# Patient Record
Sex: MALE | Race: WHITE | NOT HISPANIC OR LATINO | Employment: OTHER | URBAN - NONMETROPOLITAN AREA
[De-identification: names, ages, dates, MRNs, and addresses within clinical notes are randomized per-mention and may not be internally consistent; named-entity substitution may affect disease eponyms.]

---

## 2017-01-20 ENCOUNTER — LAB (OUTPATIENT)
Dept: ONCOLOGY | Facility: CLINIC | Age: 42
End: 2017-01-20

## 2017-01-20 ENCOUNTER — OFFICE VISIT (OUTPATIENT)
Dept: ONCOLOGY | Facility: CLINIC | Age: 42
End: 2017-01-20

## 2017-01-20 VITALS
DIASTOLIC BLOOD PRESSURE: 83 MMHG | OXYGEN SATURATION: 94 % | BODY MASS INDEX: 53.05 KG/M2 | SYSTOLIC BLOOD PRESSURE: 131 MMHG | HEART RATE: 91 BPM | RESPIRATION RATE: 18 BRPM | WEIGHT: 315 LBS | TEMPERATURE: 97.6 F

## 2017-01-20 DIAGNOSIS — I10 ESSENTIAL HYPERTENSION: ICD-10-CM

## 2017-01-20 DIAGNOSIS — D75.1 POLYCYTHEMIA: Primary | ICD-10-CM

## 2017-01-20 DIAGNOSIS — R93.89 MEDIASTINAL WIDENING: ICD-10-CM

## 2017-01-20 DIAGNOSIS — IMO0002 UNCONTROLLED TYPE 2 DIABETES MELLITUS WITH OTHER CIRCULATORY COMPLICATION: ICD-10-CM

## 2017-01-20 LAB
BASOPHILS # BLD AUTO: 0.05 10*3/MM3 (ref 0–0.3)
BASOPHILS NFR BLD AUTO: 0.5 % (ref 0–2)
DEPRECATED RDW RBC AUTO: 43.8 FL (ref 37–54)
EOSINOPHIL # BLD AUTO: 1.21 10*3/MM3 (ref 0–0.7)
EOSINOPHIL NFR BLD AUTO: 13.3 % (ref 0–5)
ERYTHROCYTE [DISTWIDTH] IN BLOOD BY AUTOMATED COUNT: 13.2 % (ref 11.5–14.5)
HCT VFR BLD AUTO: 52.7 % (ref 42–52)
HGB BLD-MCNC: 18.6 G/DL (ref 14–18)
IMM GRANULOCYTES # BLD: 0.03 10*3/MM3 (ref 0–0.03)
IMM GRANULOCYTES NFR BLD: 0.3 % (ref 0–0.5)
LYMPHOCYTES # BLD AUTO: 2.9 10*3/MM3 (ref 1–3)
LYMPHOCYTES NFR BLD AUTO: 31.9 % (ref 21–51)
MCH RBC QN AUTO: 32.4 PG (ref 27–33)
MCHC RBC AUTO-ENTMCNC: 35.3 G/DL (ref 33–37)
MCV RBC AUTO: 91.8 FL (ref 80–94)
MONOCYTES # BLD AUTO: 0.85 10*3/MM3 (ref 0.1–0.9)
MONOCYTES NFR BLD AUTO: 9.3 % (ref 0–10)
NEUTROPHILS # BLD AUTO: 4.06 10*3/MM3 (ref 1.4–6.5)
NEUTROPHILS NFR BLD AUTO: 44.7 % (ref 30–70)
PLATELET # BLD AUTO: 197 10*3/MM3 (ref 130–400)
PMV BLD AUTO: 10.6 FL (ref 6–10)
RBC # BLD AUTO: 5.74 10*6/MM3 (ref 4.7–6.1)
WBC NRBC COR # BLD: 9.1 10*3/MM3 (ref 4.5–12.5)

## 2017-01-20 PROCEDURE — 85025 COMPLETE CBC W/AUTO DIFF WBC: CPT | Performed by: INTERNAL MEDICINE

## 2017-01-20 PROCEDURE — 99214 OFFICE O/P EST MOD 30 MIN: CPT | Performed by: INTERNAL MEDICINE

## 2017-01-20 NOTE — LETTER
January 20, 2017     TYSON Sepulveda  [address] [facility] [address]    Patient: Johnathon Mclain   YOB: 1975   Date of Visit: 1/20/2017       Dear TYSON Argueta:    Johnathon Mclain was in my office today. Below are the relevant portions of my assessment and plan of care.      Polycythemiaassess for a myeloproliferative disorder I obtained a [id number] and Exon 12/CALR/MPL mutations which were negative. BCR ABL PCR was <0.001%. 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 I did make a sleep referral however he has not proceeded as he wanted to see if he would qualify for an at home study however insurance did not approve this. Therefore will make an appointment at the sleep center for testing. Abdominal ultrasound did show mild splenomegaly. He has not yet had pulmonary function tests and given his exposure to second hand smoke will obtain these. Will also obtain an erythropoietin level during his next visit. In the interim I have initiated phlebotomy for one unit (500 mL) to be taken every 2 weeks with goal of Hct <50%. He currently does not take iron supplementation.     Mediastinal Widening  This was seen on CXR in evaluation for secondary polycythemia therefore will obtain a CT Chest with IV contrast to further evaluate.     Elevated Liver Function Tests  Abdominal US was negative with mild splenomegaly.     I will have the patient return in follow up appointment to review test results in one month. He understands that should he have any questions or concerns prior to his appointment he should give us a call at any time and I would be happy to see him sooner. It was a pleasure to see this patient in clinic today, thank you for allowing me to participate in the care of this patient.     I spent 26 minutes in regards to this patient’s care today. More than 15 minutes of the time was spent in direct interaction with the patient for  the above problems.      Shy Lowe MD  01/20/2017  2:42 PM        If you have questions, please do not hesitate to call me. I look forward to following Johnathon along with you.         Sincerely,        Shy Lowe MD        CC: No Recipients

## 2017-01-20 NOTE — PROGRESS NOTES
Johnathon Mclain  [id number]  1975 1/20/2017    Referring Provider:   TYSON Sepulveda     Reason for Consultation:   Polycythemia/Erythrocytosis     Chief Complaint:  Fatigue      History of Present Illness   Johnathon Mclain is a very pleasant 41 y.o.  male who presents in new consultation at the request of TYSON Vinson for further management and evaluation of polycythemia.      Mr. Mclain reports that he has had an erythrocytosis for at least the last six months. He has also had some phlebotomy with his primary care provider. He denies of any significant weight changes, fevers, night sweats or lymphadenopathy. He denies of any significant headaches or pruritis. He does not have any abnormal bleeding or thrombosis history. He is a never smoker and is not on testosterone supplementation. His wife notes that he does snore at night and at time he does awake himself in the middle of his sleep. He has never been tested for obstructive sleep apnea.       The following portions of the patient's history were reviewed and updated as appropriate: allergies, current medications, past family history, past medical history, past social history, past surgical history and problem list.    No Known Allergies    Past Medical History   Diagnosis Date   • COPD (chronic obstructive pulmonary disease)    • Diabetes mellitus    • GERD (gastroesophageal reflux disease)    • Gout    • Hyperlipidemia      mixed   • Hypertension    • Neuralgia and neuritis    • Osteoarthrosis    • Type II diabetes mellitus, uncontrolled      uncomplicated       History reviewed. No pertinent past surgical history.    Social History     Social History   • Marital status:      Spouse name: N/A   • Number of children: N/A   • Years of education: N/A     Occupational History   • Not on file.     Social History Main Topics   • Smoking status: Never Smoker   • Smokeless tobacco: Current User     Types: Chew   • Alcohol  use No   • Drug use: No   • Sexual activity: Not on file     Other Topics Concern   • Not on file     Social History Narrative       Family History   Problem Relation Age of Onset   • Arthritis Mother    • COPD Mother    • Asthma Mother    • Cancer Mother    • Hyperlipidemia Mother    • Diabetes Father    • Heart disease Father    • Hyperlipidemia Father    • Hypertension Father    • Stroke Father          Current Outpatient Prescriptions:   •  albuterol (PROVENTIL HFA;VENTOLIN HFA) 108 (90 BASE) MCG/ACT inhaler, Inhale 1 puff As Needed for wheezing., Disp: 1 inhaler, Rfl: 5  •  aspirin  MG EC tablet, Take 325 mg by mouth daily., Disp: , Rfl:   •  Canagliflozin (INVOKANA) 100 MG tablet, Take 100 mg by mouth Daily., Disp: 30 tablet, Rfl: 5  •  carvedilol (COREG) 6.25 MG tablet, Take 1 tablet by mouth 2 (Two) Times a Day., Disp: 60 tablet, Rfl: 5  •  chlorthalidone (HYGROTON) 25 MG tablet, Take 1 tablet by mouth Daily., Disp: 30 tablet, Rfl: 5  •  cholecalciferol (VITAMIN D3) 1000 UNITS tablet, Take 1,000 Units by mouth 2 (two) times a day., Disp: , Rfl:   •  clobetasol (TEMOVATE) 0.05 % cream, Apply  topically 2 (Two) Times a Day., Disp: 30 g, Rfl: 5  •  clotrimazole (LOTRIMIN) 1 % cream, Apply 1 application topically daily., Disp: , Rfl:   •  colchicine 0.6 MG tablet, Take 1 tablet by mouth Daily., Disp: 30 tablet, Rfl: 5  •  febuxostat (ULORIC) 80 MG tablet tablet, Take 1 tablet by mouth Daily., Disp: 30 tablet, Rfl: 5  •  fenofibrate (TRICOR) 48 MG tablet, Take 1 tablet by mouth Daily., Disp: 30 tablet, Rfl: 5  •  fluticasone (FLONASE) 50 MCG/ACT nasal spray, 2 sprays into each nostril Daily. Administer 2 sprays in each nostril for each dose., Disp: 1 bottle, Rfl: 5  •  gabapentin (NEURONTIN) 600 MG tablet, Take 1 tablet by mouth 3 (Three) Times a Day., Disp: 90 tablet, Rfl: 5  •  glipiZIDE (GLUCOTROL) 5 MG tablet, Take 1 tablet by mouth Daily., Disp: 30 tablet, Rfl: 5  •  HYDROcodone-acetaminophen (NORCO)  7.5-325 MG per tablet, Take 1 tablet by mouth Every 6 (Six) Hours As Needed for moderate pain (4-6) or severe pain (7-10)., Disp: 60 tablet, Rfl: 0  •  icosapent ethyl (VASCEPA) 1 G capsule capsule, Take 1 g by mouth 2 (Two) Times a Day With Meals., Disp: 120 capsule, Rfl: 5  •  lisinopril (PRINIVIL,ZESTRIL) 20 MG tablet, Take 1 tablet by mouth 2 (Two) Times a Day., Disp: 60 tablet, Rfl: 5  •  Loratadine 10 MG capsule, Take 10 mg by mouth Daily., Disp: 30 each, Rfl: 5  •  metFORMIN (GLUCOPHAGE) 1000 MG tablet, Take 1 tablet by mouth 2 (Two) Times a Day., Disp: 60 tablet, Rfl: 5  •  omeprazole (priLOSEC) 20 MG capsule, Take 1 capsule by mouth Daily., Disp: 30 capsule, Rfl: 5        Review of Systems  A comprehensive 12 point review of systems was conducted with patient and positive as per HPI otherwise negative.         Physical Exam  Vital Signs: These were reviewed and listed as per patient’s electronic medical chart  Vitals:    01/20/17 1116   BP: 131/83   Pulse: 91   Resp: 18   Temp: 97.6 °F (36.4 °C)   SpO2: 94%     General: Awake, alert and oriented, in no distress, obese  HEENT: Head is atraumatic, normocephalic, extraocular movements full, oropharynx clear, no scleral icterus, pink moist mucous membranes, large neck  Neck: supple, no jvd, lymphadenopathy or masses  Cardiovascular: regular rate and rhythm without murmurs, rubs or gallops  Pulmonary: non-labored, clear to auscultation bilaterally, no wheezing  Abdomen: soft, non-tender, non-distended, normal active bowel sounds present, no organomegaly  Extremities: No clubbing, cyanosis or edema  Lymph: No cervical, supraclavicular, axillary, adenopathy  Neurologic: Mental status as above, alert, awake and oriented, grossly non-focal exam  Skin: warm, dry, intact        Labs / Studies:    Office Visit on 01/20/2017   Component Date Value   • WBC 01/20/2017 9.10    • RBC 01/20/2017 5.74    • Hemoglobin 01/20/2017 18.6*   • Hematocrit 01/20/2017 52.7*   • MCV  01/20/2017 91.8    • MCH 01/20/2017 32.4    • MCHC 01/20/2017 35.3    • RDW 01/20/2017 13.2    • RDW-SD 01/20/2017 43.8    • MPV 01/20/2017 10.6*   • Platelets 01/20/2017 197    • Neutrophil % 01/20/2017 44.7    • Lymphocyte % 01/20/2017 31.9    • Monocyte % 01/20/2017 9.3    • Eosinophil % 01/20/2017 13.3*   • Basophil % 01/20/2017 0.5    • Immature Grans % 01/20/2017 0.3    • Neutrophils, Absolute 01/20/2017 4.06    • Lymphocytes, Absolute 01/20/2017 2.90    • Monocytes, Absolute 01/20/2017 0.85    • Eosinophils, Absolute 01/20/2017 1.21*   • Basophils, Absolute 01/20/2017 0.05    • Immature Grans, Absolute 01/20/2017 0.03    Lab on 12/22/2016   Component Date Value   • WBC 12/22/2016 9.37    • RBC 12/22/2016 5.79    • Hemoglobin 12/22/2016 18.7*   • Hematocrit 12/22/2016 53.9*   • MCV 12/22/2016 93.1    • MCH 12/22/2016 32.3    • MCHC 12/22/2016 34.7    • RDW 12/22/2016 13.1    • RDW-SD 12/22/2016 44.2    • MPV 12/22/2016 10.5*   • Platelets 12/22/2016 183    • Neutrophil % 12/22/2016 50.6    • Lymphocyte % 12/22/2016 28.3    • Monocyte % 12/22/2016 9.2    • Eosinophil % 12/22/2016 11.2*   • Basophil % 12/22/2016 0.4    • Immature Grans % 12/22/2016 0.3    • Neutrophils, Absolute 12/22/2016 4.74    • Lymphocytes, Absolute 12/22/2016 2.65    • Monocytes, Absolute 12/22/2016 0.86    • Eosinophils, Absolute 12/22/2016 1.05*   • Basophils, Absolute 12/22/2016 0.04    • Immature Grans, Absolute 12/22/2016 0.03    • Pulse 12/22/2016 95    • Volume Collected 12/22/2016 510    • Pre-Hgb 12/22/2016 18.7    • Pre-Hct 12/22/2016 53.9    • Pre-Blood Pressure 12/22/2016 150/95    • Post-Blood Pressure 12/22/2016 144/85    Consult on 12/16/2016   Component Date Value   • B2A2 transcript 12/16/2016 Comment    • B3A2 transcript 12/16/2016 Comment    • E1A2 transcript 12/16/2016 Comment    • Interpretation 12/16/2016 Comment    • Director Review 12/16/2016 Comment    • Background: 12/16/2016 Comment    • JAK2 [id number] Mutation  12/16/2016 Comment    • Background: 12/16/2016 Comment    • Director Review 12/16/2016 Comment    • Reflex 12/16/2016 Comment    • Glucose 12/16/2016 185*   • BUN 12/16/2016 24*   • Creatinine 12/16/2016 1.03    • Sodium 12/16/2016 136    • Potassium 12/16/2016 4.4    • Chloride 12/16/2016 100    • CO2 12/16/2016 27.1    • Calcium 12/16/2016 9.9    • Total Protein 12/16/2016 7.4    • Albumin 12/16/2016 4.50    • ALT (SGPT) 12/16/2016 86*   • AST (SGOT) 12/16/2016 45*   • Alkaline Phosphatase 12/16/2016 68    • Total Bilirubin 12/16/2016 0.9    • eGFR Non  Amer 12/16/2016 79    • Globulin 12/16/2016 2.9    • A/G Ratio 12/16/2016 1.6    • BUN/Creatinine Ratio 12/16/2016 23.3    • Anion Gap 12/16/2016 8.9    • Erythropoietin 12/16/2016 10.5    • WBC 12/16/2016 8.63    • RBC 12/16/2016 5.70    • Hemoglobin 12/16/2016 18.1*   • Hematocrit 12/16/2016 52.3*   • MCV 12/16/2016 91.8    • MCH 12/16/2016 31.8    • MCHC 12/16/2016 34.6    • RDW 12/16/2016 13.3    • RDW-SD 12/16/2016 43.7    • MPV 12/16/2016 10.2*   • Platelets 12/16/2016 214    • Neutrophil % 12/16/2016 49.0    • Lymphocyte % 12/16/2016 27.3    • Monocyte % 12/16/2016 9.8    • Eosinophil % 12/16/2016 13.1*   • Basophil % 12/16/2016 0.6    • Immature Grans % 12/16/2016 0.2    • Neutrophils, Absolute 12/16/2016 4.22    • Lymphocytes, Absolute 12/16/2016 2.36    • Monocytes, Absolute 12/16/2016 0.85    • Eosinophils, Absolute 12/16/2016 1.13*   • Basophils, Absolute 12/16/2016 0.05    • Immature Grans, Absolute 12/16/2016 0.02    • Osmolality Calc 12/16/2016 281    • CALR Mutation Detection * 12/16/2016 Comment    • Background: 12/16/2016 Comment    • Methodology 12/16/2016 Comment    • Reference: 12/16/2016 Comment    • Director Review 12/16/2016 Comment    • JAK2 Exon 12 Analysis Re* 12/16/2016 Comment    • Background 12/16/2016 Comment    • Director Review 12/16/2016 Comment    • MPL Mutation Analysis Re* 12/16/2016 Comment    • Background 12/16/2016  Comment    • Method: 12/16/2016 Comment    • Reference 12/16/2016 Comment    • Director Review: 12/16/2016 Comment    Office Visit on 12/08/2016   Component Date Value   • Glucose 12/08/2016 163*   • BUN 12/08/2016 22*   • Creatinine 12/08/2016 0.93    • Sodium 12/08/2016 137    • Potassium 12/08/2016 4.8    • Chloride 12/08/2016 104    • CO2 12/08/2016 24.1*   • Calcium 12/08/2016 10.4*   • Total Protein 12/08/2016 7.6    • Albumin 12/08/2016 4.80    • ALT (SGPT) 12/08/2016 84*   • AST (SGOT) 12/08/2016 41*   • Alkaline Phosphatase 12/08/2016 74    • Total Bilirubin 12/08/2016 1.1    • eGFR Non  Amer 12/08/2016 89    • Globulin 12/08/2016 2.8    • A/G Ratio 12/08/2016 1.7    • BUN/Creatinine Ratio 12/08/2016 23.7    • Anion Gap 12/08/2016 8.9    • Total Cholesterol 12/08/2016 171    • Triglycerides 12/08/2016 306*   • HDL Cholesterol 12/08/2016 29*   • LDL Cholesterol  12/08/2016 81    • VLDL Cholesterol 12/08/2016 61.2    • LDL/HDL Ratio 12/08/2016 2.79    • TSH 12/08/2016 0.803    • Hemoglobin A1C 12/08/2016 6.80*   • Uric Acid 12/08/2016 4.6    • WBC 12/08/2016 9.77    • RBC 12/08/2016 5.94    • Hemoglobin 12/08/2016 18.5*   • Hematocrit 12/08/2016 54.8*   • MCV 12/08/2016 92.3    • MCH 12/08/2016 31.1    • MCHC 12/08/2016 33.8    • RDW 12/08/2016 13.6    • RDW-SD 12/08/2016 45.0    • MPV 12/08/2016 10.6*   • Platelets 12/08/2016 215    • Neutrophil % 12/08/2016 50.4    • Lymphocyte % 12/08/2016 23.2    • Monocyte % 12/08/2016 11.2*   • Eosinophil % 12/08/2016 13.7*   • Basophil % 12/08/2016 1.0    • Immature Grans % 12/08/2016 0.5    • Neutrophils, Absolute 12/08/2016 4.92    • Lymphocytes, Absolute 12/08/2016 2.27    • Monocytes, Absolute 12/08/2016 1.09*   • Eosinophils, Absolute 12/08/2016 1.34*   • Basophils, Absolute 12/08/2016 0.10    • Immature Grans, Absolute 12/08/2016 0.05*   • Osmolality Calc 12/08/2016 281           12/30/16: US ABDOMEN COMPLETE: Visualized pancreas is unremarkable. The  imaged portion of the abdominal aorta is nondilated.  The liver is homogeneous. There is no intrahepatic ductal dilatation or focal hepatic mass. The imaged portion of the hepatic vessels and inferior vena cava are patent. The gallbladder shows no cholelithiasis or pericholecystic fluid. The wall of the gallbladder is not thickened. It measures 2.9 mm. The common bile duct is normal, measuring 4.8 mm. The kidneys demonstrate no evidence of hydronephrosis or solid renal mass. The spleen is homogeneous and measures 14.2 cm which is slightly enlarged. IMPRESSION: Negative abdominal ultrasound.     12/30/16: XR CHEST PA AND LATERAL: The cardiac silhouette is normal in size. There is some slight widening of the upper mediastinum. This can be seen with tortuous vessels. I could not entirely exclude some right paratracheal adenopathy. Correlation with old films or CT may add additional information. The lungs show increased interstitial markings throughout the lungs. No alveolar infiltrates to suggest pneumonia are seen; there are no signs of heart failure or pleural fluid. IMPRESSION: Slight widening of the mediastinum with fullness in the right paratracheal area. There is interstitial lung disease throughout both lungs.            Assessment/Plan   Polycythemia  To assess for a myeloproliferative disorder I obtained a [id number] and Exon 12/CALR/MPL mutations which were negative. BCR ABL PCR was <0.001%. 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 I did make a sleep referral however he has not proceeded as he wanted to see if he would qualify for an at home study however insurance did not approve this. Therefore will make an appointment at the sleep center for testing. Abdominal ultrasound did show mild splenomegaly. He has not yet had pulmonary function tests and given his exposure to second hand smoke will obtain these. Will also obtain an erythropoietin level during his  next visit. In the interim I have initiated phlebotomy for one unit (500 mL) to be taken every 2 weeks with goal of Hct <50%. He currently does not take iron supplementation.     Mediastinal Widening  This was seen on CXR in evaluation for secondary polycythemia therefore will obtain a CT Chest with IV contrast to further evaluate.     Elevated Liver Function Tests  Abdominal US was negative with mild splenomegaly.     I will have the patient return in follow up appointment to review test results in one month. He understands that should he have any questions or concerns prior to his appointment he should give us a call at any time and I would be happy to see him sooner. It was a pleasure to see this patient in clinic today, thank you for allowing me to participate in the care of this patient.     I spent 26 minutes in regards to this patient’s care today. More than 15 minutes of the time was spent in direct interaction with the patient for the above problems.      Shy Lowe MD  01/20/2017  2:42 PM

## 2017-01-20 NOTE — MR AVS SNAPSHOT
Johnathon Mclain   1/20/2017 11:45 AM   Office Visit    Dept Phone:  [phone]   Encounter #:  [id number]    Provider:  Shy Lowe MD   Department:  [facility] HEMATOLOGY  AND ONCOLOGY                Your Full Care Plan              Your Updated Medication List          This list is accurate as of: 1/20/17 12:16 PM.  Always use your most recent med list.                albuterol 108 (90 BASE) MCG/ACT inhaler   Commonly known as:  PROVENTIL HFA;VENTOLIN HFA   Inhale 1 puff As Needed for wheezing.       aspirin  MG tablet       Canagliflozin 100 MG tablet   Commonly known as:  INVOKANA   Take 100 mg by mouth Daily.       carvedilol 6.25 MG tablet   Commonly known as:  COREG   Take 1 tablet by mouth 2 (Two) Times a Day.       chlorthalidone 25 MG tablet   Commonly known as:  HYGROTON   Take 1 tablet by mouth Daily.       cholecalciferol 1000 UNITS tablet   Commonly known as:  VITAMIN D3       clobetasol 0.05 % cream   Commonly known as:  TEMOVATE   Apply  topically 2 (Two) Times a Day.       clotrimazole 1 % cream   Commonly known as:  LOTRIMIN       colchicine 0.6 MG tablet   Take 1 tablet by mouth Daily.       febuxostat 80 MG tablet tablet   Commonly known as:  ULORIC   Take 1 tablet by mouth Daily.       fenofibrate 48 MG tablet   Commonly known as:  TRICOR   Take 1 tablet by mouth Daily.       fluticasone 50 MCG/ACT nasal spray   Commonly known as:  FLONASE   2 sprays into each nostril Daily. Administer 2 sprays in each nostril for each dose.       gabapentin 600 MG tablet   Commonly known as:  NEURONTIN   Take 1 tablet by mouth 3 (Three) Times a Day.       glipiZIDE 5 MG tablet   Commonly known as:  GLUCOTROL   Take 1 tablet by mouth Daily.       HYDROcodone-acetaminophen 7.5-325 MG per tablet   Commonly known as:  NORCO   Take 1 tablet by mouth Every 6 (Six) Hours As Needed for moderate pain (4-6) or severe pain (7-10).       icosapent ethyl 1 G capsule  capsule   Commonly known as:  VASCEPA   Take 1 g by mouth 2 (Two) Times a Day With Meals.       lisinopril 20 MG tablet   Commonly known as:  PRINIVIL,ZESTRIL   Take 1 tablet by mouth 2 (Two) Times a Day.       Loratadine 10 MG capsule   Take 10 mg by mouth Daily.       metFORMIN 1000 MG tablet   Commonly known as:  GLUCOPHAGE   Take 1 tablet by mouth 2 (Two) Times a Day.       omeprazole 20 MG capsule   Commonly known as:  priLOSEC   Take 1 capsule by mouth Daily.               We Performed the Following     CBC & Differential     CBC Auto Differential       You Were Diagnosed With        Codes Comments    Polycythemia    -  Primary ICD-10-CM: D75.1  ICD-9-CM: 238.4     Mediastinal widening     ICD-10-CM: R93.8  ICD-9-CM: 793.2       Instructions     None    Patient Instructions History      Upcoming Appointments     Visit Type Date Time Department    FOLLOW UP 1/20/2017 11:45 AM [facility]    LAB 1/20/2017 12:15 PM [facility]    FOLLOW UP 3/3/2017 11:00 AM [facility]    OFFICE VISIT 6/8/2017  8:00 AM [facility]      Lockbox Signup     Our records indicate that you have an active [employer] account.    You can view your After Visit Summary by going to [employer] and logging in with your Lockbox username and password.  If you don't have a Lockbox username and password but a parent or guardian has access to your record, the parent or guardian should login with their own Lockbox username and password and access your record to view the After Visit Summary.    If you have questions, you can email [email or web address] or call [phone] to talk to our Lockbox staff.  Remember, Lockbox is NOT to be used for urgent needs.  For medical emergencies, dial 911.               Other Info from Your Visit           Your Appointments     Mar 03, 2017 11:00 AM EST   Follow Up with Shy Lowe MD   [facility]  [facility]COL[facility] ([place])    [address]  [facility] [facility]  [facility] [id number] [phone]           Arrive 15 minutes prior to appointment.            Jun 08, 2017  8:00 AM EDT   Office Visit with TYSON Sepulveda   [facility] (--)    [address] [place] [id number] [phone]           Please arrive 10 minutes early, bring a complete list of all medications and bring any previous records or diagnostic testing results.              Allergies     No Known Allergies      Vital Signs     Blood Pressure Pulse Temperature Respirations Weight Oxygen Saturation    131/83 91 97.6 °F (36.4 °C) (Oral) 18 375 lb (170 kg) 94%    Body Mass Index Smoking Status                53.05 kg/m2 Never Smoker          Problems and Diagnoses Noted     Elevated red blood cell count    -  Primary    Mediastinal widening

## 2017-01-23 ENCOUNTER — LAB (OUTPATIENT)
Dept: ONCOLOGY | Facility: CLINIC | Age: 42
End: 2017-01-23

## 2017-01-23 VITALS
TEMPERATURE: 96.8 F | BODY MASS INDEX: 53.36 KG/M2 | HEART RATE: 92 BPM | SYSTOLIC BLOOD PRESSURE: 126 MMHG | DIASTOLIC BLOOD PRESSURE: 81 MMHG | RESPIRATION RATE: 18 BRPM | WEIGHT: 315 LBS | OXYGEN SATURATION: 95 %

## 2017-01-23 DIAGNOSIS — D75.1 POLYCYTHEMIA: Primary | ICD-10-CM

## 2017-01-23 LAB
BASOPHILS # BLD AUTO: 0.04 10*3/MM3 (ref 0–0.3)
BASOPHILS NFR BLD AUTO: 0.5 % (ref 0–2)
DEPRECATED RDW RBC AUTO: 44.2 FL (ref 37–54)
EOSINOPHIL # BLD AUTO: 1.29 10*3/MM3 (ref 0–0.7)
EOSINOPHIL NFR BLD AUTO: 14.8 % (ref 0–5)
ERYTHROCYTE [DISTWIDTH] IN BLOOD BY AUTOMATED COUNT: 13.3 % (ref 11.5–14.5)
HCT VFR BLD AUTO: 53 % (ref 42–52)
HGB BLD-MCNC: 19.1 G/DL (ref 14–18)
IMM GRANULOCYTES # BLD: 0.03 10*3/MM3 (ref 0–0.03)
IMM GRANULOCYTES NFR BLD: 0.3 % (ref 0–0.5)
LYMPHOCYTES # BLD AUTO: 2.79 10*3/MM3 (ref 1–3)
LYMPHOCYTES NFR BLD AUTO: 32.1 % (ref 21–51)
MCH RBC QN AUTO: 33.2 PG (ref 27–33)
MCHC RBC AUTO-ENTMCNC: 36 G/DL (ref 33–37)
MCV RBC AUTO: 92 FL (ref 80–94)
MONOCYTES # BLD AUTO: 0.94 10*3/MM3 (ref 0.1–0.9)
MONOCYTES NFR BLD AUTO: 10.8 % (ref 0–10)
NEUTROPHILS # BLD AUTO: 3.61 10*3/MM3 (ref 1.4–6.5)
NEUTROPHILS NFR BLD AUTO: 41.5 % (ref 30–70)
PLATELET # BLD AUTO: 193 10*3/MM3 (ref 130–400)
PMV BLD AUTO: 10.5 FL (ref 6–10)
POST-BLOOD PRESSURE: NORMAL
PRE-BLOOD PRESSURE: NORMAL
PRE-HCT: 53
PRE-HGB: 19.1
PULSE: 92
RBC # BLD AUTO: 5.76 10*6/MM3 (ref 4.7–6.1)
VOLUME COLLECTED: 510
WBC NRBC COR # BLD: 8.7 10*3/MM3 (ref 4.5–12.5)

## 2017-01-23 PROCEDURE — 85025 COMPLETE CBC W/AUTO DIFF WBC: CPT | Performed by: INTERNAL MEDICINE

## 2017-01-23 PROCEDURE — 36415 COLL VENOUS BLD VENIPUNCTURE: CPT

## 2017-01-26 ENCOUNTER — HOSPITAL ENCOUNTER (OUTPATIENT)
Dept: CT IMAGING | Facility: HOSPITAL | Age: 42
Discharge: HOME | End: 2017-01-26
Attending: INTERNAL MEDICINE | Admitting: INTERNAL MEDICINE

## 2017-01-26 DIAGNOSIS — R93.89 MEDIASTINAL WIDENING: ICD-10-CM

## 2017-01-26 LAB — CREAT BLDA-MCNC: 1.1 MG/DL (ref 0.6–1.3)

## 2017-01-26 PROCEDURE — 82565 ASSAY OF CREATININE: CPT

## 2017-01-26 PROCEDURE — 71260 CT THORAX DX C+: CPT | Performed by: RADIOLOGY

## 2017-01-26 PROCEDURE — 0 IOPAMIDOL 61 % SOLUTION: Performed by: INTERNAL MEDICINE

## 2017-01-26 PROCEDURE — 71260 CT THORAX DX C+: CPT

## 2017-01-26 RX ADMIN — IOPAMIDOL 100 ML: 612 INJECTION, SOLUTION INTRAVENOUS at 09:00

## 2017-02-06 ENCOUNTER — LAB (OUTPATIENT)
Dept: ONCOLOGY | Facility: CLINIC | Age: 42
End: 2017-02-06

## 2017-02-06 VITALS
OXYGEN SATURATION: 99 % | RESPIRATION RATE: 20 BRPM | TEMPERATURE: 97.6 F | SYSTOLIC BLOOD PRESSURE: 123 MMHG | HEART RATE: 89 BPM | DIASTOLIC BLOOD PRESSURE: 66 MMHG

## 2017-02-06 DIAGNOSIS — D75.1 POLYCYTHEMIA: ICD-10-CM

## 2017-02-06 LAB
BASOPHILS # BLD AUTO: 0.04 10*3/MM3 (ref 0–0.3)
BASOPHILS NFR BLD AUTO: 0.5 % (ref 0–2)
DEPRECATED RDW RBC AUTO: 43.6 FL (ref 37–54)
EOSINOPHIL # BLD AUTO: 1.19 10*3/MM3 (ref 0–0.7)
EOSINOPHIL NFR BLD AUTO: 14 % (ref 0–5)
ERYTHROCYTE [DISTWIDTH] IN BLOOD BY AUTOMATED COUNT: 13.2 % (ref 11.5–14.5)
HCT VFR BLD AUTO: 51.7 % (ref 42–52)
HGB BLD-MCNC: 17.7 G/DL (ref 14–18)
IMM GRANULOCYTES # BLD: 0.02 10*3/MM3 (ref 0–0.03)
IMM GRANULOCYTES NFR BLD: 0.2 % (ref 0–0.5)
LYMPHOCYTES # BLD AUTO: 2.5 10*3/MM3 (ref 1–3)
LYMPHOCYTES NFR BLD AUTO: 29.4 % (ref 21–51)
MCH RBC QN AUTO: 31.3 PG (ref 27–33)
MCHC RBC AUTO-ENTMCNC: 34.2 G/DL (ref 33–37)
MCV RBC AUTO: 91.5 FL (ref 80–94)
MONOCYTES # BLD AUTO: 0.82 10*3/MM3 (ref 0.1–0.9)
MONOCYTES NFR BLD AUTO: 9.6 % (ref 0–10)
NEUTROPHILS # BLD AUTO: 3.93 10*3/MM3 (ref 1.4–6.5)
NEUTROPHILS NFR BLD AUTO: 46.3 % (ref 30–70)
PLATELET # BLD AUTO: 202 10*3/MM3 (ref 130–400)
PMV BLD AUTO: 10 FL (ref 6–10)
POST-BLOOD PRESSURE: NORMAL
PRE-BLOOD PRESSURE: NORMAL
PRE-HCT: 51.7
PRE-HGB: 17.7
PULSE: 89
RBC # BLD AUTO: 5.65 10*6/MM3 (ref 4.7–6.1)
VOLUME COLLECTED: 517
WBC NRBC COR # BLD: 8.5 10*3/MM3 (ref 4.5–12.5)

## 2017-02-06 PROCEDURE — 85025 COMPLETE CBC W/AUTO DIFF WBC: CPT | Performed by: INTERNAL MEDICINE

## 2017-02-20 ENCOUNTER — LAB (OUTPATIENT)
Dept: ONCOLOGY | Facility: CLINIC | Age: 42
End: 2017-02-20

## 2017-02-20 VITALS
OXYGEN SATURATION: 96 % | HEART RATE: 104 BPM | RESPIRATION RATE: 16 BRPM | TEMPERATURE: 97 F | SYSTOLIC BLOOD PRESSURE: 119 MMHG | DIASTOLIC BLOOD PRESSURE: 76 MMHG

## 2017-02-20 DIAGNOSIS — D75.1 POLYCYTHEMIA: ICD-10-CM

## 2017-02-20 LAB
BASOPHILS # BLD AUTO: 0.04 10*3/MM3 (ref 0–0.3)
BASOPHILS NFR BLD AUTO: 0.4 % (ref 0–2)
DEPRECATED RDW RBC AUTO: 42.2 FL (ref 37–54)
EOSINOPHIL # BLD AUTO: 1.14 10*3/MM3 (ref 0–0.7)
EOSINOPHIL NFR BLD AUTO: 12.4 % (ref 0–5)
ERYTHROCYTE [DISTWIDTH] IN BLOOD BY AUTOMATED COUNT: 12.8 % (ref 11.5–14.5)
HCT VFR BLD AUTO: 53.1 % (ref 42–52)
HGB BLD-MCNC: 18.4 G/DL (ref 14–18)
IMM GRANULOCYTES # BLD: 0.04 10*3/MM3 (ref 0–0.03)
IMM GRANULOCYTES NFR BLD: 0.4 % (ref 0–0.5)
LYMPHOCYTES # BLD AUTO: 2.22 10*3/MM3 (ref 1–3)
LYMPHOCYTES NFR BLD AUTO: 24.1 % (ref 21–51)
MCH RBC QN AUTO: 31.7 PG (ref 27–33)
MCHC RBC AUTO-ENTMCNC: 34.7 G/DL (ref 33–37)
MCV RBC AUTO: 91.6 FL (ref 80–94)
MONOCYTES # BLD AUTO: 0.95 10*3/MM3 (ref 0.1–0.9)
MONOCYTES NFR BLD AUTO: 10.3 % (ref 0–10)
NEUTROPHILS # BLD AUTO: 4.81 10*3/MM3 (ref 1.4–6.5)
NEUTROPHILS NFR BLD AUTO: 52.4 % (ref 30–70)
PLATELET # BLD AUTO: 212 10*3/MM3 (ref 130–400)
PMV BLD AUTO: 10.6 FL (ref 6–10)
POST-BLOOD PRESSURE: NORMAL
PRE-BLOOD PRESSURE: NORMAL
PRE-HCT: 53.1
PRE-HGB: 18.4
PULSE: 104
RBC # BLD AUTO: 5.8 10*6/MM3 (ref 4.7–6.1)
VOLUME COLLECTED: 524
WBC NRBC COR # BLD: 9.2 10*3/MM3 (ref 4.5–12.5)

## 2017-02-20 PROCEDURE — 85025 COMPLETE CBC W/AUTO DIFF WBC: CPT | Performed by: INTERNAL MEDICINE

## 2017-03-03 ENCOUNTER — LAB (OUTPATIENT)
Dept: ONCOLOGY | Facility: CLINIC | Age: 42
End: 2017-03-03

## 2017-03-03 ENCOUNTER — OFFICE VISIT (OUTPATIENT)
Dept: ONCOLOGY | Facility: CLINIC | Age: 42
End: 2017-03-03

## 2017-03-03 VITALS
RESPIRATION RATE: 16 BRPM | OXYGEN SATURATION: 96 % | SYSTOLIC BLOOD PRESSURE: 144 MMHG | HEART RATE: 93 BPM | BODY MASS INDEX: 52.9 KG/M2 | TEMPERATURE: 97.5 F | WEIGHT: 315 LBS | DIASTOLIC BLOOD PRESSURE: 79 MMHG

## 2017-03-03 DIAGNOSIS — D75.1 POLYCYTHEMIA: Primary | ICD-10-CM

## 2017-03-03 DIAGNOSIS — M79.2 NEURALGIA AND NEURITIS: ICD-10-CM

## 2017-03-03 DIAGNOSIS — IMO0002 UNCONTROLLED TYPE 2 DIABETES MELLITUS WITH OTHER CIRCULATORY COMPLICATION: ICD-10-CM

## 2017-03-03 LAB
BASOPHILS # BLD AUTO: 0.05 10*3/MM3 (ref 0–0.3)
BASOPHILS NFR BLD AUTO: 0.6 % (ref 0–2)
DEPRECATED RDW RBC AUTO: 41.6 FL (ref 37–54)
EOSINOPHIL # BLD AUTO: 1.02 10*3/MM3 (ref 0–0.7)
EOSINOPHIL NFR BLD AUTO: 11.7 % (ref 0–5)
ERYTHROCYTE [DISTWIDTH] IN BLOOD BY AUTOMATED COUNT: 12.6 % (ref 11.5–14.5)
HCT VFR BLD AUTO: 49.4 % (ref 42–52)
HGB BLD-MCNC: 17 G/DL (ref 14–18)
IMM GRANULOCYTES # BLD: 0.02 10*3/MM3 (ref 0–0.03)
IMM GRANULOCYTES NFR BLD: 0.2 % (ref 0–0.5)
LYMPHOCYTES # BLD AUTO: 2.67 10*3/MM3 (ref 1–3)
LYMPHOCYTES NFR BLD AUTO: 30.5 % (ref 21–51)
MCH RBC QN AUTO: 31.4 PG (ref 27–33)
MCHC RBC AUTO-ENTMCNC: 34.4 G/DL (ref 33–37)
MCV RBC AUTO: 91.3 FL (ref 80–94)
MONOCYTES # BLD AUTO: 1.06 10*3/MM3 (ref 0.1–0.9)
MONOCYTES NFR BLD AUTO: 12.1 % (ref 0–10)
NEUTROPHILS # BLD AUTO: 3.92 10*3/MM3 (ref 1.4–6.5)
NEUTROPHILS NFR BLD AUTO: 44.9 % (ref 30–70)
PLATELET # BLD AUTO: 217 10*3/MM3 (ref 130–400)
PMV BLD AUTO: 10.3 FL (ref 6–10)
POST-BLOOD PRESSURE: NORMAL
PRE-BLOOD PRESSURE: NORMAL
PRE-HCT: 49.4
PRE-HGB: 17
PULSE: 93
RBC # BLD AUTO: 5.41 10*6/MM3 (ref 4.7–6.1)
VOLUME COLLECTED: 510
WBC NRBC COR # BLD: 8.74 10*3/MM3 (ref 4.5–12.5)

## 2017-03-03 PROCEDURE — 85025 COMPLETE CBC W/AUTO DIFF WBC: CPT | Performed by: INTERNAL MEDICINE

## 2017-03-03 PROCEDURE — 99214 OFFICE O/P EST MOD 30 MIN: CPT | Performed by: INTERNAL MEDICINE

## 2017-03-03 NOTE — PROGRESS NOTES
Johnathon Mclain  [id number]  1975  3/6/2017    Referring Provider:   TYSON Sepulveda     Reason for Consultation:   Polycythemia/Erythrocytosis     Chief Complaint:  Fatigue      History of Present Illness   Johnathon Mclain is a very pleasant 41 y.o.  male who presents in new consultation at the request of TYSON Vinson for further management and evaluation of polycythemia.      Mr. Mclain reports that he has had an erythrocytosis since summer of 2016. He has also had some phlebotomy with his primary care provider. He denies of any significant weight changes, fevers, night sweats or lymphadenopathy. He denies of any significant headaches or pruritis. He does not have any abnormal bleeding or thrombosis history. He is a never smoker, however does have strong exposure to second hand smoke and also worked in the coal mines. His wife notes that he does snore at night and at time he does awake himself in the middle of his sleep. He has never been tested for obstructive sleep apnea. He was also previously on testosterone supplementation however is no longer taking this.    Patient's main complaint today is gout related pain othewise he denies of any signifcant complaints. He does have chronic dyspnea with long distances and is currently on inhalers. He reports that his last pulmonary function tests were performed some time ago and and these showed decreased lung function requiring his to take inhalers.      The following portions of the patient's history were reviewed and updated as appropriate: allergies, current medications, past family history, past medical history, past social history, past surgical history and problem list.    No Known Allergies    Past Medical History   Diagnosis Date   • COPD (chronic obstructive pulmonary disease)    • Diabetes mellitus    • GERD (gastroesophageal reflux disease)    • Gout    • Hyperlipidemia      mixed   • Hypertension    • Neuralgia and  neuritis    • Osteoarthrosis    • Type II diabetes mellitus, uncontrolled      uncomplicated       History reviewed. No pertinent past surgical history.    Social History     Social History   • Marital status:      Spouse name: N/A   • Number of children: N/A   • Years of education: N/A     Occupational History   • Not on file.     Social History Main Topics   • Smoking status: Never Smoker   • Smokeless tobacco: Current User     Types: Chew   • Alcohol use No   • Drug use: No   • Sexual activity: Not on file     Other Topics Concern   • Not on file     Social History Narrative       Family History   Problem Relation Age of Onset   • Arthritis Mother    • COPD Mother    • Asthma Mother    • Cancer Mother    • Hyperlipidemia Mother    • Diabetes Father    • Heart disease Father    • Hyperlipidemia Father    • Hypertension Father    • Stroke Father          Current Outpatient Prescriptions:   •  albuterol (PROVENTIL HFA;VENTOLIN HFA) 108 (90 BASE) MCG/ACT inhaler, Inhale 1 puff As Needed for wheezing., Disp: 1 inhaler, Rfl: 5  •  aspirin  MG EC tablet, Take 325 mg by mouth daily., Disp: , Rfl:   •  Canagliflozin (INVOKANA) 100 MG tablet, Take 100 mg by mouth Daily., Disp: 30 tablet, Rfl: 5  •  carvedilol (COREG) 6.25 MG tablet, Take 1 tablet by mouth 2 (Two) Times a Day., Disp: 60 tablet, Rfl: 5  •  chlorthalidone (HYGROTON) 25 MG tablet, Take 1 tablet by mouth Daily., Disp: 30 tablet, Rfl: 5  •  cholecalciferol (VITAMIN D3) 1000 UNITS tablet, Take 1,000 Units by mouth 2 (two) times a day., Disp: , Rfl:   •  clobetasol (TEMOVATE) 0.05 % cream, Apply  topically 2 (Two) Times a Day., Disp: 30 g, Rfl: 5  •  clotrimazole (LOTRIMIN) 1 % cream, Apply 1 application topically daily., Disp: , Rfl:   •  colchicine 0.6 MG tablet, Take 1 tablet by mouth Daily., Disp: 30 tablet, Rfl: 5  •  febuxostat (ULORIC) 80 MG tablet tablet, Take 1 tablet by mouth Daily., Disp: 30 tablet, Rfl: 5  •  fenofibrate (TRICOR) 48 MG  tablet, Take 1 tablet by mouth Daily., Disp: 30 tablet, Rfl: 5  •  fluticasone (FLONASE) 50 MCG/ACT nasal spray, 2 sprays into each nostril Daily. Administer 2 sprays in each nostril for each dose., Disp: 1 bottle, Rfl: 5  •  gabapentin (NEURONTIN) 600 MG tablet, Take 1 tablet by mouth 3 (Three) Times a Day., Disp: 90 tablet, Rfl: 5  •  glipiZIDE (GLUCOTROL) 5 MG tablet, Take 1 tablet by mouth Daily., Disp: 30 tablet, Rfl: 5  •  HYDROcodone-acetaminophen (NORCO) 7.5-325 MG per tablet, Take 1 tablet by mouth Every 6 (Six) Hours As Needed for moderate pain (4-6) or severe pain (7-10)., Disp: 60 tablet, Rfl: 0  •  icosapent ethyl (VASCEPA) 1 G capsule capsule, Take 1 g by mouth 2 (Two) Times a Day With Meals., Disp: 120 capsule, Rfl: 5  •  lisinopril (PRINIVIL,ZESTRIL) 20 MG tablet, Take 1 tablet by mouth 2 (Two) Times a Day., Disp: 60 tablet, Rfl: 5  •  Loratadine 10 MG capsule, Take 10 mg by mouth Daily., Disp: 30 each, Rfl: 5  •  metFORMIN (GLUCOPHAGE) 1000 MG tablet, Take 1 tablet by mouth 2 (Two) Times a Day., Disp: 60 tablet, Rfl: 5  •  omeprazole (priLOSEC) 20 MG capsule, Take 1 capsule by mouth Daily., Disp: 30 capsule, Rfl: 5        Review of Systems  A comprehensive 12 point review of systems was conducted with patient and positive as per HPI otherwise negative.         Physical Exam  Vital Signs: These were reviewed and listed as per patient’s electronic medical chart  Vitals:    03/03/17 1129   BP: 144/79   Pulse:    Resp:    Temp:    SpO2:      General: Awake, alert and oriented, in no distress, obese  HEENT: Head is atraumatic, normocephalic, extraocular movements full, oropharynx clear, no scleral icterus, pink moist mucous membranes, large neck  Neck: supple, no jvd, lymphadenopathy or masses  Cardiovascular: regular rate and rhythm without murmurs, rubs or gallops  Pulmonary: non-labored, clear to auscultation bilaterally, no wheezing  Abdomen: soft, non-tender, non-distended, normal active bowel  sounds present, no organomegaly  Extremities: No clubbing, cyanosis or edema  Lymph: No cervical, supraclavicular adenopathy  Neurologic: Mental status as above, alert, awake and oriented, grossly non-focal exam  Skin: warm, dry, intact        Labs / Studies:    Office Visit on 03/03/2017   Component Date Value   • WBC 03/03/2017 8.74    • RBC 03/03/2017 5.41    • Hemoglobin 03/03/2017 17.0    • Hematocrit 03/03/2017 49.4    • MCV 03/03/2017 91.3    • MCH 03/03/2017 31.4    • MCHC 03/03/2017 34.4    • RDW 03/03/2017 12.6    • RDW-SD 03/03/2017 41.6    • MPV 03/03/2017 10.3*   • Platelets 03/03/2017 217    • Neutrophil % 03/03/2017 44.9    • Lymphocyte % 03/03/2017 30.5    • Monocyte % 03/03/2017 12.1*   • Eosinophil % 03/03/2017 11.7*   • Basophil % 03/03/2017 0.6    • Immature Grans % 03/03/2017 0.2    • Neutrophils, Absolute 03/03/2017 3.92    • Lymphocytes, Absolute 03/03/2017 2.67    • Monocytes, Absolute 03/03/2017 1.06*   • Eosinophils, Absolute 03/03/2017 1.02*   • Basophils, Absolute 03/03/2017 0.05    • Immature Grans, Absolute 03/03/2017 0.02    • Pulse 03/03/2017 93    • Volume Collected 03/03/2017 510    • Pre-Hgb 03/03/2017 17.0    • Pre-Hct 03/03/2017 49.4    • Pre-Blood Pressure 03/03/2017 144/79    • Post-Blood Pressure 03/03/2017 115/75    Lab on 02/20/2017   Component Date Value   • WBC 02/20/2017 9.20    • RBC 02/20/2017 5.80    • Hemoglobin 02/20/2017 18.4*   • Hematocrit 02/20/2017 53.1*   • MCV 02/20/2017 91.6    • MCH 02/20/2017 31.7    • MCHC 02/20/2017 34.7    • RDW 02/20/2017 12.8    • RDW-SD 02/20/2017 42.2    • MPV 02/20/2017 10.6*   • Platelets 02/20/2017 212    • Neutrophil % 02/20/2017 52.4    • Lymphocyte % 02/20/2017 24.1    • Monocyte % 02/20/2017 10.3*   • Eosinophil % 02/20/2017 12.4*   • Basophil % 02/20/2017 0.4    • Immature Grans % 02/20/2017 0.4    • Neutrophils, Absolute 02/20/2017 4.81    • Lymphocytes, Absolute 02/20/2017 2.22    • Monocytes, Absolute 02/20/2017 0.95*   •  Eosinophils, Absolute 02/20/2017 1.14*   • Basophils, Absolute 02/20/2017 0.04    • Immature Grans, Absolute 02/20/2017 0.04*   • Pulse 02/20/2017 104    • Volume Collected 02/20/2017 524    • Pre-Hgb 02/20/2017 18.4    • Pre-Hct 02/20/2017 53.1    • Pre-Blood Pressure 02/20/2017 119/76    • Post-Blood Pressure 02/20/2017 125/75    Lab on 02/06/2017   Component Date Value   • WBC 02/06/2017 8.50    • RBC 02/06/2017 5.65    • Hemoglobin 02/06/2017 17.7    • Hematocrit 02/06/2017 51.7    • MCV 02/06/2017 91.5    • MCH 02/06/2017 31.3    • MCHC 02/06/2017 34.2    • RDW 02/06/2017 13.2    • RDW-SD 02/06/2017 43.6    • MPV 02/06/2017 10.0    • Platelets 02/06/2017 202    • Neutrophil % 02/06/2017 46.3    • Lymphocyte % 02/06/2017 29.4    • Monocyte % 02/06/2017 9.6    • Eosinophil % 02/06/2017 14.0*   • Basophil % 02/06/2017 0.5    • Immature Grans % 02/06/2017 0.2    • Neutrophils, Absolute 02/06/2017 3.93    • Lymphocytes, Absolute 02/06/2017 2.50    • Monocytes, Absolute 02/06/2017 0.82    • Eosinophils, Absolute 02/06/2017 1.19*   • Basophils, Absolute 02/06/2017 0.04    • Immature Grans, Absolute 02/06/2017 0.02    • Pulse 02/06/2017 89    • Volume Collected 02/06/2017 517    • Pre-Hgb 02/06/2017 17.7    • Pre-Hct 02/06/2017 51.7    • Pre-Blood Pressure 02/06/2017 123/66    • Post-Blood Pressure 02/06/2017 119/74    Hospital Outpatient Visit on 01/26/2017   Component Date Value   • Creatinine 01/26/2017 1.10    Lab on 01/23/2017   Component Date Value   • WBC 01/23/2017 8.70    • RBC 01/23/2017 5.76    • Hemoglobin 01/23/2017 19.1*   • Hematocrit 01/23/2017 53.0*   • MCV 01/23/2017 92.0    • MCH 01/23/2017 33.2*   • MCHC 01/23/2017 36.0    • RDW 01/23/2017 13.3    • RDW-SD 01/23/2017 44.2    • MPV 01/23/2017 10.5*   • Platelets 01/23/2017 193    • Neutrophil % 01/23/2017 41.5    • Lymphocyte % 01/23/2017 32.1    • Monocyte % 01/23/2017 10.8*   • Eosinophil % 01/23/2017 14.8*   • Basophil % 01/23/2017 0.5    •  Immature Grans % 01/23/2017 0.3    • Neutrophils, Absolute 01/23/2017 3.61    • Lymphocytes, Absolute 01/23/2017 2.79    • Monocytes, Absolute 01/23/2017 0.94*   • Eosinophils, Absolute 01/23/2017 1.29*   • Basophils, Absolute 01/23/2017 0.04    • Immature Grans, Absolute 01/23/2017 0.03    • Pulse 01/23/2017 92    • Volume Collected 01/23/2017 510    • Pre-Hgb 01/23/2017 19.1    • Pre-Hct 01/23/2017 53.0    • Pre-Blood Pressure 01/23/2017 126/81    • Post-Blood Pressure 01/23/2017 127/69    Office Visit on 01/20/2017   Component Date Value   • WBC 01/20/2017 9.10    • RBC 01/20/2017 5.74    • Hemoglobin 01/20/2017 18.6*   • Hematocrit 01/20/2017 52.7*   • MCV 01/20/2017 91.8    • MCH 01/20/2017 32.4    • MCHC 01/20/2017 35.3    • RDW 01/20/2017 13.2    • RDW-SD 01/20/2017 43.8    • MPV 01/20/2017 10.6*   • Platelets 01/20/2017 197    • Neutrophil % 01/20/2017 44.7    • Lymphocyte % 01/20/2017 31.9    • Monocyte % 01/20/2017 9.3    • Eosinophil % 01/20/2017 13.3*   • Basophil % 01/20/2017 0.5    • Immature Grans % 01/20/2017 0.3    • Neutrophils, Absolute 01/20/2017 4.06    • Lymphocytes, Absolute 01/20/2017 2.90    • Monocytes, Absolute 01/20/2017 0.85    • Eosinophils, Absolute 01/20/2017 1.21*   • Basophils, Absolute 01/20/2017 0.05    • Immature Grans, Absolute 01/20/2017 0.03    Lab on 12/22/2016   Component Date Value   • WBC 12/22/2016 9.37    • RBC 12/22/2016 5.79    • Hemoglobin 12/22/2016 18.7*   • Hematocrit 12/22/2016 53.9*   • MCV 12/22/2016 93.1    • MCH 12/22/2016 32.3    • MCHC 12/22/2016 34.7    • RDW 12/22/2016 13.1    • RDW-SD 12/22/2016 44.2    • MPV 12/22/2016 10.5*   • Platelets 12/22/2016 183    • Neutrophil % 12/22/2016 50.6    • Lymphocyte % 12/22/2016 28.3    • Monocyte % 12/22/2016 9.2    • Eosinophil % 12/22/2016 11.2*   • Basophil % 12/22/2016 0.4    • Immature Grans % 12/22/2016 0.3    • Neutrophils, Absolute 12/22/2016 4.74    • Lymphocytes, Absolute 12/22/2016 2.65    • Monocytes,  Absolute 12/22/2016 0.86    • Eosinophils, Absolute 12/22/2016 1.05*   • Basophils, Absolute 12/22/2016 0.04    • Immature Grans, Absolute 12/22/2016 0.03    • Pulse 12/22/2016 95    • Volume Collected 12/22/2016 510    • Pre-Hgb 12/22/2016 18.7    • Pre-Hct 12/22/2016 53.9    • Pre-Blood Pressure 12/22/2016 150/95    • Post-Blood Pressure 12/22/2016 144/85    Consult on 12/16/2016   Component Date Value   • B2A2 transcript 12/16/2016 Comment    • B3A2 transcript 12/16/2016 Comment    • E1A2 transcript 12/16/2016 Comment    • Interpretation 12/16/2016 Comment    • Director Review 12/16/2016 Comment    • Background: 12/16/2016 Comment    • JAK2 [id number] Mutation 12/16/2016 Comment    • Background: 12/16/2016 Comment    • Director Review 12/16/2016 Comment    • Reflex 12/16/2016 Comment    • Glucose 12/16/2016 185*   • BUN 12/16/2016 24*   • Creatinine 12/16/2016 1.03    • Sodium 12/16/2016 136    • Potassium 12/16/2016 4.4    • Chloride 12/16/2016 100    • CO2 12/16/2016 27.1    • Calcium 12/16/2016 9.9    • Total Protein 12/16/2016 7.4    • Albumin 12/16/2016 4.50    • ALT (SGPT) 12/16/2016 86*   • AST (SGOT) 12/16/2016 45*   • Alkaline Phosphatase 12/16/2016 68    • Total Bilirubin 12/16/2016 0.9    • eGFR Non  Amer 12/16/2016 79    • Globulin 12/16/2016 2.9    • A/G Ratio 12/16/2016 1.6    • BUN/Creatinine Ratio 12/16/2016 23.3    • Anion Gap 12/16/2016 8.9    • Erythropoietin 12/16/2016 10.5    • WBC 12/16/2016 8.63    • RBC 12/16/2016 5.70    • Hemoglobin 12/16/2016 18.1*   • Hematocrit 12/16/2016 52.3*   • MCV 12/16/2016 91.8    • MCH 12/16/2016 31.8    • MCHC 12/16/2016 34.6    • RDW 12/16/2016 13.3    • RDW-SD 12/16/2016 43.7    • MPV 12/16/2016 10.2*   • Platelets 12/16/2016 214    • Neutrophil % 12/16/2016 49.0    • Lymphocyte % 12/16/2016 27.3    • Monocyte % 12/16/2016 9.8    • Eosinophil % 12/16/2016 13.1*   • Basophil % 12/16/2016 0.6    • Immature Grans % 12/16/2016 0.2    • Neutrophils, Absolute  12/16/2016 4.22    • Lymphocytes, Absolute 12/16/2016 2.36    • Monocytes, Absolute 12/16/2016 0.85    • Eosinophils, Absolute 12/16/2016 1.13*   • Basophils, Absolute 12/16/2016 0.05    • Immature Grans, Absolute 12/16/2016 0.02    • Osmolality Calc 12/16/2016 281    • CALR Mutation Detection * 12/16/2016 Comment    • Background: 12/16/2016 Comment    • Methodology 12/16/2016 Comment    • Reference: 12/16/2016 Comment    • Director Review 12/16/2016 Comment    • JAK2 Exon 12 Analysis Re* 12/16/2016 Comment    • Background 12/16/2016 Comment    • Director Review 12/16/2016 Comment    • MPL Mutation Analysis Re* 12/16/2016 Comment    • Background 12/16/2016 Comment    • Method: 12/16/2016 Comment    • Reference 12/16/2016 Comment    • Director Review: 12/16/2016 Comment    Office Visit on 12/08/2016   Component Date Value   • Glucose 12/08/2016 163*   • BUN 12/08/2016 22*   • Creatinine 12/08/2016 0.93    • Sodium 12/08/2016 137    • Potassium 12/08/2016 4.8    • Chloride 12/08/2016 104    • CO2 12/08/2016 24.1*   • Calcium 12/08/2016 10.4*   • Total Protein 12/08/2016 7.6    • Albumin 12/08/2016 4.80    • ALT (SGPT) 12/08/2016 84*   • AST (SGOT) 12/08/2016 41*   • Alkaline Phosphatase 12/08/2016 74    • Total Bilirubin 12/08/2016 1.1    • eGFR Non  Amer 12/08/2016 89    • Globulin 12/08/2016 2.8    • A/G Ratio 12/08/2016 1.7    • BUN/Creatinine Ratio 12/08/2016 23.7    • Anion Gap 12/08/2016 8.9    • Total Cholesterol 12/08/2016 171    • Triglycerides 12/08/2016 306*   • HDL Cholesterol 12/08/2016 29*   • LDL Cholesterol  12/08/2016 81    • VLDL Cholesterol 12/08/2016 61.2    • LDL/HDL Ratio 12/08/2016 2.79    • TSH 12/08/2016 0.803    • Hemoglobin A1C 12/08/2016 6.80*   • Uric Acid 12/08/2016 4.6    • WBC 12/08/2016 9.77    • RBC 12/08/2016 5.94    • Hemoglobin 12/08/2016 18.5*   • Hematocrit 12/08/2016 54.8*   • MCV 12/08/2016 92.3    • MCH 12/08/2016 31.1    • MCHC 12/08/2016 33.8    • RDW 12/08/2016 13.6     • RDW-SD 12/08/2016 45.0    • MPV 12/08/2016 10.6*   • Platelets 12/08/2016 215    • Neutrophil % 12/08/2016 50.4    • Lymphocyte % 12/08/2016 23.2    • Monocyte % 12/08/2016 11.2*   • Eosinophil % 12/08/2016 13.7*   • Basophil % 12/08/2016 1.0    • Immature Grans % 12/08/2016 0.5    • Neutrophils, Absolute 12/08/2016 4.92    • Lymphocytes, Absolute 12/08/2016 2.27    • Monocytes, Absolute 12/08/2016 1.09*   • Eosinophils, Absolute 12/08/2016 1.34*   • Basophils, Absolute 12/08/2016 0.10    • Immature Grans, Absolute 12/08/2016 0.05*   • Osmolality Calc 12/08/2016 281           12/30/16: US ABDOMEN COMPLETE: Visualized pancreas is unremarkable. The imaged portion of the abdominal aorta is nondilated.  The liver is homogeneous. There is no intrahepatic ductal dilatation or focal hepatic mass. The imaged portion of the hepatic vessels and inferior vena cava are patent. The gallbladder shows no cholelithiasis or pericholecystic fluid. The wall of the gallbladder is not thickened. It measures 2.9 mm. The common bile duct is normal, measuring 4.8 mm. The kidneys demonstrate no evidence of hydronephrosis or solid renal mass. The spleen is homogeneous and measures 14.2 cm which is slightly enlarged. IMPRESSION: Negative abdominal ultrasound.     12/30/16: XR CHEST PA AND LATERAL: The cardiac silhouette is normal in size. There is some slight widening of the upper mediastinum. This can be seen with tortuous vessels. I could not entirely exclude some right paratracheal adenopathy. Correlation with old films or CT may add additional information. The lungs show increased interstitial markings throughout the lungs. No alveolar infiltrates to suggest pneumonia are seen; there are no signs of heart failure or pleural fluid. IMPRESSION: Slight widening of the mediastinum with fullness in the right paratracheal area. There is interstitial lung disease throughout both lungs.            Assessment/Plan   Polycythemia  To assess for  a myeloproliferative disorder I obtained a [id number] and Exon 12/CALR/MPL mutations which were negative. BCR ABL PCR was <0.001%. 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 I did make a sleep referral however he has not proceeded as he wanted to see if he would qualify for an at home study however insurance did not approve this. Therefore he does have an appointment with the sleep center for further testing. Abdominal ultrasound did show mild splenomegaly. He has not yet had pulmonary function tests and given his exposure to second hand smoke will obtain these. Erythropoietin level was normal. In the interim I have initiated phlebotomy for one unit (500 mL) to be taken every 2 weeks with goal of Hct <50%. He will get phlebotomy today for borderline Hct level. He currently does not take iron supplementation.     Mediastinal Widening  This was seen on CXR in evaluation for secondary polycythemia therefore I did obtain a CT Chest with IV contrast to further evaluate this did not show any evidence of any abnormal adenopathy or masses and was felt to be due to benign findings.     Elevated Liver Function Tests  Abdominal US was negative with mild splenomegaly, will repeat during his next visit.     I will have the patient return in follow up appointment to review test results in three months. He understands that should he have any questions or concerns prior to his appointment he should give us a call at any time and I would be happy to see him sooner. It was a pleasure to see this patient in clinic today, thank you for allowing me to participate in the care of this patient.     I spent 26 minutes in regards to this patient’s care today. More than 15 minutes of the time was spent in direct interaction with the patient for the above problems.      Shy Lowe MD  01/20/2017  8:03 AM

## 2017-03-06 ENCOUNTER — OFFICE VISIT (OUTPATIENT)
Dept: FAMILY MEDICINE CLINIC | Facility: CLINIC | Age: 42
End: 2017-03-06

## 2017-03-06 VITALS
SYSTOLIC BLOOD PRESSURE: 125 MMHG | HEART RATE: 115 BPM | OXYGEN SATURATION: 97 % | TEMPERATURE: 98.1 F | BODY MASS INDEX: 44.1 KG/M2 | HEIGHT: 71 IN | DIASTOLIC BLOOD PRESSURE: 70 MMHG | WEIGHT: 315 LBS

## 2017-03-06 DIAGNOSIS — D45 POLYCYTHEMIA VERA (HCC): ICD-10-CM

## 2017-03-06 DIAGNOSIS — N52.9 IMPOTENCE DUE TO ERECTILE DYSFUNCTION: Primary | ICD-10-CM

## 2017-03-06 DIAGNOSIS — G47.33 OBSTRUCTIVE SLEEP APNEA SYNDROME: ICD-10-CM

## 2017-03-06 DIAGNOSIS — Z12.5 SCREENING FOR PROSTATE CANCER: ICD-10-CM

## 2017-03-06 DIAGNOSIS — M15.9 PRIMARY OSTEOARTHRITIS INVOLVING MULTIPLE JOINTS: ICD-10-CM

## 2017-03-06 DIAGNOSIS — M79.2 NEURALGIA AND NEURITIS: ICD-10-CM

## 2017-03-06 DIAGNOSIS — Z79.899 HIGH RISK MEDICATION USE: ICD-10-CM

## 2017-03-06 DIAGNOSIS — M10.00 ACUTE IDIOPATHIC GOUT, UNSPECIFIED SITE: ICD-10-CM

## 2017-03-06 DIAGNOSIS — IMO0002 UNCONTROLLED TYPE 2 DIABETES MELLITUS WITH OTHER CIRCULATORY COMPLICATION, WITHOUT LONG-TERM CURRENT USE OF INSULIN: ICD-10-CM

## 2017-03-06 DIAGNOSIS — R39.15 URGENCY OF URINATION: ICD-10-CM

## 2017-03-06 DIAGNOSIS — Z13.9 SCREENING: ICD-10-CM

## 2017-03-06 PROCEDURE — 99214 OFFICE O/P EST MOD 30 MIN: CPT | Performed by: NURSE PRACTITIONER

## 2017-03-06 RX ORDER — ASPIRIN 81 MG/1
81 TABLET ORAL DAILY
Qty: 90 TABLET | Refills: 1 | Status: SHIPPED | OUTPATIENT
Start: 2017-03-06 | End: 2017-10-10 | Stop reason: SDUPTHER

## 2017-03-06 RX ORDER — SILDENAFIL 100 MG/1
100 TABLET, FILM COATED ORAL DAILY PRN
Qty: 10 TABLET | Refills: 3 | Status: SHIPPED | OUTPATIENT
Start: 2017-03-06 | End: 2017-06-08 | Stop reason: SDUPTHER

## 2017-03-06 RX ORDER — LORATADINE 10 MG/1
TABLET ORAL
Qty: 30 TABLET | Refills: 5 | Status: SHIPPED | OUTPATIENT
Start: 2017-03-06 | End: 2017-10-10 | Stop reason: SDUPTHER

## 2017-03-06 RX ORDER — HYDROCODONE BITARTRATE AND ACETAMINOPHEN 7.5; 325 MG/1; MG/1
1 TABLET ORAL EVERY 6 HOURS PRN
Qty: 60 TABLET | Refills: 0 | Status: SHIPPED | OUTPATIENT
Start: 2017-03-06 | End: 2017-06-08 | Stop reason: SDUPTHER

## 2017-03-06 NOTE — PROGRESS NOTES
Subjective   Johnathon Mclain is a 41 y.o. male.     History of Present Illness      Pt is here with his wife today wishing to discuss some concerns.     He has been having some bleeding after venipuncture. Taking several minutes for the bleeding to stop. His wife is requesting his aspirin be reduced to 81 mg if appropriate.    Impotence  Mr. Mclain and his wife are concerned that he is having difficulty maintaining an erection. He would like to discuss medication to help this problem. He denies any difficulty voiding or decreased urine flow. He is on several medications that can cause Erectile dysfunction.     Polycythemia   He is under the care of hematology.   Hematology is requesting a sleep study. He does have COPD. His wife reports he snores and that she must often shake him at night as he stops breathing. He declines to go for in house sleep study.     Neuralgia/Neuritis   Pt states that he is having increased foot pain. Burning pain between his toes that radiates into his foot. He has been seen by podiatry.   Neurontin is helpful.     Type 2 Diabetes  Pt states his home glucose reading has been with in accepted ranges. He admits that he does not diet or watch his food intake at all times. His wife tries to encourage him to make healthy choices.     Gout  Foot pain is increased. Most recent uric acid level is < 7.     OA- chronic condition , mostly affected in his low back. He does have some neuropathy in his feet.   Johnathon Mclain rates pain today as 7 on a scale of 0-10. Patient states that pain is reduced by at least one half but not completely with current Medication/Treatment. Pain does interfere with ability to perform daily activities as well as enjoyment of life. Patient reports quality of life and mobility has improved as a result of current treatment. Patient describes pain as stabbing. Pain is made worse from walking. Pain is eased by medication and rest. [facility] urine drug screen will be obtained as  indicated.          The following portions of the patient's history were reviewed and updated as appropriate: allergies, current medications, past family history, past medical history, past social history, past surgical history and problem list.    Review of Systems   Constitutional: Negative.    HENT: Negative.    Eyes: Negative.    Respiratory: Positive for apnea. Negative for cough, chest tightness, shortness of breath and wheezing.    Cardiovascular: Negative for chest pain and palpitations.   Gastrointestinal: Negative for abdominal pain, blood in stool, constipation, diarrhea, nausea and vomiting.   Endocrine: Negative.    Genitourinary: Positive for urgency. Negative for dysuria and testicular pain.   Musculoskeletal: Positive for arthralgias and back pain.   Allergic/Immunologic: Negative.    Neurological: Negative.    Hematological: Negative for adenopathy. Bruises/bleeds easily.   Psychiatric/Behavioral: Positive for sleep disturbance (due to foot pain ). Negative for self-injury and suicidal ideas.   All other systems reviewed and are negative.      Objective   Physical Exam   Constitutional: He is oriented to person, place, and time. Vital signs are normal. He appears well-developed and well-nourished. No distress.   Visibly obese.    HENT:   Head: Normocephalic and atraumatic.   Right Ear: External ear normal.   Left Ear: External ear normal.   Nose: Nose normal.   Mouth/Throat: Oropharynx is clear and moist. No oropharyngeal exudate.   Eyes: EOM are normal. Pupils are equal, round, and reactive to light.   Neck: Normal range of motion. Neck supple. No tracheal deviation present. No thyromegaly present.   Cardiovascular: Normal rate, regular rhythm, normal heart sounds and intact distal pulses.    No murmur heard.  Pulmonary/Chest: Effort normal and breath sounds normal. No respiratory distress. He has no wheezes. He has no rales. He exhibits no tenderness.   Abdominal: Soft. Bowel sounds are normal. He  exhibits no distension and no mass. There is no tenderness. There is no rebound and no guarding.   Musculoskeletal:        Lumbar back: He exhibits decreased range of motion, tenderness, pain and spasm.   Decreased lumbar curvature, there is pain with forward flexion. DTR's +2. No edema.    Lymphadenopathy:     He has no cervical adenopathy.   Neurological: He is alert and oriented to person, place, and time. He has normal reflexes.   Skin: Skin is warm and dry. No rash noted. He is not diaphoretic. No erythema. No pallor.   Psychiatric: He has a normal mood and affect. His behavior is normal. Judgment and thought content normal. His affect is not inappropriate. Cognition and memory are normal.   Denies thoughts of hurting self or others.   Nursing note and vitals reviewed.      Assessment/Plan       Diagnoses and all orders for this visit:    Impotence due to erectile dysfunction  -     Testosterone (Free & Total), LC / MS; Future  - psa  Sample of Viagra 50 mg tablets x 2 single dose pack.   Reviewed possible side effects and risks with patient and his wife.     Screening  -     Home Sleep Study; Future    Obstructive sleep apnea syndrome   -     Home Sleep Study; Future    Neuralgia and neuritis  Comments:  refill medications     Orders:  -     HYDROcodone-acetaminophen (NORCO) 7.5-325 MG per tablet; Take 1 tablet by mouth Every 6 (Six) Hours As Needed for moderate pain (4-6) or severe pain (7-10).   [facility] for Nabumetone 2.5%, gabapentin 2.5%, Lidocaine 2.25% and prilocaine 2.25% topical cream. May be applied up to four times daily for foot pain.     Acute idiopathic gout, unspecified site  -     Uric Acid; Future    Uncontrolled type 2 diabetes mellitus with other circulatory complication, without long-term current use of insulin  Pt has been educated/instructed on diabetic care and protocols. Sick day rules reviewed. Continue to monitor blood sugar and report abnormal readings  as discussed today. Pt / family state understanding.   - decrease aspirin to 81 mg. Have requested pt and his wife also discuss this change with hematology at next visit.     Polycythemia vera  - remain under the care of hematology.     Screening for prostate cancer    Urgency of urination   -     PSA; Future    Primary osteoarthritis involving multiple joints  -     Urine Drug Screen    High risk medication use  -     Urine Drug Screen    Other orders  -     sildenafil (VIAGRA) 100 MG tablet; Take 1 tablet by mouth Daily As Needed for erectile dysfunction.  -     aspirin 81 MG EC tablet; Take 1 tablet by mouth Daily.      I have discussed diagnosis in detail today allowing time for questions and answers. Pt is aware of reasons to seek urgent or emergent medical care as well as reasons to return to the clinic for evaluation. Possible side effects, interactions and progression of symptoms discussed as well. Pt / family states understanding.   Emotional support and active listening provided.   Medication adjustments.   Labs will be obtained with next scheduled lab for hematology next week.   Follow up q 3-4 months, sooner if needed.   Weight loss is recommended and goals of gradual weight loss discussed.

## 2017-03-13 DIAGNOSIS — D45 POLYCYTHEMIA VERA (HCC): Primary | ICD-10-CM

## 2017-03-14 DIAGNOSIS — G47.30 SLEEP APNEA IN ADULT: Primary | ICD-10-CM

## 2017-03-17 ENCOUNTER — TELEPHONE (OUTPATIENT)
Dept: FAMILY MEDICINE CLINIC | Facility: CLINIC | Age: 42
End: 2017-03-17

## 2017-03-17 ENCOUNTER — DOCUMENTATION (OUTPATIENT)
Dept: FAMILY MEDICINE CLINIC | Facility: CLINIC | Age: 42
End: 2017-03-17

## 2017-03-20 ENCOUNTER — LAB (OUTPATIENT)
Dept: ONCOLOGY | Facility: CLINIC | Age: 42
End: 2017-03-20

## 2017-03-20 VITALS
TEMPERATURE: 97.8 F | BODY MASS INDEX: 52.99 KG/M2 | DIASTOLIC BLOOD PRESSURE: 86 MMHG | RESPIRATION RATE: 18 BRPM | OXYGEN SATURATION: 97 % | HEART RATE: 99 BPM | SYSTOLIC BLOOD PRESSURE: 122 MMHG | WEIGHT: 315 LBS

## 2017-03-20 DIAGNOSIS — R39.15 URGENCY OF URINATION: ICD-10-CM

## 2017-03-20 DIAGNOSIS — N52.9 IMPOTENCE DUE TO ERECTILE DYSFUNCTION: ICD-10-CM

## 2017-03-20 DIAGNOSIS — M10.00 ACUTE IDIOPATHIC GOUT, UNSPECIFIED SITE: ICD-10-CM

## 2017-03-20 DIAGNOSIS — D45 POLYCYTHEMIA VERA (HCC): ICD-10-CM

## 2017-03-20 LAB
BASOPHILS # BLD AUTO: 0.07 10*3/MM3 (ref 0–0.3)
BASOPHILS NFR BLD AUTO: 0.8 % (ref 0–2)
DEPRECATED RDW RBC AUTO: 41.2 FL (ref 37–54)
EOSINOPHIL # BLD AUTO: 1.05 10*3/MM3 (ref 0–0.7)
EOSINOPHIL NFR BLD AUTO: 12 % (ref 0–5)
ERYTHROCYTE [DISTWIDTH] IN BLOOD BY AUTOMATED COUNT: 12.7 % (ref 11.5–14.5)
HCT VFR BLD AUTO: 49.5 % (ref 42–52)
HGB BLD-MCNC: 16.8 G/DL (ref 14–18)
IMM GRANULOCYTES # BLD: 0.03 10*3/MM3 (ref 0–0.03)
IMM GRANULOCYTES NFR BLD: 0.3 % (ref 0–0.5)
LYMPHOCYTES # BLD AUTO: 2.76 10*3/MM3 (ref 1–3)
LYMPHOCYTES NFR BLD AUTO: 31.4 % (ref 21–51)
MCH RBC QN AUTO: 30.4 PG (ref 27–33)
MCHC RBC AUTO-ENTMCNC: 33.9 G/DL (ref 33–37)
MCV RBC AUTO: 89.5 FL (ref 80–94)
MONOCYTES # BLD AUTO: 0.98 10*3/MM3 (ref 0.1–0.9)
MONOCYTES NFR BLD AUTO: 11.2 % (ref 0–10)
NEUTROPHILS # BLD AUTO: 3.89 10*3/MM3 (ref 1.4–6.5)
NEUTROPHILS NFR BLD AUTO: 44.3 % (ref 30–70)
PLATELET # BLD AUTO: 242 10*3/MM3 (ref 130–400)
PMV BLD AUTO: 9.8 FL (ref 6–10)
PSA SERPL-MCNC: 0.49 NG/ML (ref 0–4)
RBC # BLD AUTO: 5.53 10*6/MM3 (ref 4.7–6.1)
URATE SERPL-MCNC: 4.4 MG/DL (ref 3.7–7)
WBC NRBC COR # BLD: 8.78 10*3/MM3 (ref 4.5–12.5)

## 2017-03-20 PROCEDURE — 85025 COMPLETE CBC W/AUTO DIFF WBC: CPT | Performed by: INTERNAL MEDICINE

## 2017-03-20 PROCEDURE — 84402 ASSAY OF FREE TESTOSTERONE: CPT | Performed by: NURSE PRACTITIONER

## 2017-03-20 PROCEDURE — 84153 ASSAY OF PSA TOTAL: CPT | Performed by: NURSE PRACTITIONER

## 2017-03-20 PROCEDURE — 36415 COLL VENOUS BLD VENIPUNCTURE: CPT | Performed by: INTERNAL MEDICINE

## 2017-03-20 PROCEDURE — 84550 ASSAY OF BLOOD/URIC ACID: CPT | Performed by: NURSE PRACTITIONER

## 2017-03-20 PROCEDURE — 84403 ASSAY OF TOTAL TESTOSTERONE: CPT | Performed by: NURSE PRACTITIONER

## 2017-03-23 LAB
TESTOST FREE SERPL-MCNC: 7.7 PG/ML (ref 6.8–21.5)
TESTOST SERPL-MCNC: 486.9 NG/DL (ref 348–1197)

## 2017-04-03 ENCOUNTER — LAB (OUTPATIENT)
Dept: ONCOLOGY | Facility: CLINIC | Age: 42
End: 2017-04-03

## 2017-04-03 VITALS
WEIGHT: 315 LBS | HEART RATE: 84 BPM | TEMPERATURE: 97.6 F | DIASTOLIC BLOOD PRESSURE: 79 MMHG | OXYGEN SATURATION: 98 % | BODY MASS INDEX: 53.47 KG/M2 | RESPIRATION RATE: 20 BRPM | SYSTOLIC BLOOD PRESSURE: 132 MMHG

## 2017-04-03 DIAGNOSIS — D75.1 POLYCYTHEMIA: ICD-10-CM

## 2017-04-03 DIAGNOSIS — M79.2 NEURALGIA AND NEURITIS: ICD-10-CM

## 2017-04-03 LAB
BASOPHILS # BLD AUTO: 0.05 10*3/MM3 (ref 0–0.3)
BASOPHILS NFR BLD AUTO: 0.6 % (ref 0–2)
DEPRECATED RDW RBC AUTO: 40.3 FL (ref 37–54)
EOSINOPHIL # BLD AUTO: 1.09 10*3/MM3 (ref 0–0.7)
EOSINOPHIL NFR BLD AUTO: 12.6 % (ref 0–5)
ERYTHROCYTE [DISTWIDTH] IN BLOOD BY AUTOMATED COUNT: 12.5 % (ref 11.5–14.5)
HCT VFR BLD AUTO: 49.3 % (ref 42–52)
HGB BLD-MCNC: 16.8 G/DL (ref 14–18)
IMM GRANULOCYTES # BLD: 0.05 10*3/MM3 (ref 0–0.03)
IMM GRANULOCYTES NFR BLD: 0.6 % (ref 0–0.5)
LYMPHOCYTES # BLD AUTO: 2.15 10*3/MM3 (ref 1–3)
LYMPHOCYTES NFR BLD AUTO: 24.8 % (ref 21–51)
MCH RBC QN AUTO: 30.7 PG (ref 27–33)
MCHC RBC AUTO-ENTMCNC: 34.1 G/DL (ref 33–37)
MCV RBC AUTO: 90.1 FL (ref 80–94)
MONOCYTES # BLD AUTO: 1.06 10*3/MM3 (ref 0.1–0.9)
MONOCYTES NFR BLD AUTO: 12.2 % (ref 0–10)
NEUTROPHILS # BLD AUTO: 4.26 10*3/MM3 (ref 1.4–6.5)
NEUTROPHILS NFR BLD AUTO: 49.2 % (ref 30–70)
PLATELET # BLD AUTO: 223 10*3/MM3 (ref 130–400)
PMV BLD AUTO: 10.2 FL (ref 6–10)
RBC # BLD AUTO: 5.47 10*6/MM3 (ref 4.7–6.1)
WBC NRBC COR # BLD: 8.66 10*3/MM3 (ref 4.5–12.5)

## 2017-04-03 PROCEDURE — 85025 COMPLETE CBC W/AUTO DIFF WBC: CPT | Performed by: INTERNAL MEDICINE

## 2017-04-03 PROCEDURE — 36415 COLL VENOUS BLD VENIPUNCTURE: CPT | Performed by: INTERNAL MEDICINE

## 2017-04-03 NOTE — TELEPHONE ENCOUNTER
Pt has appt on the 11th. Not sure if pt is to come in sooner or not to get med.  Thanks  Mason KITCHEN

## 2017-04-04 RX ORDER — HYDROCODONE BITARTRATE AND ACETAMINOPHEN 7.5; 325 MG/1; MG/1
TABLET ORAL
Qty: 60 TABLET | OUTPATIENT
Start: 2017-04-04

## 2017-04-11 ENCOUNTER — HOSPITAL ENCOUNTER (OUTPATIENT)
Dept: RESPIRATORY THERAPY | Facility: HOSPITAL | Age: 42
Discharge: HOME | End: 2017-04-11
Attending: INTERNAL MEDICINE | Admitting: INTERNAL MEDICINE

## 2017-04-11 DIAGNOSIS — D75.1 POLYCYTHEMIA: ICD-10-CM

## 2017-04-11 PROCEDURE — 94729 DIFFUSING CAPACITY: CPT

## 2017-04-11 PROCEDURE — 94729 DIFFUSING CAPACITY: CPT | Performed by: INTERNAL MEDICINE

## 2017-04-12 ENCOUNTER — OFFICE VISIT (OUTPATIENT)
Dept: FAMILY MEDICINE CLINIC | Facility: CLINIC | Age: 42
End: 2017-04-12

## 2017-04-12 VITALS
DIASTOLIC BLOOD PRESSURE: 80 MMHG | SYSTOLIC BLOOD PRESSURE: 130 MMHG | HEIGHT: 71 IN | TEMPERATURE: 97.4 F | HEART RATE: 97 BPM | BODY MASS INDEX: 44.1 KG/M2 | WEIGHT: 315 LBS | OXYGEN SATURATION: 99 %

## 2017-04-12 DIAGNOSIS — L02.91 ABSCESS: Primary | ICD-10-CM

## 2017-04-12 PROCEDURE — 99213 OFFICE O/P EST LOW 20 MIN: CPT | Performed by: NURSE PRACTITIONER

## 2017-04-12 RX ORDER — SULFAMETHOXAZOLE AND TRIMETHOPRIM 800; 160 MG/1; MG/1
1 TABLET ORAL 2 TIMES DAILY
Qty: 20 TABLET | Refills: 0 | Status: SHIPPED | OUTPATIENT
Start: 2017-04-12 | End: 2017-08-14

## 2017-04-12 RX ORDER — MUPIROCIN CALCIUM 20 MG/G
CREAM TOPICAL 3 TIMES DAILY
Qty: 1 EACH | Refills: 5 | Status: SHIPPED | OUTPATIENT
Start: 2017-04-12 | End: 2017-10-10 | Stop reason: SDUPTHER

## 2017-04-12 NOTE — PROGRESS NOTES
Subjective   Johnathon Mclain is a 41 y.o. male.     History of Present Illness     Recurrent abscess on the back of his neck. Reports it feels tender and warm. Responded to antibiotics last time. Feels he has caught it early this time.   No fever or chills.     The following portions of the patient's history were reviewed and updated as appropriate: allergies, current medications, past family history, past medical history, past social history, past surgical history and problem list.    Review of Systems   Constitutional: Negative.    HENT: Negative.    Respiratory: Positive for apnea. Negative for cough, choking, shortness of breath and wheezing.    Cardiovascular: Negative for chest pain and palpitations.   Gastrointestinal: Negative for abdominal pain.   Musculoskeletal: Positive for arthralgias and back pain.   Skin: Positive for color change.   Allergic/Immunologic: Negative.    Hematological: Negative.    Psychiatric/Behavioral: Negative for self-injury and suicidal ideas.       Objective   Physical Exam   Constitutional: He is oriented to person, place, and time. Vital signs are normal. He appears well-developed and well-nourished. No distress.   Visibly obese.    HENT:   Head: Normocephalic and atraumatic.   Right Ear: External ear normal.   Left Ear: External ear normal.   Nose: Nose normal.   Mouth/Throat: Oropharynx is clear and moist. No oropharyngeal exudate.   Eyes: EOM are normal. Pupils are equal, round, and reactive to light.   Neck: Normal range of motion. Neck supple. No tracheal deviation present. No thyromegaly present.   Cardiovascular: Normal rate, regular rhythm, normal heart sounds and intact distal pulses.    No murmur heard.  Pulmonary/Chest: Effort normal and breath sounds normal. No respiratory distress. He has no wheezes. He has no rales. He exhibits no tenderness.   Abdominal: Soft. Bowel sounds are normal. He exhibits no distension and no mass. There is no tenderness. There is no rebound  and no guarding.   Musculoskeletal:   Decreased lumbar curvature, there is pain with forward flexion. DTR's +2. No edema.    Lymphadenopathy:     He has no cervical adenopathy.   Neurological: He is alert and oriented to person, place, and time. He has normal reflexes.   Skin: Skin is warm and dry. No rash noted. He is not diaphoretic. No erythema. No pallor.        Psychiatric: He has a normal mood and affect. His speech is normal and behavior is normal. Judgment and thought content normal. His affect is not inappropriate. Cognition and memory are normal.   Denies thoughts of hurting self or others.   Nursing note and vitals reviewed.      Assessment/Plan   Diagnoses and all orders for this visit:    Abscess    Other orders  -     sulfamethoxazole-trimethoprim (BACTRIM DS) 800-160 MG per tablet; Take 1 tablet by mouth 2 (Two) Times a Day.  -     mupirocin (BACTROBAN) 2 % cream; Apply  topically 3 (Three) Times a Day.    Warm soaks , use of antibacterial soap daily. Infection control instructions. Report failure to improve in 3-5 days. Immediately report any worsening of symptoms or changes. Increase fluid intake while on bactrim.   RTC 2-5 days if not improved, sooner if condition worsens/changes. Symptomatic care advised as well as reasons for urgent or emergent care. Pt / family state understanding.

## 2017-04-17 ENCOUNTER — LAB (OUTPATIENT)
Dept: ONCOLOGY | Facility: CLINIC | Age: 42
End: 2017-04-17

## 2017-04-17 VITALS
SYSTOLIC BLOOD PRESSURE: 117 MMHG | DIASTOLIC BLOOD PRESSURE: 70 MMHG | RESPIRATION RATE: 20 BRPM | BODY MASS INDEX: 52.34 KG/M2 | HEART RATE: 101 BPM | OXYGEN SATURATION: 96 % | TEMPERATURE: 98 F | WEIGHT: 315 LBS

## 2017-04-17 DIAGNOSIS — D75.1 POLYCYTHEMIA: ICD-10-CM

## 2017-04-17 LAB
BASOPHILS # BLD AUTO: 0.06 10*3/MM3 (ref 0–0.3)
BASOPHILS NFR BLD AUTO: 0.6 % (ref 0–2)
DEPRECATED RDW RBC AUTO: 40.5 FL (ref 37–54)
EOSINOPHIL # BLD AUTO: 1.01 10*3/MM3 (ref 0–0.7)
EOSINOPHIL NFR BLD AUTO: 9.3 % (ref 0–5)
ERYTHROCYTE [DISTWIDTH] IN BLOOD BY AUTOMATED COUNT: 12.7 % (ref 11.5–14.5)
HCT VFR BLD AUTO: 48.9 % (ref 42–52)
HGB BLD-MCNC: 16.3 G/DL (ref 14–18)
IMM GRANULOCYTES # BLD: 0.04 10*3/MM3 (ref 0–0.03)
IMM GRANULOCYTES NFR BLD: 0.4 % (ref 0–0.5)
LYMPHOCYTES # BLD AUTO: 2.2 10*3/MM3 (ref 1–3)
LYMPHOCYTES NFR BLD AUTO: 20.2 % (ref 21–51)
MCH RBC QN AUTO: 29.5 PG (ref 27–33)
MCHC RBC AUTO-ENTMCNC: 33.3 G/DL (ref 33–37)
MCV RBC AUTO: 88.4 FL (ref 80–94)
MONOCYTES # BLD AUTO: 1.13 10*3/MM3 (ref 0.1–0.9)
MONOCYTES NFR BLD AUTO: 10.4 % (ref 0–10)
NEUTROPHILS # BLD AUTO: 6.44 10*3/MM3 (ref 1.4–6.5)
NEUTROPHILS NFR BLD AUTO: 59.1 % (ref 30–70)
PLATELET # BLD AUTO: 214 10*3/MM3 (ref 130–400)
PMV BLD AUTO: 10.3 FL (ref 6–10)
RBC # BLD AUTO: 5.53 10*6/MM3 (ref 4.7–6.1)
WBC NRBC COR # BLD: 10.88 10*3/MM3 (ref 4.5–12.5)

## 2017-04-17 PROCEDURE — 85025 COMPLETE CBC W/AUTO DIFF WBC: CPT | Performed by: INTERNAL MEDICINE

## 2017-04-17 PROCEDURE — 36415 COLL VENOUS BLD VENIPUNCTURE: CPT | Performed by: INTERNAL MEDICINE

## 2017-05-02 DIAGNOSIS — E78.5 HYPERLIPEMIA: ICD-10-CM

## 2017-05-02 DIAGNOSIS — M79.2 NEURALGIA AND NEURITIS: ICD-10-CM

## 2017-05-02 RX ORDER — ICOSAPENT ETHYL 1000 MG/1
CAPSULE ORAL
Qty: 120 CAPSULE | OUTPATIENT
Start: 2017-05-02

## 2017-05-02 RX ORDER — HYDROCODONE BITARTRATE AND ACETAMINOPHEN 7.5; 325 MG/1; MG/1
TABLET ORAL
Qty: 60 TABLET | OUTPATIENT
Start: 2017-05-02

## 2017-05-15 ENCOUNTER — LAB (OUTPATIENT)
Dept: ONCOLOGY | Facility: CLINIC | Age: 42
End: 2017-05-15

## 2017-05-15 VITALS
WEIGHT: 315 LBS | DIASTOLIC BLOOD PRESSURE: 81 MMHG | BODY MASS INDEX: 53.39 KG/M2 | RESPIRATION RATE: 18 BRPM | SYSTOLIC BLOOD PRESSURE: 131 MMHG | TEMPERATURE: 97.9 F | HEART RATE: 91 BPM | OXYGEN SATURATION: 97 %

## 2017-05-15 DIAGNOSIS — D75.1 POLYCYTHEMIA: ICD-10-CM

## 2017-05-15 LAB
BASOPHILS # BLD AUTO: 0.06 10*3/MM3 (ref 0–0.3)
BASOPHILS NFR BLD AUTO: 0.8 % (ref 0–2)
DEPRECATED RDW RBC AUTO: 42.9 FL (ref 37–54)
EOSINOPHIL # BLD AUTO: 1.19 10*3/MM3 (ref 0–0.7)
EOSINOPHIL NFR BLD AUTO: 15.9 % (ref 0–5)
ERYTHROCYTE [DISTWIDTH] IN BLOOD BY AUTOMATED COUNT: 13.8 % (ref 11.5–14.5)
HCT VFR BLD AUTO: 51 % (ref 42–52)
HGB BLD-MCNC: 16.9 G/DL (ref 14–18)
IMM GRANULOCYTES # BLD: 0.02 10*3/MM3 (ref 0–0.03)
IMM GRANULOCYTES NFR BLD: 0.3 % (ref 0–0.5)
LYMPHOCYTES # BLD AUTO: 2.4 10*3/MM3 (ref 1–3)
LYMPHOCYTES NFR BLD AUTO: 32 % (ref 21–51)
MCH RBC QN AUTO: 28.5 PG (ref 27–33)
MCHC RBC AUTO-ENTMCNC: 33.1 G/DL (ref 33–37)
MCV RBC AUTO: 86 FL (ref 80–94)
MONOCYTES # BLD AUTO: 0.7 10*3/MM3 (ref 0.1–0.9)
MONOCYTES NFR BLD AUTO: 9.3 % (ref 0–10)
NEUTROPHILS # BLD AUTO: 3.13 10*3/MM3 (ref 1.4–6.5)
NEUTROPHILS NFR BLD AUTO: 41.7 % (ref 30–70)
PLATELET # BLD AUTO: 198 10*3/MM3 (ref 130–400)
PMV BLD AUTO: 10.5 FL (ref 6–10)
POST-BLOOD PRESSURE: NORMAL
PRE-BLOOD PRESSURE: NORMAL
PRE-HCT: 51
PRE-HGB: 16.9
PULSE: 91
RBC # BLD AUTO: 5.93 10*6/MM3 (ref 4.7–6.1)
VOLUME COLLECTED: 510
WBC NRBC COR # BLD: 7.5 10*3/MM3 (ref 4.5–12.5)

## 2017-05-15 PROCEDURE — 85025 COMPLETE CBC W/AUTO DIFF WBC: CPT | Performed by: INTERNAL MEDICINE

## 2017-05-15 PROCEDURE — 36415 COLL VENOUS BLD VENIPUNCTURE: CPT | Performed by: INTERNAL MEDICINE

## 2017-05-15 PROCEDURE — 99195 PHLEBOTOMY: CPT | Performed by: INTERNAL MEDICINE

## 2017-05-31 DIAGNOSIS — I10 ESSENTIAL HYPERTENSION: ICD-10-CM

## 2017-05-31 DIAGNOSIS — IMO0002 UNCONTROLLED TYPE 2 DIABETES MELLITUS WITH OTHER CIRCULATORY COMPLICATION: ICD-10-CM

## 2017-05-31 DIAGNOSIS — M79.2 NEURALGIA AND NEURITIS: ICD-10-CM

## 2017-05-31 RX ORDER — CANAGLIFLOZIN 100 MG/1
TABLET, FILM COATED ORAL
Qty: 30 TABLET | OUTPATIENT
Start: 2017-05-31

## 2017-06-02 ENCOUNTER — LAB (OUTPATIENT)
Dept: ONCOLOGY | Facility: CLINIC | Age: 42
End: 2017-06-02

## 2017-06-02 ENCOUNTER — OFFICE VISIT (OUTPATIENT)
Dept: ONCOLOGY | Facility: CLINIC | Age: 42
End: 2017-06-02

## 2017-06-02 VITALS
RESPIRATION RATE: 18 BRPM | WEIGHT: 315 LBS | HEART RATE: 105 BPM | TEMPERATURE: 97.9 F | OXYGEN SATURATION: 98 % | SYSTOLIC BLOOD PRESSURE: 143 MMHG | BODY MASS INDEX: 53.56 KG/M2 | DIASTOLIC BLOOD PRESSURE: 86 MMHG

## 2017-06-02 DIAGNOSIS — D75.1 POLYCYTHEMIA: ICD-10-CM

## 2017-06-02 DIAGNOSIS — I10 ESSENTIAL HYPERTENSION: ICD-10-CM

## 2017-06-02 DIAGNOSIS — R79.89 ELEVATED LFTS: Primary | ICD-10-CM

## 2017-06-02 DIAGNOSIS — D45 POLYCYTHEMIA VERA (HCC): ICD-10-CM

## 2017-06-02 LAB
BASOPHILS # BLD AUTO: 0.06 10*3/MM3 (ref 0–0.3)
BASOPHILS NFR BLD AUTO: 0.7 % (ref 0–2)
DEPRECATED RDW RBC AUTO: 42.3 FL (ref 37–54)
EOSINOPHIL # BLD AUTO: 1.06 10*3/MM3 (ref 0–0.7)
EOSINOPHIL NFR BLD AUTO: 11.7 % (ref 0–5)
ERYTHROCYTE [DISTWIDTH] IN BLOOD BY AUTOMATED COUNT: 13.6 % (ref 11.5–14.5)
HCT VFR BLD AUTO: 49.5 % (ref 42–52)
HGB BLD-MCNC: 16.6 G/DL (ref 14–18)
IMM GRANULOCYTES # BLD: 0.02 10*3/MM3 (ref 0–0.03)
IMM GRANULOCYTES NFR BLD: 0.2 % (ref 0–0.5)
LYMPHOCYTES # BLD AUTO: 2.58 10*3/MM3 (ref 1–3)
LYMPHOCYTES NFR BLD AUTO: 28.4 % (ref 21–51)
MCH RBC QN AUTO: 29 PG (ref 27–33)
MCHC RBC AUTO-ENTMCNC: 33.5 G/DL (ref 33–37)
MCV RBC AUTO: 86.4 FL (ref 80–94)
MONOCYTES # BLD AUTO: 0.93 10*3/MM3 (ref 0.1–0.9)
MONOCYTES NFR BLD AUTO: 10.2 % (ref 0–10)
NEUTROPHILS # BLD AUTO: 4.43 10*3/MM3 (ref 1.4–6.5)
NEUTROPHILS NFR BLD AUTO: 48.8 % (ref 30–70)
PLATELET # BLD AUTO: 233 10*3/MM3 (ref 130–400)
PMV BLD AUTO: 10.5 FL (ref 6–10)
POST-BLOOD PRESSURE: NORMAL
PRE-BLOOD PRESSURE: NORMAL
PRE-HCT: 49.5
PRE-HGB: 16.6
PULSE: 105
RBC # BLD AUTO: 5.73 10*6/MM3 (ref 4.7–6.1)
VOLUME COLLECTED: 515
WBC NRBC COR # BLD: 9.08 10*3/MM3 (ref 4.5–12.5)

## 2017-06-02 PROCEDURE — 99195 PHLEBOTOMY: CPT | Performed by: INTERNAL MEDICINE

## 2017-06-02 PROCEDURE — 85025 COMPLETE CBC W/AUTO DIFF WBC: CPT | Performed by: INTERNAL MEDICINE

## 2017-06-02 PROCEDURE — 99214 OFFICE O/P EST MOD 30 MIN: CPT | Performed by: INTERNAL MEDICINE

## 2017-06-02 PROCEDURE — 36415 COLL VENOUS BLD VENIPUNCTURE: CPT | Performed by: INTERNAL MEDICINE

## 2017-06-02 NOTE — PROGRESS NOTES
Johnathon Mclain  [id number]  1975 6/2/2017    Referring Provider:   TYSON Sepulveda     Reason for Follow Up:   Polycythemia/Erythrocytosis     Chief Complaint:  Not Any      History of Present Illness:   Johnathon Mclain is a very pleasant 41 y.o.  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      Mr. Mclain has had an erythrocytosis since summer of 2016. He has also received previous phlebotomy with his primary care provider. He denies of any significant weight changes, fevers, night sweats or lymphadenopathy. He denies of any significant headaches or pruritis. He does not have any abnormal bleeding or thrombosis history. He is a never smoker, however does have strong exposure to second hand smoke and also worked in the coal mines. His wife notes that he does snore at night and at time he does awake himself in the middle of his sleep. He has never been tested for obstructive sleep apnea. He was also previously on testosterone supplementation however is no longer taking this. He did undergo pulmonary function tests which were normal. He reports that he did keep his appointment with sleep medicine however when he got home that day he already received an at home testing kit and later was sent supplemental oxygen at home. He is currently on nasal canula only and does not wear a CPAP mask.    Ms. Mclain otherwise denies of any significant complaints today. He last received phlebotomy two weeks ago.    The following portions of the patient's history were reviewed and updated as appropriate: allergies, current medications, past family history, past medical history, past social history, past surgical history and problem list.    No Known Allergies    Past Medical History:   Diagnosis Date   • COPD (chronic obstructive pulmonary disease)    • Diabetes mellitus    • GERD (gastroesophageal reflux disease)    • Gout    • Hyperlipidemia      mixed   • Hypertension    • Neuralgia and neuritis    • Osteoarthrosis    • Type II diabetes mellitus, uncontrolled     uncomplicated       History reviewed. No pertinent surgical history.    Social History     Social History   • Marital status:      Spouse name: N/A   • Number of children: N/A   • Years of education: N/A     Occupational History   • Not on file.     Social History Main Topics   • Smoking status: Never Smoker   • Smokeless tobacco: Current User     Types: Chew   • Alcohol use No   • Drug use: No   • Sexual activity: Not on file     Other Topics Concern   • Not on file     Social History Narrative       Family History   Problem Relation Age of Onset   • Arthritis Mother    • COPD Mother    • Asthma Mother    • Cancer Mother    • Hyperlipidemia Mother    • Diabetes Father    • Heart disease Father    • Hyperlipidemia Father    • Hypertension Father    • Stroke Father          Current Outpatient Prescriptions:   •  albuterol (PROVENTIL HFA;VENTOLIN HFA) 108 (90 BASE) MCG/ACT inhaler, Inhale 1 puff As Needed for wheezing., Disp: 1 inhaler, Rfl: 5  •  aspirin 81 MG EC tablet, Take 1 tablet by mouth Daily., Disp: 90 tablet, Rfl: 1  •  Canagliflozin (INVOKANA) 100 MG tablet, Take 100 mg by mouth Daily., Disp: 30 tablet, Rfl: 5  •  carvedilol (COREG) 6.25 MG tablet, Take 1 tablet by mouth 2 (Two) Times a Day., Disp: 60 tablet, Rfl: 5  •  chlorthalidone (HYGROTON) 25 MG tablet, Take 1 tablet by mouth Daily., Disp: 30 tablet, Rfl: 5  •  cholecalciferol (VITAMIN D3) 1000 UNITS tablet, Take 1,000 Units by mouth 2 (two) times a day., Disp: , Rfl:   •  clobetasol (TEMOVATE) 0.05 % cream, Apply  topically 2 (Two) Times a Day., Disp: 30 g, Rfl: 5  •  clotrimazole (LOTRIMIN) 1 % cream, Apply 1 application topically daily., Disp: , Rfl:   •  colchicine 0.6 MG tablet, Take 1 tablet by mouth Daily., Disp: 30 tablet, Rfl: 5  •  febuxostat (ULORIC) 80 MG tablet tablet, Take 1 tablet by mouth Daily., Disp: 30  tablet, Rfl: 5  •  fenofibrate (TRICOR) 48 MG tablet, Take 1 tablet by mouth Daily., Disp: 30 tablet, Rfl: 5  •  fluticasone (FLONASE) 50 MCG/ACT nasal spray, 2 sprays into each nostril Daily. Administer 2 sprays in each nostril for each dose., Disp: 1 bottle, Rfl: 5  •  gabapentin (NEURONTIN) 600 MG tablet, Take 1 tablet by mouth 3 (Three) Times a Day., Disp: 90 tablet, Rfl: 5  •  glipiZIDE (GLUCOTROL) 5 MG tablet, Take 1 tablet by mouth Daily., Disp: 30 tablet, Rfl: 5  •  HYDROcodone-acetaminophen (NORCO) 7.5-325 MG per tablet, Take 1 tablet by mouth Every 6 (Six) Hours As Needed for moderate pain (4-6) or severe pain (7-10)., Disp: 60 tablet, Rfl: 0  •  icosapent ethyl (VASCEPA) 1 G capsule capsule, Take 1 g by mouth 2 (Two) Times a Day With Meals., Disp: 120 capsule, Rfl: 5  •  lisinopril (PRINIVIL,ZESTRIL) 20 MG tablet, Take 1 tablet by mouth 2 (Two) Times a Day., Disp: 60 tablet, Rfl: 5  •  loratadine (CLARITIN) 10 MG tablet, TAKE 1 Tablet BY MOUTH EVERY DAY, Disp: 30 tablet, Rfl: 5  •  Loratadine 10 MG capsule, Take 10 mg by mouth Daily., Disp: 30 each, Rfl: 5  •  metFORMIN (GLUCOPHAGE) 1000 MG tablet, Take 1 tablet by mouth 2 (Two) Times a Day., Disp: 60 tablet, Rfl: 5  •  mupirocin (BACTROBAN) 2 % cream, Apply  topically 3 (Three) Times a Day., Disp: 1 each, Rfl: 5  •  omeprazole (priLOSEC) 20 MG capsule, Take 1 capsule by mouth Daily., Disp: 30 capsule, Rfl: 5  •  sildenafil (VIAGRA) 100 MG tablet, Take 1 tablet by mouth Daily As Needed for erectile dysfunction., Disp: 10 tablet, Rfl: 3  •  sulfamethoxazole-trimethoprim (BACTRIM DS) 800-160 MG per tablet, Take 1 tablet by mouth 2 (Two) Times a Day., Disp: 20 tablet, Rfl: 0        Review of Systems  A comprehensive 10 point review of systems was conducted with patient and positive as per HPI otherwise negative.         Physical Exam  Vital Signs: These were reviewed and listed as per patient’s electronic medical chart  Vitals:    06/02/17 1126   BP: 143/86    Pulse: 105   Resp: 18   Temp: 97.9 °F (36.6 °C)   SpO2: 98%     General: Awake, alert and oriented, in no distress, obese  HEENT: Head is atraumatic, normocephalic, extraocular movements full, oropharynx clear, no scleral icterus, pink moist mucous membranes, large neck  Neck: supple, no jvd, lymphadenopathy or masses  Cardiovascular: regular rate and rhythm without murmurs, rubs or gallops  Pulmonary: non-labored, clear to auscultation bilaterally, no wheezing  Abdomen: soft, non-tender, non-distended, normal active bowel sounds present, no organomegaly  Extremities: No clubbing, cyanosis or edema  Lymph: No cervical, supraclavicular adenopathy  Neurologic: Mental status as above, alert, awake and oriented, grossly non-focal exam  Skin: warm, dry, intact        Labs / Studies:    Lab on 05/15/2017   Component Date Value   • WBC 05/15/2017 7.50    • RBC 05/15/2017 5.93    • Hemoglobin 05/15/2017 16.9    • Hematocrit 05/15/2017 51.0    • MCV 05/15/2017 86.0    • MCH 05/15/2017 28.5    • MCHC 05/15/2017 33.1    • RDW 05/15/2017 13.8    • RDW-SD 05/15/2017 42.9    • MPV 05/15/2017 10.5*   • Platelets 05/15/2017 198    • Neutrophil % 05/15/2017 41.7    • Lymphocyte % 05/15/2017 32.0    • Monocyte % 05/15/2017 9.3    • Eosinophil % 05/15/2017 15.9*   • Basophil % 05/15/2017 0.8    • Immature Grans % 05/15/2017 0.3    • Neutrophils, Absolute 05/15/2017 3.13    • Lymphocytes, Absolute 05/15/2017 2.40    • Monocytes, Absolute 05/15/2017 0.70    • Eosinophils, Absolute 05/15/2017 1.19*   • Basophils, Absolute 05/15/2017 0.06    • Immature Grans, Absolute 05/15/2017 0.02    • Pulse 05/15/2017 91    • Volume Collected 05/15/2017 510    • Pre-Hgb 05/15/2017 16.9    • Pre-Hct 05/15/2017 51.0    • Pre-Blood Pressure 05/15/2017 131/81    • Post-Blood Pressure 05/15/2017 120/74    Lab on 04/17/2017   Component Date Value   • WBC 04/17/2017 10.88    • RBC 04/17/2017 5.53    • Hemoglobin 04/17/2017 16.3    • Hematocrit 04/17/2017  48.9    • MCV 04/17/2017 88.4    • MCH 04/17/2017 29.5    • MCHC 04/17/2017 33.3    • RDW 04/17/2017 12.7    • RDW-SD 04/17/2017 40.5    • MPV 04/17/2017 10.3*   • Platelets 04/17/2017 214    • Neutrophil % 04/17/2017 59.1    • Lymphocyte % 04/17/2017 20.2*   • Monocyte % 04/17/2017 10.4*   • Eosinophil % 04/17/2017 9.3*   • Basophil % 04/17/2017 0.6    • Immature Grans % 04/17/2017 0.4    • Neutrophils, Absolute 04/17/2017 6.44    • Lymphocytes, Absolute 04/17/2017 2.20    • Monocytes, Absolute 04/17/2017 1.13*   • Eosinophils, Absolute 04/17/2017 1.01*   • Basophils, Absolute 04/17/2017 0.06    • Immature Grans, Absolute 04/17/2017 0.04*   Lab on 04/03/2017   Component Date Value   • WBC 04/03/2017 8.66    • RBC 04/03/2017 5.47    • Hemoglobin 04/03/2017 16.8    • Hematocrit 04/03/2017 49.3    • MCV 04/03/2017 90.1    • MCH 04/03/2017 30.7    • MCHC 04/03/2017 34.1    • RDW 04/03/2017 12.5    • RDW-SD 04/03/2017 40.3    • MPV 04/03/2017 10.2*   • Platelets 04/03/2017 223    • Neutrophil % 04/03/2017 49.2    • Lymphocyte % 04/03/2017 24.8    • Monocyte % 04/03/2017 12.2*   • Eosinophil % 04/03/2017 12.6*   • Basophil % 04/03/2017 0.6    • Immature Grans % 04/03/2017 0.6*   • Neutrophils, Absolute 04/03/2017 4.26    • Lymphocytes, Absolute 04/03/2017 2.15    • Monocytes, Absolute 04/03/2017 1.06*   • Eosinophils, Absolute 04/03/2017 1.09*   • Basophils, Absolute 04/03/2017 0.05    • Immature Grans, Absolute 04/03/2017 0.05*   Lab on 03/20/2017   Component Date Value   • WBC 03/20/2017 8.78    • RBC 03/20/2017 5.53    • Hemoglobin 03/20/2017 16.8    • Hematocrit 03/20/2017 49.5    • MCV 03/20/2017 89.5    • MCH 03/20/2017 30.4    • MCHC 03/20/2017 33.9    • RDW 03/20/2017 12.7    • RDW-SD 03/20/2017 41.2    • MPV 03/20/2017 9.8    • Platelets 03/20/2017 242    • Neutrophil % 03/20/2017 44.3    • Lymphocyte % 03/20/2017 31.4    • Monocyte % 03/20/2017 11.2*   • Eosinophil % 03/20/2017 12.0*   • Basophil % 03/20/2017  0.8    • Immature Grans % 03/20/2017 0.3    • Neutrophils, Absolute 03/20/2017 3.89    • Lymphocytes, Absolute 03/20/2017 2.76    • Monocytes, Absolute 03/20/2017 0.98*   • Eosinophils, Absolute 03/20/2017 1.05*   • Basophils, Absolute 03/20/2017 0.07    • Immature Grans, Absolute 03/20/2017 0.03    • Testosterone, Total 03/20/2017 486.9    • Testosterone, Free 03/20/2017 7.7    • Uric Acid 03/20/2017 4.4    • PSA 03/20/2017 0.490    Office Visit on 03/03/2017   Component Date Value   • WBC 03/03/2017 8.74    • RBC 03/03/2017 5.41    • Hemoglobin 03/03/2017 17.0    • Hematocrit 03/03/2017 49.4    • MCV 03/03/2017 91.3    • MCH 03/03/2017 31.4    • MCHC 03/03/2017 34.4    • RDW 03/03/2017 12.6    • RDW-SD 03/03/2017 41.6    • MPV 03/03/2017 10.3*   • Platelets 03/03/2017 217    • Neutrophil % 03/03/2017 44.9    • Lymphocyte % 03/03/2017 30.5    • Monocyte % 03/03/2017 12.1*   • Eosinophil % 03/03/2017 11.7*   • Basophil % 03/03/2017 0.6    • Immature Grans % 03/03/2017 0.2    • Neutrophils, Absolute 03/03/2017 3.92    • Lymphocytes, Absolute 03/03/2017 2.67    • Monocytes, Absolute 03/03/2017 1.06*   • Eosinophils, Absolute 03/03/2017 1.02*   • Basophils, Absolute 03/03/2017 0.05    • Immature Grans, Absolute 03/03/2017 0.02    • Pulse 03/03/2017 93    • Volume Collected 03/03/2017 510    • Pre-Hgb 03/03/2017 17.0    • Pre-Hct 03/03/2017 49.4    • Pre-Blood Pressure 03/03/2017 144/79    • Post-Blood Pressure 03/03/2017 115/75    Lab on 02/20/2017   Component Date Value   • WBC 02/20/2017 9.20    • RBC 02/20/2017 5.80    • Hemoglobin 02/20/2017 18.4*   • Hematocrit 02/20/2017 53.1*   • MCV 02/20/2017 91.6    • MCH 02/20/2017 31.7    • MCHC 02/20/2017 34.7    • RDW 02/20/2017 12.8    • RDW-SD 02/20/2017 42.2    • MPV 02/20/2017 10.6*   • Platelets 02/20/2017 212    • Neutrophil % 02/20/2017 52.4    • Lymphocyte % 02/20/2017 24.1    • Monocyte % 02/20/2017 10.3*   • Eosinophil % 02/20/2017 12.4*   • Basophil %  02/20/2017 0.4    • Immature Grans % 02/20/2017 0.4    • Neutrophils, Absolute 02/20/2017 4.81    • Lymphocytes, Absolute 02/20/2017 2.22    • Monocytes, Absolute 02/20/2017 0.95*   • Eosinophils, Absolute 02/20/2017 1.14*   • Basophils, Absolute 02/20/2017 0.04    • Immature Grans, Absolute 02/20/2017 0.04*   • Pulse 02/20/2017 104    • Volume Collected 02/20/2017 524    • Pre-Hgb 02/20/2017 18.4    • Pre-Hct 02/20/2017 53.1    • Pre-Blood Pressure 02/20/2017 119/76    • Post-Blood Pressure 02/20/2017 125/75    Lab on 02/06/2017   Component Date Value   • WBC 02/06/2017 8.50    • RBC 02/06/2017 5.65    • Hemoglobin 02/06/2017 17.7    • Hematocrit 02/06/2017 51.7    • MCV 02/06/2017 91.5    • MCH 02/06/2017 31.3    • MCHC 02/06/2017 34.2    • RDW 02/06/2017 13.2    • RDW-SD 02/06/2017 43.6    • MPV 02/06/2017 10.0    • Platelets 02/06/2017 202    • Neutrophil % 02/06/2017 46.3    • Lymphocyte % 02/06/2017 29.4    • Monocyte % 02/06/2017 9.6    • Eosinophil % 02/06/2017 14.0*   • Basophil % 02/06/2017 0.5    • Immature Grans % 02/06/2017 0.2    • Neutrophils, Absolute 02/06/2017 3.93    • Lymphocytes, Absolute 02/06/2017 2.50    • Monocytes, Absolute 02/06/2017 0.82    • Eosinophils, Absolute 02/06/2017 1.19*   • Basophils, Absolute 02/06/2017 0.04    • Immature Grans, Absolute 02/06/2017 0.02    • Pulse 02/06/2017 89    • Volume Collected 02/06/2017 517    • Pre-Hgb 02/06/2017 17.7    • Pre-Hct 02/06/2017 51.7    • Pre-Blood Pressure 02/06/2017 123/66    • Post-Blood Pressure 02/06/2017 119/74    Hospital Outpatient Visit on 01/26/2017   Component Date Value   • Creatinine 01/26/2017 1.10    Lab on 01/23/2017   Component Date Value   • WBC 01/23/2017 8.70    • RBC 01/23/2017 5.76    • Hemoglobin 01/23/2017 19.1*   • Hematocrit 01/23/2017 53.0*   • MCV 01/23/2017 92.0    • MCH 01/23/2017 33.2*   • MCHC 01/23/2017 36.0    • RDW 01/23/2017 13.3    • RDW-SD 01/23/2017 44.2    • MPV 01/23/2017 10.5*   • Platelets  01/23/2017 193    • Neutrophil % 01/23/2017 41.5    • Lymphocyte % 01/23/2017 32.1    • Monocyte % 01/23/2017 10.8*   • Eosinophil % 01/23/2017 14.8*   • Basophil % 01/23/2017 0.5    • Immature Grans % 01/23/2017 0.3    • Neutrophils, Absolute 01/23/2017 3.61    • Lymphocytes, Absolute 01/23/2017 2.79    • Monocytes, Absolute 01/23/2017 0.94*   • Eosinophils, Absolute 01/23/2017 1.29*   • Basophils, Absolute 01/23/2017 0.04    • Immature Grans, Absolute 01/23/2017 0.03    • Pulse 01/23/2017 92    • Volume Collected 01/23/2017 510    • Pre-Hgb 01/23/2017 19.1    • Pre-Hct 01/23/2017 53.0    • Pre-Blood Pressure 01/23/2017 126/81    • Post-Blood Pressure 01/23/2017 127/69    Office Visit on 01/20/2017   Component Date Value   • WBC 01/20/2017 9.10    • RBC 01/20/2017 5.74    • Hemoglobin 01/20/2017 18.6*   • Hematocrit 01/20/2017 52.7*   • MCV 01/20/2017 91.8    • MCH 01/20/2017 32.4    • MCHC 01/20/2017 35.3    • RDW 01/20/2017 13.2    • RDW-SD 01/20/2017 43.8    • MPV 01/20/2017 10.6*   • Platelets 01/20/2017 197    • Neutrophil % 01/20/2017 44.7    • Lymphocyte % 01/20/2017 31.9    • Monocyte % 01/20/2017 9.3    • Eosinophil % 01/20/2017 13.3*   • Basophil % 01/20/2017 0.5    • Immature Grans % 01/20/2017 0.3    • Neutrophils, Absolute 01/20/2017 4.06    • Lymphocytes, Absolute 01/20/2017 2.90    • Monocytes, Absolute 01/20/2017 0.85    • Eosinophils, Absolute 01/20/2017 1.21*   • Basophils, Absolute 01/20/2017 0.05    • Immature Grans, Absolute 01/20/2017 0.03    There may be more visits with results that are not included.       12/30/16: US ABDOMEN COMPLETE: Visualized pancreas is unremarkable. The imaged portion of the abdominal aorta is nondilated.  The liver is homogeneous. There is no intrahepatic ductal dilatation or focal hepatic mass. The imaged portion of the hepatic vessels and inferior vena cava are patent. The gallbladder shows no cholelithiasis or pericholecystic fluid. The wall of the gallbladder is  not thickened. It measures 2.9 mm. The common bile duct is normal, measuring 4.8 mm. The kidneys demonstrate no evidence of hydronephrosis or solid renal mass. The spleen is homogeneous and measures 14.2 cm which is slightly enlarged. IMPRESSION: Negative abdominal ultrasound.     12/30/16: XR CHEST PA AND LATERAL: The cardiac silhouette is normal in size. There is some slight widening of the upper mediastinum. This can be seen with tortuous vessels. I could not entirely exclude some right paratracheal adenopathy. Correlation with old films or CT may add additional information. The lungs show increased interstitial markings throughout the lungs. No alveolar infiltrates to suggest pneumonia are seen; there are no signs of heart failure or pleural fluid. IMPRESSION: Slight widening of the mediastinum with fullness in the right paratracheal area. There is interstitial lung disease throughout both lungs.            Assessment/Plan    Johnathon Mclain is a very pleasant 41 y.o.  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     Polycythemia  To assess for a myeloproliferative disorder I obtained a [id number] and Exon 12/CALR/MPL mutations which were negative. BCR ABL PCR was <0.001%. I believe that he likely has a secondary polycythemia from underlying sleep apnea. I originally did make a sleep referral however he has not proceeded as he wanted to see if he would qualify for an at home study however insurance did not approve this. However it appears that his primary provider was able to approve this. He is currently on supplemental oxygen via NC at night and is currently not on CPAP. He likely therefore carries a diagnosis of sleep apnea however does not know the results of this study and should ideally used a CPAP machine if this is the case which was relayed to the patient. He has seen sleep medicine once however did not return as he was able to do at  home testing. Abdominal ultrasound did show mild splenomegaly. His pulmonary function tests are likely consistent with sleep apnea versus hypoxemia due to COPD however O2 sats on RA have been normal. Erythropoietin level was normal. Patient was initiated phlebotomy for one unit (500 mL) to be taken every 2 weeks with goal of Hct <50%. I will change this to every month and he will also get a CMP with his next lab draw. He will get phlebotomy today for borderline Hct level. He currently does not take iron supplementation.     Mediastinal Widening  This was seen on CXR in evaluation for secondary polycythemia therefore I did obtain a CT Chest with IV contrast to further evaluate this did not show any evidence of any abnormal adenopathy or masses and was felt to be due to benign findings.     Elevated Liver Function Tests  Abdominal US was negative with mild splenomegaly, will repeat with next blood draw as complete blood count was already drawn today.     I will have the patient return in follow up appointment to in three months with labs monthly. He understands that should he have any questions or concerns prior to his appointment he should give us a call at any time and I would be happy to see him sooner. It was a pleasure to see this patient in clinic today, thank you for allowing me to participate in the care of this patient.     I spent 26 minutes in regards to this patient’s care today. More than 15 minutes of the time was spent in direct interaction with the patient for the above problems.      Shy Lowe MD  06/02/2017  11:32 AM

## 2017-06-05 RX ORDER — ALBUTEROL SULFATE 90 UG/1
2 AEROSOL, METERED RESPIRATORY (INHALATION) EVERY 4 HOURS PRN
Qty: 1 INHALER | Refills: 1
Start: 2017-06-05 | End: 2017-08-14

## 2017-06-05 RX ORDER — GABAPENTIN 600 MG/1
600 TABLET ORAL 3 TIMES DAILY
Qty: 90 TABLET | Refills: 0
Start: 2017-06-05 | End: 2017-06-08 | Stop reason: SDUPTHER

## 2017-06-05 RX ORDER — CHLORTHALIDONE 25 MG/1
25 TABLET ORAL DAILY
Qty: 30 TABLET | Refills: 1
Start: 2017-06-05 | End: 2017-06-08 | Stop reason: SDUPTHER

## 2017-06-05 RX ORDER — FENOFIBRATE 48 MG/1
48 TABLET, COATED ORAL DAILY
Qty: 30 TABLET | Refills: 1
Start: 2017-06-05 | End: 2017-06-08 | Stop reason: SDUPTHER

## 2017-06-08 ENCOUNTER — TELEPHONE (OUTPATIENT)
Dept: FAMILY MEDICINE CLINIC | Facility: CLINIC | Age: 42
End: 2017-06-08

## 2017-06-08 ENCOUNTER — OFFICE VISIT (OUTPATIENT)
Dept: FAMILY MEDICINE CLINIC | Facility: CLINIC | Age: 42
End: 2017-06-08

## 2017-06-08 VITALS
HEART RATE: 88 BPM | SYSTOLIC BLOOD PRESSURE: 140 MMHG | OXYGEN SATURATION: 94 % | DIASTOLIC BLOOD PRESSURE: 80 MMHG | TEMPERATURE: 97.7 F | WEIGHT: 315 LBS | BODY MASS INDEX: 44.1 KG/M2 | HEIGHT: 71 IN

## 2017-06-08 DIAGNOSIS — E78.2 MIXED HYPERLIPIDEMIA: ICD-10-CM

## 2017-06-08 DIAGNOSIS — H60.22 ACUTE MALIGNANT OTITIS EXTERNA OF LEFT EAR: ICD-10-CM

## 2017-06-08 DIAGNOSIS — G47.33 OBSTRUCTIVE SLEEP APNEA SYNDROME: ICD-10-CM

## 2017-06-08 DIAGNOSIS — J30.89 SEASONAL ALLERGIC RHINITIS DUE TO OTHER ALLERGIC TRIGGER: ICD-10-CM

## 2017-06-08 DIAGNOSIS — I10 ESSENTIAL HYPERTENSION: ICD-10-CM

## 2017-06-08 DIAGNOSIS — IMO0002 UNCONTROLLED TYPE 2 DIABETES MELLITUS WITH OTHER CIRCULATORY COMPLICATION: ICD-10-CM

## 2017-06-08 DIAGNOSIS — Z79.899 HIGH RISK MEDICATION USE: Primary | ICD-10-CM

## 2017-06-08 DIAGNOSIS — M79.2 NEURALGIA AND NEURITIS: ICD-10-CM

## 2017-06-08 DIAGNOSIS — K21.00 GASTROESOPHAGEAL REFLUX DISEASE WITH ESOPHAGITIS: ICD-10-CM

## 2017-06-08 DIAGNOSIS — Z13.9 SCREENING: ICD-10-CM

## 2017-06-08 DIAGNOSIS — E55.9 VITAMIN D DEFICIENCY DISEASE: ICD-10-CM

## 2017-06-08 PROCEDURE — 99214 OFFICE O/P EST MOD 30 MIN: CPT | Performed by: NURSE PRACTITIONER

## 2017-06-08 RX ORDER — CHLORTHALIDONE 25 MG/1
25 TABLET ORAL DAILY
Qty: 30 TABLET | Refills: 1
Start: 2017-06-08 | End: 2017-08-01 | Stop reason: SDUPTHER

## 2017-06-08 RX ORDER — FENOFIBRATE 48 MG/1
48 TABLET, COATED ORAL DAILY
Qty: 30 TABLET | Refills: 1
Start: 2017-06-08 | End: 2017-08-01 | Stop reason: SDUPTHER

## 2017-06-08 RX ORDER — GLIPIZIDE 5 MG/1
5 TABLET ORAL DAILY
Qty: 30 TABLET | Refills: 5 | Status: SHIPPED | OUTPATIENT
Start: 2017-06-08 | End: 2017-10-10 | Stop reason: SDUPTHER

## 2017-06-08 RX ORDER — SILDENAFIL 100 MG/1
100 TABLET, FILM COATED ORAL DAILY PRN
Qty: 10 TABLET | Refills: 3 | Status: SHIPPED | OUTPATIENT
Start: 2017-06-08 | End: 2017-10-10 | Stop reason: SDUPTHER

## 2017-06-08 RX ORDER — HYDROCODONE BITARTRATE AND ACETAMINOPHEN 7.5; 325 MG/1; MG/1
1 TABLET ORAL EVERY 6 HOURS PRN
Qty: 60 TABLET | Refills: 0 | Status: SHIPPED | OUTPATIENT
Start: 2017-06-08 | End: 2017-08-14 | Stop reason: SDUPTHER

## 2017-06-08 RX ORDER — LISINOPRIL 20 MG/1
20 TABLET ORAL 2 TIMES DAILY
Qty: 60 TABLET | Refills: 5
Start: 2017-06-08 | End: 2017-10-10 | Stop reason: SDUPTHER

## 2017-06-08 RX ORDER — LORATADINE 10 MG/1
10 CAPSULE, LIQUID FILLED ORAL DAILY
Qty: 30 EACH | Refills: 5 | Status: SHIPPED | OUTPATIENT
Start: 2017-06-08 | End: 2017-10-10 | Stop reason: SDUPTHER

## 2017-06-08 RX ORDER — COLCHICINE 0.6 MG/1
0.6 TABLET ORAL DAILY
Qty: 30 TABLET | Refills: 5 | Status: SHIPPED | OUTPATIENT
Start: 2017-06-08 | End: 2017-10-10 | Stop reason: SDUPTHER

## 2017-06-08 RX ORDER — FLUTICASONE PROPIONATE 50 MCG
2 SPRAY, SUSPENSION (ML) NASAL DAILY
Qty: 1 BOTTLE | Refills: 5 | Status: SHIPPED | OUTPATIENT
Start: 2017-06-08 | End: 2017-10-10 | Stop reason: SDUPTHER

## 2017-06-08 RX ORDER — GABAPENTIN 600 MG/1
600 TABLET ORAL 3 TIMES DAILY
Qty: 90 TABLET | Refills: 0 | Status: SHIPPED | OUTPATIENT
Start: 2017-06-08 | End: 2017-08-01 | Stop reason: SDUPTHER

## 2017-06-08 RX ORDER — ICOSAPENT ETHYL 1000 MG/1
1 CAPSULE ORAL 2 TIMES DAILY WITH MEALS
Qty: 120 CAPSULE | Refills: 5 | Status: SHIPPED | OUTPATIENT
Start: 2017-06-08 | End: 2018-02-08 | Stop reason: SDUPTHER

## 2017-06-08 RX ORDER — FEBUXOSTAT 80 MG/1
80 TABLET, FILM COATED ORAL DAILY
Qty: 30 TABLET | Refills: 5 | Status: SHIPPED | OUTPATIENT
Start: 2017-06-08 | End: 2017-10-10 | Stop reason: SDUPTHER

## 2017-06-08 RX ORDER — CIPROFLOXACIN AND DEXAMETHASONE 3; 1 MG/ML; MG/ML
4 SUSPENSION/ DROPS AURICULAR (OTIC) 2 TIMES DAILY
Qty: 7.5 ML | Refills: 0 | Status: SHIPPED | OUTPATIENT
Start: 2017-06-08 | End: 2017-06-15

## 2017-06-08 RX ORDER — CLOBETASOL PROPIONATE 0.5 MG/G
CREAM TOPICAL 2 TIMES DAILY
Qty: 30 G | Refills: 5 | Status: SHIPPED | OUTPATIENT
Start: 2017-06-08 | End: 2017-10-10 | Stop reason: SDUPTHER

## 2017-06-08 RX ORDER — OMEPRAZOLE 20 MG/1
20 CAPSULE, DELAYED RELEASE ORAL DAILY
Qty: 30 CAPSULE | Refills: 5 | Status: SHIPPED | OUTPATIENT
Start: 2017-06-08 | End: 2017-10-10 | Stop reason: SDUPTHER

## 2017-06-08 NOTE — PROGRESS NOTES
Subjective   Johnathon Mclain is a 41 y.o. male.     Chief Complaint   Patient presents with   • Diabetes   • Osteoarthritis       History of Present Illness     OA- chronic condition , mostly affected in his low back. He does have some neuropathy in his feet.   Johanthon Mclain rates pain today as 7 on a scale of 0-10. Patient states that pain is reduced by at least one half but not completely with current Medication/Treatment. Pain does interfere with ability to perform daily activities as well as enjoyment of life. Patient reports quality of life and mobility has improved as a result of current treatment. Patient describes pain as stabbing. Pain is made worse from walking. Pain is eased by medication and rest.     Diabetes  Current symptoms include paresthesia of the feet. Patient denies visual disturbances, polydipsia, polyuria, hypoglycemia and foot ulcerations. Evaluation to date has been: fasting blood sugar, fasting lipid panel, hemoglobin A1C and microalbuminuria. Home sugars: patient does not check sugars. Current treatments: metformin. Last dilated eye exam 2016. Most recent hemoglobin A1c   Lab Results   Component Value Date    HGBA1C 6.80 (H) 12/08/2016    HGBA1C 6.9 (H) 06/08/2016    HGBA1C 6.4 (H) 12/17/2015    HGBA1C 8.0 (H) 06/08/2015      Sleep apnea  Pt has sleep study on file. Is wearing oxygen. Has had pulmonary function testing. Agrees to Pulmonary consult.     Hypertension  Stable     Under the care of hematology for polycythemia. Has scheduled blood removals.       Acute complaint of left ear pain- present for one week, tender, no fever.       The following portions of the patient's history were reviewed and updated as appropriate: allergies, current medications, past family history, past medical history, past social history, past surgical history and problem list.    Review of Systems   Constitutional: Negative.    HENT: Positive for ear pain.    Eyes: Negative.    Respiratory: Positive for apnea  "(wears oxygen at night ). Negative for cough, chest tightness, shortness of breath and wheezing.    Cardiovascular: Negative for chest pain and palpitations.   Gastrointestinal: Negative for abdominal pain, blood in stool, constipation, diarrhea, nausea and vomiting.   Endocrine: Negative.    Genitourinary: Negative.    Musculoskeletal: Positive for arthralgias and back pain. Negative for neck stiffness.   Skin: Negative for rash and wound.   Allergic/Immunologic: Negative.    Neurological: Negative.    Hematological: Negative.    Psychiatric/Behavioral: Negative for self-injury and suicidal ideas. The patient is not nervous/anxious.    All other systems reviewed and are negative.      Objective     /80 (BP Location: Left arm, Patient Position: Sitting, Cuff Size: Adult)  Pulse 88  Temp 97.7 °F (36.5 °C) (Oral)   Ht 70.5\" (179.1 cm)  Wt (!) 378 lb (171 kg)  SpO2 94%  BMI 53.47 kg/m2  Office Visit on 06/02/2017   Component Date Value Ref Range Status   • WBC 06/02/2017 9.08  4.50 - 12.50 10*3/mm3 Final   • RBC 06/02/2017 5.73  4.70 - 6.10 10*6/mm3 Final   • Hemoglobin 06/02/2017 16.6  14.0 - 18.0 g/dL Final   • Hematocrit 06/02/2017 49.5  42.0 - 52.0 % Final   • MCV 06/02/2017 86.4  80.0 - 94.0 fL Final   • MCH 06/02/2017 29.0  27.0 - 33.0 pg Final   • MCHC 06/02/2017 33.5  33.0 - 37.0 g/dL Final   • RDW 06/02/2017 13.6  11.5 - 14.5 % Final   • RDW-SD 06/02/2017 42.3  37.0 - 54.0 fl Final   • MPV 06/02/2017 10.5* 6.0 - 10.0 fL Final   • Platelets 06/02/2017 233  130 - 400 10*3/mm3 Final   • Neutrophil % 06/02/2017 48.8  30.0 - 70.0 % Final   • Lymphocyte % 06/02/2017 28.4  21.0 - 51.0 % Final   • Monocyte % 06/02/2017 10.2* 0.0 - 10.0 % Final   • Eosinophil % 06/02/2017 11.7* 0.0 - 5.0 % Final   • Basophil % 06/02/2017 0.7  0.0 - 2.0 % Final   • Immature Grans % 06/02/2017 0.2  0.0 - 0.5 % Final   • Neutrophils, Absolute 06/02/2017 4.43  1.40 - 6.50 10*3/mm3 Final   • Lymphocytes, Absolute 06/02/2017 2.58 "  1.00 - 3.00 10*3/mm3 Final   • Monocytes, Absolute 06/02/2017 0.93* 0.10 - 0.90 10*3/mm3 Final   • Eosinophils, Absolute 06/02/2017 1.06* 0.00 - 0.70 10*3/mm3 Final   • Basophils, Absolute 06/02/2017 0.06  0.00 - 0.30 10*3/mm3 Final   • Immature Grans, Absolute 06/02/2017 0.02  0.00 - 0.03 10*3/mm3 Final       Physical Exam   Constitutional: He is oriented to person, place, and time. Vital signs are normal. He appears well-developed and well-nourished. No distress.   Visibly obese.    HENT:   Head: Normocephalic and atraumatic.   Right Ear: Hearing, tympanic membrane, external ear and ear canal normal.   Left Ear: Tympanic membrane and external ear normal. There is drainage and swelling.   Nose: Nose normal.   Mouth/Throat: Oropharynx is clear and moist. No oropharyngeal exudate.   Eyes: EOM are normal. Pupils are equal, round, and reactive to light.   Neck: Normal range of motion. Neck supple. No tracheal deviation present. No thyromegaly present.   Cardiovascular: Normal rate, regular rhythm, normal heart sounds and intact distal pulses.  Exam reveals no gallop and no friction rub.    No murmur heard.  Pulmonary/Chest: Effort normal and breath sounds normal. No respiratory distress. He has no wheezes. He has no rales. He exhibits no tenderness.   Abdominal: Soft. Bowel sounds are normal. He exhibits no distension and no mass. There is no tenderness. There is no rebound and no guarding.   Musculoskeletal:        Lumbar back: He exhibits decreased range of motion and tenderness.   Lymphadenopathy:     He has no cervical adenopathy.   Neurological: He is alert and oriented to person, place, and time. He has normal reflexes.   CN 2-12 grossly intact    Skin: Skin is warm and dry. No rash noted. He is not diaphoretic. No erythema.   Multiple skin tags at base of neck    Psychiatric: He has a normal mood and affect. His speech is normal and behavior is normal. Judgment and thought content normal. Cognition and memory  are normal.   Vitals reviewed.      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    Overview     uncomplicated         Relevant Medications    glipiZIDE (GLUCOTROL) 5 MG tablet    lisinopril (PRINIVIL,ZESTRIL) 20 MG tablet    Other Relevant Orders    Comprehensive Metabolic Panel    Lipid Panel    Hemoglobin A1c    TSH      Other Visit Diagnoses     High risk medication use    -  Primary    Relevant Orders    Urine Drug Screen    Mixed hyperlipidemia        Relevant Medications    fenofibrate (TRICOR) 48 MG tablet    icosapent ethyl (VASCEPA) 1 G capsule capsule    Gastroesophageal reflux disease with esophagitis        stable    Relevant Medications    omeprazole (priLOSEC) 20 MG capsule    Vitamin D deficiency disease        Relevant Orders    Vitamin D 25 Hydroxy    Screening        Relevant Orders    Vitamin B12    Seasonal allergic rhinitis due to other allergic trigger        Relevant Medications    fluticasone (FLONASE) 50 MCG/ACT nasal spray    Acute malignant otitis externa of left ear        Relevant Medications    clobetasol (TEMOVATE) 0.05 % cream    ciprofloxacin-dexamethasone (CIPRODEX) 0.3-0.1 % otic suspension    Obstructive sleep apnea syndrome        Relevant Orders    Ambulatory Referral to Pulmonology        Remain under the care of hematology.  Fasting labs one week prior to next visit.   Pt has been educated/instructed on diabetic care and protocols. Sick day rules reviewed. Continue to monitor  blood sugar and report abnormal readings as discussed today. Pt / family state understanding.   I have discussed diagnosis in detail today allowing time for questions and answers. Pt is aware of reasons to seek urgent or emergent medical care as well as reasons to return to the clinic for evaluation. Possible side effects, interactions and progression of symptoms discussed as well. Pt / family states understanding.   John has been reviewed as consistent.  Uds is on file.   Goal: Promote comfort and maintain mobility.   Follow up 1-2 months, sooner if needed.              This document has been electronically signed by:  TYSON Vinson, NP-C

## 2017-06-29 ENCOUNTER — LAB (OUTPATIENT)
Dept: ONCOLOGY | Facility: CLINIC | Age: 42
End: 2017-06-29

## 2017-06-29 VITALS
DIASTOLIC BLOOD PRESSURE: 72 MMHG | HEART RATE: 102 BPM | SYSTOLIC BLOOD PRESSURE: 127 MMHG | TEMPERATURE: 97.3 F | OXYGEN SATURATION: 97 % | RESPIRATION RATE: 18 BRPM

## 2017-06-29 DIAGNOSIS — D75.1 POLYCYTHEMIA: ICD-10-CM

## 2017-06-29 LAB
BASOPHILS # BLD AUTO: 0.05 10*3/MM3 (ref 0–0.3)
BASOPHILS NFR BLD AUTO: 0.6 % (ref 0–2)
DEPRECATED RDW RBC AUTO: 42.5 FL (ref 37–54)
EOSINOPHIL # BLD AUTO: 1.22 10*3/MM3 (ref 0–0.7)
EOSINOPHIL NFR BLD AUTO: 14.3 % (ref 0–5)
ERYTHROCYTE [DISTWIDTH] IN BLOOD BY AUTOMATED COUNT: 13.8 % (ref 11.5–14.5)
HCT VFR BLD AUTO: 47.2 % (ref 42–52)
HGB BLD-MCNC: 15.4 G/DL (ref 14–18)
IMM GRANULOCYTES # BLD: 0.03 10*3/MM3 (ref 0–0.03)
IMM GRANULOCYTES NFR BLD: 0.4 % (ref 0–0.5)
LYMPHOCYTES # BLD AUTO: 2.42 10*3/MM3 (ref 1–3)
LYMPHOCYTES NFR BLD AUTO: 28.3 % (ref 21–51)
MCH RBC QN AUTO: 27.5 PG (ref 27–33)
MCHC RBC AUTO-ENTMCNC: 32.6 G/DL (ref 33–37)
MCV RBC AUTO: 84.1 FL (ref 80–94)
MONOCYTES # BLD AUTO: 0.88 10*3/MM3 (ref 0.1–0.9)
MONOCYTES NFR BLD AUTO: 10.3 % (ref 0–10)
NEUTROPHILS # BLD AUTO: 3.94 10*3/MM3 (ref 1.4–6.5)
NEUTROPHILS NFR BLD AUTO: 46.1 % (ref 30–70)
PLATELET # BLD AUTO: 232 10*3/MM3 (ref 130–400)
PMV BLD AUTO: 10.2 FL (ref 6–10)
RBC # BLD AUTO: 5.61 10*6/MM3 (ref 4.7–6.1)
WBC NRBC COR # BLD: 8.54 10*3/MM3 (ref 4.5–12.5)

## 2017-06-29 PROCEDURE — 36415 COLL VENOUS BLD VENIPUNCTURE: CPT | Performed by: INTERNAL MEDICINE

## 2017-06-29 PROCEDURE — 85025 COMPLETE CBC W/AUTO DIFF WBC: CPT | Performed by: INTERNAL MEDICINE

## 2017-06-30 DIAGNOSIS — IMO0002 UNCONTROLLED TYPE 2 DIABETES MELLITUS WITH OTHER CIRCULATORY COMPLICATION: ICD-10-CM

## 2017-06-30 DIAGNOSIS — I10 ESSENTIAL HYPERTENSION: ICD-10-CM

## 2017-06-30 DIAGNOSIS — M79.2 NEURALGIA AND NEURITIS: ICD-10-CM

## 2017-06-30 DIAGNOSIS — E78.5 HYPERLIPEMIA: ICD-10-CM

## 2017-06-30 RX ORDER — ICOSAPENT ETHYL 1000 MG/1
CAPSULE ORAL
Qty: 120 CAPSULE | OUTPATIENT
Start: 2017-06-30

## 2017-06-30 RX ORDER — FENOFIBRATE 48 MG/1
TABLET, COATED ORAL
Qty: 30 TABLET | OUTPATIENT
Start: 2017-06-30

## 2017-06-30 RX ORDER — CHLORTHALIDONE 25 MG/1
TABLET ORAL
Qty: 30 TABLET | OUTPATIENT
Start: 2017-06-30

## 2017-06-30 RX ORDER — LISINOPRIL 20 MG/1
TABLET ORAL
Qty: 60 TABLET | OUTPATIENT
Start: 2017-06-30

## 2017-06-30 RX ORDER — CARVEDILOL 6.25 MG/1
TABLET ORAL
Qty: 60 TABLET | OUTPATIENT
Start: 2017-06-30

## 2017-06-30 RX ORDER — GABAPENTIN 600 MG/1
TABLET ORAL
Qty: 90 TABLET | OUTPATIENT
Start: 2017-06-30

## 2017-07-02 RX ORDER — HYDROCODONE BITARTRATE AND ACETAMINOPHEN 7.5; 325 MG/1; MG/1
TABLET ORAL
Qty: 60 TABLET | OUTPATIENT
Start: 2017-07-02

## 2017-07-27 ENCOUNTER — LAB (OUTPATIENT)
Dept: ONCOLOGY | Facility: CLINIC | Age: 42
End: 2017-07-27

## 2017-07-27 VITALS
HEART RATE: 97 BPM | RESPIRATION RATE: 18 BRPM | DIASTOLIC BLOOD PRESSURE: 80 MMHG | BODY MASS INDEX: 54.62 KG/M2 | OXYGEN SATURATION: 95 % | SYSTOLIC BLOOD PRESSURE: 141 MMHG | WEIGHT: 315 LBS | TEMPERATURE: 97.3 F

## 2017-07-27 DIAGNOSIS — D75.1 POLYCYTHEMIA: ICD-10-CM

## 2017-07-27 LAB
BASOPHILS # BLD AUTO: 0.06 10*3/MM3 (ref 0–0.3)
BASOPHILS NFR BLD AUTO: 0.7 % (ref 0–2)
DEPRECATED RDW RBC AUTO: 41.2 FL (ref 37–54)
EOSINOPHIL # BLD AUTO: 1.15 10*3/MM3 (ref 0–0.7)
EOSINOPHIL NFR BLD AUTO: 13.8 % (ref 0–5)
ERYTHROCYTE [DISTWIDTH] IN BLOOD BY AUTOMATED COUNT: 13.5 % (ref 11.5–14.5)
HCT VFR BLD AUTO: 47.6 % (ref 42–52)
HGB BLD-MCNC: 15.7 G/DL (ref 14–18)
IMM GRANULOCYTES # BLD: 0.02 10*3/MM3 (ref 0–0.03)
IMM GRANULOCYTES NFR BLD: 0.2 % (ref 0–0.5)
LYMPHOCYTES # BLD AUTO: 2.77 10*3/MM3 (ref 1–3)
LYMPHOCYTES NFR BLD AUTO: 33.2 % (ref 21–51)
MCH RBC QN AUTO: 27.7 PG (ref 27–33)
MCHC RBC AUTO-ENTMCNC: 33 G/DL (ref 33–37)
MCV RBC AUTO: 84 FL (ref 80–94)
MONOCYTES # BLD AUTO: 0.75 10*3/MM3 (ref 0.1–0.9)
MONOCYTES NFR BLD AUTO: 9 % (ref 0–10)
NEUTROPHILS # BLD AUTO: 3.6 10*3/MM3 (ref 1.4–6.5)
NEUTROPHILS NFR BLD AUTO: 43.1 % (ref 30–70)
PLATELET # BLD AUTO: 210 10*3/MM3 (ref 130–400)
PMV BLD AUTO: 10.4 FL (ref 6–10)
RBC # BLD AUTO: 5.67 10*6/MM3 (ref 4.7–6.1)
WBC NRBC COR # BLD: 8.35 10*3/MM3 (ref 4.5–12.5)

## 2017-07-27 PROCEDURE — 85025 COMPLETE CBC W/AUTO DIFF WBC: CPT | Performed by: INTERNAL MEDICINE

## 2017-08-01 DIAGNOSIS — I10 ESSENTIAL HYPERTENSION: ICD-10-CM

## 2017-08-01 DIAGNOSIS — M79.2 NEURALGIA AND NEURITIS: ICD-10-CM

## 2017-08-02 RX ORDER — CHLORTHALIDONE 25 MG/1
TABLET ORAL
Qty: 30 TABLET | Refills: 5 | Status: SHIPPED | OUTPATIENT
Start: 2017-08-02 | End: 2017-10-10 | Stop reason: SDUPTHER

## 2017-08-02 RX ORDER — FENOFIBRATE 48 MG/1
TABLET, COATED ORAL
Qty: 30 TABLET | Refills: 5 | Status: SHIPPED | OUTPATIENT
Start: 2017-08-02 | End: 2017-10-10 | Stop reason: SDUPTHER

## 2017-08-02 RX ORDER — GABAPENTIN 600 MG/1
TABLET ORAL
Qty: 90 TABLET | Refills: 2 | Status: SHIPPED | OUTPATIENT
Start: 2017-08-02 | End: 2017-08-14 | Stop reason: SDUPTHER

## 2017-08-02 RX ORDER — CANAGLIFLOZIN 100 MG/1
TABLET, FILM COATED ORAL
Qty: 30 TABLET | Refills: 5 | Status: SHIPPED | OUTPATIENT
Start: 2017-08-02 | End: 2018-01-15 | Stop reason: SDUPTHER

## 2017-08-08 ENCOUNTER — LAB (OUTPATIENT)
Dept: FAMILY MEDICINE CLINIC | Facility: CLINIC | Age: 42
End: 2017-08-08

## 2017-08-08 DIAGNOSIS — E55.9 VITAMIN D DEFICIENCY DISEASE: ICD-10-CM

## 2017-08-08 DIAGNOSIS — IMO0002 UNCONTROLLED TYPE 2 DIABETES MELLITUS WITH OTHER CIRCULATORY COMPLICATION: ICD-10-CM

## 2017-08-08 DIAGNOSIS — Z13.9 SCREENING: ICD-10-CM

## 2017-08-08 LAB
25(OH)D3 SERPL-MCNC: 30 NG/ML
ALBUMIN SERPL-MCNC: 4.7 G/DL (ref 3.5–5)
ALBUMIN/GLOB SERPL: 1.8 G/DL (ref 1.5–2.5)
ALP SERPL-CCNC: 74 U/L (ref 40–129)
ALT SERPL W P-5'-P-CCNC: 91 U/L (ref 10–44)
ANION GAP SERPL CALCULATED.3IONS-SCNC: 8.5 MMOL/L (ref 3.6–11.2)
AST SERPL-CCNC: 50 U/L (ref 10–34)
BILIRUB SERPL-MCNC: 0.5 MG/DL (ref 0.2–1.8)
BUN BLD-MCNC: 21 MG/DL (ref 7–21)
BUN/CREAT SERPL: 17.2 (ref 7–25)
CALCIUM SPEC-SCNC: 10.3 MG/DL (ref 7.7–10)
CHLORIDE SERPL-SCNC: 101 MMOL/L (ref 99–112)
CHOLEST SERPL-MCNC: 165 MG/DL (ref 0–200)
CO2 SERPL-SCNC: 26.5 MMOL/L (ref 24.3–31.9)
CREAT BLD-MCNC: 1.22 MG/DL (ref 0.43–1.29)
GFR SERPL CREATININE-BSD FRML MDRD: 65 ML/MIN/1.73
GLOBULIN UR ELPH-MCNC: 2.6 GM/DL
GLUCOSE BLD-MCNC: 208 MG/DL (ref 70–110)
HBA1C MFR BLD: 8.1 % (ref 4.5–5.7)
HDLC SERPL-MCNC: 27 MG/DL (ref 60–100)
LDLC SERPL CALC-MCNC: 78 MG/DL (ref 0–100)
LDLC/HDLC SERPL: 2.88 {RATIO}
OSMOLALITY SERPL CALC.SUM OF ELEC: 281 MOSM/KG (ref 273–305)
POTASSIUM BLD-SCNC: 4.7 MMOL/L (ref 3.5–5.3)
PROT SERPL-MCNC: 7.3 G/DL (ref 6–8)
SODIUM BLD-SCNC: 136 MMOL/L (ref 135–153)
TRIGL SERPL-MCNC: 301 MG/DL (ref 0–150)
TSH SERPL DL<=0.05 MIU/L-ACNC: 1.03 MIU/ML (ref 0.55–4.78)
VIT B12 BLD-MCNC: 544 PG/ML (ref 211–911)
VLDLC SERPL-MCNC: 60.2 MG/DL

## 2017-08-08 PROCEDURE — 80061 LIPID PANEL: CPT | Performed by: NURSE PRACTITIONER

## 2017-08-08 PROCEDURE — 80053 COMPREHEN METABOLIC PANEL: CPT | Performed by: NURSE PRACTITIONER

## 2017-08-08 PROCEDURE — 84443 ASSAY THYROID STIM HORMONE: CPT | Performed by: NURSE PRACTITIONER

## 2017-08-08 PROCEDURE — 83036 HEMOGLOBIN GLYCOSYLATED A1C: CPT | Performed by: NURSE PRACTITIONER

## 2017-08-08 PROCEDURE — 82306 VITAMIN D 25 HYDROXY: CPT | Performed by: NURSE PRACTITIONER

## 2017-08-08 PROCEDURE — 36415 COLL VENOUS BLD VENIPUNCTURE: CPT

## 2017-08-08 PROCEDURE — 82607 VITAMIN B-12: CPT | Performed by: NURSE PRACTITIONER

## 2017-08-08 NOTE — PROGRESS NOTES
Called and advised client to watch their sugar intake and to try to stay on a strict diabetic diet. Informed about A1c. PKF

## 2017-08-14 ENCOUNTER — TELEPHONE (OUTPATIENT)
Dept: FAMILY MEDICINE CLINIC | Facility: CLINIC | Age: 42
End: 2017-08-14

## 2017-08-14 ENCOUNTER — OFFICE VISIT (OUTPATIENT)
Dept: FAMILY MEDICINE CLINIC | Facility: CLINIC | Age: 42
End: 2017-08-14

## 2017-08-14 VITALS
HEART RATE: 93 BPM | SYSTOLIC BLOOD PRESSURE: 140 MMHG | WEIGHT: 315 LBS | DIASTOLIC BLOOD PRESSURE: 80 MMHG | HEIGHT: 71 IN | BODY MASS INDEX: 44.1 KG/M2 | TEMPERATURE: 98.7 F | OXYGEN SATURATION: 91 %

## 2017-08-14 DIAGNOSIS — E11.40 TYPE 2 DIABETES MELLITUS WITH DIABETIC NEUROPATHY, WITHOUT LONG-TERM CURRENT USE OF INSULIN (HCC): Primary | ICD-10-CM

## 2017-08-14 DIAGNOSIS — Z79.899 HIGH RISK MEDICATION USE: ICD-10-CM

## 2017-08-14 DIAGNOSIS — M19.90 OSTEOARTHRITIS, UNSPECIFIED OSTEOARTHRITIS TYPE, UNSPECIFIED SITE: ICD-10-CM

## 2017-08-14 DIAGNOSIS — IMO0002 UNCONTROLLED TYPE 2 DIABETES MELLITUS WITH DIABETIC PERIPHERAL ANGIOPATHY WITHOUT GANGRENE, WITHOUT LONG-TERM CURRENT USE OF INSULIN: ICD-10-CM

## 2017-08-14 DIAGNOSIS — M79.2 NEURALGIA AND NEURITIS: ICD-10-CM

## 2017-08-14 DIAGNOSIS — N52.9 IMPOTENCE DUE TO ERECTILE DYSFUNCTION: ICD-10-CM

## 2017-08-14 DIAGNOSIS — M54.5 LOW BACK PAIN, UNSPECIFIED BACK PAIN LATERALITY, UNSPECIFIED CHRONICITY, WITH SCIATICA PRESENCE UNSPECIFIED: ICD-10-CM

## 2017-08-14 DIAGNOSIS — I10 ESSENTIAL HYPERTENSION: ICD-10-CM

## 2017-08-14 LAB — ALBUMIN UR-MCNC: 3.6 MG/L

## 2017-08-14 PROCEDURE — 82043 UR ALBUMIN QUANTITATIVE: CPT | Performed by: NURSE PRACTITIONER

## 2017-08-14 PROCEDURE — 99214 OFFICE O/P EST MOD 30 MIN: CPT | Performed by: NURSE PRACTITIONER

## 2017-08-14 RX ORDER — HYDROCODONE BITARTRATE AND ACETAMINOPHEN 7.5; 325 MG/1; MG/1
1 TABLET ORAL EVERY 6 HOURS PRN
Qty: 60 TABLET | Refills: 0 | Status: SHIPPED | OUTPATIENT
Start: 2017-08-14 | End: 2017-10-10 | Stop reason: SDUPTHER

## 2017-08-14 RX ORDER — GABAPENTIN 600 MG/1
600 TABLET ORAL 3 TIMES DAILY
Qty: 90 TABLET | Refills: 2 | Status: SHIPPED | OUTPATIENT
Start: 2017-08-14 | End: 2017-10-10 | Stop reason: SDUPTHER

## 2017-08-14 NOTE — PROGRESS NOTES
Subjective   Johnathon Mclain is a 42 y.o. male.     Chief Complaint   Patient presents with   • Osteoarthritis   • Diabetes   • COPD   • Hyperlipidemia   • Hypertension       History of Present Illness       Allergic rhinitis - stable with current medications.  Hypertension- Does check blood pressure randomly with the readings within acceptable ranges.  Side effects from current medication.   COPD -  patient wears oxygen at night.  Is consistent and compliant with his inhaler use.  He does have sleep study on file.    Erectile dysfunction  - Viagra is effective.  He does not desire a refill today.    Polycythemia vera - remains under the care of hematology.    Uncontrolled diabetes with circulatory convocation and diabetic neuropathy - recent A1c greater than 8.  Patient declines to start any type of injection.  He does not wish to add or increase medications at this time.  He states he will work harder on diet and exercise.  He does understand the risks .  He currently takes Neurontin 600 mg 1 by mouth 3 times a day for diabetic neuropathy.  Osteoarthritis - patient has chronic osteoarthritis of his neck and mid and low back.  He rates his back pain as 7 on a scale of 0-10.  Pain is made worse with bending, lifting, twisting and prolonged standing.  Patient reports that he does become stiff if he is sedentary.  He has tried anti-inflammatories in the past with no success.  Patient currently receives Norco which he uses when necessary for chronic back pain.  He reports his pain is decreased to a 2 on a pain scale of 0-10.  He has no history of substance abuse or addiction.  He does report an improvement in quality of life and decrease in pain with current medication regimen.      The following portions of the patient's history were reviewed and updated as appropriate: allergies, current medications, past family history, past medical history, past social history, past surgical history and problem list.      Current  Outpatient Prescriptions:   •  albuterol (PROVENTIL HFA;VENTOLIN HFA) 108 (90 BASE) MCG/ACT inhaler, Inhale 1 puff As Needed for wheezing., Disp: 1 inhaler, Rfl: 5  •  aspirin 81 MG EC tablet, Take 1 tablet by mouth Daily., Disp: 90 tablet, Rfl: 1  •  carvedilol (COREG) 6.25 MG tablet, Take 1 tablet by mouth 2 (Two) Times a Day., Disp: 60 tablet, Rfl: 5  •  chlorthalidone (HYGROTON) 25 MG tablet, TAKE 1 Tablet BY MOUTH EVERY DAY, Disp: 30 tablet, Rfl: 5  •  cholecalciferol (VITAMIN D3) 1000 UNITS tablet, Take 1,000 Units by mouth 2 (two) times a day., Disp: , Rfl:   •  clobetasol (TEMOVATE) 0.05 % cream, Apply  topically 2 (Two) Times a Day., Disp: 30 g, Rfl: 5  •  colchicine 0.6 MG tablet, Take 1 tablet by mouth Daily., Disp: 30 tablet, Rfl: 5  •  febuxostat (ULORIC) 80 MG tablet tablet, Take 1 tablet by mouth Daily., Disp: 30 tablet, Rfl: 5  •  fenofibrate (TRICOR) 48 MG tablet, TAKE 1 Tablet BY MOUTH EVERY DAY, Disp: 30 tablet, Rfl: 5  •  fluticasone (FLONASE) 50 MCG/ACT nasal spray, 2 sprays into each nostril Daily. Administer 2 sprays in each nostril for each dose., Disp: 1 bottle, Rfl: 5  •  gabapentin (NEURONTIN) 600 MG tablet, Take 1 tablet by mouth 3 (Three) Times a Day., Disp: 90 tablet, Rfl: 2  •  glipiZIDE (GLUCOTROL) 5 MG tablet, Take 1 tablet by mouth Daily., Disp: 30 tablet, Rfl: 5  •  HYDROcodone-acetaminophen (NORCO) 7.5-325 MG per tablet, Take 1 tablet by mouth Every 6 (Six) Hours As Needed for Moderate Pain (4-6) or Severe Pain (7-10)., Disp: 60 tablet, Rfl: 0  •  icosapent ethyl (VASCEPA) 1 G capsule capsule, Take 1 g by mouth 2 (Two) Times a Day With Meals., Disp: 120 capsule, Rfl: 5  •  INVOKANA 100 MG tablet, TAKE 1 Tablet BY MOUTH EVERY DAY, Disp: 30 tablet, Rfl: 5  •  lisinopril (PRINIVIL,ZESTRIL) 20 MG tablet, Take 1 tablet by mouth 2 (Two) Times a Day., Disp: 60 tablet, Rfl: 5  •  loratadine (CLARITIN) 10 MG tablet, TAKE 1 Tablet BY MOUTH EVERY DAY, Disp: 30 tablet, Rfl: 5  •  Loratadine  "10 MG capsule, Take 10 mg by mouth Daily., Disp: 30 each, Rfl: 5  •  metFORMIN (GLUCOPHAGE) 1000 MG tablet, Take 1 tablet by mouth 2 (Two) Times a Day., Disp: 60 tablet, Rfl: 5  •  mupirocin (BACTROBAN) 2 % cream, Apply  topically 3 (Three) Times a Day., Disp: 1 each, Rfl: 5  •  omeprazole (priLOSEC) 20 MG capsule, Take 1 capsule by mouth Daily., Disp: 30 capsule, Rfl: 5  •  sildenafil (VIAGRA) 100 MG tablet, Take 1 tablet by mouth Daily As Needed for erectile dysfunction., Disp: 10 tablet, Rfl: 3    Review of Systems   Constitutional: Negative for activity change, appetite change, chills, fatigue and fever.   HENT: Negative for ear pain, facial swelling, hearing loss, sinus pressure, sore throat, trouble swallowing and voice change.    Eyes: Negative for pain, discharge and visual disturbance.   Respiratory: Negative for apnea, cough, chest tightness, shortness of breath and wheezing.         Oxygen at night   Cardiovascular: Negative for chest pain, palpitations and leg swelling.   Gastrointestinal: Negative for abdominal pain, blood in stool, constipation, diarrhea, nausea and vomiting.   Genitourinary: Negative for dysuria.   Musculoskeletal: Positive for arthralgias, back pain and neck pain. Negative for neck stiffness.   Skin: Negative for color change.   Neurological: Negative for headaches.   Psychiatric/Behavioral: Negative for confusion and suicidal ideas. The patient is not nervous/anxious.    All other systems reviewed and are negative.      Objective     /80 (BP Location: Left arm, Patient Position: Sitting, Cuff Size: Adult)  Pulse 93  Temp 98.7 °F (37.1 °C) (Tympanic)   Ht 70.5\" (179.1 cm)  Wt (!) 383 lb (174 kg)  SpO2 91%  BMI 54.18 kg/m2  Lab on 08/08/2017   Component Date Value Ref Range Status   • Glucose 08/08/2017 208* 70 - 110 mg/dL Final   • BUN 08/08/2017 21  7 - 21 mg/dL Final   • Creatinine 08/08/2017 1.22  0.43 - 1.29 mg/dL Final   • Sodium 08/08/2017 136  135 - 153 mmol/L " Final   • Potassium 08/08/2017 4.7  3.5 - 5.3 mmol/L Final   • Chloride 08/08/2017 101  99 - 112 mmol/L Final   • CO2 08/08/2017 26.5  24.3 - 31.9 mmol/L Final   • Calcium 08/08/2017 10.3* 7.7 - 10.0 mg/dL Final   • Total Protein 08/08/2017 7.3  6.0 - 8.0 g/dL Final   • Albumin 08/08/2017 4.70  3.50 - 5.00 g/dL Final   • ALT (SGPT) 08/08/2017 91* 10 - 44 U/L Final   • AST (SGOT) 08/08/2017 50* 10 - 34 U/L Final   • Alkaline Phosphatase 08/08/2017 74  40 - 129 U/L Final   • Total Bilirubin 08/08/2017 0.5  0.2 - 1.8 mg/dL Final   • eGFR Non  Amer 08/08/2017 65  >60 mL/min/1.73 Final   • Globulin 08/08/2017 2.6  gm/dL Final   • A/G Ratio 08/08/2017 1.8  1.5 - 2.5 g/dL Final   • BUN/Creatinine Ratio 08/08/2017 17.2  7.0 - 25.0 Final   • Anion Gap 08/08/2017 8.5  3.6 - 11.2 mmol/L Final   • Total Cholesterol 08/08/2017 165  0 - 200 mg/dL Final   • Triglycerides 08/08/2017 301* 0 - 150 mg/dL Final   • HDL Cholesterol 08/08/2017 27* 60 - 100 mg/dL Final   • LDL Cholesterol  08/08/2017 78  0 - 100 mg/dL Final   • VLDL Cholesterol 08/08/2017 60.2  mg/dL Final   • LDL/HDL Ratio 08/08/2017 2.88   Final   • Hemoglobin A1C 08/08/2017 8.10* 4.50 - 5.70 % Final   • TSH 08/08/2017 1.035  0.550 - 4.780 mIU/mL Final   • 25 Hydroxy, Vitamin D 08/08/2017 30.0  ng/ml Final   • Vitamin B-12 08/08/2017 544  211 - 911 pg/mL Final   • Osmolality Calc 08/08/2017 281.0  273.0 - 305.0 mOsm/kg Final       Physical Exam   Constitutional: He is oriented to person, place, and time. Vital signs are normal. He appears well-developed and well-nourished. No distress.   Visibly obese.    HENT:   Head: Normocephalic and atraumatic.   Right Ear: External ear normal.   Left Ear: External ear normal.   Nose: Nose normal.   Mouth/Throat: Oropharynx is clear and moist. No oropharyngeal exudate.   Eyes: EOM are normal. Pupils are equal, round, and reactive to light.   Neck: Normal range of motion. Neck supple. No tracheal deviation present. No  thyromegaly present.   Cardiovascular: Normal rate, regular rhythm, normal heart sounds and intact distal pulses.  Exam reveals no gallop and no friction rub.    No murmur heard.  Pulmonary/Chest: Effort normal and breath sounds normal. No respiratory distress. He has no wheezes. He has no rales. He exhibits no tenderness.   Abdominal: Soft. Bowel sounds are normal. He exhibits no distension and no mass. There is no tenderness. There is no rebound and no guarding.   Musculoskeletal:        Lumbar back: He exhibits decreased range of motion, tenderness and pain.   Lymphadenopathy:     He has no cervical adenopathy.   Neurological: He is alert and oriented to person, place, and time. He has normal reflexes.   CN 2-12 grossly intact    Skin: Skin is warm and dry. No rash noted. He is not diaphoretic. No erythema.   Multiple skin tags on the base of his neck   Psychiatric: He has a normal mood and affect. His speech is normal and behavior is normal. Judgment and thought content normal. Cognition and memory are normal.   Vitals reviewed.      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  John and UDS are on file as consistent.  Patient has been instructed regarding risk of misuse and addiction with controlled medication.  Goal: Improved quality of life and reduction in pain as evidenced by pt report.   We have reviewed his most recent labs.  Patient continues to decline changes in his diabetes medication for the addition of any type of injection.  He states he will work harder on diet and exercise as able.  We will obtain a micro-albumen today.  He has been given a written order for x-rays of the C-spine, T-spine, and L-spine.  There is existing radiology on file in all [facility] and [facility] records.     Medicare wellness exam this month.  Pt has been educated/instructed on diabetic care and protocols. Sick day rules reviewed. Continue to monitor blood sugar and report abnormal readings as discussed today. Pt / family state understanding.     I have discussed diagnosis in detail today allowing time for questions and answers. Pt is aware of reasons to seek urgent or emergent medical care as well as reasons to return to the clinic for evaluation. Possible side effects, interactions and progression of symptoms discussed as well. Pt / family states understanding.   Follow up this month for Medicare wellness, 1-2 months for routine visits.             This document has been electronically signed by:  TYSON Vinson, NP-C

## 2017-08-24 DIAGNOSIS — D45 CHRONIC ERYTHREMIA IN REMISSION (HCC): Primary | ICD-10-CM

## 2017-08-25 DIAGNOSIS — M54.5 LOW BACK PAIN, UNSPECIFIED BACK PAIN LATERALITY, UNSPECIFIED CHRONICITY, WITH SCIATICA PRESENCE UNSPECIFIED: Primary | ICD-10-CM

## 2017-08-31 ENCOUNTER — LAB (OUTPATIENT)
Dept: ONCOLOGY | Facility: CLINIC | Age: 42
End: 2017-08-31

## 2017-08-31 ENCOUNTER — OFFICE VISIT (OUTPATIENT)
Dept: ONCOLOGY | Facility: CLINIC | Age: 42
End: 2017-08-31

## 2017-08-31 VITALS
DIASTOLIC BLOOD PRESSURE: 81 MMHG | HEART RATE: 104 BPM | SYSTOLIC BLOOD PRESSURE: 136 MMHG | RESPIRATION RATE: 20 BRPM | BODY MASS INDEX: 54.18 KG/M2 | TEMPERATURE: 98.6 F | OXYGEN SATURATION: 96 % | WEIGHT: 315 LBS

## 2017-08-31 DIAGNOSIS — G47.33 OBSTRUCTIVE SLEEP APNEA: ICD-10-CM

## 2017-08-31 DIAGNOSIS — D75.1 POLYCYTHEMIA: Primary | ICD-10-CM

## 2017-08-31 DIAGNOSIS — K75.9 INFLAMMATORY LIVER DISEASE: ICD-10-CM

## 2017-08-31 DIAGNOSIS — R79.89 ELEVATED LFTS: ICD-10-CM

## 2017-08-31 DIAGNOSIS — E66.9 OBESITY, UNSPECIFIED OBESITY SEVERITY, UNSPECIFIED OBESITY TYPE: ICD-10-CM

## 2017-08-31 DIAGNOSIS — D75.1 POLYCYTHEMIA: ICD-10-CM

## 2017-08-31 DIAGNOSIS — IMO0002 UNCONTROLLED TYPE 2 DIABETES MELLITUS WITH OTHER CIRCULATORY COMPLICATION: ICD-10-CM

## 2017-08-31 LAB
ALBUMIN SERPL-MCNC: 4.5 G/DL (ref 3.5–5)
ALBUMIN/GLOB SERPL: 1.6 G/DL (ref 1.5–2.5)
ALP SERPL-CCNC: 74 U/L (ref 40–129)
ALT SERPL W P-5'-P-CCNC: 98 U/L (ref 10–44)
ANION GAP SERPL CALCULATED.3IONS-SCNC: 10.8 MMOL/L (ref 3.6–11.2)
AST SERPL-CCNC: 48 U/L (ref 10–34)
BASOPHILS # BLD AUTO: 0.04 10*3/MM3 (ref 0–0.3)
BASOPHILS NFR BLD AUTO: 0.5 % (ref 0–2)
BILIRUB SERPL-MCNC: 0.6 MG/DL (ref 0.2–1.8)
BUN BLD-MCNC: 25 MG/DL (ref 7–21)
BUN/CREAT SERPL: 21.6 (ref 7–25)
CALCIUM SPEC-SCNC: 10.1 MG/DL (ref 7.7–10)
CHLORIDE SERPL-SCNC: 100 MMOL/L (ref 99–112)
CO2 SERPL-SCNC: 24.2 MMOL/L (ref 24.3–31.9)
CREAT BLD-MCNC: 1.16 MG/DL (ref 0.43–1.29)
DEPRECATED RDW RBC AUTO: 43 FL (ref 37–54)
EOSINOPHIL # BLD AUTO: 1.55 10*3/MM3 (ref 0–0.7)
EOSINOPHIL NFR BLD AUTO: 17.8 % (ref 0–5)
ERYTHROCYTE [DISTWIDTH] IN BLOOD BY AUTOMATED COUNT: 14.3 % (ref 11.5–14.5)
GFR SERPL CREATININE-BSD FRML MDRD: 68 ML/MIN/1.73
GLOBULIN UR ELPH-MCNC: 2.9 GM/DL
GLUCOSE BLD-MCNC: 181 MG/DL (ref 70–110)
HCT VFR BLD AUTO: 49 % (ref 42–52)
HGB BLD-MCNC: 15.9 G/DL (ref 14–18)
IMM GRANULOCYTES # BLD: 0.02 10*3/MM3 (ref 0–0.03)
IMM GRANULOCYTES NFR BLD: 0.2 % (ref 0–0.5)
LYMPHOCYTES # BLD AUTO: 2.45 10*3/MM3 (ref 1–3)
LYMPHOCYTES NFR BLD AUTO: 28.1 % (ref 21–51)
MCH RBC QN AUTO: 26.9 PG (ref 27–33)
MCHC RBC AUTO-ENTMCNC: 32.4 G/DL (ref 33–37)
MCV RBC AUTO: 82.8 FL (ref 80–94)
MONOCYTES # BLD AUTO: 0.98 10*3/MM3 (ref 0.1–0.9)
MONOCYTES NFR BLD AUTO: 11.2 % (ref 0–10)
NEUTROPHILS # BLD AUTO: 3.69 10*3/MM3 (ref 1.4–6.5)
NEUTROPHILS NFR BLD AUTO: 42.2 % (ref 30–70)
OSMOLALITY SERPL CALC.SUM OF ELEC: 279.1 MOSM/KG (ref 273–305)
PLATELET # BLD AUTO: 212 10*3/MM3 (ref 130–400)
PMV BLD AUTO: 10.3 FL (ref 6–10)
POTASSIUM BLD-SCNC: 4.2 MMOL/L (ref 3.5–5.3)
PROT SERPL-MCNC: 7.4 G/DL (ref 6–8)
RBC # BLD AUTO: 5.92 10*6/MM3 (ref 4.7–6.1)
SODIUM BLD-SCNC: 135 MMOL/L (ref 135–153)
WBC NRBC COR # BLD: 8.73 10*3/MM3 (ref 4.5–12.5)

## 2017-08-31 PROCEDURE — 85025 COMPLETE CBC W/AUTO DIFF WBC: CPT | Performed by: INTERNAL MEDICINE

## 2017-08-31 PROCEDURE — 80053 COMPREHEN METABOLIC PANEL: CPT | Performed by: INTERNAL MEDICINE

## 2017-08-31 PROCEDURE — 99215 OFFICE O/P EST HI 40 MIN: CPT | Performed by: INTERNAL MEDICINE

## 2017-09-14 ENCOUNTER — OFFICE VISIT (OUTPATIENT)
Dept: FAMILY MEDICINE CLINIC | Facility: CLINIC | Age: 42
End: 2017-09-14

## 2017-09-14 VITALS
BODY MASS INDEX: 44.1 KG/M2 | HEIGHT: 71 IN | SYSTOLIC BLOOD PRESSURE: 130 MMHG | HEART RATE: 96 BPM | WEIGHT: 315 LBS | OXYGEN SATURATION: 96 % | TEMPERATURE: 98.2 F | DIASTOLIC BLOOD PRESSURE: 80 MMHG

## 2017-09-14 DIAGNOSIS — D45 POLYCYTHEMIA VERA (HCC): ICD-10-CM

## 2017-09-14 DIAGNOSIS — E78.2 MIXED HYPERLIPIDEMIA: ICD-10-CM

## 2017-09-14 DIAGNOSIS — E55.9 VITAMIN D DEFICIENCY DISEASE: ICD-10-CM

## 2017-09-14 DIAGNOSIS — J44.9 CHRONIC OBSTRUCTIVE PULMONARY DISEASE, UNSPECIFIED COPD TYPE (HCC): ICD-10-CM

## 2017-09-14 DIAGNOSIS — I10 ESSENTIAL HYPERTENSION: ICD-10-CM

## 2017-09-14 DIAGNOSIS — IMO0002 UNCONTROLLED TYPE 2 DIABETES MELLITUS WITH DIABETIC PERIPHERAL ANGIOPATHY WITHOUT GANGRENE, WITHOUT LONG-TERM CURRENT USE OF INSULIN: ICD-10-CM

## 2017-09-14 DIAGNOSIS — K21.9 GASTROESOPHAGEAL REFLUX DISEASE WITHOUT ESOPHAGITIS: ICD-10-CM

## 2017-09-14 DIAGNOSIS — M15.9 PRIMARY OSTEOARTHRITIS INVOLVING MULTIPLE JOINTS: ICD-10-CM

## 2017-09-14 DIAGNOSIS — M79.2 NEURALGIA AND NEURITIS: ICD-10-CM

## 2017-09-14 DIAGNOSIS — M10.00 IDIOPATHIC GOUT, UNSPECIFIED CHRONICITY, UNSPECIFIED SITE: ICD-10-CM

## 2017-09-14 DIAGNOSIS — E09.40: Primary | ICD-10-CM

## 2017-09-14 DIAGNOSIS — Z13.9 SCREENING: ICD-10-CM

## 2017-09-14 DIAGNOSIS — N52.9 IMPOTENCE DUE TO ERECTILE DYSFUNCTION: ICD-10-CM

## 2017-09-14 PROBLEM — M10.9 GOUT: Status: ACTIVE | Noted: 2017-09-14

## 2017-09-14 PROBLEM — E78.5 HYPERLIPIDEMIA: Status: ACTIVE | Noted: 2017-09-14

## 2017-09-14 PROCEDURE — G0438 PPPS, INITIAL VISIT: HCPCS | Performed by: NURSE PRACTITIONER

## 2017-09-14 PROCEDURE — 99214 OFFICE O/P EST MOD 30 MIN: CPT | Performed by: NURSE PRACTITIONER

## 2017-09-14 NOTE — PROGRESS NOTES
QUICK REFERENCE INFORMATION:  The ABCs of the Annual Wellness Visit    Initial Medicare Wellness Visit    HEALTH RISK ASSESSMENT    1975    Recent Hospitalizations:  No hospitalization(s) within the last year..        Current Medical Providers:  Patient Care Team:  TYSON Sepulveda as PCP - General  Shy Lowe MD as Consulting Physician (Hematology and Oncology)        Smoking Status:  History   Smoking Status   • Never Smoker   Smokeless Tobacco   • Current User   • Types: Chew     Chewing tobacco since age 5.       Alcohol Consumption:  History   Alcohol Use No       Depression Screen:   No flowsheet data found.    Health Habits and Functional and Cognitive Screening:  Functional & Cognitive Status 9/14/2017   Do you have difficulty preparing food and eating? No   Do you have difficulty bathing yourself? No   Do you have difficulty getting dressed? No   Do you have difficulty using the toilet? No   Do you have difficulty moving around from place to place? No   In the past year have you fallen or experienced a near fall? Yes   Do you need help using the phone?  No   Are you deaf or do you have serious difficulty hearing?  Yes   Do you need help with transportation? No   Do you need help shopping? No   Do you need help preparing meals?  No   Do you need help with housework?  Yes   Do you need help with laundry? Yes   Do you need help taking your medications? Yes   Do you need help managing money? Yes   Do you have difficulty concentrating, remembering or making decisions? No       Health Habits  Current Diet: Unhealthy Diet  Dental Exam: Not up to date  Eye Exam: Not up to date  Exercise (times per week): 0 times per week  Current Exercise Activities Include: Walking          Does the patient have evidence of cognitive impairment? No    Asiprin use counseling: Taking ASA appropriately as indicated      Recent Lab Results:    Visual Acuity:  No exam data present    20/20  Both  20/20 right    20/20 left     Hearing tested with appropriate response to whisper test at 5 and 10 feet.     Age-appropriate Screening Schedule:  Refer to the list below for future screening recommendations based on patient's age, sex and/or medical conditions. Orders for these recommended tests are listed in the plan section. The patient has been provided with a written plan.    Health Maintenance   Topic Date Due   • HEMOGLOBIN A1C  02/08/2018   • DIABETIC EYE EXAM  04/12/2018   • LIPID PANEL  08/08/2018   • URINE MICROALBUMIN  08/14/2018   • DIABETIC FOOT EXAM  09/14/2018   • TDAP/TD VACCINES (2 - Td) 03/06/2027   • PNEUMOCOCCAL VACCINE (19-64 MEDIUM RISK)  Addressed   • INFLUENZA VACCINE  Addressed        Subjective   History of Present Illness    Johnathon Mclain is a 42 y.o. male who presents for an Annual Wellness Visit.    Type 2 diabetes with diabetic neuropathy-patient has A1c greater than 8.  He is on multiple oral diabetes medications.  Some elevations in liver enzymes are noted.  He is currently on a statin and fenofibrate due to uncontrolled lipids.  A trial of decreasing his antilipid medication did not show an improvement in his liver enzyme elevation.  Patient is noncompliant with home glucose monitoring.  His wife is a nurse and is present for today's visit.  Patient states he is open to discussing changes in his diabetes medication regimen.  He does currently have some GI side effects from metformin.  Currently takes Neurontin for diabetic neuropathy.  Patient is currently on Inderal, 100 mg daily, metformin 1000 mg twice daily and glipizide 5 mg daily.     other diagnoses reviewed today include frequent abscesses, hypertension, impotence due to erectile dysfunction, osteoarthritis, polycythemia fair, COPD with sleep apnea, GERD, gout and hyperlipidemia.          The following portions of the patient's history were reviewed and updated as appropriate: allergies, current medications, past family history, past  medical history, past social history, past surgical history and problem list.    Outpatient Medications Prior to Visit   Medication Sig Dispense Refill   • albuterol (PROVENTIL HFA;VENTOLIN HFA) 108 (90 BASE) MCG/ACT inhaler Inhale 1 puff As Needed for wheezing. 1 inhaler 5   • aspirin 81 MG EC tablet Take 1 tablet by mouth Daily. 90 tablet 1   • carvedilol (COREG) 6.25 MG tablet Take 1 tablet by mouth 2 (Two) Times a Day. 60 tablet 5   • chlorthalidone (HYGROTON) 25 MG tablet TAKE 1 Tablet BY MOUTH EVERY DAY 30 tablet 5   • cholecalciferol (VITAMIN D3) 1000 UNITS tablet Take 1,000 Units by mouth 2 (two) times a day.     • clobetasol (TEMOVATE) 0.05 % cream Apply  topically 2 (Two) Times a Day. 30 g 5   • colchicine 0.6 MG tablet Take 1 tablet by mouth Daily. 30 tablet 5   • febuxostat (ULORIC) 80 MG tablet tablet Take 1 tablet by mouth Daily. 30 tablet 5   • fenofibrate (TRICOR) 48 MG tablet TAKE 1 Tablet BY MOUTH EVERY DAY 30 tablet 5   • fluticasone (FLONASE) 50 MCG/ACT nasal spray 2 sprays into each nostril Daily. Administer 2 sprays in each nostril for each dose. 1 bottle 5   • gabapentin (NEURONTIN) 600 MG tablet Take 1 tablet by mouth 3 (Three) Times a Day. 90 tablet 2   • glipiZIDE (GLUCOTROL) 5 MG tablet Take 1 tablet by mouth Daily. 30 tablet 5   • HYDROcodone-acetaminophen (NORCO) 7.5-325 MG per tablet Take 1 tablet by mouth Every 6 (Six) Hours As Needed for Moderate Pain (4-6) or Severe Pain (7-10). 60 tablet 0   • icosapent ethyl (VASCEPA) 1 G capsule capsule Take 1 g by mouth 2 (Two) Times a Day With Meals. 120 capsule 5   • INVOKANA 100 MG tablet TAKE 1 Tablet BY MOUTH EVERY DAY 30 tablet 5   • lisinopril (PRINIVIL,ZESTRIL) 20 MG tablet Take 1 tablet by mouth 2 (Two) Times a Day. 60 tablet 5   • loratadine (CLARITIN) 10 MG tablet TAKE 1 Tablet BY MOUTH EVERY DAY 30 tablet 5   • Loratadine 10 MG capsule Take 10 mg by mouth Daily. 30 each 5   • mupirocin (BACTROBAN) 2 % cream Apply  topically 3 (Three)  Times a Day. 1 each 5   • omeprazole (priLOSEC) 20 MG capsule Take 1 capsule by mouth Daily. 30 capsule 5   • sildenafil (VIAGRA) 100 MG tablet Take 1 tablet by mouth Daily As Needed for erectile dysfunction. 10 tablet 3   • metFORMIN (GLUCOPHAGE) 1000 MG tablet Take 1 tablet by mouth 2 (Two) Times a Day. 60 tablet 5     No facility-administered medications prior to visit.        Patient Active Problem List   Diagnosis   • Uncontrolled type 2 diabetes mellitus with circulatory disorder   • Neuralgia and neuritis   • Hypertension   • Impotence due to erectile dysfunction   • Polycythemia vera   • Abscess   • Osteoarthrosis   • Diabetes mellitus with diabetic neuropathy, without long-term current use of insulin   • COPD (chronic obstructive pulmonary disease)   • GERD (gastroesophageal reflux disease)   • Hyperlipidemia   • Gout       Advance Care Planning:  has NO advance directive - not interested in additional information    Identification of Risk Factors:  Risk factors include: weight , unhealthy diet, cardiovascular risk, inactivity, tobacco use and chronic pain.    Review of Systems   Constitutional: Negative for activity change, appetite change, chills, fatigue and fever.   HENT: Negative for ear pain, facial swelling, hearing loss, sinus pressure, sore throat, trouble swallowing and voice change.    Eyes: Negative for pain, discharge and visual disturbance.   Respiratory: Positive for cough (chronic copd). Negative for chest tightness, shortness of breath and wheezing. Apnea: has pap machine     Cardiovascular: Negative for chest pain, palpitations and leg swelling.   Gastrointestinal: Negative for abdominal pain, blood in stool, constipation, diarrhea, nausea and vomiting.   Genitourinary: Negative for dysuria.   Musculoskeletal: Positive for arthralgias and back pain. Negative for neck stiffness.   Skin: Negative for color change.   Neurological: Negative for headaches.   Psychiatric/Behavioral: Negative for  confusion, self-injury and suicidal ideas. The patient is not nervous/anxious.        Compared to one year ago, the patient feels his physical health is worse.  Compared to one year ago, the patient feels his mental health is the same.    Objective     Physical Exam   Constitutional: He is oriented to person, place, and time. Vital signs are normal. He appears well-developed and well-nourished. No distress.   Visibly obese.    HENT:   Head: Normocephalic and atraumatic.   Right Ear: External ear normal.   Left Ear: External ear normal.   Nose: Nose normal.   Mouth/Throat: Oropharynx is clear and moist. No oropharyngeal exudate.   Eyes: EOM are normal. Pupils are equal, round, and reactive to light.   Neck: Normal range of motion. Neck supple. No tracheal deviation present. No thyromegaly present.   Cardiovascular: Normal rate, regular rhythm, normal heart sounds and intact distal pulses.  Exam reveals no gallop and no friction rub.    No murmur heard.  Pulmonary/Chest: Effort normal and breath sounds normal. No respiratory distress. He has no wheezes. He has no rales. He exhibits no tenderness.   Abdominal: Soft. Bowel sounds are normal. He exhibits no distension and no mass. There is no tenderness. There is no rebound and no guarding.   Musculoskeletal:        Lumbar back: He exhibits decreased range of motion, tenderness and pain.    Johnathon had a diabetic foot exam performed today.    Vascular Status -  His exam exhibits right foot vasculature normal. His exam exhibits left foot vasculature normal.   Skin Integrity  -  His right foot skin is intact.     Johnathon 's left foot skin is intact. .  Lymphadenopathy:     He has no cervical adenopathy.   Neurological: He is alert and oriented to person, place, and time. He has normal reflexes.   CN 2-12 grossly intact    Skin: Skin is warm and dry. No rash noted. He is not diaphoretic. No erythema.   Multiple skin tags on the base of his neck   Psychiatric: He has a  "normal mood and affect. His speech is normal and behavior is normal. Judgment and thought content normal. Cognition and memory are normal.   Vitals reviewed.      Vitals:    09/14/17 1536   BP: 130/80   BP Location: Left arm   Patient Position: Sitting   Cuff Size: Adult   Pulse: 96   Temp: 98.2 °F (36.8 °C)   TempSrc: Tympanic   SpO2: 96%   Weight: (!) 380 lb (172 kg)   Height: 70.5\" (179.1 cm)       Body mass index is 53.75 kg/(m^2).  Discussed the patient's BMI with him. The BMI is above average; BMI management plan is completed.    Assessment/Plan   Patient Self-Management and Personalized Health Advice  The patient has been provided with information about: diet, exercise, weight management, prevention of cardiac or vascular disease, the relationship between weight and GERD, tobacco cessation and fall prevention and preventive services including:   · Advance directive, Colorectal cancer screening, fecal occult blood test, Counseling for cardiovascular disease risk reduction, Exercise counseling provided, Glaucoma screening recommended, Influenza vaccine, Nutrition counseling provided, Pneumococcal vaccine , Prostate cancer screening discussed, TdaP vaccine.    Visit Diagnoses:    ICD-10-CM ICD-9-CM   1. Drug or chemical induced diabetes mellitus with diabetic neuropathy, without long-term current use of insulin E09.40    2. Uncontrolled type 2 diabetes mellitus with diabetic peripheral angiopathy without gangrene, without long-term current use of insulin E11.51 250.72    E11.65 443.81   3. Impotence due to erectile dysfunction N52.9 607.84   4. Essential hypertension I10 401.9   5. Neuralgia and neuritis M79.2 729.2   6. Primary osteoarthritis involving multiple joints M15.0 715.09   7. Polycythemia vera D45 238.4   8. Chronic obstructive pulmonary disease, unspecified COPD type J44.9 496   9. Gastroesophageal reflux disease without esophagitis K21.9 530.81   10. Mixed hyperlipidemia E78.2 272.2   11. Idiopathic " gout, unspecified chronicity, unspecified site M10.00 274.9   12. Screening Z13.9 V82.9   13. Vitamin D deficiency disease E55.9 268.9       Orders Placed This Encounter   Procedures   • Comprehensive Metabolic Panel     Standing Status:   Future     Standing Expiration Date:   9/15/2018   • TSH     Standing Status:   Future     Standing Expiration Date:   9/14/2018   • Hemoglobin A1c     Standing Status:   Future     Standing Expiration Date:   9/14/2018   • Vitamin D 25 Hydroxy     Standing Status:   Future     Standing Expiration Date:   9/14/2018   • Uric Acid     Standing Status:   Future     Standing Expiration Date:   9/14/2018   • Lipid Panel     Standing Status:   Future     Standing Expiration Date:   9/14/2018   • MicroAlbumin, Urine, Random     Standing Status:   Future     Standing Expiration Date:   9/14/2018   • Vitamin B12   • Occult Blood X 3, Stool     Standing Status:   Future     Standing Expiration Date:   9/14/2018     Order Specific Question:   Is this occult blood testing for Screening purposes?     Answer:   Yes   • CBC & Differential     Standing Status:   Future     Standing Expiration Date:   9/14/2018     Order Specific Question:   Manual Differential     Answer:   No       Outpatient Encounter Prescriptions as of 9/14/2017   Medication Sig Dispense Refill   • albuterol (PROVENTIL HFA;VENTOLIN HFA) 108 (90 BASE) MCG/ACT inhaler Inhale 1 puff As Needed for wheezing. 1 inhaler 5   • aspirin 81 MG EC tablet Take 1 tablet by mouth Daily. 90 tablet 1   • carvedilol (COREG) 6.25 MG tablet Take 1 tablet by mouth 2 (Two) Times a Day. 60 tablet 5   • chlorthalidone (HYGROTON) 25 MG tablet TAKE 1 Tablet BY MOUTH EVERY DAY 30 tablet 5   • cholecalciferol (VITAMIN D3) 1000 UNITS tablet Take 1,000 Units by mouth 2 (two) times a day.     • clobetasol (TEMOVATE) 0.05 % cream Apply  topically 2 (Two) Times a Day. 30 g 5   • colchicine 0.6 MG tablet Take 1 tablet by mouth Daily. 30 tablet 5   •  febuxostat (ULORIC) 80 MG tablet tablet Take 1 tablet by mouth Daily. 30 tablet 5   • fenofibrate (TRICOR) 48 MG tablet TAKE 1 Tablet BY MOUTH EVERY DAY 30 tablet 5   • fluticasone (FLONASE) 50 MCG/ACT nasal spray 2 sprays into each nostril Daily. Administer 2 sprays in each nostril for each dose. 1 bottle 5   • gabapentin (NEURONTIN) 600 MG tablet Take 1 tablet by mouth 3 (Three) Times a Day. 90 tablet 2   • glipiZIDE (GLUCOTROL) 5 MG tablet Take 1 tablet by mouth Daily. 30 tablet 5   • HYDROcodone-acetaminophen (NORCO) 7.5-325 MG per tablet Take 1 tablet by mouth Every 6 (Six) Hours As Needed for Moderate Pain (4-6) or Severe Pain (7-10). 60 tablet 0   • icosapent ethyl (VASCEPA) 1 G capsule capsule Take 1 g by mouth 2 (Two) Times a Day With Meals. 120 capsule 5   • INVOKANA 100 MG tablet TAKE 1 Tablet BY MOUTH EVERY DAY 30 tablet 5   • lisinopril (PRINIVIL,ZESTRIL) 20 MG tablet Take 1 tablet by mouth 2 (Two) Times a Day. 60 tablet 5   • loratadine (CLARITIN) 10 MG tablet TAKE 1 Tablet BY MOUTH EVERY DAY 30 tablet 5   • Loratadine 10 MG capsule Take 10 mg by mouth Daily. 30 each 5   • metFORMIN (GLUCOPHAGE) 500 MG tablet Take 1 tablet by mouth 2 (Two) Times a Day. 60 tablet 5   • mupirocin (BACTROBAN) 2 % cream Apply  topically 3 (Three) Times a Day. 1 each 5   • omeprazole (priLOSEC) 20 MG capsule Take 1 capsule by mouth Daily. 30 capsule 5   • sildenafil (VIAGRA) 100 MG tablet Take 1 tablet by mouth Daily As Needed for erectile dysfunction. 10 tablet 3   • [DISCONTINUED] metFORMIN (GLUCOPHAGE) 1000 MG tablet Take 1 tablet by mouth 2 (Two) Times a Day. 60 tablet 5     No facility-administered encounter medications on file as of 9/14/2017.        Patient and his wife were present for counseling on uncontrolled diabetes and lifestyle changes.  Patient is agreeable to start a daily insulin dose with a goal of decreasing his oral medications over the next several weeks.  We will decrease his metformin to 500 mg by  mouth twice a day at this time and begin basal insulin injections today.  Patient is Y4 instructed and educated on the administration of Toujeo.10 units. His wife is an RN and was able to demonstrate correct administration of insulin. We will gradually increase by 3 units every 3 days until we reach 20 units. Pt tolerated first injection well.   Declines vaccines at this time.     I have discussed diagnosis in detail today allowing time for questions and answers. Pt is aware of reasons to seek urgent or emergent medical care as well as reasons to return to the clinic for evaluation. Possible side effects, interactions and progression of symptoms discussed as well. Pt / family states understanding.     Age appropriate education and safety instruction has been provided. Preventive education/recommendation > 15 minutes. Safety, accident prevention, vaccines, health maintenance concerns, nutrition, diet, exercise and social activity.     Medicare wellness exam was performed as well as a separate visit for uncontrolled diabetes and diabetic instruction.  Time face-to-face with patient was approximately 45 minutes with 50% of time spent counseling regarding lifestyle changes, insulin administration and changes in medication.    Reviewed use of high risk medication in the elderly: not applicable  Reviewed for potential of harmful drug interactions in the elderly: not applicable    Follow Up:  Return if symptoms worsen or fail to improve.     An After Visit Summary and PPPS with all of these plans were given to the patient.

## 2017-10-10 ENCOUNTER — OFFICE VISIT (OUTPATIENT)
Dept: FAMILY MEDICINE CLINIC | Facility: CLINIC | Age: 42
End: 2017-10-10

## 2017-10-10 VITALS
WEIGHT: 315 LBS | OXYGEN SATURATION: 95 % | BODY MASS INDEX: 44.1 KG/M2 | DIASTOLIC BLOOD PRESSURE: 80 MMHG | SYSTOLIC BLOOD PRESSURE: 130 MMHG | HEART RATE: 107 BPM | TEMPERATURE: 99.5 F | HEIGHT: 71 IN

## 2017-10-10 DIAGNOSIS — I10 ESSENTIAL HYPERTENSION: ICD-10-CM

## 2017-10-10 DIAGNOSIS — Z79.4 DIABETES MELLITUS DUE TO UNDERLYING CONDITION WITH DIABETIC NEUROPATHY, WITH LONG-TERM CURRENT USE OF INSULIN (HCC): ICD-10-CM

## 2017-10-10 DIAGNOSIS — E08.40 DIABETES MELLITUS DUE TO UNDERLYING CONDITION WITH DIABETIC NEUROPATHY, WITH LONG-TERM CURRENT USE OF INSULIN (HCC): ICD-10-CM

## 2017-10-10 DIAGNOSIS — K75.9 INFLAMMATORY LIVER DISEASE: ICD-10-CM

## 2017-10-10 DIAGNOSIS — M79.2 NEURALGIA AND NEURITIS: ICD-10-CM

## 2017-10-10 DIAGNOSIS — M10.00 IDIOPATHIC GOUT, UNSPECIFIED CHRONICITY, UNSPECIFIED SITE: ICD-10-CM

## 2017-10-10 DIAGNOSIS — Z79.899 HIGH RISK MEDICATION USE: ICD-10-CM

## 2017-10-10 DIAGNOSIS — IMO0002 UNCONTROLLED TYPE 2 DIABETES MELLITUS WITH DIABETIC PERIPHERAL ANGIOPATHY WITHOUT GANGRENE, WITHOUT LONG-TERM CURRENT USE OF INSULIN: ICD-10-CM

## 2017-10-10 DIAGNOSIS — L02.91 ABSCESS: Primary | ICD-10-CM

## 2017-10-10 DIAGNOSIS — E55.9 VITAMIN D DEFICIENCY DISEASE: ICD-10-CM

## 2017-10-10 DIAGNOSIS — R79.89 ELEVATED LFTS: ICD-10-CM

## 2017-10-10 DIAGNOSIS — K21.00 GASTROESOPHAGEAL REFLUX DISEASE WITH ESOPHAGITIS: ICD-10-CM

## 2017-10-10 LAB
25(OH)D3 SERPL-MCNC: 28 NG/ML
ALBUMIN SERPL-MCNC: 4.7 G/DL (ref 3.5–5)
ALBUMIN UR-MCNC: 1.9 MG/L
ALBUMIN/GLOB SERPL: 1.6 G/DL (ref 1.5–2.5)
ALP SERPL-CCNC: 82 U/L (ref 40–129)
ALT SERPL W P-5'-P-CCNC: 61 U/L (ref 10–44)
ANION GAP SERPL CALCULATED.3IONS-SCNC: 10.7 MMOL/L (ref 3.6–11.2)
AST SERPL-CCNC: 38 U/L (ref 10–34)
BASOPHILS # BLD AUTO: 0.06 10*3/MM3 (ref 0–0.3)
BASOPHILS NFR BLD AUTO: 0.6 % (ref 0–2)
BILIRUB SERPL-MCNC: 0.6 MG/DL (ref 0.2–1.8)
BUN BLD-MCNC: 25 MG/DL (ref 7–21)
BUN/CREAT SERPL: 21 (ref 7–25)
CALCIUM SPEC-SCNC: 11.3 MG/DL (ref 7.7–10)
CHLORIDE SERPL-SCNC: 103 MMOL/L (ref 99–112)
CHOLEST SERPL-MCNC: 161 MG/DL (ref 0–200)
CO2 SERPL-SCNC: 23.3 MMOL/L (ref 24.3–31.9)
CREAT BLD-MCNC: 1.19 MG/DL (ref 0.43–1.29)
DEPRECATED RDW RBC AUTO: 45.9 FL (ref 37–54)
EOSINOPHIL # BLD AUTO: 0.94 10*3/MM3 (ref 0–0.7)
EOSINOPHIL NFR BLD AUTO: 8.9 % (ref 0–5)
ERYTHROCYTE [DISTWIDTH] IN BLOOD BY AUTOMATED COUNT: 15.1 % (ref 11.5–14.5)
GFR SERPL CREATININE-BSD FRML MDRD: 66 ML/MIN/1.73
GLOBULIN UR ELPH-MCNC: 3 GM/DL
GLUCOSE BLD-MCNC: 201 MG/DL (ref 70–110)
HAV IGM SERPL QL IA: NORMAL
HBA1C MFR BLD: 7.8 % (ref 4.5–5.7)
HBV CORE IGM SERPL QL IA: NORMAL
HBV SURFACE AG SERPL QL IA: NORMAL
HCT VFR BLD AUTO: 50.8 % (ref 42–52)
HCV AB SER DONR QL: NORMAL
HDLC SERPL-MCNC: 28 MG/DL (ref 60–100)
HGB BLD-MCNC: 16.4 G/DL (ref 14–18)
IMM GRANULOCYTES # BLD: 0.03 10*3/MM3 (ref 0–0.03)
IMM GRANULOCYTES NFR BLD: 0.3 % (ref 0–0.5)
LDLC SERPL CALC-MCNC: 80 MG/DL (ref 0–100)
LDLC/HDLC SERPL: 2.86 {RATIO}
LYMPHOCYTES # BLD AUTO: 2.11 10*3/MM3 (ref 1–3)
LYMPHOCYTES NFR BLD AUTO: 20.1 % (ref 21–51)
MCH RBC QN AUTO: 26.8 PG (ref 27–33)
MCHC RBC AUTO-ENTMCNC: 32.3 G/DL (ref 33–37)
MCV RBC AUTO: 83.1 FL (ref 80–94)
MONOCYTES # BLD AUTO: 1.27 10*3/MM3 (ref 0.1–0.9)
MONOCYTES NFR BLD AUTO: 12.1 % (ref 0–10)
NEUTROPHILS # BLD AUTO: 6.1 10*3/MM3 (ref 1.4–6.5)
NEUTROPHILS NFR BLD AUTO: 58 % (ref 30–70)
OSMOLALITY SERPL CALC.SUM OF ELEC: 283.9 MOSM/KG (ref 273–305)
PLATELET # BLD AUTO: 233 10*3/MM3 (ref 130–400)
PMV BLD AUTO: 10.8 FL (ref 6–10)
POTASSIUM BLD-SCNC: 4.2 MMOL/L (ref 3.5–5.3)
PROT SERPL-MCNC: 7.7 G/DL (ref 6–8)
RBC # BLD AUTO: 6.11 10*6/MM3 (ref 4.7–6.1)
SODIUM BLD-SCNC: 137 MMOL/L (ref 135–153)
TRIGL SERPL-MCNC: 264 MG/DL (ref 0–150)
TSH SERPL DL<=0.05 MIU/L-ACNC: 0.72 MIU/ML (ref 0.55–4.78)
URATE SERPL-MCNC: 4.5 MG/DL (ref 3.7–7)
VIT B12 BLD-MCNC: 429 PG/ML (ref 211–911)
VLDLC SERPL-MCNC: 52.8 MG/DL
WBC NRBC COR # BLD: 10.51 10*3/MM3 (ref 4.5–12.5)

## 2017-10-10 PROCEDURE — 82043 UR ALBUMIN QUANTITATIVE: CPT | Performed by: NURSE PRACTITIONER

## 2017-10-10 PROCEDURE — 80074 ACUTE HEPATITIS PANEL: CPT | Performed by: INTERNAL MEDICINE

## 2017-10-10 PROCEDURE — 84550 ASSAY OF BLOOD/URIC ACID: CPT | Performed by: NURSE PRACTITIONER

## 2017-10-10 PROCEDURE — 84443 ASSAY THYROID STIM HORMONE: CPT | Performed by: NURSE PRACTITIONER

## 2017-10-10 PROCEDURE — 82607 VITAMIN B-12: CPT | Performed by: NURSE PRACTITIONER

## 2017-10-10 PROCEDURE — 80061 LIPID PANEL: CPT | Performed by: NURSE PRACTITIONER

## 2017-10-10 PROCEDURE — 85025 COMPLETE CBC W/AUTO DIFF WBC: CPT | Performed by: NURSE PRACTITIONER

## 2017-10-10 PROCEDURE — 82306 VITAMIN D 25 HYDROXY: CPT | Performed by: NURSE PRACTITIONER

## 2017-10-10 PROCEDURE — 36415 COLL VENOUS BLD VENIPUNCTURE: CPT | Performed by: NURSE PRACTITIONER

## 2017-10-10 PROCEDURE — 99214 OFFICE O/P EST MOD 30 MIN: CPT | Performed by: NURSE PRACTITIONER

## 2017-10-10 PROCEDURE — 83036 HEMOGLOBIN GLYCOSYLATED A1C: CPT | Performed by: NURSE PRACTITIONER

## 2017-10-10 PROCEDURE — 80053 COMPREHEN METABOLIC PANEL: CPT | Performed by: NURSE PRACTITIONER

## 2017-10-10 RX ORDER — LORATADINE 10 MG/1
10 CAPSULE, LIQUID FILLED ORAL DAILY
Qty: 30 EACH | Refills: 5 | Status: SHIPPED | OUTPATIENT
Start: 2017-10-10 | End: 2018-07-05 | Stop reason: SDUPTHER

## 2017-10-10 RX ORDER — SILDENAFIL 100 MG/1
100 TABLET, FILM COATED ORAL DAILY PRN
Qty: 10 TABLET | Refills: 3 | Status: ON HOLD | OUTPATIENT
Start: 2017-10-10 | End: 2019-01-02

## 2017-10-10 RX ORDER — CARVEDILOL 6.25 MG/1
6.25 TABLET ORAL 2 TIMES DAILY
Qty: 60 TABLET | Refills: 5 | Status: SHIPPED | OUTPATIENT
Start: 2017-10-10 | End: 2018-07-05 | Stop reason: SDUPTHER

## 2017-10-10 RX ORDER — FEBUXOSTAT 80 MG/1
80 TABLET, FILM COATED ORAL DAILY
Qty: 30 TABLET | Refills: 5 | Status: SHIPPED | OUTPATIENT
Start: 2017-10-10 | End: 2018-02-08 | Stop reason: SDUPTHER

## 2017-10-10 RX ORDER — MELATONIN
1000 DAILY
Qty: 30 TABLET | Refills: 5 | Status: SHIPPED | OUTPATIENT
Start: 2017-10-10 | End: 2018-02-08 | Stop reason: SDUPTHER

## 2017-10-10 RX ORDER — HYDROCODONE BITARTRATE AND ACETAMINOPHEN 7.5; 325 MG/1; MG/1
1 TABLET ORAL EVERY 6 HOURS PRN
Qty: 60 TABLET | Refills: 0 | Status: SHIPPED | OUTPATIENT
Start: 2017-10-10 | End: 2017-11-14 | Stop reason: SDUPTHER

## 2017-10-10 RX ORDER — FLUTICASONE PROPIONATE 50 MCG
2 SPRAY, SUSPENSION (ML) NASAL DAILY
Qty: 1 BOTTLE | Refills: 5 | Status: ON HOLD | OUTPATIENT
Start: 2017-10-10 | End: 2019-01-02

## 2017-10-10 RX ORDER — FENOFIBRATE 48 MG/1
48 TABLET, COATED ORAL DAILY
Qty: 30 TABLET | Refills: 5 | Status: SHIPPED | OUTPATIENT
Start: 2017-10-10 | End: 2018-01-15 | Stop reason: SDUPTHER

## 2017-10-10 RX ORDER — ALBUTEROL SULFATE 90 UG/1
1 AEROSOL, METERED RESPIRATORY (INHALATION) AS NEEDED
Qty: 1 INHALER | Refills: 5 | Status: ON HOLD | OUTPATIENT
Start: 2017-10-10 | End: 2019-01-02

## 2017-10-10 RX ORDER — ASPIRIN 81 MG/1
81 TABLET ORAL DAILY
Qty: 90 TABLET | Refills: 1 | Status: SHIPPED | OUTPATIENT
Start: 2017-10-10 | End: 2019-01-10 | Stop reason: SDUPTHER

## 2017-10-10 RX ORDER — COLCHICINE 0.6 MG/1
0.6 TABLET ORAL DAILY
Qty: 30 TABLET | Refills: 5 | Status: SHIPPED | OUTPATIENT
Start: 2017-10-10 | End: 2018-07-05 | Stop reason: SDUPTHER

## 2017-10-10 RX ORDER — MUPIROCIN CALCIUM 20 MG/G
CREAM TOPICAL 3 TIMES DAILY
Qty: 1 EACH | Refills: 5 | Status: ON HOLD | OUTPATIENT
Start: 2017-10-10 | End: 2019-01-02

## 2017-10-10 RX ORDER — OMEPRAZOLE 20 MG/1
20 CAPSULE, DELAYED RELEASE ORAL DAILY
Qty: 30 CAPSULE | Refills: 5 | Status: SHIPPED | OUTPATIENT
Start: 2017-10-10 | End: 2018-02-08 | Stop reason: SDUPTHER

## 2017-10-10 RX ORDER — SULFAMETHOXAZOLE AND TRIMETHOPRIM 800; 160 MG/1; MG/1
1 TABLET ORAL 2 TIMES DAILY
Qty: 20 TABLET | Refills: 0 | Status: SHIPPED | OUTPATIENT
Start: 2017-10-10 | End: 2017-11-14

## 2017-10-10 RX ORDER — LORATADINE 10 MG/1
10 TABLET ORAL DAILY
Qty: 30 TABLET | Refills: 5 | Status: SHIPPED | OUTPATIENT
Start: 2017-10-10 | End: 2018-06-07 | Stop reason: SDUPTHER

## 2017-10-10 RX ORDER — CHLORTHALIDONE 25 MG/1
25 TABLET ORAL DAILY
Qty: 30 TABLET | Refills: 5 | Status: SHIPPED | OUTPATIENT
Start: 2017-10-10 | End: 2018-02-08 | Stop reason: SDUPTHER

## 2017-10-10 RX ORDER — LISINOPRIL 20 MG/1
20 TABLET ORAL 2 TIMES DAILY
Qty: 60 TABLET | Refills: 5
Start: 2017-10-10 | End: 2018-07-09 | Stop reason: SDUPTHER

## 2017-10-10 RX ORDER — GLIPIZIDE 5 MG/1
5 TABLET ORAL DAILY
Qty: 30 TABLET | Refills: 5 | Status: SHIPPED | OUTPATIENT
Start: 2017-10-10 | End: 2018-02-08 | Stop reason: SDUPTHER

## 2017-10-10 RX ORDER — GABAPENTIN 600 MG/1
600 TABLET ORAL 3 TIMES DAILY
Qty: 90 TABLET | Refills: 2 | Status: SHIPPED | OUTPATIENT
Start: 2017-10-10 | End: 2017-12-12 | Stop reason: SDUPTHER

## 2017-10-10 RX ORDER — CLOBETASOL PROPIONATE 0.5 MG/G
CREAM TOPICAL 2 TIMES DAILY
Qty: 30 G | Refills: 5 | Status: ON HOLD | OUTPATIENT
Start: 2017-10-10 | End: 2019-01-02

## 2017-10-10 NOTE — PROGRESS NOTES
Subjective   Johnathon Mclain is a 42 y.o. male.     Chief Complaint   Patient presents with   • Diabetes   • Hypertension   • Osteoarthritis   • Abscess       History of Present Illness       Recurrent abscess on his neck - patient has a recurrent boil.  Has responded to a sulfa drug in the past.  He denies any drainage.  He denies any fever.    Diabetes-patient is currently taking 20 units of Toujeo. He reports an improvement in his fasting glucose levels.  He reports yesterday his fasting glucose level was 156.  He denies any side effects.  He denies any symptomatic hypoglycemia or hypoglycemic episodes.  Patient does do daily foot and skin exams.  He declines podiatry referral at this time.  He does do eye exams at least every 2 years.  He declines yearly exams due to expense.  Patient is currently taking Inderal, 100 mg, metformin 500 mg twice daily and Glucotrol 5 mg daily.  Johnathon receives Neurontin for his diabetic neuropathy.    Hypertension-stable on current medication.  His wife does check his blood pressure randomly.  Denies any side effects from medication.    Hyperlipidemia-patient states that he is compliant with current medication.  He denies any side effects.    Impotence due to erectile dysfunction-patient reports that use of Viagra is helpful in controlling the symptoms.    Allergic Rhinitis -controlled with Flonase and Claritin.    OA- chronic condition , mostly affected in his low back. He does have some neuropathy in his feet.   Johnathon Mclain rates pain today as 7 on a scale of 0-10. Patient states that pain is reduced by at least one half but not completely with current Medication/Treatment. Pain does interfere with ability to perform daily activities as well as enjoyment of life. Patient reports quality of life and mobility has improved as a result of current treatment. Patient describes pain as stabbing. Pain is made worse from walking. Pain is eased by medication and rest    Polycythemia vera  "-he is under the care of hematology.  He is due [facility] today.      The following portions of the patient's history were reviewed and updated as appropriate: allergies, current medications, past family history, past medical history, past social history, past surgical history and problem list.    Review of Systems   Constitutional: Negative for activity change, appetite change, chills, fatigue and fever.   HENT: Negative for ear pain, facial swelling, hearing loss, sinus pressure, sore throat, trouble swallowing and voice change.    Eyes: Negative for pain, discharge and visual disturbance.   Respiratory: Negative for apnea, cough, chest tightness, shortness of breath and wheezing.    Cardiovascular: Negative for chest pain, palpitations and leg swelling.   Gastrointestinal: Negative for abdominal pain, blood in stool, constipation, diarrhea, nausea and vomiting.   Genitourinary: Negative for dysuria.   Musculoskeletal: Positive for arthralgias, back pain and neck pain. Negative for neck stiffness.   Skin: Positive for rash. Negative for color change.   Neurological: Negative for headaches.   Psychiatric/Behavioral: Negative for confusion, dysphoric mood and suicidal ideas. The patient is not nervous/anxious.        Objective     /80 (BP Location: Left arm, Patient Position: Sitting, Cuff Size: Adult)  Pulse 107  Temp 99.5 °F (37.5 °C) (Tympanic)   Ht 70.5\" (179.1 cm)  Wt (!) 373 lb (169 kg)  SpO2 95%  BMI 52.76 kg/m2  Lab on 08/31/2017   Component Date Value Ref Range Status   • Glucose 08/31/2017 181* 70 - 110 mg/dL Final   • BUN 08/31/2017 25* 7 - 21 mg/dL Final   • Creatinine 08/31/2017 1.16  0.43 - 1.29 mg/dL Final   • Sodium 08/31/2017 135  135 - 153 mmol/L Final   • Potassium 08/31/2017 4.2  3.5 - 5.3 mmol/L Final   • Chloride 08/31/2017 100  99 - 112 mmol/L Final   • CO2 08/31/2017 24.2* 24.3 - 31.9 mmol/L Final   • Calcium 08/31/2017 10.1* 7.7 - 10.0 mg/dL Final   • Total Protein 08/31/2017 7.4  6.0 " - 8.0 g/dL Final   • Albumin 08/31/2017 4.50  3.50 - 5.00 g/dL Final   • ALT (SGPT) 08/31/2017 98* 10 - 44 U/L Final   • AST (SGOT) 08/31/2017 48* 10 - 34 U/L Final   • Alkaline Phosphatase 08/31/2017 74  40 - 129 U/L Final   • Total Bilirubin 08/31/2017 0.6  0.2 - 1.8 mg/dL Final   • eGFR Non  Amer 08/31/2017 68  >60 mL/min/1.73 Final   • Globulin 08/31/2017 2.9  gm/dL Final   • A/G Ratio 08/31/2017 1.6  1.5 - 2.5 g/dL Final   • BUN/Creatinine Ratio 08/31/2017 21.6  7.0 - 25.0 Final   • Anion Gap 08/31/2017 10.8  3.6 - 11.2 mmol/L Final   • Osmolality Calc 08/31/2017 279.1  273.0 - 305.0 mOsm/kg Final       Physical Exam   Constitutional: He is oriented to person, place, and time. Vital signs are normal. He appears well-developed and well-nourished. No distress.   Visibly obese.    HENT:   Head: Normocephalic and atraumatic.   Right Ear: External ear normal.   Left Ear: External ear normal.   Nose: Nose normal.   Mouth/Throat: Oropharynx is clear and moist. No oropharyngeal exudate.   Eyes: EOM are normal. Pupils are equal, round, and reactive to light.   Neck: Normal range of motion. Neck supple. No tracheal deviation present. No thyromegaly present.   Cardiovascular: Normal rate, regular rhythm, normal heart sounds and intact distal pulses.    No murmur heard.  Pulmonary/Chest: Effort normal and breath sounds normal. No respiratory distress. He has no wheezes. He has no rales. He exhibits no tenderness.   Abdominal: Soft. Bowel sounds are normal. He exhibits no distension and no mass. There is no tenderness. There is no rebound and no guarding.   Musculoskeletal:   Decreased lumbar curvature, there is pain with forward flexion. DTR's +2. No edema.    Lymphadenopathy:     He has no cervical adenopathy.   Neurological: He is alert and oriented to person, place, and time. He has normal reflexes.   Skin: Skin is warm and dry. No rash noted. He is not diaphoretic. No erythema. No pallor.        Psychiatric: He  has a normal mood and affect. His speech is normal and behavior is normal. Judgment and thought content normal. His affect is not inappropriate. Cognition and memory are normal.   Denies thoughts of hurting self or others.   Nursing note and vitals reviewed.      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    Overview     uncomplicated         Relevant Medications    metFORMIN (GLUCOPHAGE) 500 MG tablet    glipiZIDE (GLUCOTROL) 5 MG tablet    Insulin Glargine (TOUJEO SOLOSTAR) 300 UNIT/ML solution pen-injector    Other Relevant Orders    CBC Auto Differential    Hypertension    Relevant Medications    lisinopril (PRINIVIL,ZESTRIL) 20 MG tablet    chlorthalidone (HYGROTON) 25 MG tablet    carvedilol (COREG) 6.25 MG tablet       Digestive    GERD (gastroesophageal reflux disease)    Relevant Medications    omeprazole (priLOSEC) 20 MG capsule       Endocrine    Diabetes mellitus with diabetic neuropathy, with long-term current use of insulin    Relevant Medications    metFORMIN (GLUCOPHAGE) 500 MG tablet    glipiZIDE (GLUCOTROL) 5 MG tablet    Insulin Glargine (TOUJEO SOLOSTAR) 300 UNIT/ML solution pen-injector       Nervous and Auditory    Neuralgia and neuritis    Relevant Medications    HYDROcodone-acetaminophen (NORCO) 7.5-325 MG per tablet    gabapentin (NEURONTIN) 600 MG tablet    febuxostat (ULORIC) 80 MG tablet tablet    colchicine 0.6 MG tablet       Other    Abscess - Primary    Relevant Medications    sulfamethoxazole-trimethoprim (BACTRIM DS) 800-160 MG per tablet    mupirocin (BACTROBAN) 2 % cream    Gout    High risk medication use    Relevant Orders    Urine Drug Screen - Urine, Clean Catch      Other Visit Diagnoses     Inflammatory liver disease         Elevated LFTs        Vitamin D deficiency disease              Extensive diabetic education performed today.  Patient declines  influenza vaccine.  Fasting labs today.  Remain under the care of hematology.  Wash neck daily with antibacterial soap.  Start sulfa drug.  Ensure adequate fluid.  Apply Bactroban 2-3 times daily as directed.  Report any changes.  Patient declines incision and drainage or referral for surgical consult.  He declines referral to dermatology.  John has been reviewed as consistent.  Uds is on file.   Goal: Improved quality of life and reduction in pain as evidenced by pt report.   Patient has been counseled regarding the possible misuse or addiction of controlled medication.  Johnathon denies any substance abuse or addiction.  Neurontin is appropriate for this patient due to diabetes with circulatory complication and neuropathy.  I have discussed diagnosis in detail today allowing time for questions and answers. Pt is aware of reasons to seek urgent or emergent medical care as well as reasons to return to the clinic for evaluation. Possible side effects, interactions and progression of symptoms discussed as well. Pt / family states understanding.    follow-up 3-5 days if abscess is not improving, sooner if needed.  Routine follow-up every 1-2 months, sooner if needed.    This document has been electronically signed by:  TYSON Vinson, NP-C

## 2017-10-11 NOTE — PROGRESS NOTES
I called and let patient know the Hemoglobin and hematocrit levels are normal. Advised them to try to work on lowering his glucose levels. Advised about A1c levels were improving.ANAND

## 2017-10-13 ENCOUNTER — TELEPHONE (OUTPATIENT)
Dept: FAMILY MEDICINE CLINIC | Facility: CLINIC | Age: 42
End: 2017-10-13

## 2017-10-13 NOTE — TELEPHONE ENCOUNTER
----- Message from TYSON Sepulveda sent at 10/12/2017  4:57 PM EDT -----  Regarding: RE: Pt has question about Toujeo  Increase to 22 units daily     ----- Message -----     From: Richardson Reeder MA     Sent: 10/12/2017   4:16 PM       To: TYSON Sepulveda  Subject: Pt has question about Toujeo                     Pt was seen the other day. They have been started on Toujeo at 20u daily. I guess they want to know if they should be taken more than 20u due to sugars staying high. I don't know how high the glucose levels are. Please advise if they may need to increase dose. PKF.

## 2017-10-20 RX ORDER — CEPHALEXIN 500 MG/1
500 CAPSULE ORAL 3 TIMES DAILY
Qty: 30 CAPSULE | Refills: 0
Start: 2017-10-20 | End: 2017-11-14

## 2017-11-14 ENCOUNTER — OFFICE VISIT (OUTPATIENT)
Dept: FAMILY MEDICINE CLINIC | Facility: CLINIC | Age: 42
End: 2017-11-14

## 2017-11-14 VITALS
HEART RATE: 99 BPM | SYSTOLIC BLOOD PRESSURE: 128 MMHG | TEMPERATURE: 97.7 F | HEIGHT: 71 IN | DIASTOLIC BLOOD PRESSURE: 80 MMHG | BODY MASS INDEX: 44.1 KG/M2 | OXYGEN SATURATION: 96 % | WEIGHT: 315 LBS

## 2017-11-14 DIAGNOSIS — IMO0002 UNCONTROLLED TYPE 2 DIABETES MELLITUS WITH DIABETIC PERIPHERAL ANGIOPATHY WITHOUT GANGRENE, WITH LONG-TERM CURRENT USE OF INSULIN: Primary | ICD-10-CM

## 2017-11-14 DIAGNOSIS — L40.9 SCALP PSORIASIS: ICD-10-CM

## 2017-11-14 DIAGNOSIS — J44.9 CHRONIC OBSTRUCTIVE PULMONARY DISEASE, UNSPECIFIED COPD TYPE (HCC): ICD-10-CM

## 2017-11-14 DIAGNOSIS — E08.40 DIABETES MELLITUS DUE TO UNDERLYING CONDITION WITH DIABETIC NEUROPATHY, WITH LONG-TERM CURRENT USE OF INSULIN (HCC): ICD-10-CM

## 2017-11-14 DIAGNOSIS — M1A.09X0 IDIOPATHIC CHRONIC GOUT OF MULTIPLE SITES WITHOUT TOPHUS: ICD-10-CM

## 2017-11-14 DIAGNOSIS — K21.00 GASTROESOPHAGEAL REFLUX DISEASE WITH ESOPHAGITIS: ICD-10-CM

## 2017-11-14 DIAGNOSIS — M79.2 NEURALGIA AND NEURITIS: ICD-10-CM

## 2017-11-14 DIAGNOSIS — L02.91 ABSCESS: ICD-10-CM

## 2017-11-14 DIAGNOSIS — Z79.899 HIGH RISK MEDICATION USE: ICD-10-CM

## 2017-11-14 DIAGNOSIS — Z79.4 DIABETES MELLITUS DUE TO UNDERLYING CONDITION WITH DIABETIC NEUROPATHY, WITH LONG-TERM CURRENT USE OF INSULIN (HCC): ICD-10-CM

## 2017-11-14 DIAGNOSIS — M15.9 PRIMARY OSTEOARTHRITIS INVOLVING MULTIPLE JOINTS: ICD-10-CM

## 2017-11-14 PROCEDURE — 99214 OFFICE O/P EST MOD 30 MIN: CPT | Performed by: NURSE PRACTITIONER

## 2017-11-14 RX ORDER — HYDROCODONE BITARTRATE AND ACETAMINOPHEN 7.5; 325 MG/1; MG/1
1 TABLET ORAL EVERY 6 HOURS PRN
Qty: 60 TABLET | Refills: 0 | Status: SHIPPED | OUTPATIENT
Start: 2017-11-14 | End: 2017-12-12 | Stop reason: SDUPTHER

## 2017-11-14 RX ORDER — KETOCONAZOLE 20 MG/ML
SHAMPOO TOPICAL 2 TIMES WEEKLY
Qty: 120 ML | Refills: 5 | Status: SHIPPED | OUTPATIENT
Start: 2017-11-16 | End: 2018-11-27 | Stop reason: SDUPTHER

## 2017-11-14 RX ORDER — MINOCYCLINE HYDROCHLORIDE 100 MG/1
100 CAPSULE ORAL 2 TIMES DAILY
Qty: 60 CAPSULE | Refills: 2 | Status: SHIPPED | OUTPATIENT
Start: 2017-11-14 | End: 2018-02-08 | Stop reason: SDUPTHER

## 2017-11-14 NOTE — PROGRESS NOTES
Subjective   Johnathon Mclain is a 42 y.o. male.     Chief Complaint   Patient presents with   • Results   • Osteoarthritis   • Diabetes       History of Present Illness       He is here today to follow-up on recent lab results.    Hypertension-stable on current medication.  His wife does check his blood pressure randomly.  Denies any side effects from medication.     Hyperlipidemia-patient states that he is compliant with current medication.  He denies any side effects.     Impotence due to erectile dysfunction-patient reports that use of Viagra is helpful in controlling the symptoms.     Allergic Rhinitis -controlled with Flonase and Claritin.     OA- chronic condition , mostly affected in his low back. He does have some neuropathy in his feet.   Johnathon Mclain rates pain today as 8 on a scale of 0-10.  Johnathon has pain in his low back, mid and upper back. He has diabetic foot which causes numbness. Patient states that pain is reduced by at least one half but not completely with current Medication/Treatment. Pain does interfere with ability to perform daily activities as well as enjoyment of life. Patient reports quality of life and mobility has improved as a result of current treatment. Patient describes pain as stabbing. Pain is made worse from walking. Pain is eased by medication and rest     Scalp psoriasis - Pt has used Ketoconazole for his scalp in the past, he is having an exacerbation.     Diabetes with diabetic neuropathy-patient is currently taking Glucophage 500 mg twice a day as well as Invokana. He is currently taking Toujeo 20 units at HS. He has made multiple diet changes.  She denies any symptomatic hypoglycemia or hyperglycemia.  He does daily foot exams.  On fenofibrate.  Needs script for DM shoes today.    Current abscess on his neck-somewhat improved but is recurrent.  Patient declined surgical consult at this time.      Polycythemia vera -he is under the care of hematology.  He is due [facility]  "today.      The following portions of the patient's history were reviewed and updated as appropriate: allergies, current medications, past family history, past medical history, past social history, past surgical history and problem list.    Review of Systems   Respiratory: Positive for apnea (unable to wear his CPAP , says it blows off ).    Cardiovascular: Negative for chest pain and palpitations.   Gastrointestinal: Negative for abdominal pain, blood in stool, constipation, diarrhea, nausea and vomiting.   Endocrine: Negative.    Genitourinary: Negative for dysuria.   Musculoskeletal: Positive for arthralgias, back pain and neck pain. Negative for neck stiffness.   Skin: Positive for rash.   Allergic/Immunologic: Negative.    Neurological: Negative.    Hematological: Negative.    Psychiatric/Behavioral: Negative for dysphoric mood, self-injury and suicidal ideas. The patient is not nervous/anxious.    All other systems reviewed and are negative.      Objective     /80 (BP Location: Left arm, Patient Position: Sitting, Cuff Size: Large Adult)  Pulse 99  Temp 97.7 °F (36.5 °C) (Oral)   Ht 70.5\" (179.1 cm)  Wt (!) 375 lb (170 kg)  SpO2 96%  BMI 53.05 kg/m2  Office Visit on 10/10/2017   Component Date Value Ref Range Status   • Hepatitis B Surface Ag 10/10/2017 Non-Reactive  Non-Reactive Final   • Hep A IgM 10/10/2017 Non-Reactive  Non-Reactive Final   • Hep B C IgM 10/10/2017 Non-Reactive  Non-Reactive Final   • Hepatitis C Ab 10/10/2017 Non-Reactive  Non-Reactive Final   • Glucose 10/10/2017 201* 70 - 110 mg/dL Final   • BUN 10/10/2017 25* 7 - 21 mg/dL Final   • Creatinine 10/10/2017 1.19  0.43 - 1.29 mg/dL Final   • Sodium 10/10/2017 137  135 - 153 mmol/L Final   • Potassium 10/10/2017 4.2  3.5 - 5.3 mmol/L Final   • Chloride 10/10/2017 103  99 - 112 mmol/L Final   • CO2 10/10/2017 23.3* 24.3 - 31.9 mmol/L Final   • Calcium 10/10/2017 11.3* 7.7 - 10.0 mg/dL Final   • Total Protein 10/10/2017 7.7  6.0 - " 8.0 g/dL Final   • Albumin 10/10/2017 4.70  3.50 - 5.00 g/dL Final   • ALT (SGPT) 10/10/2017 61* 10 - 44 U/L Final   • AST (SGOT) 10/10/2017 38* 10 - 34 U/L Final   • Alkaline Phosphatase 10/10/2017 82  40 - 129 U/L Final   • Total Bilirubin 10/10/2017 0.6  0.2 - 1.8 mg/dL Final   • eGFR Non African Amer 10/10/2017 66  >60 mL/min/1.73 Final   • Globulin 10/10/2017 3.0  gm/dL Final   • A/G Ratio 10/10/2017 1.6  1.5 - 2.5 g/dL Final   • BUN/Creatinine Ratio 10/10/2017 21.0  7.0 - 25.0 Final   • Anion Gap 10/10/2017 10.7  3.6 - 11.2 mmol/L Final   • TSH 10/10/2017 0.722  0.550 - 4.780 mIU/mL Final   • Hemoglobin A1C 10/10/2017 7.80* 4.50 - 5.70 % Final   • 25 Hydroxy, Vitamin D 10/10/2017 28.0  ng/ml Final   • Uric Acid 10/10/2017 4.5  3.7 - 7.0 mg/dL Final   • Total Cholesterol 10/10/2017 161  0 - 200 mg/dL Final   • Triglycerides 10/10/2017 264* 0 - 150 mg/dL Final   • HDL Cholesterol 10/10/2017 28* 60 - 100 mg/dL Final   • LDL Cholesterol  10/10/2017 80  0 - 100 mg/dL Final   • VLDL Cholesterol 10/10/2017 52.8  mg/dL Final   • LDL/HDL Ratio 10/10/2017 2.86   Final   • Microalbumin, Urine 10/10/2017 1.9  mg/L Final   • WBC 10/10/2017 10.51  4.50 - 12.50 10*3/mm3 Final   • RBC 10/10/2017 6.11* 4.70 - 6.10 10*6/mm3 Final   • Hemoglobin 10/10/2017 16.4  14.0 - 18.0 g/dL Final   • Hematocrit 10/10/2017 50.8  42.0 - 52.0 % Final   • MCV 10/10/2017 83.1  80.0 - 94.0 fL Final   • MCH 10/10/2017 26.8* 27.0 - 33.0 pg Final   • MCHC 10/10/2017 32.3* 33.0 - 37.0 g/dL Final   • RDW 10/10/2017 15.1* 11.5 - 14.5 % Final   • RDW-SD 10/10/2017 45.9  37.0 - 54.0 fl Final   • MPV 10/10/2017 10.8* 6.0 - 10.0 fL Final   • Platelets 10/10/2017 233  130 - 400 10*3/mm3 Final   • Neutrophil % 10/10/2017 58.0  30.0 - 70.0 % Final   • Lymphocyte % 10/10/2017 20.1* 21.0 - 51.0 % Final   • Monocyte % 10/10/2017 12.1* 0.0 - 10.0 % Final   • Eosinophil % 10/10/2017 8.9* 0.0 - 5.0 % Final   • Basophil % 10/10/2017 0.6  0.0 - 2.0 % Final   •  Immature Grans % 10/10/2017 0.3  0.0 - 0.5 % Final   • Neutrophils, Absolute 10/10/2017 6.10  1.40 - 6.50 10*3/mm3 Final   • Lymphocytes, Absolute 10/10/2017 2.11  1.00 - 3.00 10*3/mm3 Final   • Monocytes, Absolute 10/10/2017 1.27* 0.10 - 0.90 10*3/mm3 Final   • Eosinophils, Absolute 10/10/2017 0.94* 0.00 - 0.70 10*3/mm3 Final   • Basophils, Absolute 10/10/2017 0.06  0.00 - 0.30 10*3/mm3 Final   • Immature Grans, Absolute 10/10/2017 0.03  0.00 - 0.03 10*3/mm3 Final   • Osmolality Calc 10/10/2017 283.9  273.0 - 305.0 mOsm/kg Final       Physical Exam   Constitutional: He is oriented to person, place, and time. Vital signs are normal. He appears well-developed and well-nourished. No distress.   Visibly obese.    HENT:   Head: Normocephalic and atraumatic.   Right Ear: External ear normal.   Left Ear: External ear normal.   Nose: Nose normal.   Mouth/Throat: Oropharynx is clear and moist. No oropharyngeal exudate.   Eyes: EOM are normal. Pupils are equal, round, and reactive to light.   Neck: Normal range of motion. Neck supple. No tracheal deviation present. No thyromegaly present.   Cardiovascular: Normal rate, regular rhythm, normal heart sounds and intact distal pulses.    No murmur heard.  Pulmonary/Chest: Effort normal and breath sounds normal. No respiratory distress. He has no wheezes. He has no rales. He exhibits no tenderness.   Abdominal: Soft. Bowel sounds are normal. He exhibits no distension and no mass. There is no tenderness. There is no rebound and no guarding.   Musculoskeletal:   Decreased lumbar curvature, there is pain with forward flexion. DTR's +2. No edema.    Lymphadenopathy:     He has no cervical adenopathy.   Neurological: He is alert and oriented to person, place, and time. He has normal reflexes.   Skin: Skin is warm and dry. No rash noted. He is not diaphoretic. No erythema. No pallor.        Psychiatric: He has a normal mood and affect. His speech is normal and behavior is normal.  Judgment and thought content normal. His affect is not inappropriate. Cognition and memory are normal.   Denies thoughts of hurting self or others.   Nursing note and vitals reviewed.      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 - Primary    Overview     uncomplicated            Respiratory    COPD (chronic obstructive pulmonary disease)       Digestive    GERD (gastroesophageal reflux disease)       Endocrine    Diabetes mellitus with diabetic neuropathy, with long-term current use of insulin       Nervous and Auditory    Neuralgia and neuritis    Relevant Medications    HYDROcodone-acetaminophen (NORCO) 7.5-325 MG per tablet       Musculoskeletal and Integument    Osteoarthrosis    Chronic idiopathic gout of multiple sites       Other    Abscess    Relevant Medications    minocycline (MINOCIN) 100 MG capsule    High risk medication use      Other Visit Diagnoses     Scalp psoriasis        Relevant Medications    ketoconazole (NIZORAL) 2 % shampoo (Start on 11/16/2017)      Prescription was provided for diabetic shoes.  Start minocycline 100 mg twice a day and continue over the next 3 months if tolerated well.  *Ketoconazole shampoo twice weekly.  John has been reviewed as consistent.  Uds is on file.   Goal: Improved quality of life and reduction in pain as evidenced by pt report.   Patient has been instructed and counseled regarding opioid misuse and risk of addiction.  We have discussed proper storage and disposal of controlled medication.  Refill routine medication.   Laboratory results have been reviewed.  Patient will continue to monitor his diet and make healthy lifestyle choices.  We have discussed side effects of medications and the importance of the daily foot exams.  We discussed her recent study findings regarding Invokanna.  Patient states understanding the risk and wishes to  continue this medication at this time.  Pt has been educated/instructed on diabetic care and protocols. Sick day rules reviewed. Continue to monitor blood sugar and report abnormal readings as discussed today. Pt / family state understanding.   Follow up in one month, sooner if needed.              This document has been electronically signed by:  TYSON Vinson, NP-C

## 2017-11-17 ENCOUNTER — TELEPHONE (OUTPATIENT)
Dept: FAMILY MEDICINE CLINIC | Facility: CLINIC | Age: 42
End: 2017-11-17

## 2017-11-17 NOTE — TELEPHONE ENCOUNTER
----- Message from TYSON Sepulveda sent at 11/14/2017  8:06 AM EST -----  Please call [facility] and have them come out and adjust his CPAP mask. He says it blows off.

## 2017-11-17 NOTE — TELEPHONE ENCOUNTER
Spoke with [facility] and they are going to call jailyn and see what they can do to help him

## 2017-12-12 ENCOUNTER — OFFICE VISIT (OUTPATIENT)
Dept: FAMILY MEDICINE CLINIC | Facility: CLINIC | Age: 42
End: 2017-12-12

## 2017-12-12 VITALS
TEMPERATURE: 98.5 F | DIASTOLIC BLOOD PRESSURE: 80 MMHG | HEIGHT: 71 IN | SYSTOLIC BLOOD PRESSURE: 130 MMHG | WEIGHT: 315 LBS | OXYGEN SATURATION: 94 % | HEART RATE: 98 BPM | BODY MASS INDEX: 44.1 KG/M2

## 2017-12-12 DIAGNOSIS — E78.2 MIXED HYPERLIPIDEMIA: ICD-10-CM

## 2017-12-12 DIAGNOSIS — H60.332 ACUTE SWIMMER'S EAR OF LEFT SIDE: ICD-10-CM

## 2017-12-12 DIAGNOSIS — K21.00 GASTROESOPHAGEAL REFLUX DISEASE WITH ESOPHAGITIS: ICD-10-CM

## 2017-12-12 DIAGNOSIS — M15.9 PRIMARY OSTEOARTHRITIS INVOLVING MULTIPLE JOINTS: ICD-10-CM

## 2017-12-12 DIAGNOSIS — J44.9 CHRONIC OBSTRUCTIVE PULMONARY DISEASE, UNSPECIFIED COPD TYPE (HCC): ICD-10-CM

## 2017-12-12 DIAGNOSIS — E08.40 DIABETES MELLITUS DUE TO UNDERLYING CONDITION WITH DIABETIC NEUROPATHY, WITH LONG-TERM CURRENT USE OF INSULIN (HCC): ICD-10-CM

## 2017-12-12 DIAGNOSIS — I10 ESSENTIAL HYPERTENSION: ICD-10-CM

## 2017-12-12 DIAGNOSIS — G47.33 OBSTRUCTIVE SLEEP APNEA SYNDROME: ICD-10-CM

## 2017-12-12 DIAGNOSIS — M79.2 NEURALGIA AND NEURITIS: ICD-10-CM

## 2017-12-12 DIAGNOSIS — E55.9 VITAMIN D DEFICIENCY DISEASE: ICD-10-CM

## 2017-12-12 DIAGNOSIS — Z79.4 DIABETES MELLITUS DUE TO UNDERLYING CONDITION WITH DIABETIC NEUROPATHY, WITH LONG-TERM CURRENT USE OF INSULIN (HCC): ICD-10-CM

## 2017-12-12 DIAGNOSIS — Z79.899 HIGH RISK MEDICATION USE: Primary | ICD-10-CM

## 2017-12-12 DIAGNOSIS — IMO0002 UNCONTROLLED TYPE 2 DIABETES MELLITUS WITH DIABETIC PERIPHERAL ANGIOPATHY WITHOUT GANGRENE, WITHOUT LONG-TERM CURRENT USE OF INSULIN: ICD-10-CM

## 2017-12-12 PROCEDURE — 99214 OFFICE O/P EST MOD 30 MIN: CPT | Performed by: NURSE PRACTITIONER

## 2017-12-12 RX ORDER — METFORMIN HYDROCHLORIDE EXTENDED-RELEASE TABLETS 1000 MG/1
TABLET, FILM COATED, EXTENDED RELEASE ORAL
COMMUNITY
Start: 2017-11-10 | End: 2018-01-22 | Stop reason: SDUPTHER

## 2017-12-12 RX ORDER — GABAPENTIN 600 MG/1
600 TABLET ORAL 3 TIMES DAILY
Qty: 90 TABLET | Refills: 2 | Status: SHIPPED | OUTPATIENT
Start: 2017-12-12 | End: 2018-02-08 | Stop reason: SDUPTHER

## 2017-12-12 RX ORDER — HYDROCODONE BITARTRATE AND ACETAMINOPHEN 7.5; 325 MG/1; MG/1
1 TABLET ORAL EVERY 6 HOURS PRN
Qty: 60 TABLET | Refills: 0 | Status: SHIPPED | OUTPATIENT
Start: 2017-12-12 | End: 2018-01-15 | Stop reason: SDUPTHER

## 2017-12-12 RX ORDER — CIPROFLOXACIN AND DEXAMETHASONE 3; 1 MG/ML; MG/ML
4 SUSPENSION/ DROPS AURICULAR (OTIC) 2 TIMES DAILY
Qty: 1 EACH | Refills: 0 | Status: SHIPPED | OUTPATIENT
Start: 2017-12-12 | End: 2018-04-25

## 2017-12-12 NOTE — PROGRESS NOTES
Subjective   Johnathon Mclain is a 42 y.o. male.     Chief Complaint   Patient presents with   • Hyperlipidemia   • Hypertension   • Diabetes       History of Present Illness     Polycythemia vera- patient is under the care hematology.    COPD-patient wears oxygen at night.  He has sleep apnea but is having difficulty wearing his CPAP machine.  Patient states that he feels as if he is smothering when he wears his CPAP.    Type 2 diabetes-patient states that he continues to work on diet and lifestyle changes.  He is compliant with his current medication regimen.  He is currently doing a basal insulin once a day.  He denies any side effects.  He denies any symptomatic hypoglycemia or hyperglycemia.  Patient receives Neurontin for diabetic foot pain.    Osteoarthritis - patient has chronic osteoarthritis of his neck and mid and low back.  He rates his back pain as 7 on a scale of 0-10.  Pain is made worse with bending, lifting, twisting and prolonged standing.  Patient reports that he does become stiff if he is sedentary.  He has tried anti-inflammatories in the past with no success.  Patient currently receives Norco which he uses when necessary for chronic back pain.  He reports his pain is decreased to a 2 on a pain scale of 0-10.  He has no history of substance abuse or addiction.  He does report an improvement in quality of life and decrease in pain with current medication regimen.      Left ear pain ×3 days.  Did get water in his ear taking a shower.      The following portions of the patient's history were reviewed and updated as appropriate: allergies, current medications, past family history, past medical history, past social history, past surgical history and problem list.    Review of Systems   Constitutional: Negative.    HENT: Positive for ear pain.    Eyes: Negative.    Respiratory: Positive for apnea. Negative for cough, chest tightness, shortness of breath and wheezing.    Cardiovascular: Negative for chest  "pain, palpitations and leg swelling.   Gastrointestinal: Negative for abdominal pain, blood in stool, constipation, diarrhea, nausea and vomiting.   Endocrine: Negative.    Genitourinary: Negative.    Musculoskeletal: Positive for arthralgias, back pain and neck pain. Negative for neck stiffness.   Skin: Negative for rash.   Allergic/Immunologic: Negative.    Neurological: Negative.    Hematological: Negative.    Psychiatric/Behavioral: Negative.    All other systems reviewed and are negative.      Objective     /80  Pulse 98  Temp 98.5 °F (36.9 °C) (Temporal Artery )   Ht 179.1 cm (70.5\")  Wt (!) 171 kg (377 lb)  SpO2 94%  BMI 53.33 kg/m2  Office Visit on 10/10/2017   Component Date Value Ref Range Status   • Hepatitis B Surface Ag 10/10/2017 Non-Reactive  Non-Reactive Final   • Hep A IgM 10/10/2017 Non-Reactive  Non-Reactive Final   • Hep B C IgM 10/10/2017 Non-Reactive  Non-Reactive Final   • Hepatitis C Ab 10/10/2017 Non-Reactive  Non-Reactive Final   • Glucose 10/10/2017 201* 70 - 110 mg/dL Final   • BUN 10/10/2017 25* 7 - 21 mg/dL Final   • Creatinine 10/10/2017 1.19  0.43 - 1.29 mg/dL Final   • Sodium 10/10/2017 137  135 - 153 mmol/L Final   • Potassium 10/10/2017 4.2  3.5 - 5.3 mmol/L Final   • Chloride 10/10/2017 103  99 - 112 mmol/L Final   • CO2 10/10/2017 23.3* 24.3 - 31.9 mmol/L Final   • Calcium 10/10/2017 11.3* 7.7 - 10.0 mg/dL Final   • Total Protein 10/10/2017 7.7  6.0 - 8.0 g/dL Final   • Albumin 10/10/2017 4.70  3.50 - 5.00 g/dL Final   • ALT (SGPT) 10/10/2017 61* 10 - 44 U/L Final   • AST (SGOT) 10/10/2017 38* 10 - 34 U/L Final   • Alkaline Phosphatase 10/10/2017 82  40 - 129 U/L Final   • Total Bilirubin 10/10/2017 0.6  0.2 - 1.8 mg/dL Final   • eGFR Non African Amer 10/10/2017 66  >60 mL/min/1.73 Final   • Globulin 10/10/2017 3.0  gm/dL Final   • A/G Ratio 10/10/2017 1.6  1.5 - 2.5 g/dL Final   • BUN/Creatinine Ratio 10/10/2017 21.0  7.0 - 25.0 Final   • Anion Gap 10/10/2017 10.7  " 3.6 - 11.2 mmol/L Final   • TSH 10/10/2017 0.722  0.550 - 4.780 mIU/mL Final   • Hemoglobin A1C 10/10/2017 7.80* 4.50 - 5.70 % Final   • 25 Hydroxy, Vitamin D 10/10/2017 28.0  ng/ml Final   • Uric Acid 10/10/2017 4.5  3.7 - 7.0 mg/dL Final   • Total Cholesterol 10/10/2017 161  0 - 200 mg/dL Final   • Triglycerides 10/10/2017 264* 0 - 150 mg/dL Final   • HDL Cholesterol 10/10/2017 28* 60 - 100 mg/dL Final   • LDL Cholesterol  10/10/2017 80  0 - 100 mg/dL Final   • VLDL Cholesterol 10/10/2017 52.8  mg/dL Final   • LDL/HDL Ratio 10/10/2017 2.86   Final   • Microalbumin, Urine 10/10/2017 1.9  mg/L Final   • WBC 10/10/2017 10.51  4.50 - 12.50 10*3/mm3 Final   • RBC 10/10/2017 6.11* 4.70 - 6.10 10*6/mm3 Final   • Hemoglobin 10/10/2017 16.4  14.0 - 18.0 g/dL Final   • Hematocrit 10/10/2017 50.8  42.0 - 52.0 % Final   • MCV 10/10/2017 83.1  80.0 - 94.0 fL Final   • MCH 10/10/2017 26.8* 27.0 - 33.0 pg Final   • MCHC 10/10/2017 32.3* 33.0 - 37.0 g/dL Final   • RDW 10/10/2017 15.1* 11.5 - 14.5 % Final   • RDW-SD 10/10/2017 45.9  37.0 - 54.0 fl Final   • MPV 10/10/2017 10.8* 6.0 - 10.0 fL Final   • Platelets 10/10/2017 233  130 - 400 10*3/mm3 Final   • Neutrophil % 10/10/2017 58.0  30.0 - 70.0 % Final   • Lymphocyte % 10/10/2017 20.1* 21.0 - 51.0 % Final   • Monocyte % 10/10/2017 12.1* 0.0 - 10.0 % Final   • Eosinophil % 10/10/2017 8.9* 0.0 - 5.0 % Final   • Basophil % 10/10/2017 0.6  0.0 - 2.0 % Final   • Immature Grans % 10/10/2017 0.3  0.0 - 0.5 % Final   • Neutrophils, Absolute 10/10/2017 6.10  1.40 - 6.50 10*3/mm3 Final   • Lymphocytes, Absolute 10/10/2017 2.11  1.00 - 3.00 10*3/mm3 Final   • Monocytes, Absolute 10/10/2017 1.27* 0.10 - 0.90 10*3/mm3 Final   • Eosinophils, Absolute 10/10/2017 0.94* 0.00 - 0.70 10*3/mm3 Final   • Basophils, Absolute 10/10/2017 0.06  0.00 - 0.30 10*3/mm3 Final   • Immature Grans, Absolute 10/10/2017 0.03  0.00 - 0.03 10*3/mm3 Final   • Osmolality Calc 10/10/2017 283.9  273.0 - 305.0 mOsm/kg  Final       Physical Exam   Constitutional: He is oriented to person, place, and time. Vital signs are normal. He appears well-developed and well-nourished. No distress.   Visibly obese.    HENT:   Head: Normocephalic and atraumatic.   Right Ear: External ear normal.   Left Ear: External ear normal. There is swelling and tenderness.   Nose: Nose normal.   Mouth/Throat: Oropharynx is clear and moist.   Eyes: EOM are normal. Pupils are equal, round, and reactive to light.   Neck: Normal range of motion. Neck supple. No tracheal deviation present. No thyromegaly present.   Cardiovascular: Normal rate, regular rhythm, normal heart sounds and intact distal pulses.    No murmur heard.  Pulmonary/Chest: Effort normal and breath sounds normal. No respiratory distress. He has no wheezes. He has no rales. He exhibits no tenderness.   Abdominal: Soft. Bowel sounds are normal. He exhibits no distension and no mass. There is no tenderness. There is no rebound and no guarding.   Musculoskeletal:        Lumbar back: He exhibits decreased range of motion, tenderness, pain and spasm.   Decreased lumbar curvature, there is pain with forward flexion. DTR's +2. No edema.    Lymphadenopathy:     He has no cervical adenopathy.   Neurological: He is alert and oriented to person, place, and time. He has normal reflexes.   Skin: Skin is warm and dry. No rash noted. He is not diaphoretic. No erythema. No pallor.   Psychiatric: He has a normal mood and affect. His speech is normal and behavior is normal. Judgment and thought content normal. His affect is not inappropriate. Cognition and memory are normal.   Nursing note and vitals reviewed.      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    Overview     uncomplicated         Relevant Medications    metFORMIN (GLUCOPHAGE) 1000 MG tablet    metFORMIN (FORTAMET) 1000 MG (OSM)  24 hr tablet    Hypertension    Hyperlipidemia    Overview     mixed            Respiratory    COPD (chronic obstructive pulmonary disease)    Obstructive sleep apnea syndrome       Digestive    GERD (gastroesophageal reflux disease)       Endocrine    Diabetes mellitus with diabetic neuropathy, with long-term current use of insulin    Relevant Medications    metFORMIN (GLUCOPHAGE) 1000 MG tablet    metFORMIN (FORTAMET) 1000 MG (OSM) 24 hr tablet       Nervous and Auditory    Neuralgia and neuritis    Relevant Medications    HYDROcodone-acetaminophen (NORCO) 7.5-325 MG per tablet    gabapentin (NEURONTIN) 600 MG tablet    Other Relevant Orders    Urine Drug Screen - Urine, Clean Catch       Musculoskeletal and Integument    Osteoarthrosis       Other    High risk medication use - Primary    Relevant Orders    Urine Drug Screen - Urine, Clean Catch    CBC & Differential    Comprehensive Metabolic Panel    TSH    Hemoglobin A1c    Vitamin D 25 Hydroxy    Uric Acid    Lipid Panel    MicroAlbumin, Urine, Random - Urine, Clean Catch    Vitamin B12      Other Visit Diagnoses     Vitamin D deficiency disease        Relevant Orders    Vitamin D 25 Hydroxy    Acute swimmer's ear of left side        Relevant Medications    mupirocin (BACTROBAN) 2 % ointment    ciprofloxacin-dexamethasone (CIPRODEX) 0.3-0.1 % otic suspension        Refill Neurontin with 2 refills.  Refill [place] with no refill.  Recommend weight loss.  Continue wearing oxygen at night and will request that staff have oxygen company come out and try to work on cpap setting/mask for better fit which may help with compliance.   Pt has been educated/instructed on diabetic care and protocols. Sick day rules reviewed. Continue to monitor blood sugar and report abnormal readings as discussed today. Pt / family state understanding.   John has been reviewed as consistent.  Uds is on file.   Goal: Improved quality of life and reduction in pain as evidenced by pt  report.   Patient has been instructed and counseled regarding opioid misuse and risk of addiction.  We have discussed proper storage and disposal of controlled medication.  As part of this patient's treatment plan they are being prescribed controlled substance/substances with potential for abuse. This patient has been made aware of the appropriate use of such medications, including potential risk of somnolence, limited ability to drive and / or work safely, and potential for overdose. It has also been made clear that these medications are for use by this patient only, without concomitant use of alcohol or other substances unless prescribed/advised by medical provider. Patient has completed controlled substance agreement detailing terms of continued prescribing of controlled substances including monitoring John reports, drug screens and pill counts. The patient was asked and states they are not receiving narcotic medication from any there provider or any provider that this office has not been made aware of. History and physical exam exhibit continued safe and appropriate use of controlled substances.   I have discussed diagnosis in detail today allowing time for questions and answers. Pt is aware of reasons to seek urgent or emergent medical care as well as reasons to return to the clinic for evaluation. Possible side effects, interactions and progression of symptoms discussed as well. Pt / family states understanding.   Emotional support and active listening provided.   RTC 2-5 days if not improved, sooner if condition worsens/changes. Symptomatic care advised as well as reasons for urgent or emergent care. Pt / family state understanding.   Avoid water in ears.  Start cipro-dex otic drops.   Schedule fasting labs one week prior to next appointment.  Keep follow up with hematology scheduled in January 2018.           This document has been electronically signed by:  TYSON Vinson, NP-C

## 2018-01-02 ENCOUNTER — TELEPHONE (OUTPATIENT)
Dept: FAMILY MEDICINE CLINIC | Facility: CLINIC | Age: 43
End: 2018-01-02

## 2018-01-02 NOTE — TELEPHONE ENCOUNTER
I spoke with [facility] and they are going to call [employer]yn and it is time for a new mask and they will adjusts his settings. I called [employer] back and told him if they do not come out or call him to let us know

## 2018-01-04 ENCOUNTER — TELEPHONE (OUTPATIENT)
Dept: FAMILY MEDICINE CLINIC | Facility: CLINIC | Age: 43
End: 2018-01-04

## 2018-01-04 ENCOUNTER — LAB (OUTPATIENT)
Dept: FAMILY MEDICINE CLINIC | Facility: CLINIC | Age: 43
End: 2018-01-04

## 2018-01-04 DIAGNOSIS — E55.9 VITAMIN D DEFICIENCY DISEASE: ICD-10-CM

## 2018-01-04 DIAGNOSIS — R94.4 DECREASED GFR: Primary | ICD-10-CM

## 2018-01-04 DIAGNOSIS — Z79.899 HIGH RISK MEDICATION USE: ICD-10-CM

## 2018-01-04 LAB
25(OH)D3 SERPL-MCNC: 27 NG/ML
ALBUMIN SERPL-MCNC: 4.5 G/DL (ref 3.5–5)
ALBUMIN UR-MCNC: 4.1 MG/L
ALBUMIN/GLOB SERPL: 1.8 G/DL (ref 1.5–2.5)
ALP SERPL-CCNC: 72 U/L (ref 40–129)
ALT SERPL W P-5'-P-CCNC: 90 U/L (ref 10–44)
ANION GAP SERPL CALCULATED.3IONS-SCNC: 8.7 MMOL/L (ref 3.6–11.2)
AST SERPL-CCNC: 52 U/L (ref 10–34)
BASOPHILS # BLD AUTO: 0.05 10*3/MM3 (ref 0–0.3)
BASOPHILS NFR BLD AUTO: 0.6 % (ref 0–2)
BILIRUB SERPL-MCNC: 0.8 MG/DL (ref 0.2–1.8)
BUN BLD-MCNC: 29 MG/DL (ref 7–21)
BUN/CREAT SERPL: 21.3 (ref 7–25)
CALCIUM SPEC-SCNC: 9.7 MG/DL (ref 7.7–10)
CHLORIDE SERPL-SCNC: 98 MMOL/L (ref 99–112)
CHOLEST SERPL-MCNC: 168 MG/DL (ref 0–200)
CO2 SERPL-SCNC: 27.3 MMOL/L (ref 24.3–31.9)
CREAT BLD-MCNC: 1.36 MG/DL (ref 0.43–1.29)
DEPRECATED RDW RBC AUTO: 47.5 FL (ref 37–54)
EOSINOPHIL # BLD AUTO: 0.86 10*3/MM3 (ref 0–0.7)
EOSINOPHIL NFR BLD AUTO: 10.9 % (ref 0–5)
ERYTHROCYTE [DISTWIDTH] IN BLOOD BY AUTOMATED COUNT: 15.2 % (ref 11.5–14.5)
GFR SERPL CREATININE-BSD FRML MDRD: 57 ML/MIN/1.73
GLOBULIN UR ELPH-MCNC: 2.5 GM/DL
GLUCOSE BLD-MCNC: 191 MG/DL (ref 70–110)
HBA1C MFR BLD: 8.5 % (ref 4.5–5.7)
HCT VFR BLD AUTO: 52.1 % (ref 42–52)
HDLC SERPL-MCNC: 26 MG/DL (ref 60–100)
HGB BLD-MCNC: 16.8 G/DL (ref 14–18)
IMM GRANULOCYTES # BLD: 0.02 10*3/MM3 (ref 0–0.03)
IMM GRANULOCYTES NFR BLD: 0.3 % (ref 0–0.5)
LDLC SERPL CALC-MCNC: 68 MG/DL (ref 0–100)
LDLC/HDLC SERPL: 2.62 {RATIO}
LYMPHOCYTES # BLD AUTO: 2.24 10*3/MM3 (ref 1–3)
LYMPHOCYTES NFR BLD AUTO: 28.3 % (ref 21–51)
MCH RBC QN AUTO: 27.6 PG (ref 27–33)
MCHC RBC AUTO-ENTMCNC: 32.2 G/DL (ref 33–37)
MCV RBC AUTO: 85.6 FL (ref 80–94)
MONOCYTES # BLD AUTO: 1.09 10*3/MM3 (ref 0.1–0.9)
MONOCYTES NFR BLD AUTO: 13.8 % (ref 0–10)
NEUTROPHILS # BLD AUTO: 3.66 10*3/MM3 (ref 1.4–6.5)
NEUTROPHILS NFR BLD AUTO: 46.1 % (ref 30–70)
OSMOLALITY SERPL CALC.SUM OF ELEC: 279.2 MOSM/KG (ref 273–305)
PLATELET # BLD AUTO: 199 10*3/MM3 (ref 130–400)
PMV BLD AUTO: 11 FL (ref 6–10)
POTASSIUM BLD-SCNC: 4.6 MMOL/L (ref 3.5–5.3)
PROT SERPL-MCNC: 7 G/DL (ref 6–8)
RBC # BLD AUTO: 6.09 10*6/MM3 (ref 4.7–6.1)
SODIUM BLD-SCNC: 134 MMOL/L (ref 135–153)
TRIGL SERPL-MCNC: 369 MG/DL (ref 0–150)
TSH SERPL DL<=0.05 MIU/L-ACNC: 1.34 MIU/ML (ref 0.55–4.78)
URATE SERPL-MCNC: 4.9 MG/DL (ref 3.7–7)
VIT B12 BLD-MCNC: 421 PG/ML (ref 211–911)
VLDLC SERPL-MCNC: 73.8 MG/DL
WBC NRBC COR # BLD: 7.92 10*3/MM3 (ref 4.5–12.5)

## 2018-01-04 PROCEDURE — 82306 VITAMIN D 25 HYDROXY: CPT | Performed by: NURSE PRACTITIONER

## 2018-01-04 PROCEDURE — 84550 ASSAY OF BLOOD/URIC ACID: CPT | Performed by: NURSE PRACTITIONER

## 2018-01-04 PROCEDURE — 36415 COLL VENOUS BLD VENIPUNCTURE: CPT

## 2018-01-04 PROCEDURE — 82043 UR ALBUMIN QUANTITATIVE: CPT | Performed by: NURSE PRACTITIONER

## 2018-01-04 PROCEDURE — 80053 COMPREHEN METABOLIC PANEL: CPT | Performed by: NURSE PRACTITIONER

## 2018-01-04 PROCEDURE — 82607 VITAMIN B-12: CPT | Performed by: NURSE PRACTITIONER

## 2018-01-04 PROCEDURE — 83036 HEMOGLOBIN GLYCOSYLATED A1C: CPT | Performed by: NURSE PRACTITIONER

## 2018-01-04 PROCEDURE — 85025 COMPLETE CBC W/AUTO DIFF WBC: CPT | Performed by: NURSE PRACTITIONER

## 2018-01-04 PROCEDURE — 84443 ASSAY THYROID STIM HORMONE: CPT | Performed by: NURSE PRACTITIONER

## 2018-01-04 PROCEDURE — 80061 LIPID PANEL: CPT | Performed by: NURSE PRACTITIONER

## 2018-01-04 NOTE — TELEPHONE ENCOUNTER
----- Message from TYSON Sepulveda sent at 1/4/2018 12:33 PM EST -----  Follow-up as scheduled 01/15/2018 to review.  We will refer to nephrology due to low GFR and abnormal renal function levels.  He does have a strong family history of kidney disease.

## 2018-01-09 ENCOUNTER — OFFICE VISIT (OUTPATIENT)
Dept: ONCOLOGY | Facility: CLINIC | Age: 43
End: 2018-01-09

## 2018-01-09 ENCOUNTER — LAB (OUTPATIENT)
Dept: ONCOLOGY | Facility: CLINIC | Age: 43
End: 2018-01-09

## 2018-01-09 VITALS
TEMPERATURE: 98.2 F | HEART RATE: 102 BPM | RESPIRATION RATE: 18 BRPM | DIASTOLIC BLOOD PRESSURE: 81 MMHG | SYSTOLIC BLOOD PRESSURE: 143 MMHG | WEIGHT: 315 LBS | OXYGEN SATURATION: 92 % | BODY MASS INDEX: 53.68 KG/M2

## 2018-01-09 DIAGNOSIS — D45 POLYCYTHEMIA VERA (HCC): Primary | ICD-10-CM

## 2018-01-09 DIAGNOSIS — R79.89 ELEVATED LFTS: ICD-10-CM

## 2018-01-09 DIAGNOSIS — D75.1 POLYCYTHEMIA: ICD-10-CM

## 2018-01-09 DIAGNOSIS — G47.33 OBSTRUCTIVE SLEEP APNEA SYNDROME: ICD-10-CM

## 2018-01-09 DIAGNOSIS — R93.89 MEDIASTINAL WIDENING: ICD-10-CM

## 2018-01-09 DIAGNOSIS — R22.1 NECK NODULE: ICD-10-CM

## 2018-01-09 DIAGNOSIS — Z00.00 HEALTHCARE MAINTENANCE: ICD-10-CM

## 2018-01-09 DIAGNOSIS — E66.9 OBESITY, UNSPECIFIED OBESITY SEVERITY, UNSPECIFIED OBESITY TYPE: ICD-10-CM

## 2018-01-09 DIAGNOSIS — D45 CHRONIC ERYTHREMIA IN REMISSION (HCC): ICD-10-CM

## 2018-01-09 LAB
BASOPHILS # BLD AUTO: 0.05 10*3/MM3 (ref 0–0.3)
BASOPHILS NFR BLD AUTO: 0.6 % (ref 0–2)
DEPRECATED RDW RBC AUTO: 44.4 FL (ref 37–54)
EOSINOPHIL # BLD AUTO: 0.84 10*3/MM3 (ref 0–0.7)
EOSINOPHIL NFR BLD AUTO: 10.4 % (ref 0–5)
ERYTHROCYTE [DISTWIDTH] IN BLOOD BY AUTOMATED COUNT: 14.4 % (ref 11.5–14.5)
HCT VFR BLD AUTO: 52.3 % (ref 42–52)
HGB BLD-MCNC: 17 G/DL (ref 14–18)
IMM GRANULOCYTES # BLD: 0.02 10*3/MM3 (ref 0–0.03)
IMM GRANULOCYTES NFR BLD: 0.2 % (ref 0–0.5)
LYMPHOCYTES # BLD AUTO: 2.31 10*3/MM3 (ref 1–3)
LYMPHOCYTES NFR BLD AUTO: 28.5 % (ref 21–51)
MCH RBC QN AUTO: 27.8 PG (ref 27–33)
MCHC RBC AUTO-ENTMCNC: 32.5 G/DL (ref 33–37)
MCV RBC AUTO: 85.6 FL (ref 80–94)
MONOCYTES # BLD AUTO: 0.72 10*3/MM3 (ref 0.1–0.9)
MONOCYTES NFR BLD AUTO: 8.9 % (ref 0–10)
NEUTROPHILS # BLD AUTO: 4.16 10*3/MM3 (ref 1.4–6.5)
NEUTROPHILS NFR BLD AUTO: 51.4 % (ref 30–70)
PLATELET # BLD AUTO: 187 10*3/MM3 (ref 130–400)
PMV BLD AUTO: 10.8 FL (ref 6–10)
POST-BLOOD PRESSURE: NORMAL
PRE-BLOOD PRESSURE: NORMAL
PRE-HCT: 52.3
PRE-HGB: 17
PULSE: 102
RBC # BLD AUTO: 6.11 10*6/MM3 (ref 4.7–6.1)
VOLUME COLLECTED: 500
WBC NRBC COR # BLD: 8.1 10*3/MM3 (ref 4.5–12.5)

## 2018-01-09 PROCEDURE — 99215 OFFICE O/P EST HI 40 MIN: CPT | Performed by: INTERNAL MEDICINE

## 2018-01-09 PROCEDURE — 99195 PHLEBOTOMY: CPT | Performed by: INTERNAL MEDICINE

## 2018-01-09 PROCEDURE — 85025 COMPLETE CBC W/AUTO DIFF WBC: CPT | Performed by: INTERNAL MEDICINE

## 2018-01-09 NOTE — PROGRESS NOTES
Johnathon Mclain  [id number]  1975 1/9/2018    Referring Provider:   TYSON Sepulveda     Reason for Follow Up:   Secondary Polycythemia  Obstructive Sleep Apnea     Chief Complaint:  Upper respiratory symptoms      History of Present Illness:   Johnathon Mclain is a very pleasant 42 y.o.  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      Mr. Mclain has had an erythrocytosis since summer of 2016. He has previously received phlebotomy with his primary care provider. He denied of any significant headaches or pruritis. He does not have any abnormal bleeding or thrombosis history. He is a never smoker, however does have strong exposure to second hand smoke and has also worked in the coal mines. His wife noted that he does snore at night and at time he does awake himself in the middle of his sleep. He had never previously been tested for obstructive sleep apnea. He was also previously on testosterone supplementation however is no longer taking this. He did undergo pulmonary function tests which were consistent with sleep apnea versus hypoxemia due to COPD. He reports that he did keep his appointment with sleep medicine however when he got home that day he already received an at home testing kit and later was sent supplemental oxygen at home. He is currently on nasal canula only and does not wear a CPAP mask.    Ms. Mclain otherwise denies of any significant complaints today. He hasn't required phlebotomy since June 2017. However in review of his recent complete blood counts obtained with his primary provider it appears that his erythrocytosis has been worsening. He was diagnosed with moderate obstructive sleep apnea last year and has a CPAP machine however has not been as compliant with it as he has had difficulty with the machine and has been getting the settings adjusted. He denies of any significant changes since his last visit with  me. He has continued to gain weight and recently has had a new insulin medication added to his regimen. He is currently having upper respiratory symptoms and feels as if there is an increasing know on the back of his neck. He denies of any fevers, however does have sinus drainage and cough and is currently on antibiotics.     The following portions of the patient's history were reviewed and updated as appropriate: allergies, current medications, past family history, past medical history, past social history, past surgical history and problem list.    No Known Allergies    Past Medical History:   Diagnosis Date   • COPD (chronic obstructive pulmonary disease)    • Diabetes mellitus    • GERD (gastroesophageal reflux disease)    • Gout    • Hyperlipidemia     mixed   • Hypertension    • Neuralgia and neuritis    • Osteoarthrosis    • Type II diabetes mellitus, uncontrolled     uncomplicated       History reviewed. No pertinent surgical history.    Social History     Social History   • Marital status:      Spouse name: N/A   • Number of children: N/A   • Years of education: N/A     Occupational History   • Not on file.     Social History Main Topics   • Smoking status: Never Smoker   • Smokeless tobacco: Current User     Types: Chew   • Alcohol use No   • Drug use: No   • Sexual activity: Not on file     Other Topics Concern   • Not on file     Social History Narrative       Family History   Problem Relation Age of Onset   • Arthritis Mother    • COPD Mother    • Asthma Mother    • Cancer Mother    • Hyperlipidemia Mother    • Diabetes Father    • Heart disease Father    • Hyperlipidemia Father    • Hypertension Father    • Stroke Father          Current Outpatient Prescriptions:   •  albuterol (PROVENTIL HFA;VENTOLIN HFA) 108 (90 Base) MCG/ACT inhaler, Inhale 1 puff As Needed for Wheezing., Disp: 1 inhaler, Rfl: 5  •  aspirin 81 MG EC tablet, Take 1 tablet by mouth Daily., Disp: 90 tablet, Rfl: 1  •  carvedilol  (COREG) 6.25 MG tablet, Take 1 tablet by mouth 2 (Two) Times a Day., Disp: 60 tablet, Rfl: 5  •  chlorthalidone (HYGROTON) 25 MG tablet, Take 1 tablet by mouth Daily., Disp: 30 tablet, Rfl: 5  •  cholecalciferol (VITAMIN D3) 1000 units tablet, Take 1 tablet by mouth Daily., Disp: 30 tablet, Rfl: 5  •  ciprofloxacin-dexamethasone (CIPRODEX) 0.3-0.1 % otic suspension, Administer 4 drops into the left ear 2 (Two) Times a Day., Disp: 1 each, Rfl: 0  •  clobetasol (TEMOVATE) 0.05 % cream, Apply  topically 2 (Two) Times a Day., Disp: 30 g, Rfl: 5  •  colchicine 0.6 MG tablet, Take 1 tablet by mouth Daily., Disp: 30 tablet, Rfl: 5  •  febuxostat (ULORIC) 80 MG tablet tablet, Take 1 tablet by mouth Daily., Disp: 30 tablet, Rfl: 5  •  fenofibrate (TRICOR) 48 MG tablet, Take 1 tablet by mouth Daily., Disp: 30 tablet, Rfl: 5  •  fluticasone (FLONASE) 50 MCG/ACT nasal spray, 2 sprays into each nostril Daily. Administer 2 sprays in each nostril for each dose., Disp: 1 bottle, Rfl: 5  •  gabapentin (NEURONTIN) 600 MG tablet, Take 1 tablet by mouth 3 (Three) Times a Day., Disp: 90 tablet, Rfl: 2  •  glipiZIDE (GLUCOTROL) 5 MG tablet, Take 1 tablet by mouth Daily., Disp: 30 tablet, Rfl: 5  •  HYDROcodone-acetaminophen (NORCO) 7.5-325 MG per tablet, Take 1 tablet by mouth Every 6 (Six) Hours As Needed for Moderate Pain  or Severe Pain ., Disp: 60 tablet, Rfl: 0  •  icosapent ethyl (VASCEPA) 1 G capsule capsule, Take 1 g by mouth 2 (Two) Times a Day With Meals., Disp: 120 capsule, Rfl: 5  •  Insulin Glargine (TOUJEO SOLOSTAR) 300 UNIT/ML solution pen-injector, Inject 20 Units under the skin Daily., Disp: 5 pen, Rfl: 5  •  INVOKANA 100 MG tablet, TAKE 1 Tablet BY MOUTH EVERY DAY, Disp: 30 tablet, Rfl: 5  •  ketoconazole (NIZORAL) 2 % shampoo, Apply  topically 2 (Two) Times a Week., Disp: 120 mL, Rfl: 5  •  lisinopril (PRINIVIL,ZESTRIL) 20 MG tablet, Take 1 tablet by mouth 2 (Two) Times a Day., Disp: 60 tablet, Rfl: 5  •  loratadine  (CLARITIN) 10 MG tablet, Take 1 tablet by mouth Daily., Disp: 30 tablet, Rfl: 5  •  Loratadine 10 MG capsule, Take 10 mg by mouth Daily., Disp: 30 each, Rfl: 5  •  metFORMIN (FORTAMET) 1000 MG (OSM) 24 hr tablet, , Disp: , Rfl:   •  metFORMIN (GLUCOPHAGE) 1000 MG tablet, TAKE 1 Tablet BY MOUTH TWICE DAILY, Disp: , Rfl: 5  •  metFORMIN (GLUCOPHAGE) 500 MG tablet, Take 1 tablet by mouth 2 (Two) Times a Day., Disp: 60 tablet, Rfl: 5  •  minocycline (MINOCIN) 100 MG capsule, Take 1 capsule by mouth 2 (Two) Times a Day., Disp: 60 capsule, Rfl: 2  •  mupirocin (BACTROBAN) 2 % cream, Apply  topically 3 (Three) Times a Day., Disp: 1 each, Rfl: 5  •  mupirocin (BACTROBAN) 2 % ointment, apply TO affected AREA THREE TIMES DAILY, Disp: , Rfl: 5  •  omeprazole (priLOSEC) 20 MG capsule, Take 1 capsule by mouth Daily., Disp: 30 capsule, Rfl: 5  •  sildenafil (VIAGRA) 100 MG tablet, Take 1 tablet by mouth Daily As Needed for erectile dysfunction., Disp: 10 tablet, Rfl: 3        Review of Systems  A comprehensive 10 point review of systems was conducted with patient and positive as per HPI otherwise negative.         Physical Exam  Vital Signs: These were reviewed and listed as per patient’s electronic medical chart, 383lbs same since 8/14  Vitals:    01/09/18 0843   BP: 143/81   Pulse: 102   Resp: 18   Temp: 98.2 °F (36.8 °C)   SpO2: 92%     General: Awake, alert and oriented, in no distress, obese  HEENT: Head is atraumatic, normocephalic, extraocular movements full, oropharynx clear, no scleral icterus, pink moist mucous membranes, large neck  Neck: supple, no jvd, lymphadenopathy, nodule on back of neck  Cardiovascular: regular rate and rhythm without murmurs, rubs or gallops  Pulmonary: non-labored, clear to auscultation bilaterally, no wheezing, distant breath sounds due to body habitus  Abdomen: soft, non-tender, non-distended, normal active bowel sounds present, no organomegaly although difficult to assess due to  obesity  Extremities: No clubbing, cyanosis or edema  Neurologic: Mental status as above, alert, awake and oriented, grossly non-focal exam  Skin: warm, dry, intact, skin tags at the back of the neck, nodule however not infectious appearing        Labs / Studies:    Lab on 01/09/2018   Component Date Value   • WBC 01/09/2018 8.10    • RBC 01/09/2018 6.11*   • Hemoglobin 01/09/2018 17.0    • Hematocrit 01/09/2018 52.3*   • MCV 01/09/2018 85.6    • MCH 01/09/2018 27.8    • MCHC 01/09/2018 32.5*   • RDW 01/09/2018 14.4    • RDW-SD 01/09/2018 44.4    • MPV 01/09/2018 10.8*   • Platelets 01/09/2018 187    • Neutrophil % 01/09/2018 51.4    • Lymphocyte % 01/09/2018 28.5    • Monocyte % 01/09/2018 8.9    • Eosinophil % 01/09/2018 10.4*   • Basophil % 01/09/2018 0.6    • Immature Grans % 01/09/2018 0.2    • Neutrophils, Absolute 01/09/2018 4.16    • Lymphocytes, Absolute 01/09/2018 2.31    • Monocytes, Absolute 01/09/2018 0.72    • Eosinophils, Absolute 01/09/2018 0.84*   • Basophils, Absolute 01/09/2018 0.05    • Immature Grans, Absolute 01/09/2018 0.02    Lab on 01/04/2018   Component Date Value   • Glucose 01/04/2018 191*   • BUN 01/04/2018 29*   • Creatinine 01/04/2018 1.36*   • Sodium 01/04/2018 134*   • Potassium 01/04/2018 4.6    • Chloride 01/04/2018 98*   • CO2 01/04/2018 27.3    • Calcium 01/04/2018 9.7    • Total Protein 01/04/2018 7.0    • Albumin 01/04/2018 4.50    • ALT (SGPT) 01/04/2018 90*   • AST (SGOT) 01/04/2018 52*   • Alkaline Phosphatase 01/04/2018 72    • Total Bilirubin 01/04/2018 0.8    • eGFR Non African Amer 01/04/2018 57*   • Globulin 01/04/2018 2.5    • A/G Ratio 01/04/2018 1.8    • BUN/Creatinine Ratio 01/04/2018 21.3    • Anion Gap 01/04/2018 8.7    • TSH 01/04/2018 1.342    • Hemoglobin A1C 01/04/2018 8.50*   • 25 Hydroxy, Vitamin D 01/04/2018 27.0    • Uric Acid 01/04/2018 4.9    • Total Cholesterol 01/04/2018 168    • Triglycerides 01/04/2018 369*   • HDL Cholesterol 01/04/2018 26*   • LDL  Cholesterol  01/04/2018 68    • VLDL Cholesterol 01/04/2018 73.8    • LDL/HDL Ratio 01/04/2018 2.62    • Microalbumin, Urine 01/04/2018 4.1    • Vitamin B-12 01/04/2018 421    • WBC 01/04/2018 7.92    • RBC 01/04/2018 6.09    • Hemoglobin 01/04/2018 16.8    • Hematocrit 01/04/2018 52.1*   • MCV 01/04/2018 85.6    • MCH 01/04/2018 27.6    • MCHC 01/04/2018 32.2*   • RDW 01/04/2018 15.2*   • RDW-SD 01/04/2018 47.5    • MPV 01/04/2018 11.0*   • Platelets 01/04/2018 199    • Neutrophil % 01/04/2018 46.1    • Lymphocyte % 01/04/2018 28.3    • Monocyte % 01/04/2018 13.8*   • Eosinophil % 01/04/2018 10.9*   • Basophil % 01/04/2018 0.6    • Immature Grans % 01/04/2018 0.3    • Neutrophils, Absolute 01/04/2018 3.66    • Lymphocytes, Absolute 01/04/2018 2.24    • Monocytes, Absolute 01/04/2018 1.09*   • Eosinophils, Absolute 01/04/2018 0.86*   • Basophils, Absolute 01/04/2018 0.05    • Immature Grans, Absolute 01/04/2018 0.02    • Osmolality Calc 01/04/2018 279.2    Office Visit on 10/10/2017   Component Date Value   • Hepatitis B Surface Ag 10/10/2017 Non-Reactive    • Hep A IgM 10/10/2017 Non-Reactive    • Hep B C IgM 10/10/2017 Non-Reactive    • Hepatitis C Ab 10/10/2017 Non-Reactive    • Glucose 10/10/2017 201*   • BUN 10/10/2017 25*   • Creatinine 10/10/2017 1.19    • Sodium 10/10/2017 137    • Potassium 10/10/2017 4.2    • Chloride 10/10/2017 103    • CO2 10/10/2017 23.3*   • Calcium 10/10/2017 11.3*   • Total Protein 10/10/2017 7.7    • Albumin 10/10/2017 4.70    • ALT (SGPT) 10/10/2017 61*   • AST (SGOT) 10/10/2017 38*   • Alkaline Phosphatase 10/10/2017 82    • Total Bilirubin 10/10/2017 0.6    • eGFR Non  Amer 10/10/2017 66    • Globulin 10/10/2017 3.0    • A/G Ratio 10/10/2017 1.6    • BUN/Creatinine Ratio 10/10/2017 21.0    • Anion Gap 10/10/2017 10.7    • TSH 10/10/2017 0.722    • Hemoglobin A1C 10/10/2017 7.80*   • 25 Hydroxy, Vitamin D 10/10/2017 28.0    • Uric Acid 10/10/2017 4.5    • Total Cholesterol  10/10/2017 161    • Triglycerides 10/10/2017 264*   • HDL Cholesterol 10/10/2017 28*   • LDL Cholesterol  10/10/2017 80    • VLDL Cholesterol 10/10/2017 52.8    • LDL/HDL Ratio 10/10/2017 2.86    • Microalbumin, Urine 10/10/2017 1.9    • WBC 10/10/2017 10.51    • RBC 10/10/2017 6.11*   • Hemoglobin 10/10/2017 16.4    • Hematocrit 10/10/2017 50.8    • MCV 10/10/2017 83.1    • MCH 10/10/2017 26.8*   • MCHC 10/10/2017 32.3*   • RDW 10/10/2017 15.1*   • RDW-SD 10/10/2017 45.9    • MPV 10/10/2017 10.8*   • Platelets 10/10/2017 233    • Neutrophil % 10/10/2017 58.0    • Lymphocyte % 10/10/2017 20.1*   • Monocyte % 10/10/2017 12.1*   • Eosinophil % 10/10/2017 8.9*   • Basophil % 10/10/2017 0.6    • Immature Grans % 10/10/2017 0.3    • Neutrophils, Absolute 10/10/2017 6.10    • Lymphocytes, Absolute 10/10/2017 2.11    • Monocytes, Absolute 10/10/2017 1.27*   • Eosinophils, Absolute 10/10/2017 0.94*   • Basophils, Absolute 10/10/2017 0.06    • Immature Grans, Absolute 10/10/2017 0.03    • Osmolality Calc 10/10/2017 283.9    Office Visit on 09/14/2017   Component Date Value   • Vitamin B-12 10/10/2017 429    Lab on 08/31/2017   Component Date Value   • Glucose 08/31/2017 181*   • BUN 08/31/2017 25*   • Creatinine 08/31/2017 1.16    • Sodium 08/31/2017 135    • Potassium 08/31/2017 4.2    • Chloride 08/31/2017 100    • CO2 08/31/2017 24.2*   • Calcium 08/31/2017 10.1*   • Total Protein 08/31/2017 7.4    • Albumin 08/31/2017 4.50    • ALT (SGPT) 08/31/2017 98*   • AST (SGOT) 08/31/2017 48*   • Alkaline Phosphatase 08/31/2017 74    • Total Bilirubin 08/31/2017 0.6    • eGFR Non  Amer 08/31/2017 68    • Globulin 08/31/2017 2.9    • A/G Ratio 08/31/2017 1.6    • BUN/Creatinine Ratio 08/31/2017 21.6    • Anion Gap 08/31/2017 10.8    • Osmolality Calc 08/31/2017 279.1    Orders Only on 08/24/2017   Component Date Value   • WBC 08/31/2017 8.73    • RBC 08/31/2017 5.92    • Hemoglobin 08/31/2017 15.9    • Hematocrit 08/31/2017  49.0    • MCV 08/31/2017 82.8    • MCH 08/31/2017 26.9*   • MCHC 08/31/2017 32.4*   • RDW 08/31/2017 14.3    • RDW-SD 08/31/2017 43.0    • MPV 08/31/2017 10.3*   • Platelets 08/31/2017 212    • Neutrophil % 08/31/2017 42.2    • Lymphocyte % 08/31/2017 28.1    • Monocyte % 08/31/2017 11.2*   • Eosinophil % 08/31/2017 17.8*   • Basophil % 08/31/2017 0.5    • Immature Grans % 08/31/2017 0.2    • Neutrophils, Absolute 08/31/2017 3.69    • Lymphocytes, Absolute 08/31/2017 2.45    • Monocytes, Absolute 08/31/2017 0.98*   • Eosinophils, Absolute 08/31/2017 1.55*   • Basophils, Absolute 08/31/2017 0.04    • Immature Grans, Absolute 08/31/2017 0.02    Office Visit on 08/14/2017   Component Date Value   • Microalbumin, Urine 08/14/2017 3.6    Lab on 08/08/2017   Component Date Value   • Glucose 08/08/2017 208*   • BUN 08/08/2017 21    • Creatinine 08/08/2017 1.22    • Sodium 08/08/2017 136    • Potassium 08/08/2017 4.7    • Chloride 08/08/2017 101    • CO2 08/08/2017 26.5    • Calcium 08/08/2017 10.3*   • Total Protein 08/08/2017 7.3    • Albumin 08/08/2017 4.70    • ALT (SGPT) 08/08/2017 91*   • AST (SGOT) 08/08/2017 50*   • Alkaline Phosphatase 08/08/2017 74    • Total Bilirubin 08/08/2017 0.5    • eGFR Non  Amer 08/08/2017 65    • Globulin 08/08/2017 2.6    • A/G Ratio 08/08/2017 1.8    • BUN/Creatinine Ratio 08/08/2017 17.2    • Anion Gap 08/08/2017 8.5    • Total Cholesterol 08/08/2017 165    • Triglycerides 08/08/2017 301*   • HDL Cholesterol 08/08/2017 27*   • LDL Cholesterol  08/08/2017 78    • VLDL Cholesterol 08/08/2017 60.2    • LDL/HDL Ratio 08/08/2017 2.88    • Hemoglobin A1C 08/08/2017 8.10*   • TSH 08/08/2017 1.035    • 25 Hydroxy, Vitamin D 08/08/2017 30.0    • Vitamin B-12 08/08/2017 544    • Osmolality Calc 08/08/2017 281.0    Lab on 07/27/2017   Component Date Value   • WBC 07/27/2017 8.35    • RBC 07/27/2017 5.67    • Hemoglobin 07/27/2017 15.7    • Hematocrit 07/27/2017 47.6    • MCV 07/27/2017  84.0    • MCH 07/27/2017 27.7    • MCHC 07/27/2017 33.0    • RDW 07/27/2017 13.5    • RDW-SD 07/27/2017 41.2    • MPV 07/27/2017 10.4*   • Platelets 07/27/2017 210    • Neutrophil % 07/27/2017 43.1    • Lymphocyte % 07/27/2017 33.2    • Monocyte % 07/27/2017 9.0    • Eosinophil % 07/27/2017 13.8*   • Basophil % 07/27/2017 0.7    • Immature Grans % 07/27/2017 0.2    • Neutrophils, Absolute 07/27/2017 3.60    • Lymphocytes, Absolute 07/27/2017 2.77    • Monocytes, Absolute 07/27/2017 0.75    • Eosinophils, Absolute 07/27/2017 1.15*   • Basophils, Absolute 07/27/2017 0.06    • Immature Grans, Absolute 07/27/2017 0.02           12/30/16: US ABDOMEN COMPLETE: Visualized pancreas is unremarkable. The imaged portion of the abdominal aorta is nondilated.  The liver is homogeneous. There is no intrahepatic ductal dilatation or focal hepatic mass. The imaged portion of the hepatic vessels and inferior vena cava are patent. The gallbladder shows no cholelithiasis or pericholecystic fluid. The wall of the gallbladder is not thickened. It measures 2.9 mm. The common bile duct is normal, measuring 4.8 mm. The kidneys demonstrate no evidence of hydronephrosis or solid renal mass. The spleen is homogeneous and measures 14.2 cm which is slightly enlarged. IMPRESSION: Negative abdominal ultrasound.     12/30/16: XR CHEST PA AND LATERAL: The cardiac silhouette is normal in size. There is some slight widening of the upper mediastinum. This can be seen with tortuous vessels. I could not entirely exclude some right paratracheal adenopathy. Correlation with old films or CT may add additional information. The lungs show increased interstitial markings throughout the lungs. No alveolar infiltrates to suggest pneumonia are seen; there are no signs of heart failure or pleural fluid. IMPRESSION: Slight widening of the mediastinum with fullness in the right paratracheal area. There is interstitial lung disease throughout both lungs.             Assessment/Plan    Johnathon Mclain is a very pleasant 42 y.o.  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     Polycythemia  To assess for a myeloproliferative disorder I obtained a [id number] and Exon 12/CALR/MPL mutations which were negative. BCR ABL PCR was <0.001%. I believe that he likely has a secondary polycythemia from underlying sleep apnea. I originally did make a sleep referral however he did not follow up as scheduled as he wanted to see if he would qualify for an at home study. Unfortunately insurance did not approve this through our office but his primary provider was able to obtain approval for this and was found to have moderate obstructive sleep apnea. He has not really been compliant with CPAP due to setting issues and is currently having this managed. He has seen sleep medicine once however did not return as he was able to do at home testing. Abdominal ultrasound did show mild splenomegaly. His pulmonary function tests are likely consistent with sleep apnea versus hypoxemia due to COPD however O2 sats on RA have been normal. Erythropoietin level was normal. Patient was initiated phlebotomy for one unit (500 mL) to be taken every month with goal of Hct <50%. He does require phlebotomy today. He currently does not take iron supplementation.     Obstructive Sleep Apnea  He has in the past been referred to sleep medicine. However he did not follow with them beyond the first appointment as he received a home sleep study and was found to have moderate obstructive sleep apnea. He currently has CPAP however has not been as compliant and the importance of compliance for this issue was again discussed with the patient.    Neck Nodule  He reports that this has been fluctuating however is concerned. This does appear to be infectious appearing or an abscess will however obtain a neck ultrasound to further  evaluate.    Obesity  Patient is currently overweight and is likely contributing to his ongoing medical issues including sleep apnea. He was continued to be advised of diet and exercise.    Elevated Liver Function Tests  Abdominal US was negative with mild splenomegaly. I would be concerned for metabolic syndrome as patient is overweight, has diabetes and cholesterol abnormalities. I have strongly advised him of weight loss. Patient does not appear to be on a statin, however he is on Norco which contains acetaminophen and he was counseled on considering decreasing usage versus discontinuing with liver function abnormalities. He has also been taking chlorthalidone which can cause some LFT abnormalities however he has been on this for many years and will further discuss these medications with his PCP. Acute hepatitis panel was non reactive.     Mediastinal Widening  This was seen on CXR in evaluation for secondary polycythemia therefore I did obtain a CT Chest with IV contrast to further evaluate this did not show any evidence of any abnormal adenopathy or masses and was felt to be due to benign findings.    Healthcare Maintenance  He was offered the influenza vaccine today however declines reporting that he has never had the vaccine and does not want one today. He was counseled on the importance of vaccination.     I will have the patient return for laboratory evaluation in 2 months for CBC and possible phlebotomy and in follow up appointment in four months. He understands that should he have any questions or concerns prior to his appointment he should give us a call at any time and I would be happy to see him sooner. It was a pleasure to see this patient in clinic today, thank you for allowing me to participate in the care of this patient.     I spent 40 minutes in regards to this patient’s care today. More than 22 minutes of the time was spent in direct interaction with the patient for the above problems and in  counceling.      Shy Lowe MD  01/09/2017  11:50 AM

## 2018-01-15 ENCOUNTER — OFFICE VISIT (OUTPATIENT)
Dept: FAMILY MEDICINE CLINIC | Facility: CLINIC | Age: 43
End: 2018-01-15

## 2018-01-15 VITALS
TEMPERATURE: 98.2 F | OXYGEN SATURATION: 95 % | HEIGHT: 68 IN | SYSTOLIC BLOOD PRESSURE: 138 MMHG | DIASTOLIC BLOOD PRESSURE: 80 MMHG | HEART RATE: 95 BPM | WEIGHT: 315 LBS | BODY MASS INDEX: 47.74 KG/M2

## 2018-01-15 DIAGNOSIS — IMO0002 UNCONTROLLED TYPE 2 DIABETES MELLITUS WITH DIABETIC PERIPHERAL ANGIOPATHY WITHOUT GANGRENE, WITH LONG-TERM CURRENT USE OF INSULIN: ICD-10-CM

## 2018-01-15 DIAGNOSIS — M79.2 NEURALGIA AND NEURITIS: ICD-10-CM

## 2018-01-15 DIAGNOSIS — G47.33 OBSTRUCTIVE SLEEP APNEA SYNDROME: ICD-10-CM

## 2018-01-15 DIAGNOSIS — J44.9 CHRONIC OBSTRUCTIVE PULMONARY DISEASE, UNSPECIFIED COPD TYPE (HCC): ICD-10-CM

## 2018-01-15 DIAGNOSIS — Z79.4 TYPE 2 DIABETES MELLITUS WITH DIABETIC NEUROPATHY, WITH LONG-TERM CURRENT USE OF INSULIN (HCC): ICD-10-CM

## 2018-01-15 DIAGNOSIS — E78.2 MIXED HYPERLIPIDEMIA: Primary | ICD-10-CM

## 2018-01-15 DIAGNOSIS — F51.01 PRIMARY INSOMNIA: ICD-10-CM

## 2018-01-15 DIAGNOSIS — K21.00 GASTROESOPHAGEAL REFLUX DISEASE WITH ESOPHAGITIS: ICD-10-CM

## 2018-01-15 DIAGNOSIS — I10 HYPERTENSION, UNSPECIFIED TYPE: ICD-10-CM

## 2018-01-15 DIAGNOSIS — IMO0002 UNCONTROLLED TYPE 2 DIABETES MELLITUS WITH DIABETIC PERIPHERAL ANGIOPATHY WITHOUT GANGRENE, WITHOUT LONG-TERM CURRENT USE OF INSULIN: ICD-10-CM

## 2018-01-15 DIAGNOSIS — E11.40 TYPE 2 DIABETES MELLITUS WITH DIABETIC NEUROPATHY, WITH LONG-TERM CURRENT USE OF INSULIN (HCC): ICD-10-CM

## 2018-01-15 PROCEDURE — 99214 OFFICE O/P EST MOD 30 MIN: CPT | Performed by: NURSE PRACTITIONER

## 2018-01-15 RX ORDER — AMITRIPTYLINE HYDROCHLORIDE 10 MG/1
10 TABLET, FILM COATED ORAL NIGHTLY PRN
Qty: 30 TABLET | Refills: 5 | Status: SHIPPED | OUTPATIENT
Start: 2018-01-15 | End: 2018-02-08 | Stop reason: SINTOL

## 2018-01-15 RX ORDER — FENOFIBRATE 120 MG/1
120 TABLET ORAL DAILY
Qty: 30 TABLET | Refills: 5 | Status: SHIPPED | OUTPATIENT
Start: 2018-01-15 | End: 2018-01-16 | Stop reason: RX

## 2018-01-15 RX ORDER — CANAGLIFLOZIN 100 MG/1
TABLET, FILM COATED ORAL
Qty: 30 TABLET | Refills: 5 | Status: SHIPPED | OUTPATIENT
Start: 2018-01-15 | End: 2018-07-05 | Stop reason: SDUPTHER

## 2018-01-15 RX ORDER — HYDROCODONE BITARTRATE AND ACETAMINOPHEN 7.5; 325 MG/1; MG/1
1 TABLET ORAL EVERY 6 HOURS PRN
Qty: 60 TABLET | Refills: 0 | Status: SHIPPED | OUTPATIENT
Start: 2018-01-15 | End: 2018-02-19 | Stop reason: SDUPTHER

## 2018-01-15 NOTE — PROGRESS NOTES
Subjective   Johnathon Mclain is a 42 y.o. male.     Chief Complaint   Patient presents with   • Follow-up       History of Present Illness           Follow up on recent lab results and hematology consult.    Insomnia-patient reports that he is having difficulty going to sleep and staying asleep.  He would like something to help him sleep.    Sleep  apnea-patient wore CPAP.    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 He does currently take 22 units of Toujeo each evening.     Gerd - stable     Copd - no recent exacerbation. Some dry cough.     Gout - no recent attack.     Polycythemia - patient is under the care of hematology.  He has recently returned with a hematocrit greater than 50 and therapeutic phlebotomy was performed.  Patient is scheduled to follow up with hematology in 2 months.  Goal is to maintain hematocrit less than 50.    Lipidemia-patient is currently taking TriCor and Vascepa. He reports compliance.    Osteoarthritis - patient has chronic osteoarthritis of his neck and mid and low back.  He rates his back pain as 7 on a scale of 0-10.  Pain is made worse with bending, lifting, twisting and prolonged standing.  Patient reports that he does become stiff if he is sedentary.  He has tried anti-inflammatories in the past with no success.  Patient currently receives Norco which he uses when necessary for chronic back pain.  He reports his pain is decreased to a 2 on a pain scale of 0-10.  He has no history of substance abuse or addiction.  He does report an improvement in quality of life and decrease in pain with current medication regimen.      The following portions of the patient's history were reviewed and updated as appropriate: allergies, current medications, past family history, past medical history, past social history, past surgical history and problem list.    Review of Systems   Constitutional: Negative.  " Negative for appetite change, chills, fatigue and fever.   HENT: Negative.    Eyes: Negative.    Respiratory: Positive for apnea and cough. Negative for choking, shortness of breath and wheezing.    Cardiovascular: Negative.  Negative for chest pain, palpitations and leg swelling.   Gastrointestinal: Negative.    Endocrine: Negative.    Genitourinary: Negative.  Negative for difficulty urinating.   Musculoskeletal: Positive for arthralgias and back pain.   Skin: Negative.    Allergic/Immunologic: Negative.    Neurological: Negative.  Negative for headaches.   Hematological: Negative.  Negative for adenopathy. Does not bruise/bleed easily.   Psychiatric/Behavioral: Positive for sleep disturbance. Negative for dysphoric mood and suicidal ideas.       Objective     /80 (BP Location: Left arm, Patient Position: Sitting, Cuff Size: Large Adult)  Pulse 95  Temp 98.2 °F (36.8 °C) (Oral)   Ht 172 cm (67.72\")  Wt (!) 172 kg (379 lb)  SpO2 95%  BMI 58.11 kg/m2  Office Visit on 01/09/2018   Component Date Value Ref Range Status   • Pulse 01/09/2018 102   Final   • Volume Collected 01/09/2018 500   Final   • Pre-Hgb 01/09/2018 17.0   Final   • Pre-Hct 01/09/2018 52.3   Final   • Pre-Blood Pressure 01/09/2018 143/81   Final   • Post-Blood Pressure 01/09/2018 131/76   Final       Physical Exam   Constitutional: He is oriented to person, place, and time. Vital signs are normal. He appears well-developed and well-nourished. No distress.   Visibly obese.    HENT:   Head: Normocephalic and atraumatic.   Right Ear: External ear normal.   Left Ear: External ear normal.   Nose: Nose normal.   Mouth/Throat: Oropharynx is clear and moist. No oropharyngeal exudate.   Eyes: EOM are normal. Pupils are equal, round, and reactive to light.   Neck: Normal range of motion. Neck supple. No tracheal deviation present. No thyromegaly present.   Cardiovascular: Normal rate, regular rhythm, normal heart sounds and intact distal pulses.  " Exam reveals no gallop and no friction rub.    No murmur heard.  Pulmonary/Chest: Effort normal and breath sounds normal. No respiratory distress. He has no wheezes. He has no rales. He exhibits no tenderness.   Abdominal: Soft. Bowel sounds are normal. He exhibits no distension and no mass. There is no tenderness. There is no rebound and no guarding.   Musculoskeletal: Normal range of motion.        Lumbar back: He exhibits tenderness, pain and spasm.   Decreased lumbar curvature, there is pain with forward flexion. DTR's +2. No edema.    Lymphadenopathy:     He has no cervical adenopathy.   Neurological: He is alert and oriented to person, place, and time. He has normal reflexes.   CN 2-12 grossly intact    Skin: Skin is warm and dry. No rash noted. He is not diaphoretic. No erythema. No pallor.   Psychiatric: He has a normal mood and affect. His speech is normal and behavior is normal. Judgment and thought content normal. His affect is not inappropriate. Cognition and memory are normal.   Nursing note and vitals reviewed.      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    Overview     uncomplicated         Relevant Medications    Insulin Glargine (TOUJEO SOLOSTAR) 300 UNIT/ML solution pen-injector    Hypertension    Hyperlipidemia - Primary    Overview     mixed         Relevant Medications    fenofibrate (FENOGLIDE) 120 MG tablet       Respiratory    COPD (chronic obstructive pulmonary disease)    Obstructive sleep apnea syndrome       Digestive    GERD (gastroesophageal reflux disease)       Endocrine    Diabetes mellitus with diabetic neuropathy, with long-term current use of insulin    Relevant Medications    Insulin Glargine (TOUJEO SOLOSTAR) 300 UNIT/ML solution pen-injector       Nervous and Auditory    Neuralgia and neuritis    Relevant Medications    HYDROcodone-acetaminophen (NORCO)  7.5-325 MG per tablet       Other    Primary insomnia    Relevant Medications    amitriptyline (ELAVIL) 10 MG tablet        Sleep habit and residual reviewed.  Stop all stimulating activity one hour prior to bedtime.  Avoid caffeine intake.  At Elavil 10 mg 20 minutes prior to bedtime.  I have discussed diagnosis in detail today allowing time for questions and answers. Pt is aware of reasons to seek urgent or emergent medical care as well as reasons to return to the clinic for evaluation. Possible side effects, interactions and progression of symptoms discussed as well. Pt / family states understanding.   January 2018 labs reviewed and discussed.  Increase Toujeo from 22 units up to 26 units and follow better diet control.   Pt has been educated/instructed on diabetic care and protocols. Sick day rules reviewed. Continue to monitor blood sugar and report abnormal readings as discussed today. Pt / family state understanding.   I have discussed diagnosis in detail today allowing time for questions and answers. Pt is aware of reasons to seek urgent or emergent medical care as well as reasons to return to the clinic for evaluation. Possible side effects, interactions and progression of symptoms discussed as well. Pt / family states understanding.   John has been reviewed as consistent.  Uds is on file.   Goal: Improved quality of life and reduction in pain as evidenced by pt report.   As part of this patient's treatment plan they are being prescribed controlled substance/substances with potential for abuse. This patient has been made aware of the appropriate use of such medications, including potential risk of somnolence, limited ability to drive and / or work safely, and potential for overdose. It has also been made clear that these medications are for use by this patient only, without concomitant use of alcohol or other substances unless prescribed/advised by medical provider. Patient has completed controlled  substance agreement detailing terms of continued prescribing of controlled substances including monitoring John reports, drug screens and pill counts. The patient was asked and states they are not receiving narcotic medication from any there provider or any provider that this office has not been made aware of. History and physical exam exhibit continued safe and appropriate use of controlled substances.   Follow up in one month, sooner if needed.          This document has been electronically signed by:  TYSON Vinson, NP-C

## 2018-01-16 RX ORDER — FENOFIBRATE 134 MG/1
134 CAPSULE ORAL
Qty: 30 CAPSULE | Refills: 5 | Status: SHIPPED | OUTPATIENT
Start: 2018-01-16 | End: 2018-07-09 | Stop reason: SDUPTHER

## 2018-01-22 RX ORDER — LISINOPRIL 20 MG/1
TABLET ORAL
Qty: 60 TABLET | Refills: 5 | Status: SHIPPED | OUTPATIENT
Start: 2018-01-22 | End: 2018-03-22 | Stop reason: SDUPTHER

## 2018-01-22 RX ORDER — METFORMIN HYDROCHLORIDE EXTENDED-RELEASE TABLETS 1000 MG/1
TABLET, FILM COATED, EXTENDED RELEASE ORAL
Qty: 60 TABLET | Refills: 5 | Status: ON HOLD | OUTPATIENT
Start: 2018-01-22 | End: 2018-05-11

## 2018-01-25 ENCOUNTER — HOSPITAL ENCOUNTER (OUTPATIENT)
Dept: ULTRASOUND IMAGING | Facility: HOSPITAL | Age: 43
Discharge: HOME | End: 2018-01-25
Attending: INTERNAL MEDICINE | Admitting: INTERNAL MEDICINE

## 2018-01-25 DIAGNOSIS — R22.1 NECK NODULE: ICD-10-CM

## 2018-01-25 PROCEDURE — 76536 US EXAM OF HEAD AND NECK: CPT | Performed by: RADIOLOGY

## 2018-01-25 PROCEDURE — 76536 US EXAM OF HEAD AND NECK: CPT

## 2018-02-06 ENCOUNTER — TELEPHONE (OUTPATIENT)
Dept: FAMILY MEDICINE CLINIC | Facility: CLINIC | Age: 43
End: 2018-02-06

## 2018-02-08 ENCOUNTER — OFFICE VISIT (OUTPATIENT)
Dept: FAMILY MEDICINE CLINIC | Facility: CLINIC | Age: 43
End: 2018-02-08

## 2018-02-08 VITALS
DIASTOLIC BLOOD PRESSURE: 80 MMHG | OXYGEN SATURATION: 97 % | WEIGHT: 315 LBS | SYSTOLIC BLOOD PRESSURE: 130 MMHG | TEMPERATURE: 98.2 F | HEART RATE: 85 BPM | HEIGHT: 68 IN | BODY MASS INDEX: 47.74 KG/M2

## 2018-02-08 DIAGNOSIS — L02.91 ABSCESS: ICD-10-CM

## 2018-02-08 DIAGNOSIS — M79.2 NEURALGIA AND NEURITIS: ICD-10-CM

## 2018-02-08 DIAGNOSIS — E78.2 MIXED HYPERLIPIDEMIA: ICD-10-CM

## 2018-02-08 DIAGNOSIS — K21.00 GASTROESOPHAGEAL REFLUX DISEASE WITH ESOPHAGITIS: ICD-10-CM

## 2018-02-08 DIAGNOSIS — I10 ESSENTIAL HYPERTENSION: ICD-10-CM

## 2018-02-08 PROCEDURE — 99214 OFFICE O/P EST MOD 30 MIN: CPT | Performed by: NURSE PRACTITIONER

## 2018-02-08 RX ORDER — MELATONIN
1000 DAILY
Qty: 30 TABLET | Refills: 5 | Status: ON HOLD | OUTPATIENT
Start: 2018-02-08 | End: 2019-01-02

## 2018-02-08 RX ORDER — GABAPENTIN 600 MG/1
600 TABLET ORAL 3 TIMES DAILY
Qty: 90 TABLET | Refills: 2 | Status: SHIPPED | OUTPATIENT
Start: 2018-02-08 | End: 2018-04-25 | Stop reason: SDUPTHER

## 2018-02-08 RX ORDER — OMEPRAZOLE 20 MG/1
20 CAPSULE, DELAYED RELEASE ORAL DAILY
Qty: 30 CAPSULE | Refills: 5 | Status: SHIPPED | OUTPATIENT
Start: 2018-02-08 | End: 2018-07-09 | Stop reason: SDUPTHER

## 2018-02-08 RX ORDER — HYDROXYZINE PAMOATE 25 MG/1
25 CAPSULE ORAL NIGHTLY PRN
Qty: 30 CAPSULE | Refills: 3 | Status: SHIPPED | OUTPATIENT
Start: 2018-02-08 | End: 2018-06-07 | Stop reason: SDUPTHER

## 2018-02-08 RX ORDER — CHLORTHALIDONE 25 MG/1
25 TABLET ORAL DAILY
Qty: 30 TABLET | Refills: 5 | Status: SHIPPED | OUTPATIENT
Start: 2018-02-08 | End: 2018-12-03 | Stop reason: SDUPTHER

## 2018-02-08 RX ORDER — GLIPIZIDE 5 MG/1
5 TABLET ORAL DAILY
Qty: 30 TABLET | Refills: 5 | Status: SHIPPED | OUTPATIENT
Start: 2018-02-08 | End: 2018-07-09 | Stop reason: SDUPTHER

## 2018-02-08 RX ORDER — FEBUXOSTAT 80 MG/1
80 TABLET, FILM COATED ORAL DAILY
Qty: 30 TABLET | Refills: 5 | Status: SHIPPED | OUTPATIENT
Start: 2018-02-08 | End: 2018-07-09 | Stop reason: SDUPTHER

## 2018-02-08 RX ORDER — ICOSAPENT ETHYL 1000 MG/1
1 CAPSULE ORAL 2 TIMES DAILY WITH MEALS
Qty: 120 CAPSULE | Refills: 5 | Status: SHIPPED | OUTPATIENT
Start: 2018-02-08 | End: 2018-07-09 | Stop reason: SDUPTHER

## 2018-02-08 RX ORDER — MINOCYCLINE HYDROCHLORIDE 100 MG/1
100 CAPSULE ORAL EVERY 12 HOURS SCHEDULED
Qty: 60 CAPSULE | Refills: 5 | Status: SHIPPED | OUTPATIENT
Start: 2018-02-08 | End: 2018-02-19 | Stop reason: SDUPTHER

## 2018-02-08 RX ORDER — ESCITALOPRAM OXALATE 10 MG/1
10 TABLET ORAL DAILY
Qty: 30 TABLET | Refills: 5 | Status: SHIPPED | OUTPATIENT
Start: 2018-02-08 | End: 2018-08-02 | Stop reason: SDUPTHER

## 2018-02-08 NOTE — PROGRESS NOTES
"Subjective   Johnathon Mclain is a 42 y.o. male.     Chief Complaint   Patient presents with   • Hypertension     go over ultrasound   • Hyperlipidemia   • COPD   • Osteoarthritis       History of Present Illness       Wife has left him. Not sleeping, feels nervous. Crying all the time. Denies thoughts of hurting self or others.     Diabetic neuropathy-patient needs a refill on his Neurontin.  He reports increase in his diabetic foot pain.  He has been seen by podiatry in the past.  No history of substance abuse.    Abscess on the back of his neck-patient is a CT to review today.    Place of anemia very-patient is under the care of hematology.  He does have frequent blood removal.        The following portions of the patient's history were reviewed and updated as appropriate: allergies, current medications, past family history, past medical history, past social history, past surgical history and problem list.    Review of Systems   Constitutional: Positive for appetite change and fatigue.   Eyes: Negative.    Respiratory: Positive for cough and shortness of breath.    Cardiovascular: Negative.    Gastrointestinal: Positive for diarrhea.   Endocrine: Negative.    Genitourinary: Negative.    Musculoskeletal: Positive for back pain.   Skin: Positive for rash.   Allergic/Immunologic: Negative.    Neurological: Positive for headaches.   Hematological: Negative.    Psychiatric/Behavioral: Positive for dysphoric mood and sleep disturbance. Negative for self-injury and suicidal ideas. The patient is nervous/anxious.        Objective     /80  Pulse 85  Temp 98.2 °F (36.8 °C) (Tympanic)   Ht 172 cm (67.72\")  Wt (!) 162 kg (358 lb)  SpO2 97%  BMI 54.89 kg/m2  Office Visit on 01/09/2018   Component Date Value Ref Range Status   • Pulse 01/09/2018 102   Final   • Volume Collected 01/09/2018 500   Final   • Pre-Hgb 01/09/2018 17.0   Final   • Pre-Hct 01/09/2018 52.3   Final   • Pre-Blood Pressure 01/09/2018 143/81   " Final   • Post-Blood Pressure 01/09/2018 131/76   Final       Physical Exam   Constitutional: He is oriented to person, place, and time. He appears well-developed and well-nourished. No distress.   HENT:   Head: Normocephalic.   Right Ear: External ear normal.   Left Ear: External ear normal.   Nose: Nose normal.   Mouth/Throat: No oropharyngeal exudate.   Eyes: Pupils are equal, round, and reactive to light.   Neck: Neck supple. No thyromegaly present.       Cardiovascular: Normal rate, regular rhythm and normal heart sounds.    No murmur heard.  Pulmonary/Chest: Effort normal and breath sounds normal. No respiratory distress. He has no wheezes. He has no rales. He exhibits no tenderness.   Abdominal: Soft. Bowel sounds are normal. He exhibits no distension. There is no tenderness.   Lymphadenopathy:     He has no cervical adenopathy.   Neurological: He is alert and oriented to person, place, and time.   Skin: Skin is warm and dry. No rash noted. He is not diaphoretic.   Psychiatric: His speech is normal and behavior is normal. Judgment and thought content normal. He exhibits a depressed mood.   He comes tearful when discussing his recent marital separation   Vitals reviewed.      Assessment/Plan     Problem List Items Addressed This Visit        Cardiovascular and Mediastinum    Hypertension    Relevant Medications    chlorthalidone (HYGROTON) 25 MG tablet    Hyperlipidemia    Overview     mixed         Relevant Medications    icosapent ethyl (VASCEPA) 1 g capsule capsule       Digestive    GERD (gastroesophageal reflux disease)    Relevant Medications    omeprazole (priLOSEC) 20 MG capsule       Nervous and Auditory    Neuralgia and neuritis    Relevant Medications    gabapentin (NEURONTIN) 600 MG tablet    febuxostat (ULORIC) 80 MG tablet tablet       Other    Abscess    Relevant Medications    minocycline (MINOCIN) 100 MG capsule          CT of head/neck result from 1/25/18 reviewed and discussed today.  Recommend surgical consult for biopsy/removal. Pt declines at this time. He understands the risks.   Start Lexapro 10 mg daily.  Stop Elavil as it is not helping him sleep.  *Vistaril 25 mg as needed at bedtime for every 8 hours for anxiety as needed.  Refill routine medication.  Emotional support and active listening provided.   I have discussed diagnosis in detail today allowing time for questions and answers. Pt is aware of reasons to seek urgent or emergent medical care as well as reasons to return to the clinic for evaluation. Possible side effects, interactions and progression of symptoms discussed as well. Pt / family states understanding.   Refill Neurontin.  Follow up 2/19/18 for further evaluation, sooner if needed.            This document has been electronically signed by:  TYSON Vinson, NP-C

## 2018-02-19 ENCOUNTER — OFFICE VISIT (OUTPATIENT)
Dept: FAMILY MEDICINE CLINIC | Facility: CLINIC | Age: 43
End: 2018-02-19

## 2018-02-19 VITALS
WEIGHT: 315 LBS | HEIGHT: 68 IN | HEART RATE: 95 BPM | DIASTOLIC BLOOD PRESSURE: 70 MMHG | SYSTOLIC BLOOD PRESSURE: 120 MMHG | OXYGEN SATURATION: 98 % | TEMPERATURE: 98.1 F | BODY MASS INDEX: 47.74 KG/M2

## 2018-02-19 DIAGNOSIS — L02.91 ABSCESS: ICD-10-CM

## 2018-02-19 DIAGNOSIS — IMO0002 UNCONTROLLED TYPE 2 DIABETES MELLITUS WITH DIABETIC PERIPHERAL ANGIOPATHY WITHOUT GANGRENE, WITH LONG-TERM CURRENT USE OF INSULIN: Primary | ICD-10-CM

## 2018-02-19 DIAGNOSIS — D45 POLYCYTHEMIA VERA (HCC): ICD-10-CM

## 2018-02-19 DIAGNOSIS — Z79.899 HIGH RISK MEDICATION USE: ICD-10-CM

## 2018-02-19 DIAGNOSIS — M79.2 NEURALGIA AND NEURITIS: ICD-10-CM

## 2018-02-19 DIAGNOSIS — F32.9 REACTIVE DEPRESSION: ICD-10-CM

## 2018-02-19 DIAGNOSIS — G47.33 OBSTRUCTIVE SLEEP APNEA SYNDROME: ICD-10-CM

## 2018-02-19 PROCEDURE — 99214 OFFICE O/P EST MOD 30 MIN: CPT | Performed by: NURSE PRACTITIONER

## 2018-02-19 RX ORDER — MINOCYCLINE HYDROCHLORIDE 100 MG/1
100 CAPSULE ORAL EVERY 12 HOURS SCHEDULED
Qty: 60 CAPSULE | Refills: 5 | Status: ON HOLD | OUTPATIENT
Start: 2018-02-19 | End: 2018-05-11

## 2018-02-19 RX ORDER — HYDROCODONE BITARTRATE AND ACETAMINOPHEN 7.5; 325 MG/1; MG/1
1 TABLET ORAL EVERY 6 HOURS PRN
Qty: 60 TABLET | Refills: 0 | Status: SHIPPED | OUTPATIENT
Start: 2018-02-19 | End: 2018-03-22 | Stop reason: SDUPTHER

## 2018-02-19 NOTE — ASSESSMENT & PLAN NOTE
Psychological condition is improving with treatment.  Continue current treatment regimen.  Psychological condition  will be reassessed in 4 weeks.

## 2018-02-19 NOTE — ASSESSMENT & PLAN NOTE
Diabetes is improving with lifestyle modifications.   Continue current treatment regimen.  Diabetes will be reassessed in 1 month.

## 2018-02-19 NOTE — PROGRESS NOTES
Subjective   Johnathon Mclain is a 42 y.o. male.     Chief Complaint   Patient presents with   • Diabetes       History of Present Illness     Depression -Patient reports that he is having some improvement with addition of Lexapro.  He has been speaking with his estranged wife and feels his coming to better terms with her separation.  He denies thoughts of hurting self or others.    Type 2 diabetes-recent A1c was elevated at 8.5.  Patient states that he has stopped drinking soda and is walking.  He has lost approximately 25+ pounds over the past 1 month with dietary and lifestyle changes.  He reports compliance with current medication regimen.  Patient needs a new glucometer as his is broken.    Sleep apnea-wearing CPAP.  No problems at this time.      Osteoarthritis - patient has chronic osteoarthritis of his neck and mid and low back.  He rates his back pain as 7 on a scale of 0-10.  Pain is made worse with bending, lifting, twisting and prolonged standing.  Patient reports that he does become stiff if he is sedentary.  He has tried anti-inflammatories in the past with no success.  Patient currently receives Norco which he uses when necessary for chronic back pain.  He reports his pain is decreased to a 2 on a pain scale of 0-10.  He has no history of substance abuse or addiction.  He does report an improvement in quality of life and decrease in pain with current medication regimen.      Polycythemia vera-under the care of hematology.  Has a follow up with hematology in one month.    Recurrent skin abscesses-patient has completed minocycline twice daily.  He would like to continue on this minocycline as it has kept his abscesses under control.    The following portions of the patient's history were reviewed and updated as appropriate: allergies, current medications, past family history, past medical history, past social history, past surgical history and problem list.    Review of Systems   Respiratory: Positive for  "apnea (now wearing cpap).    Cardiovascular: Negative for chest pain and palpitations.   Gastrointestinal: Negative for abdominal pain, blood in stool, constipation, diarrhea, nausea and vomiting.   Endocrine: Negative.    Genitourinary: Negative for dysuria.   Musculoskeletal: Positive for arthralgias, back pain and neck pain. Negative for neck stiffness.   Skin: Positive for rash.   Allergic/Immunologic: Negative.    Neurological: Negative.    Hematological: Negative.    Psychiatric/Behavioral: Negative for dysphoric mood, self-injury and suicidal ideas. The patient is not nervous/anxious.    All other systems reviewed and are negative.      Objective     /70  Pulse 95  Temp 98.1 °F (36.7 °C) (Oral)   Ht 172 cm (67.72\")  Wt (!) 159 kg (350 lb)  SpO2 98%  BMI 53.66 kg/m2  Office Visit on 01/09/2018   Component Date Value Ref Range Status   • Pulse 01/09/2018 102   Final   • Volume Collected 01/09/2018 500   Final   • Pre-Hgb 01/09/2018 17.0   Final   • Pre-Hct 01/09/2018 52.3   Final   • Pre-Blood Pressure 01/09/2018 143/81   Final   • Post-Blood Pressure 01/09/2018 131/76   Final       Physical Exam   Constitutional: He is oriented to person, place, and time. Vital signs are normal. He appears well-developed and well-nourished. No distress.   Visibly obese.    HENT:   Head: Normocephalic and atraumatic.   Right Ear: External ear normal.   Left Ear: External ear normal.   Nose: Nose normal.   Mouth/Throat: Oropharynx is clear and moist. No oropharyngeal exudate.   Eyes: EOM are normal. Pupils are equal, round, and reactive to light.   Neck: Normal range of motion. Neck supple. No tracheal deviation present. No thyromegaly present.   Cardiovascular: Normal rate, regular rhythm, normal heart sounds and intact distal pulses.  Exam reveals no gallop and no friction rub.    No murmur heard.  Pulmonary/Chest: Effort normal and breath sounds normal. No respiratory distress. He has no wheezes. He has no rales. " He exhibits no tenderness.   Abdominal: Soft. Bowel sounds are normal. He exhibits no distension and no mass. There is no tenderness. There is no rebound and no guarding.   Musculoskeletal: Normal range of motion.        Lumbar back: He exhibits tenderness, pain and spasm.   Decreased lumbar curvature, there is pain with forward flexion. DTR's +2. No edema.    Lymphadenopathy:     He has no cervical adenopathy.   Neurological: He is alert and oriented to person, place, and time. He has normal reflexes.   CN 2-12 grossly intact    Skin: Skin is warm and dry. No rash noted. He is not diaphoretic. No erythema. No pallor.        Psychiatric: He has a normal mood and affect. His speech is normal and behavior is normal. Judgment and thought content normal. His affect is not inappropriate. Cognition and memory are normal.   Nursing note and vitals reviewed.      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   Continue current treatment regimen.  Diabetes will be reassessed in 1 month.            Respiratory    Obstructive sleep apnea syndrome       Nervous and Auditory    Neuralgia and neuritis    Relevant Medications    HYDROcodone-acetaminophen (NORCO) 7.5-325 MG per tablet       Hematopoietic and Hemostatic    Polycythemia vera    Overview     Under the care of hematology             Other    Abscess    Overview     Has had imaging, declines biopsy or removal at this time           Relevant Medications    minocycline (MINOCIN) 100 MG capsule    High risk medication use    Reactive depression    Overview     Taking lexapro          Current Assessment & Plan     Psychological condition is improving with treatment.  Continue current treatment regimen.  Psychological condition   will be reassessed in 4 weeks.             Refill minocycline 100 mg twice daily with 5 refills.  Report any increase of his abscess.  Order for glucometer.   Last A1c was 8.5, pt has made diet changes and we will re-evaluate in April. He has lost over 25 pounds in the past month. He is walking and making lifestyle changes.  Pt has been educated/instructed on diabetic care and protocols.  Diabetic diet instructions provided.  Medication regimen reviewed.  Discussed possible side effects and interactions of current medications.  Sick day rules reviewed. Continue to monitor blood sugar and report abnormal readings as discussed today. Understanding stated by patient.   John has been reviewed as consistent.  Uds is on file.   Goal: Improved quality of life and reduction in pain as evidenced by pt report.   Patient has been instructed and counseled regarding opioid misuse and risk of addiction.  We have discussed proper storage and disposal of controlled medication.  Proper body mechanics has been reviewed and discussed today.  Emotional support and active listening provided.     Refill [place] 7.5-325 one every six hours as needed for moderate or severe pain # 60 no refills.  John has been reviewed as consistent.  Uds is on file.     Follow-up in one month, sooner if needed.  Repeat fasting labs in April 2018.    Errors in dictation may reflect use of voice recognition software and not all errors in transcription may have been detected prior to signing.                This document has been electronically signed by:  TYSON Vinson, NP-C

## 2018-02-21 ENCOUNTER — DOCUMENTATION (OUTPATIENT)
Dept: ONCOLOGY | Facility: CLINIC | Age: 43
End: 2018-02-21

## 2018-02-21 NOTE — PROGRESS NOTES
Discussed US results with patient at present patient is asymptomatic and states area has decreased in size after antibiotics from his primary provider. We discussed referring to general surgery for excision and further evaluation however since it has decreased in size he prefers to repeat US first during his next appointment.

## 2018-03-01 DIAGNOSIS — D45 PV (POLYCYTHEMIA VERA) (HCC): Primary | ICD-10-CM

## 2018-03-08 ENCOUNTER — LAB (OUTPATIENT)
Dept: ONCOLOGY | Facility: CLINIC | Age: 43
End: 2018-03-08

## 2018-03-08 VITALS
RESPIRATION RATE: 18 BRPM | OXYGEN SATURATION: 95 % | DIASTOLIC BLOOD PRESSURE: 67 MMHG | HEART RATE: 87 BPM | SYSTOLIC BLOOD PRESSURE: 112 MMHG | TEMPERATURE: 97.3 F

## 2018-03-08 DIAGNOSIS — D45 PV (POLYCYTHEMIA VERA) (HCC): ICD-10-CM

## 2018-03-08 LAB
ALBUMIN SERPL-MCNC: 4.7 G/DL (ref 3.5–5)
ALBUMIN/GLOB SERPL: 2.2 G/DL (ref 1.5–2.5)
ALP SERPL-CCNC: 65 U/L (ref 40–129)
ALT SERPL W P-5'-P-CCNC: 79 U/L (ref 10–44)
ANION GAP SERPL CALCULATED.3IONS-SCNC: 13.5 MMOL/L (ref 3.6–11.2)
AST SERPL-CCNC: 40 U/L (ref 10–34)
BASOPHILS # BLD AUTO: 0.03 10*3/MM3 (ref 0–0.3)
BASOPHILS NFR BLD AUTO: 0.4 % (ref 0–2)
BILIRUB SERPL-MCNC: 0.7 MG/DL (ref 0.2–1.8)
BUN BLD-MCNC: 21 MG/DL (ref 7–21)
BUN/CREAT SERPL: 16.4 (ref 7–25)
CALCIUM SPEC-SCNC: 10.8 MG/DL (ref 7.7–10)
CHLORIDE SERPL-SCNC: 102 MMOL/L (ref 99–112)
CO2 SERPL-SCNC: 21.5 MMOL/L (ref 24.3–31.9)
CREAT BLD-MCNC: 1.28 MG/DL (ref 0.43–1.29)
DEPRECATED RDW RBC AUTO: 46.5 FL (ref 37–54)
EOSINOPHIL # BLD AUTO: 0.45 10*3/MM3 (ref 0–0.7)
EOSINOPHIL NFR BLD AUTO: 6 % (ref 0–5)
ERYTHROCYTE [DISTWIDTH] IN BLOOD BY AUTOMATED COUNT: 14.7 % (ref 11.5–14.5)
GFR SERPL CREATININE-BSD FRML MDRD: 61 ML/MIN/1.73
GLOBULIN UR ELPH-MCNC: 2.1 GM/DL
GLUCOSE BLD-MCNC: 167 MG/DL (ref 70–110)
HCT VFR BLD AUTO: 51.2 % (ref 42–52)
HGB BLD-MCNC: 17.5 G/DL (ref 14–18)
IMM GRANULOCYTES # BLD: 0.02 10*3/MM3 (ref 0–0.03)
IMM GRANULOCYTES NFR BLD: 0.3 % (ref 0–0.5)
LYMPHOCYTES # BLD AUTO: 1.94 10*3/MM3 (ref 1–3)
LYMPHOCYTES NFR BLD AUTO: 25.9 % (ref 21–51)
MCH RBC QN AUTO: 29.5 PG (ref 27–33)
MCHC RBC AUTO-ENTMCNC: 34.2 G/DL (ref 33–37)
MCV RBC AUTO: 86.3 FL (ref 80–94)
MONOCYTES # BLD AUTO: 0.95 10*3/MM3 (ref 0.1–0.9)
MONOCYTES NFR BLD AUTO: 12.7 % (ref 0–10)
NEUTROPHILS # BLD AUTO: 4.09 10*3/MM3 (ref 1.4–6.5)
NEUTROPHILS NFR BLD AUTO: 54.7 % (ref 30–70)
OSMOLALITY SERPL CALC.SUM OF ELEC: 280.6 MOSM/KG (ref 273–305)
PLATELET # BLD AUTO: 183 10*3/MM3 (ref 130–400)
PMV BLD AUTO: 10.8 FL (ref 6–10)
POST-BLOOD PRESSURE: NORMAL
POTASSIUM BLD-SCNC: 3.9 MMOL/L (ref 3.5–5.3)
PRE-BLOOD PRESSURE: NORMAL
PRE-HCT: 51.2
PRE-HGB: 17.5
PROT SERPL-MCNC: 6.8 G/DL (ref 6–8)
PULSE: 87
RBC # BLD AUTO: 5.93 10*6/MM3 (ref 4.7–6.1)
SODIUM BLD-SCNC: 137 MMOL/L (ref 135–153)
VOLUME COLLECTED: 537
WBC NRBC COR # BLD: 7.48 10*3/MM3 (ref 4.5–12.5)

## 2018-03-08 PROCEDURE — 99195 PHLEBOTOMY: CPT | Performed by: INTERNAL MEDICINE

## 2018-03-08 PROCEDURE — 80053 COMPREHEN METABOLIC PANEL: CPT | Performed by: INTERNAL MEDICINE

## 2018-03-08 PROCEDURE — 85025 COMPLETE CBC W/AUTO DIFF WBC: CPT | Performed by: INTERNAL MEDICINE

## 2018-03-22 ENCOUNTER — OFFICE VISIT (OUTPATIENT)
Dept: FAMILY MEDICINE CLINIC | Facility: CLINIC | Age: 43
End: 2018-03-22

## 2018-03-22 VITALS
HEIGHT: 68 IN | DIASTOLIC BLOOD PRESSURE: 74 MMHG | OXYGEN SATURATION: 96 % | TEMPERATURE: 97.9 F | SYSTOLIC BLOOD PRESSURE: 120 MMHG | BODY MASS INDEX: 47.74 KG/M2 | WEIGHT: 315 LBS | HEART RATE: 100 BPM

## 2018-03-22 DIAGNOSIS — M79.2 NEURALGIA AND NEURITIS: Primary | ICD-10-CM

## 2018-03-22 DIAGNOSIS — L91.8 SKIN TAGS, MULTIPLE ACQUIRED: ICD-10-CM

## 2018-03-22 DIAGNOSIS — IMO0002 UNCONTROLLED TYPE 2 DIABETES MELLITUS WITH DIABETIC PERIPHERAL ANGIOPATHY WITHOUT GANGRENE, WITHOUT LONG-TERM CURRENT USE OF INSULIN: ICD-10-CM

## 2018-03-22 DIAGNOSIS — IMO0002 UNCONTROLLED TYPE 2 DIABETES MELLITUS WITH DIABETIC PERIPHERAL ANGIOPATHY WITHOUT GANGRENE, WITH LONG-TERM CURRENT USE OF INSULIN: ICD-10-CM

## 2018-03-22 DIAGNOSIS — Z79.899 HIGH RISK MEDICATION USE: ICD-10-CM

## 2018-03-22 PROBLEM — E78.2 MIXED HYPERLIPIDEMIA: Status: ACTIVE | Noted: 2017-09-14

## 2018-03-22 PROCEDURE — 99214 OFFICE O/P EST MOD 30 MIN: CPT | Performed by: NURSE PRACTITIONER

## 2018-03-22 RX ORDER — HYDROCODONE BITARTRATE AND ACETAMINOPHEN 7.5; 325 MG/1; MG/1
1 TABLET ORAL EVERY 6 HOURS PRN
Qty: 60 TABLET | Refills: 0 | Status: SHIPPED | OUTPATIENT
Start: 2018-03-22 | End: 2018-04-25 | Stop reason: SDUPTHER

## 2018-03-22 NOTE — ASSESSMENT & PLAN NOTE
Scheduled for removal within the next few weeks and schedule allows.  Skin tags are painful, and catching on clothing and bleed at times.

## 2018-03-22 NOTE — PROGRESS NOTES
Subjective   Johnathon Mclain is a 42 y.o. male.     Chief Complaint   Patient presents with   • Diabetes       History of Present Illness     Diabetes - Pt denies any symptomatic hypo-or hyperglycemia.  Reports compliance with diet and medication regimen.  Currently receives a statin.  Currently receives aspirin.  Currently receives ACE/ARB.  Most recent hemoglobin A1c is available on file and reviewed today. Patient reports his average glucose is now 89.  He has lost approximately 45 pounds over the past 3 months.  He would like to discuss medication adjustments.  He is currently taking 26 units of Toujeo.      Skin tags - painful and catching on clothing.  Bleeding at times.    Osteoarthritis - patient has chronic osteoarthritis of his neck and mid and low back.  He rates his back pain as 9 on a scale of 0-10.  Pain is made worse with bending, lifting, twisting and prolonged standing.  Patient reports that he does become stiff if he is sedentary.  He has tried anti-inflammatories in the past with no success.  Patient currently receives Norco which he uses when necessary for chronic back pain.  He reports his pain is decreased to a 2 on a pain scale of 0-10.  He has no history of substance abuse or addiction.  He does report an improvement in quality of life and decrease in pain with current medication regimen.      Increase in joint pain due to a late March snowstorm and cold weather.  He declines a Toradol injection today.      The following portions of the patient's history were reviewed and updated as appropriate: allergies, current medications, past family history, past medical history, past social history, past surgical history and problem list.    Review of Systems   Constitutional: Positive for activity change and appetite change. Negative for chills, diaphoresis, fatigue, fever and unexpected weight change.   HENT: Negative.    Eyes: Negative.    Respiratory: Negative for cough, chest tightness, shortness of  "breath and wheezing.    Cardiovascular: Negative for chest pain, palpitations and leg swelling.   Gastrointestinal: Negative.    Endocrine: Negative.    Genitourinary: Negative for difficulty urinating, dysuria, frequency and urgency.   Skin: Positive for color change.   Allergic/Immunologic: Negative.    Neurological: Negative.    Hematological: Negative.    Psychiatric/Behavioral: Negative for agitation, behavioral problems, dysphoric mood, self-injury and suicidal ideas. The patient is not nervous/anxious.        Objective     /74   Pulse 100   Temp 97.9 °F (36.6 °C) (Oral)   Ht 172 cm (67.72\")   Wt (!) 152 kg (334 lb)   SpO2 96%   BMI 51.21 kg/m²   Lab on 03/08/2018   Component Date Value Ref Range Status   • Glucose 03/08/2018 167* 70 - 110 mg/dL Final   • BUN 03/08/2018 21  7 - 21 mg/dL Final   • Creatinine 03/08/2018 1.28  0.43 - 1.29 mg/dL Final   • Sodium 03/08/2018 137  135 - 153 mmol/L Final   • Potassium 03/08/2018 3.9  3.5 - 5.3 mmol/L Final   • Chloride 03/08/2018 102  99 - 112 mmol/L Final   • CO2 03/08/2018 21.5* 24.3 - 31.9 mmol/L Final   • Calcium 03/08/2018 10.8* 7.7 - 10.0 mg/dL Final   • Total Protein 03/08/2018 6.8  6.0 - 8.0 g/dL Final   • Albumin 03/08/2018 4.70  3.50 - 5.00 g/dL Final   • ALT (SGPT) 03/08/2018 79* 10 - 44 U/L Final   • AST (SGOT) 03/08/2018 40* 10 - 34 U/L Final   • Alkaline Phosphatase 03/08/2018 65  40 - 129 U/L Final   • Total Bilirubin 03/08/2018 0.7  0.2 - 1.8 mg/dL Final   • eGFR Non African Amer 03/08/2018 61  >60 mL/min/1.73 Final   • Globulin 03/08/2018 2.1  gm/dL Final   • A/G Ratio 03/08/2018 2.2  1.5 - 2.5 g/dL Final   • BUN/Creatinine Ratio 03/08/2018 16.4  7.0 - 25.0 Final   • Anion Gap 03/08/2018 13.5* 3.6 - 11.2 mmol/L Final   • WBC 03/08/2018 7.48  4.50 - 12.50 10*3/mm3 Final   • RBC 03/08/2018 5.93  4.70 - 6.10 10*6/mm3 Final   • Hemoglobin 03/08/2018 17.5  14.0 - 18.0 g/dL Final   • Hematocrit 03/08/2018 51.2  42.0 - 52.0 % Final   • MCV " 03/08/2018 86.3  80.0 - 94.0 fL Final   • MCH 03/08/2018 29.5  27.0 - 33.0 pg Final   • MCHC 03/08/2018 34.2  33.0 - 37.0 g/dL Final   • RDW 03/08/2018 14.7* 11.5 - 14.5 % Final   • RDW-SD 03/08/2018 46.5  37.0 - 54.0 fl Final   • MPV 03/08/2018 10.8* 6.0 - 10.0 fL Final   • Platelets 03/08/2018 183  130 - 400 10*3/mm3 Final   • Neutrophil % 03/08/2018 54.7  30.0 - 70.0 % Final   • Lymphocyte % 03/08/2018 25.9  21.0 - 51.0 % Final   • Monocyte % 03/08/2018 12.7* 0.0 - 10.0 % Final   • Eosinophil % 03/08/2018 6.0* 0.0 - 5.0 % Final   • Basophil % 03/08/2018 0.4  0.0 - 2.0 % Final   • Immature Grans % 03/08/2018 0.3  0.0 - 0.5 % Final   • Neutrophils, Absolute 03/08/2018 4.09  1.40 - 6.50 10*3/mm3 Final   • Lymphocytes, Absolute 03/08/2018 1.94  1.00 - 3.00 10*3/mm3 Final   • Monocytes, Absolute 03/08/2018 0.95* 0.10 - 0.90 10*3/mm3 Final   • Eosinophils, Absolute 03/08/2018 0.45  0.00 - 0.70 10*3/mm3 Final   • Basophils, Absolute 03/08/2018 0.03  0.00 - 0.30 10*3/mm3 Final   • Immature Grans, Absolute 03/08/2018 0.02  0.00 - 0.03 10*3/mm3 Final   • Osmolality Calc 03/08/2018 280.6  273.0 - 305.0 mOsm/kg Final   • Pulse 03/08/2018 87   Final   • Volume Collected 03/08/2018 537   Final   • Pre-Hgb 03/08/2018 17.5   Final   • Pre-Hct 03/08/2018 51.2   Final   • Pre-Blood Pressure 03/08/2018 121/75   Final   • Post-Blood Pressure 03/08/2018 112/67   Final       Physical Exam   Constitutional: He is oriented to person, place, and time. Vital signs are normal. He appears well-developed and well-nourished. No distress.   Visibly obese.  Very pleasant 42-year-old male.   HENT:   Head: Normocephalic and atraumatic.   Right Ear: External ear normal.   Left Ear: External ear normal.   Nose: Nose normal.   Mouth/Throat: Oropharynx is clear and moist. No oropharyngeal exudate.   Eyes: EOM are normal. Pupils are equal, round, and reactive to light.   Neck: Normal range of motion. Neck supple. No tracheal deviation present. No  thyromegaly present.   Cardiovascular: Normal rate, regular rhythm, normal heart sounds and intact distal pulses.  Exam reveals no gallop and no friction rub.    No murmur heard.  Pulmonary/Chest: Effort normal and breath sounds normal. No respiratory distress. He has no wheezes. He has no rales. He exhibits no tenderness.   Abdominal: Soft. Bowel sounds are normal. He exhibits no distension and no mass. There is no tenderness. There is no rebound and no guarding.   Musculoskeletal: Normal range of motion.        Lumbar back: He exhibits tenderness, pain and spasm.   Decreased lumbar curvature, there is pain with forward flexion. DTR's +2. No edema.    Lymphadenopathy:     He has no cervical adenopathy.   Neurological: He is alert and oriented to person, place, and time. He has normal reflexes.   CN 2-12 grossly intact    Skin: Skin is warm and dry. No rash noted. He is not diaphoretic. No erythema. No pallor.        Psychiatric: He has a normal mood and affect. His speech is normal and behavior is normal. Judgment and thought content normal. His affect is not inappropriate. Cognition and memory are normal.   Nursing note and vitals reviewed.   assessment remains consistent with most recent exam.    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  Skin tags are painful, and catching on clothing and bleed at times.            Other    High risk medication use     Relevant Orders    Urine Drug Screen - Urine, Clean Catch      Other Visit Diagnoses    None.       I have discussed diagnosis in detail today allowing time for questions and answers. Pt is aware of reasons to seek urgent or emergent medical care as well as reasons to return to the clinic for evaluation. Possible side effects, interactions and progression of symptoms discussed as well. Pt / family states understanding.   Emotional support and active listening provided.   John has been reviewed as consistent.  Uds is on file.   Goal: Improved quality of life and reduction in pain as evidenced by pt report.   Patient has been instructed and counseled regarding opioid misuse and risk of addiction.  We have discussed proper storage and disposal of controlled medication.  As part of this patient's treatment plan they are being prescribed controlled substance/substances with potential for abuse. This patient has been made aware of the appropriate use of such medications, including potential risk of somnolence, limited ability to drive and / or work safely, and potential for overdose. It has also been made clear that these medications are for use by this patient only, without concomitant use of alcohol or other substances unless prescribed/advised by medical provider. Patient has completed controlled substance agreement detailing terms of continued prescribing of controlled substances including monitoring John reports, drug screens and pill counts. The patient was asked and states they are not receiving narcotic medication from any there provider or any provider that this office has not been made aware of. History and physical exam exhibit continued safe and appropriate use of controlled substances.   Proper body mechanics has been reviewed and discussed today.    Decrease Toujeo to 20 units daily.   Pt has been educated/instructed on diabetic care and protocols.  Diabetic diet instructions provided.  Medication  regimen reviewed.  Discussed possible side effects and interactions of current medications.  Sick day rules reviewed. Continue to monitor blood sugar and report abnormal readings as discussed today. Understanding stated by patient.     Follow up in one month, sooner if needed. Routine labs every 3-6 months.           Errors in dictation may reflect use of voice recognition software and not all errors in transcription may have been detected prior to signing.                This document has been electronically signed by:  TYSON Vinson, NP-C

## 2018-03-22 NOTE — PROGRESS NOTES
"Subjective   Johnathon cMlain is a 42 y.o. male.     Chief Complaint   Patient presents with   • Diabetes       History of Present Illness     The following portions of the patient's history were reviewed and updated as appropriate: allergies, current medications, past family history, past medical history, past social history, past surgical history and problem list.    Review of Systems   Constitutional: Negative.    HENT: Positive for sinus pressure.    Eyes: Negative.    Respiratory: Negative.    Cardiovascular: Negative.    Gastrointestinal: Negative.    Endocrine: Negative.    Genitourinary: Negative.    Musculoskeletal: Positive for back pain.   Skin: Negative.    Allergic/Immunologic: Positive for environmental allergies.   Neurological: Positive for headaches.       Objective     /74   Pulse 100   Temp 97.9 °F (36.6 °C) (Oral)   Ht 172 cm (67.72\")   Wt (!) 152 kg (334 lb)   SpO2 96%   BMI 51.21 kg/m²   Lab on 03/08/2018   Component Date Value Ref Range Status   • Glucose 03/08/2018 167* 70 - 110 mg/dL Final   • BUN 03/08/2018 21  7 - 21 mg/dL Final   • Creatinine 03/08/2018 1.28  0.43 - 1.29 mg/dL Final   • Sodium 03/08/2018 137  135 - 153 mmol/L Final   • Potassium 03/08/2018 3.9  3.5 - 5.3 mmol/L Final   • Chloride 03/08/2018 102  99 - 112 mmol/L Final   • CO2 03/08/2018 21.5* 24.3 - 31.9 mmol/L Final   • Calcium 03/08/2018 10.8* 7.7 - 10.0 mg/dL Final   • Total Protein 03/08/2018 6.8  6.0 - 8.0 g/dL Final   • Albumin 03/08/2018 4.70  3.50 - 5.00 g/dL Final   • ALT (SGPT) 03/08/2018 79* 10 - 44 U/L Final   • AST (SGOT) 03/08/2018 40* 10 - 34 U/L Final   • Alkaline Phosphatase 03/08/2018 65  40 - 129 U/L Final   • Total Bilirubin 03/08/2018 0.7  0.2 - 1.8 mg/dL Final   • eGFR Non African Amer 03/08/2018 61  >60 mL/min/1.73 Final   • Globulin 03/08/2018 2.1  gm/dL Final   • A/G Ratio 03/08/2018 2.2  1.5 - 2.5 g/dL Final   • BUN/Creatinine Ratio 03/08/2018 16.4  7.0 - 25.0 Final   • Anion Gap " 03/08/2018 13.5* 3.6 - 11.2 mmol/L Final   • WBC 03/08/2018 7.48  4.50 - 12.50 10*3/mm3 Final   • RBC 03/08/2018 5.93  4.70 - 6.10 10*6/mm3 Final   • Hemoglobin 03/08/2018 17.5  14.0 - 18.0 g/dL Final   • Hematocrit 03/08/2018 51.2  42.0 - 52.0 % Final   • MCV 03/08/2018 86.3  80.0 - 94.0 fL Final   • MCH 03/08/2018 29.5  27.0 - 33.0 pg Final   • MCHC 03/08/2018 34.2  33.0 - 37.0 g/dL Final   • RDW 03/08/2018 14.7* 11.5 - 14.5 % Final   • RDW-SD 03/08/2018 46.5  37.0 - 54.0 fl Final   • MPV 03/08/2018 10.8* 6.0 - 10.0 fL Final   • Platelets 03/08/2018 183  130 - 400 10*3/mm3 Final   • Neutrophil % 03/08/2018 54.7  30.0 - 70.0 % Final   • Lymphocyte % 03/08/2018 25.9  21.0 - 51.0 % Final   • Monocyte % 03/08/2018 12.7* 0.0 - 10.0 % Final   • Eosinophil % 03/08/2018 6.0* 0.0 - 5.0 % Final   • Basophil % 03/08/2018 0.4  0.0 - 2.0 % Final   • Immature Grans % 03/08/2018 0.3  0.0 - 0.5 % Final   • Neutrophils, Absolute 03/08/2018 4.09  1.40 - 6.50 10*3/mm3 Final   • Lymphocytes, Absolute 03/08/2018 1.94  1.00 - 3.00 10*3/mm3 Final   • Monocytes, Absolute 03/08/2018 0.95* 0.10 - 0.90 10*3/mm3 Final   • Eosinophils, Absolute 03/08/2018 0.45  0.00 - 0.70 10*3/mm3 Final   • Basophils, Absolute 03/08/2018 0.03  0.00 - 0.30 10*3/mm3 Final   • Immature Grans, Absolute 03/08/2018 0.02  0.00 - 0.03 10*3/mm3 Final   • Osmolality Calc 03/08/2018 280.6  273.0 - 305.0 mOsm/kg Final   • Pulse 03/08/2018 87   Final   • Volume Collected 03/08/2018 537   Final   • Pre-Hgb 03/08/2018 17.5   Final   • Pre-Hct 03/08/2018 51.2   Final   • Pre-Blood Pressure 03/08/2018 121/75   Final   • Post-Blood Pressure 03/08/2018 112/67   Final       Physical Exam    Assessment/Plan     Problem List Items Addressed This Visit        Nervous and Auditory    Neuralgia and neuritis      Other Visit Diagnoses    None.                    This document has been electronically signed by:  TYSON Vinson, NP-C

## 2018-04-04 ENCOUNTER — PROCEDURE VISIT (OUTPATIENT)
Dept: FAMILY MEDICINE CLINIC | Facility: CLINIC | Age: 43
End: 2018-04-04

## 2018-04-04 VITALS
OXYGEN SATURATION: 98 % | HEIGHT: 68 IN | HEART RATE: 113 BPM | DIASTOLIC BLOOD PRESSURE: 70 MMHG | SYSTOLIC BLOOD PRESSURE: 110 MMHG | TEMPERATURE: 98.3 F | BODY MASS INDEX: 47.74 KG/M2 | WEIGHT: 315 LBS

## 2018-04-04 DIAGNOSIS — L91.8 SKIN TAGS, MULTIPLE ACQUIRED: ICD-10-CM

## 2018-04-04 DIAGNOSIS — I10 ESSENTIAL HYPERTENSION: Primary | ICD-10-CM

## 2018-04-04 DIAGNOSIS — IMO0002 UNCONTROLLED TYPE 2 DIABETES MELLITUS WITH DIABETIC PERIPHERAL ANGIOPATHY WITHOUT GANGRENE, WITH LONG-TERM CURRENT USE OF INSULIN: ICD-10-CM

## 2018-04-04 PROCEDURE — 99214 OFFICE O/P EST MOD 30 MIN: CPT | Performed by: NURSE PRACTITIONER

## 2018-04-04 PROCEDURE — 11200 RMVL SKIN TAGS UP TO&INC 15: CPT | Performed by: NURSE PRACTITIONER

## 2018-04-04 PROCEDURE — 11201 RMVL SKIN TAGS EA ADDL 10: CPT | Performed by: NURSE PRACTITIONER

## 2018-04-04 NOTE — PROGRESS NOTES
"Subjective   Johnathon Mclain is a 42 y.o. male.     Chief Complaint   Patient presents with   • Other     skin tag remove       History of Present Illness     Pt has multiple skin tags on his neck and trunk. Would like to start having them removed. These skin tags are tender, catch on his clothing and bleed at times.     Diabetes-working on diet and reports compliance with medication both oral and injectable.  Denies hyperglycemic episodes.  Denies hypoglycemic episodes.    Essential hypertension-stable.    GERD-stable with use of PPI.    The following portions of the patient's history were reviewed and updated as appropriate: allergies, current medications, past family history, past medical history, past social history, past surgical history and problem list.    Review of Systems   Constitutional: Negative.    HENT: Negative.    Eyes: Negative.    Respiratory: Negative.    Cardiovascular: Negative.    Gastrointestinal: Negative.    Endocrine: Negative.    Genitourinary: Negative.    Musculoskeletal: Positive for back pain.   Skin: Negative for pallor, rash and wound.   Allergic/Immunologic: Negative.    Neurological: Negative.    Hematological: Negative.        Objective     /70   Pulse 113   Temp 98.3 °F (36.8 °C) (Tympanic)   Ht 172 cm (67.72\")   Wt (!) 153 kg (338 lb)   SpO2 98%   BMI 51.82 kg/m²       Physical Exam   Constitutional: He is oriented to person, place, and time. Vital signs are normal. He appears well-developed and well-nourished. No distress.   Visibly obese.    HENT:   Head: Normocephalic and atraumatic.   Eyes: EOM are normal. Pupils are equal, round, and reactive to light.   Neck: Normal range of motion. Neck supple. No tracheal deviation present. No thyromegaly present.       Cardiovascular: Normal rate, regular rhythm, normal heart sounds and intact distal pulses.    No murmur heard.  Pulmonary/Chest: Effort normal and breath sounds normal. No respiratory distress.   Abdominal: " Soft. Bowel sounds are normal. There is no tenderness.   Lymphadenopathy:     He has no cervical adenopathy.   Neurological: He is alert and oriented to person, place, and time.   Skin: Skin is warm and dry. Capillary refill takes less than 2 seconds. Lesion (hundreds of skin tags on neck ) noted. No rash noted. He is not diaphoretic. No erythema. No pallor.   Psychiatric: He has a normal mood and affect. His behavior is normal. Judgment and thought content normal.   Vitals reviewed.      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 Will gradually remove at routine visit.  Instructed patient to wash site daily with soap and water, rinse well and apply triple antibiotic ointment.  Keep covered for the next 24 hours then open to air.  Report any bleeding, erythema, drainage, tenderness or other changes in sight.           Other Visit Diagnoses    None.       The risks vs benefits of  procedure have been discussed an alternative treatments discussed with patient voicing consent for skin lesion removal.  I have discussed diagnosis in detail today allowing time for questions and answers. Pt is aware of reasons to seek urgent or emergent medical care as well as reasons to return to the clinic for evaluation. Possible side effects, interactions and progression of symptoms discussed as well. Pt / family states understanding.   Emotional support and active listening provided.   Follow up in one month, sooner if needed. Routine labs every 3-6 months.              This document has been electronically signed by:  TYSON Vinson, NP-C

## 2018-04-04 NOTE — ASSESSMENT & PLAN NOTE
21 lesions removed today. Will gradually remove at routine visit.  Instructed patient to wash site daily with soap and water, rinse well and apply triple antibiotic ointment.  Keep covered for the next 24 hours then open to air.  Report any bleeding, erythema, drainage, tenderness or other changes in sight.

## 2018-04-25 ENCOUNTER — TELEPHONE (OUTPATIENT)
Dept: FAMILY MEDICINE CLINIC | Facility: CLINIC | Age: 43
End: 2018-04-25

## 2018-04-25 ENCOUNTER — OFFICE VISIT (OUTPATIENT)
Dept: FAMILY MEDICINE CLINIC | Facility: CLINIC | Age: 43
End: 2018-04-25

## 2018-04-25 VITALS
OXYGEN SATURATION: 95 % | TEMPERATURE: 97.7 F | BODY MASS INDEX: 47.74 KG/M2 | HEART RATE: 89 BPM | DIASTOLIC BLOOD PRESSURE: 82 MMHG | WEIGHT: 315 LBS | SYSTOLIC BLOOD PRESSURE: 132 MMHG | HEIGHT: 68 IN

## 2018-04-25 DIAGNOSIS — K21.9 GASTROESOPHAGEAL REFLUX DISEASE WITHOUT ESOPHAGITIS: ICD-10-CM

## 2018-04-25 DIAGNOSIS — I10 ESSENTIAL HYPERTENSION: ICD-10-CM

## 2018-04-25 DIAGNOSIS — Z12.5 SCREENING FOR PROSTATE CANCER: ICD-10-CM

## 2018-04-25 DIAGNOSIS — IMO0002 UNCONTROLLED TYPE 2 DIABETES MELLITUS WITH OTHER CIRCULATORY COMPLICATION, WITH LONG-TERM CURRENT USE OF INSULIN: Primary | ICD-10-CM

## 2018-04-25 DIAGNOSIS — M79.2 NEURALGIA AND NEURITIS: ICD-10-CM

## 2018-04-25 DIAGNOSIS — D45 POLYCYTHEMIA VERA (HCC): ICD-10-CM

## 2018-04-25 DIAGNOSIS — M89.9 DISORDER OF BONE: ICD-10-CM

## 2018-04-25 DIAGNOSIS — G47.33 OBSTRUCTIVE SLEEP APNEA SYNDROME: ICD-10-CM

## 2018-04-25 DIAGNOSIS — Z12.5 ENCOUNTER FOR SCREENING FOR MALIGNANT NEOPLASM OF PROSTATE: ICD-10-CM

## 2018-04-25 LAB
25(OH)D3 SERPL-MCNC: 47 NG/ML
ALBUMIN SERPL-MCNC: 4.4 G/DL (ref 3.5–5)
ALBUMIN UR-MCNC: 2.3 MG/L
ALBUMIN/GLOB SERPL: 1.8 G/DL (ref 1.5–2.5)
ALP SERPL-CCNC: 59 U/L (ref 40–129)
ALT SERPL W P-5'-P-CCNC: 55 U/L (ref 10–44)
ANION GAP SERPL CALCULATED.3IONS-SCNC: 9.3 MMOL/L (ref 3.6–11.2)
AST SERPL-CCNC: 33 U/L (ref 10–34)
BASOPHILS # BLD AUTO: 0.04 10*3/MM3 (ref 0–0.3)
BASOPHILS NFR BLD AUTO: 0.5 % (ref 0–2)
BILIRUB SERPL-MCNC: 1.1 MG/DL (ref 0.2–1.8)
BUN BLD-MCNC: 16 MG/DL (ref 7–21)
BUN/CREAT SERPL: 14.3 (ref 7–25)
CALCIUM SPEC-SCNC: 10.2 MG/DL (ref 7.7–10)
CHLORIDE SERPL-SCNC: 103 MMOL/L (ref 99–112)
CHOLEST SERPL-MCNC: 175 MG/DL (ref 0–200)
CO2 SERPL-SCNC: 25.7 MMOL/L (ref 24.3–31.9)
CREAT BLD-MCNC: 1.12 MG/DL (ref 0.43–1.29)
DEPRECATED RDW RBC AUTO: 43.3 FL (ref 37–54)
EOSINOPHIL # BLD AUTO: 0.89 10*3/MM3 (ref 0–0.7)
EOSINOPHIL NFR BLD AUTO: 11.3 % (ref 0–5)
ERYTHROCYTE [DISTWIDTH] IN BLOOD BY AUTOMATED COUNT: 14.1 % (ref 11.5–14.5)
GFR SERPL CREATININE-BSD FRML MDRD: 71 ML/MIN/1.73
GLOBULIN UR ELPH-MCNC: 2.5 GM/DL
GLUCOSE BLD-MCNC: 96 MG/DL (ref 70–110)
HBA1C MFR BLD: 5.7 % (ref 4.5–5.7)
HCT VFR BLD AUTO: 49 % (ref 42–52)
HDLC SERPL-MCNC: 26 MG/DL (ref 60–100)
HGB BLD-MCNC: 16.8 G/DL (ref 14–18)
IMM GRANULOCYTES # BLD: 0.03 10*3/MM3 (ref 0–0.03)
IMM GRANULOCYTES NFR BLD: 0.4 % (ref 0–0.5)
LDLC SERPL CALC-MCNC: 96 MG/DL (ref 0–100)
LDLC/HDLC SERPL: 3.71 {RATIO}
LYMPHOCYTES # BLD AUTO: 2.27 10*3/MM3 (ref 1–3)
LYMPHOCYTES NFR BLD AUTO: 28.8 % (ref 21–51)
MCH RBC QN AUTO: 29 PG (ref 27–33)
MCHC RBC AUTO-ENTMCNC: 34.3 G/DL (ref 33–37)
MCV RBC AUTO: 84.6 FL (ref 80–94)
MONOCYTES # BLD AUTO: 0.93 10*3/MM3 (ref 0.1–0.9)
MONOCYTES NFR BLD AUTO: 11.8 % (ref 0–10)
NEUTROPHILS # BLD AUTO: 3.72 10*3/MM3 (ref 1.4–6.5)
NEUTROPHILS NFR BLD AUTO: 47.2 % (ref 30–70)
OSMOLALITY SERPL CALC.SUM OF ELEC: 276.7 MOSM/KG (ref 273–305)
PLATELET # BLD AUTO: 216 10*3/MM3 (ref 130–400)
PMV BLD AUTO: 10.7 FL (ref 6–10)
POTASSIUM BLD-SCNC: 4 MMOL/L (ref 3.5–5.3)
PROT SERPL-MCNC: 6.9 G/DL (ref 6–8)
RBC # BLD AUTO: 5.79 10*6/MM3 (ref 4.7–6.1)
SODIUM BLD-SCNC: 138 MMOL/L (ref 135–153)
TRIGL SERPL-MCNC: 263 MG/DL (ref 0–150)
TSH SERPL DL<=0.05 MIU/L-ACNC: 1.12 MIU/ML (ref 0.55–4.78)
VIT B12 BLD-MCNC: 382 PG/ML (ref 211–911)
VLDLC SERPL-MCNC: 52.6 MG/DL
WBC NRBC COR # BLD: 7.88 10*3/MM3 (ref 4.5–12.5)

## 2018-04-25 PROCEDURE — 99214 OFFICE O/P EST MOD 30 MIN: CPT | Performed by: NURSE PRACTITIONER

## 2018-04-25 PROCEDURE — 82607 VITAMIN B-12: CPT | Performed by: NURSE PRACTITIONER

## 2018-04-25 PROCEDURE — 82043 UR ALBUMIN QUANTITATIVE: CPT | Performed by: NURSE PRACTITIONER

## 2018-04-25 PROCEDURE — 36415 COLL VENOUS BLD VENIPUNCTURE: CPT | Performed by: NURSE PRACTITIONER

## 2018-04-25 PROCEDURE — 80053 COMPREHEN METABOLIC PANEL: CPT | Performed by: NURSE PRACTITIONER

## 2018-04-25 PROCEDURE — 84443 ASSAY THYROID STIM HORMONE: CPT | Performed by: NURSE PRACTITIONER

## 2018-04-25 PROCEDURE — 82306 VITAMIN D 25 HYDROXY: CPT | Performed by: NURSE PRACTITIONER

## 2018-04-25 PROCEDURE — 85025 COMPLETE CBC W/AUTO DIFF WBC: CPT | Performed by: NURSE PRACTITIONER

## 2018-04-25 PROCEDURE — 80061 LIPID PANEL: CPT | Performed by: NURSE PRACTITIONER

## 2018-04-25 PROCEDURE — 83036 HEMOGLOBIN GLYCOSYLATED A1C: CPT | Performed by: NURSE PRACTITIONER

## 2018-04-25 RX ORDER — HYDROCODONE BITARTRATE AND ACETAMINOPHEN 7.5; 325 MG/1; MG/1
1 TABLET ORAL EVERY 6 HOURS PRN
Qty: 60 TABLET | Refills: 0 | Status: SHIPPED | OUTPATIENT
Start: 2018-04-25 | End: 2018-05-31 | Stop reason: SDUPTHER

## 2018-04-25 RX ORDER — GABAPENTIN 600 MG/1
600 TABLET ORAL 3 TIMES DAILY
Qty: 90 TABLET | Refills: 2 | Status: SHIPPED | OUTPATIENT
Start: 2018-04-25 | End: 2018-06-26 | Stop reason: SDUPTHER

## 2018-04-25 NOTE — PROGRESS NOTES
Subjective   Johnathon Mclain is a 42 y.o. male.     Chief Complaint   Patient presents with   • Follow-up       History of Present Illness           Hypertension - stable with current medication. Denies any side effects or symptoms of complications.      Polycythemia vera -patient needs a routine CBC today.  He has been instructed by hematology you have performed and if elevated they will be able to do therapeutic phlebotomy today.    Diabetes - Pt denies any symptomatic hypo-or hyperglycemia.  Reports compliance with diet and medication regimen.  Currently does not  receive a statin due to side effects and interatcion with Fenofibrate risk.  Currently receives aspirin.  Currently receives ACE/ARB.  Most recent hemoglobin A1c is available on file and reviewed today. Receives Neurontin for diabetic nerve and foot pain.         Osteoarthritis - patient has chronic osteoarthritis of his neck and mid and low back.  He rates his back pain as 6  on a scale of 0-10.  Pain is made worse with bending, lifting, twisting and prolonged standing.  Patient reports that he does become stiff if he is sedentary.  He has tried anti-inflammatories in the past with no success.  Patient currently receives Norco which he uses when necessary for chronic back pain.  He reports his pain is decreased to a 2 on a pain scale of 0-10.  He has no history of substance abuse or addiction.  He does report an improvement in quality of life and decrease in pain with current medication regimen.      GERD-stable    Sleep apnea-wearing CPAP history of elevated CO2 we'll perform labs today.    Age over 40 need for screening of PSA.      The following portions of the patient's history were reviewed and updated as appropriate: allergies, current medications, past family history, past medical history, past social history, past surgical history and problem list.    Review of Systems   Constitutional: Negative.    HENT: Positive for sinus pressure.    Eyes:  "Positive for itching.   Respiratory: Positive for apnea. Negative for cough, choking, chest tightness, shortness of breath, wheezing and stridor.    Cardiovascular: Negative.  Negative for chest pain, palpitations and leg swelling.   Gastrointestinal: Negative.  Negative for abdominal pain, blood in stool, constipation, diarrhea, nausea and vomiting.   Endocrine: Negative.    Genitourinary: Negative.    Musculoskeletal: Positive for arthralgias, back pain and neck pain. Negative for neck stiffness.   Skin: Negative.  Negative for color change and wound.   Allergic/Immunologic: Negative.    Neurological: Positive for headaches (thinks blood count is up ).   Hematological: Negative.    Psychiatric/Behavioral: Negative for agitation, behavioral problems, self-injury and suicidal ideas.       Objective     /82   Pulse 89   Temp 97.7 °F (36.5 °C) (Tympanic)   Ht 172 cm (67.72\")   Wt (!) 152 kg (335 lb)   SpO2 95%   BMI 51.36 kg/m²   Lab on 03/08/2018   Component Date Value Ref Range Status   • Glucose 03/08/2018 167* 70 - 110 mg/dL Final   • BUN 03/08/2018 21  7 - 21 mg/dL Final   • Creatinine 03/08/2018 1.28  0.43 - 1.29 mg/dL Final   • Sodium 03/08/2018 137  135 - 153 mmol/L Final   • Potassium 03/08/2018 3.9  3.5 - 5.3 mmol/L Final   • Chloride 03/08/2018 102  99 - 112 mmol/L Final   • CO2 03/08/2018 21.5* 24.3 - 31.9 mmol/L Final   • Calcium 03/08/2018 10.8* 7.7 - 10.0 mg/dL Final   • Total Protein 03/08/2018 6.8  6.0 - 8.0 g/dL Final   • Albumin 03/08/2018 4.70  3.50 - 5.00 g/dL Final   • ALT (SGPT) 03/08/2018 79* 10 - 44 U/L Final   • AST (SGOT) 03/08/2018 40* 10 - 34 U/L Final   • Alkaline Phosphatase 03/08/2018 65  40 - 129 U/L Final   • Total Bilirubin 03/08/2018 0.7  0.2 - 1.8 mg/dL Final   • eGFR Non African Amer 03/08/2018 61  >60 mL/min/1.73 Final   • Globulin 03/08/2018 2.1  gm/dL Final   • A/G Ratio 03/08/2018 2.2  1.5 - 2.5 g/dL Final   • BUN/Creatinine Ratio 03/08/2018 16.4  7.0 - 25.0 Final "   • Anion Gap 03/08/2018 13.5* 3.6 - 11.2 mmol/L Final   • WBC 03/08/2018 7.48  4.50 - 12.50 10*3/mm3 Final   • RBC 03/08/2018 5.93  4.70 - 6.10 10*6/mm3 Final   • Hemoglobin 03/08/2018 17.5  14.0 - 18.0 g/dL Final   • Hematocrit 03/08/2018 51.2  42.0 - 52.0 % Final   • MCV 03/08/2018 86.3  80.0 - 94.0 fL Final   • MCH 03/08/2018 29.5  27.0 - 33.0 pg Final   • MCHC 03/08/2018 34.2  33.0 - 37.0 g/dL Final   • RDW 03/08/2018 14.7* 11.5 - 14.5 % Final   • RDW-SD 03/08/2018 46.5  37.0 - 54.0 fl Final   • MPV 03/08/2018 10.8* 6.0 - 10.0 fL Final   • Platelets 03/08/2018 183  130 - 400 10*3/mm3 Final   • Neutrophil % 03/08/2018 54.7  30.0 - 70.0 % Final   • Lymphocyte % 03/08/2018 25.9  21.0 - 51.0 % Final   • Monocyte % 03/08/2018 12.7* 0.0 - 10.0 % Final   • Eosinophil % 03/08/2018 6.0* 0.0 - 5.0 % Final   • Basophil % 03/08/2018 0.4  0.0 - 2.0 % Final   • Immature Grans % 03/08/2018 0.3  0.0 - 0.5 % Final   • Neutrophils, Absolute 03/08/2018 4.09  1.40 - 6.50 10*3/mm3 Final   • Lymphocytes, Absolute 03/08/2018 1.94  1.00 - 3.00 10*3/mm3 Final   • Monocytes, Absolute 03/08/2018 0.95* 0.10 - 0.90 10*3/mm3 Final   • Eosinophils, Absolute 03/08/2018 0.45  0.00 - 0.70 10*3/mm3 Final   • Basophils, Absolute 03/08/2018 0.03  0.00 - 0.30 10*3/mm3 Final   • Immature Grans, Absolute 03/08/2018 0.02  0.00 - 0.03 10*3/mm3 Final   • Osmolality Calc 03/08/2018 280.6  273.0 - 305.0 mOsm/kg Final   • Pulse 03/08/2018 87   Final   • Volume Collected 03/08/2018 537   Final   • Pre-Hgb 03/08/2018 17.5   Final   • Pre-Hct 03/08/2018 51.2   Final   • Pre-Blood Pressure 03/08/2018 121/75   Final   • Post-Blood Pressure 03/08/2018 112/67   Final       Physical Exam   Constitutional: He is oriented to person, place, and time. Vital signs are normal. He appears well-developed and well-nourished. No distress.   Visibly obese.    HENT:   Head: Normocephalic and atraumatic.   Right Ear: External ear normal.   Left Ear: External ear normal.    Nose: Nose normal.   Mouth/Throat: Oropharynx is clear and moist. No oropharyngeal exudate.   Eyes: EOM are normal. Pupils are equal, round, and reactive to light.   Neck: Normal range of motion. Neck supple. No tracheal deviation present. No thyromegaly present.   Cardiovascular: Normal rate, regular rhythm, normal heart sounds and intact distal pulses.    No murmur heard.  Pulmonary/Chest: Effort normal and breath sounds normal. No respiratory distress. He has no wheezes. He has no rales. He exhibits no tenderness.   Abdominal: Soft. Bowel sounds are normal. He exhibits no distension and no mass. There is no tenderness. There is no rebound and no guarding.   Musculoskeletal:        Cervical back: He exhibits spasm.        Lumbar back: He exhibits decreased range of motion, tenderness, pain and spasm.   Decreased lumbar curvature, there is pain with forward flexion. DTR's +2. No edema.     Johnathon had a diabetic foot exam performed today.  Vascular Status -  His right foot exhibits normal foot vasculature  and no edema. His left foot exhibits normal foot vasculature  and no edema.  Skin Integrity  -  His right foot skin is intact.His left foot skin is intact..  Lymphadenopathy:     He has no cervical adenopathy.   Neurological: He is alert and oriented to person, place, and time. He has normal reflexes.   Skin: Skin is warm and dry. No rash noted. He is not diaphoretic. No erythema. No pallor.        Psychiatric: He has a normal mood and affect. His behavior is normal. Judgment and thought content normal. His affect is not inappropriate. Cognition and memory are normal.   Denies thoughts of hurting self or others.   Nursing note and vitals reviewed.      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 - Primary    Overview     uncomplicated         Relevant Orders    CBC & Differential     Comprehensive Metabolic Panel    TSH    Hemoglobin A1c    Vitamin D 25 Hydroxy    Vitamin B12    Lipid Panel    MicroAlbumin, Urine, Random - Urine, Clean Catch    Essential hypertension    Relevant Orders    CBC & Differential    Comprehensive Metabolic Panel    TSH    Hemoglobin A1c    Vitamin D 25 Hydroxy    Vitamin B12    Lipid Panel    MicroAlbumin, Urine, Random - Urine, Clean Catch       Respiratory    Obstructive sleep apnea syndrome    Overview     Wears cpap             Digestive    Gastroesophageal reflux disease without esophagitis       Nervous and Auditory    Neuralgia and neuritis    Relevant Medications    HYDROcodone-acetaminophen (NORCO) 7.5-325 MG per tablet    gabapentin (NEURONTIN) 600 MG tablet       Musculoskeletal and Integument    Disorder of bone     Relevant Orders    Vitamin D 25 Hydroxy       Hematopoietic and Hemostatic    Polycythemia vera    Overview     Under the care of hematology          Relevant Orders    CBC & Differential    Comprehensive Metabolic Panel    TSH    Hemoglobin A1c    Vitamin D 25 Hydroxy    Vitamin B12    Lipid Panel    MicroAlbumin, Urine, Random - Urine, Clean Catch       Other    Encounter for screening for malignant neoplasm of prostate      Other Visit Diagnoses     Screening for prostate cancer            I have discussed diagnosis in detail today allowing time for questions and answers. Pt is aware of reasons to seek urgent or emergent medical care as well as reasons to return to the clinic for evaluation. Possible side effects, interactions and progression of symptoms discussed as well. Pt / family states understanding.   Emotional support and active listening provided.   John has been reviewed as consistent.  Uds is on file.   Goal: Improved quality of life and reduction in pain as evidenced by pt report.   Patient has been instructed and counseled regarding opioid misuse and risk of addiction.  We have discussed proper storage and disposal of  controlled medication.  As part of this patient's treatment plan they are being prescribed controlled substance/substances with potential for abuse. This patient has been made aware of the appropriate use of such medications, including potential risk of somnolence, limited ability to drive and / or work safely, and potential for overdose. It has also been made clear that these medications are for use by this patient only, without concomitant use of alcohol or other substances unless prescribed/advised by medical provider. Patient has completed controlled substance agreement detailing terms of continued prescribing of controlled substances including monitoring John reports, drug screens and pill counts. The patient was asked and states they are not receiving narcotic medication from any there provider or any provider that this office has not been made aware of. History and physical exam exhibit continued safe and appropriate use of controlled substances.     Labs today, staff to call pt with any abnormal values.     Follow up in one month, sooner if needed. Routine labs every 3-6 months.       Errors in dictation may reflect use of voice recognition software and not all errors in transcription may have been detected prior to signing.                  This document has been electronically signed by:  TYSON Vinson, NP-C

## 2018-04-25 NOTE — TELEPHONE ENCOUNTER
----- Message from TYSON Sepulveda sent at 4/25/2018  3:50 PM EDT -----  Let jailyn know that his labs show great improvement. His A1c is now normal at 5.7 from 8.5 three months ago. CBC is relatively normal. Cholesterol levels are greatly improved.       Spoke with jailyn and let him know about his labs

## 2018-05-07 NOTE — PROGRESS NOTES
Johnathon Mclain  [id number]  1975 5/8/2018    Referring Provider:   TYSON Sepulveda     Reason for Follow Up:   Secondary Polycythemia  Obstructive Sleep Apnea     Chief Complaint:  Neck nodule tenderness      History of Present Illness:   Johnathon Mclain is a very pleasant 42 y.o.  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      Mr. Mclain has had an erythrocytosis since summer of 2016. He has previously received phlebotomy with his primary care provider. He denied of any significant headaches or pruritis. He does not have any abnormal bleeding or thrombosis history. He is a never smoker, however does have strong exposure to second hand smoke and has also worked in the coal mines. His wife at initial consultation noted that he does snore and awake himself in the middle of his sleep. He had never been tested for obstructive sleep apnea. He was also previously on testosterone supplementation however is no longer taking this. He did undergo pulmonary function tests which were consistent with sleep apnea versus hypoxemia due to COPD. As part of his evaluation for secondary polycythemia he underwent a sleep apnea study and was found to have moderate obstructive sleep apnea and was at first place on nasal canula and later a CPAP machine. At first patient was non compliant with CPAP due to intolerability from high pressure settings which has been decreased and patient reports better compliance.    He denies of any significant changes since his last visit with me. He reports that he has been losing weight and his diabetes is under better control. He does have anxiety as he is unfortunately going through a divorce. During his last visit he complained of a worsening neck nodule and an US was performed which was significant a 3.7 cm cystic appearing mass which was felt to be an abscess, hematoma or neoplasm. I offered to refer him to  general surgery for further evaluation but at the time he noted that it was decreasing in size with oral antibiotics which he continues to be on. He reports that this nodule continues to persist although smaller it has been tender. He denies of any drainage or fevers.      The following portions of the patient's history were reviewed and updated as appropriate: allergies, current medications, past family history, past medical history, past social history, past surgical history and problem list.    No Known Allergies    Past Medical History:   Diagnosis Date   • COPD (chronic obstructive pulmonary disease)    • Diabetes mellitus    • GERD (gastroesophageal reflux disease)    • Gout    • Hyperlipidemia     mixed   • Hypertension    • Neuralgia and neuritis    • Osteoarthrosis    • Type II diabetes mellitus, uncontrolled     uncomplicated       History reviewed. No pertinent surgical history.    Social History     Social History   • Marital status:      Spouse name: donald   • Number of children: 2   • Years of education: N/A     Occupational History   • disabled      Social History Main Topics   • Smoking status: Never Smoker   • Smokeless tobacco: Current User     Types: Chew   • Alcohol use No   • Drug use: No   • Sexual activity: Defer     Other Topics Concern   • Not on file     Social History Narrative   • No narrative on file       Family History   Problem Relation Age of Onset   • Arthritis Mother    • COPD Mother    • Asthma Mother    • Cancer Mother    • Hyperlipidemia Mother    • Diabetes Father    • Heart disease Father    • Hyperlipidemia Father    • Hypertension Father    • Stroke Father          Current Outpatient Prescriptions:   •  albuterol (PROVENTIL HFA;VENTOLIN HFA) 108 (90 Base) MCG/ACT inhaler, Inhale 1 puff As Needed for Wheezing., Disp: 1 inhaler, Rfl: 5  •  aspirin 81 MG EC tablet, Take 1 tablet by mouth Daily., Disp: 90 tablet, Rfl: 1  •  carvedilol (COREG) 6.25 MG tablet, Take 1  tablet by mouth 2 (Two) Times a Day., Disp: 60 tablet, Rfl: 5  •  chlorthalidone (HYGROTON) 25 MG tablet, Take 1 tablet by mouth Daily., Disp: 30 tablet, Rfl: 5  •  cholecalciferol (VITAMIN D3) 1000 units tablet, Take 1 tablet by mouth Daily., Disp: 30 tablet, Rfl: 5  •  clobetasol (TEMOVATE) 0.05 % cream, Apply  topically 2 (Two) Times a Day., Disp: 30 g, Rfl: 5  •  colchicine 0.6 MG tablet, Take 1 tablet by mouth Daily., Disp: 30 tablet, Rfl: 5  •  escitalopram (LEXAPRO) 10 MG tablet, Take 1 tablet by mouth Daily., Disp: 30 tablet, Rfl: 5  •  febuxostat (ULORIC) 80 MG tablet tablet, Take 1 tablet by mouth Daily., Disp: 30 tablet, Rfl: 5  •  fenofibrate micronized (LOFIBRA) 134 MG capsule, Take 1 capsule by mouth Every Morning Before Breakfast., Disp: 30 capsule, Rfl: 5  •  fluticasone (FLONASE) 50 MCG/ACT nasal spray, 2 sprays into each nostril Daily. Administer 2 sprays in each nostril for each dose., Disp: 1 bottle, Rfl: 5  •  gabapentin (NEURONTIN) 600 MG tablet, Take 1 tablet by mouth 3 (Three) Times a Day., Disp: 90 tablet, Rfl: 2  •  glipiZIDE (GLUCOTROL) 5 MG tablet, Take 1 tablet by mouth Daily., Disp: 30 tablet, Rfl: 5  •  HYDROcodone-acetaminophen (NORCO) 7.5-325 MG per tablet, Take 1 tablet by mouth Every 6 (Six) Hours As Needed for Moderate Pain  or Severe Pain ., Disp: 60 tablet, Rfl: 0  •  hydrOXYzine (VISTARIL) 25 MG capsule, Take 1 capsule by mouth At Night As Needed for Itching., Disp: 30 capsule, Rfl: 3  •  icosapent ethyl (VASCEPA) 1 g capsule capsule, Take 1 g by mouth 2 (Two) Times a Day With Meals., Disp: 120 capsule, Rfl: 5  •  Insulin Glargine (TOUJEO SOLOSTAR) 300 UNIT/ML solution pen-injector, Inject 20 Units under the skin Daily., Disp: 5 pen, Rfl: 5  •  INVOKANA 100 MG tablet, TAKE 1 Tablet BY MOUTH EVERY DAY, Disp: 30 tablet, Rfl: 5  •  ketoconazole (NIZORAL) 2 % shampoo, Apply  topically 2 (Two) Times a Week., Disp: 120 mL, Rfl: 5  •  lisinopril (PRINIVIL,ZESTRIL) 20 MG tablet, Take 1  tablet by mouth 2 (Two) Times a Day., Disp: 60 tablet, Rfl: 5  •  loratadine (CLARITIN) 10 MG tablet, Take 1 tablet by mouth Daily., Disp: 30 tablet, Rfl: 5  •  Loratadine 10 MG capsule, Take 10 mg by mouth Daily., Disp: 30 each, Rfl: 5  •  metFORMIN (FORTAMET) 1000 MG (OSM) 24 hr tablet, TAKE 1 Tablet BY MOUTH TWICE DAILY, Disp: 60 tablet, Rfl: 5  •  metFORMIN (GLUCOPHAGE) 500 MG tablet, Take 1 tablet by mouth 2 (Two) Times a Day., Disp: 60 tablet, Rfl: 5  •  minocycline (MINOCIN) 100 MG capsule, Take 1 capsule by mouth Every 12 (Twelve) Hours., Disp: 60 capsule, Rfl: 5  •  mupirocin (BACTROBAN) 2 % cream, Apply  topically 3 (Three) Times a Day., Disp: 1 each, Rfl: 5  •  omeprazole (priLOSEC) 20 MG capsule, Take 1 capsule by mouth Daily., Disp: 30 capsule, Rfl: 5  •  sildenafil (VIAGRA) 100 MG tablet, Take 1 tablet by mouth Daily As Needed for erectile dysfunction., Disp: 10 tablet, Rfl: 3        Review of Systems  A comprehensive 10 point review of systems was conducted with patient and positive as per HPI otherwise negative. Intermittent dizziness         Physical Exam  Vital Signs: These were reviewed and listed as per patient’s electronic medical chart, 383lbs same since 8/14  Vitals:    05/08/18 0824   BP: 112/70   Pulse: 97   Resp: 18   Temp: 97 °F (36.1 °C)   SpO2: 98%     General: Awake, alert and oriented, in no distress, obese  HEENT: Head is atraumatic, normocephalic, extraocular movements full, oropharynx clear, no scleral icterus, pink moist mucous membranes, large neck  Neck: supple, no jvd, lymphadenopathy, nodule on back of neck continues to persist and tender on palpation, no drainage  Cardiovascular: regular rate and rhythm without murmurs, rubs or gallops  Pulmonary: non-labored, clear to auscultation bilaterally, no wheezing, distant breath sounds due to body habitus  Abdomen: soft, non-tender, non-distended, normal active bowel sounds present, no organomegaly although difficult to assess due to  obesity  Extremities: No clubbing, cyanosis or edema  Neurologic: Mental status as above, alert, awake and oriented, grossly non-focal exam  Skin: warm, dry, intact, skin tags at the back of the neck        Labs / Studies:    Office Visit on 05/08/2018   Component Date Value   • WBC 05/08/2018 13.69*   • RBC 05/08/2018 6.01    • Hemoglobin 05/08/2018 17.3    • Hematocrit 05/08/2018 51.2    • MCV 05/08/2018 85.2    • MCH 05/08/2018 28.8    • MCHC 05/08/2018 33.8    • RDW 05/08/2018 13.7    • RDW-SD 05/08/2018 42.3    • MPV 05/08/2018 10.1*   • Platelets 05/08/2018 214    • Neutrophil % 05/08/2018 67.9    • Lymphocyte % 05/08/2018 20.5*   • Monocyte % 05/08/2018 9.0    • Eosinophil % 05/08/2018 2.0    • Basophil % 05/08/2018 0.1    • Immature Grans % 05/08/2018 0.5    • Neutrophils, Absolute 05/08/2018 9.30*   • Lymphocytes, Absolute 05/08/2018 2.80    • Monocytes, Absolute 05/08/2018 1.23*   • Eosinophils, Absolute 05/08/2018 0.27    • Basophils, Absolute 05/08/2018 0.02    • Immature Grans, Absolute 05/08/2018 0.07*   Office Visit on 04/25/2018   Component Date Value   • Glucose 04/25/2018 96    • BUN 04/25/2018 16    • Creatinine 04/25/2018 1.12    • Sodium 04/25/2018 138    • Potassium 04/25/2018 4.0    • Chloride 04/25/2018 103    • CO2 04/25/2018 25.7    • Calcium 04/25/2018 10.2*   • Total Protein 04/25/2018 6.9    • Albumin 04/25/2018 4.40    • ALT (SGPT) 04/25/2018 55*   • AST (SGOT) 04/25/2018 33    • Alkaline Phosphatase 04/25/2018 59    • Total Bilirubin 04/25/2018 1.1    • eGFR Non  Amer 04/25/2018 71    • Globulin 04/25/2018 2.5    • A/G Ratio 04/25/2018 1.8    • BUN/Creatinine Ratio 04/25/2018 14.3    • Anion Gap 04/25/2018 9.3    • TSH 04/25/2018 1.123    • Hemoglobin A1C 04/25/2018 5.70    • 25 Hydroxy, Vitamin D 04/25/2018 47.0    • Vitamin B-12 04/25/2018 382    • Total Cholesterol 04/25/2018 175    • Triglycerides 04/25/2018 263*   • HDL Cholesterol 04/25/2018 26*   • LDL Cholesterol   04/25/2018 96    • VLDL Cholesterol 04/25/2018 52.6    • LDL/HDL Ratio 04/25/2018 3.71    • Microalbumin, Urine 04/25/2018 2.3    • WBC 04/25/2018 7.88    • RBC 04/25/2018 5.79    • Hemoglobin 04/25/2018 16.8    • Hematocrit 04/25/2018 49.0    • MCV 04/25/2018 84.6    • MCH 04/25/2018 29.0    • MCHC 04/25/2018 34.3    • RDW 04/25/2018 14.1    • RDW-SD 04/25/2018 43.3    • MPV 04/25/2018 10.7*   • Platelets 04/25/2018 216    • Neutrophil % 04/25/2018 47.2    • Lymphocyte % 04/25/2018 28.8    • Monocyte % 04/25/2018 11.8*   • Eosinophil % 04/25/2018 11.3*   • Basophil % 04/25/2018 0.5    • Immature Grans % 04/25/2018 0.4    • Neutrophils, Absolute 04/25/2018 3.72    • Lymphocytes, Absolute 04/25/2018 2.27    • Monocytes, Absolute 04/25/2018 0.93*   • Eosinophils, Absolute 04/25/2018 0.89*   • Basophils, Absolute 04/25/2018 0.04    • Immature Grans, Absolute 04/25/2018 0.03    • Osmolality Calc 04/25/2018 276.7    Lab on 03/08/2018   Component Date Value   • Glucose 03/08/2018 167*   • BUN 03/08/2018 21    • Creatinine 03/08/2018 1.28    • Sodium 03/08/2018 137    • Potassium 03/08/2018 3.9    • Chloride 03/08/2018 102    • CO2 03/08/2018 21.5*   • Calcium 03/08/2018 10.8*   • Total Protein 03/08/2018 6.8    • Albumin 03/08/2018 4.70    • ALT (SGPT) 03/08/2018 79*   • AST (SGOT) 03/08/2018 40*   • Alkaline Phosphatase 03/08/2018 65    • Total Bilirubin 03/08/2018 0.7    • eGFR Non  Amer 03/08/2018 61    • Globulin 03/08/2018 2.1    • A/G Ratio 03/08/2018 2.2    • BUN/Creatinine Ratio 03/08/2018 16.4    • Anion Gap 03/08/2018 13.5*   • WBC 03/08/2018 7.48    • RBC 03/08/2018 5.93    • Hemoglobin 03/08/2018 17.5    • Hematocrit 03/08/2018 51.2    • MCV 03/08/2018 86.3    • MCH 03/08/2018 29.5    • MCHC 03/08/2018 34.2    • RDW 03/08/2018 14.7*   • RDW-SD 03/08/2018 46.5    • MPV 03/08/2018 10.8*   • Platelets 03/08/2018 183    • Neutrophil % 03/08/2018 54.7    • Lymphocyte % 03/08/2018 25.9    • Monocyte %  03/08/2018 12.7*   • Eosinophil % 03/08/2018 6.0*   • Basophil % 03/08/2018 0.4    • Immature Grans % 03/08/2018 0.3    • Neutrophils, Absolute 03/08/2018 4.09    • Lymphocytes, Absolute 03/08/2018 1.94    • Monocytes, Absolute 03/08/2018 0.95*   • Eosinophils, Absolute 03/08/2018 0.45    • Basophils, Absolute 03/08/2018 0.03    • Immature Grans, Absolute 03/08/2018 0.02    • Osmolality Calc 03/08/2018 280.6    • Pulse 03/08/2018 87    • Volume Collected 03/08/2018 537    • Pre-Hgb 03/08/2018 17.5    • Pre-Hct 03/08/2018 51.2    • Pre-Blood Pressure 03/08/2018 121/75    • Post-Blood Pressure 03/08/2018 112/67    Office Visit on 01/09/2018   Component Date Value   • Pulse 01/09/2018 102    • Volume Collected 01/09/2018 500    • Pre-Hgb 01/09/2018 17.0    • Pre-Hct 01/09/2018 52.3    • Pre-Blood Pressure 01/09/2018 143/81    • Post-Blood Pressure 01/09/2018 131/76    Lab on 01/09/2018   Component Date Value   • WBC 01/09/2018 8.10    • RBC 01/09/2018 6.11*   • Hemoglobin 01/09/2018 17.0    • Hematocrit 01/09/2018 52.3*   • MCV 01/09/2018 85.6    • MCH 01/09/2018 27.8    • MCHC 01/09/2018 32.5*   • RDW 01/09/2018 14.4    • RDW-SD 01/09/2018 44.4    • MPV 01/09/2018 10.8*   • Platelets 01/09/2018 187    • Neutrophil % 01/09/2018 51.4    • Lymphocyte % 01/09/2018 28.5    • Monocyte % 01/09/2018 8.9    • Eosinophil % 01/09/2018 10.4*   • Basophil % 01/09/2018 0.6    • Immature Grans % 01/09/2018 0.2    • Neutrophils, Absolute 01/09/2018 4.16    • Lymphocytes, Absolute 01/09/2018 2.31    • Monocytes, Absolute 01/09/2018 0.72    • Eosinophils, Absolute 01/09/2018 0.84*   • Basophils, Absolute 01/09/2018 0.05    • Immature Grans, Absolute 01/09/2018 0.02    Lab on 01/04/2018   Component Date Value   • Glucose 01/04/2018 191*   • BUN 01/04/2018 29*   • Creatinine 01/04/2018 1.36*   • Sodium 01/04/2018 134*   • Potassium 01/04/2018 4.6    • Chloride 01/04/2018 98*   • CO2 01/04/2018 27.3    • Calcium 01/04/2018 9.7    • Total  Protein 01/04/2018 7.0    • Albumin 01/04/2018 4.50    • ALT (SGPT) 01/04/2018 90*   • AST (SGOT) 01/04/2018 52*   • Alkaline Phosphatase 01/04/2018 72    • Total Bilirubin 01/04/2018 0.8    • eGFR Non African Amer 01/04/2018 57*   • Globulin 01/04/2018 2.5    • A/G Ratio 01/04/2018 1.8    • BUN/Creatinine Ratio 01/04/2018 21.3    • Anion Gap 01/04/2018 8.7    • TSH 01/04/2018 1.342    • Hemoglobin A1C 01/04/2018 8.50*   • 25 Hydroxy, Vitamin D 01/04/2018 27.0    • Uric Acid 01/04/2018 4.9    • Total Cholesterol 01/04/2018 168    • Triglycerides 01/04/2018 369*   • HDL Cholesterol 01/04/2018 26*   • LDL Cholesterol  01/04/2018 68    • VLDL Cholesterol 01/04/2018 73.8    • LDL/HDL Ratio 01/04/2018 2.62    • Microalbumin, Urine 01/04/2018 4.1    • Vitamin B-12 01/04/2018 421    • WBC 01/04/2018 7.92    • RBC 01/04/2018 6.09    • Hemoglobin 01/04/2018 16.8    • Hematocrit 01/04/2018 52.1*   • MCV 01/04/2018 85.6    • MCH 01/04/2018 27.6    • MCHC 01/04/2018 32.2*   • RDW 01/04/2018 15.2*   • RDW-SD 01/04/2018 47.5    • MPV 01/04/2018 11.0*   • Platelets 01/04/2018 199    • Neutrophil % 01/04/2018 46.1    • Lymphocyte % 01/04/2018 28.3    • Monocyte % 01/04/2018 13.8*   • Eosinophil % 01/04/2018 10.9*   • Basophil % 01/04/2018 0.6    • Immature Grans % 01/04/2018 0.3    • Neutrophils, Absolute 01/04/2018 3.66    • Lymphocytes, Absolute 01/04/2018 2.24    • Monocytes, Absolute 01/04/2018 1.09*   • Eosinophils, Absolute 01/04/2018 0.86*   • Basophils, Absolute 01/04/2018 0.05    • Immature Grans, Absolute 01/04/2018 0.02    • Osmolality Calc 01/04/2018 279.2           12/30/16: US ABDOMEN COMPLETE: Visualized pancreas is unremarkable. The imaged portion of the abdominal aorta is nondilated.  The liver is homogeneous. There is no intrahepatic ductal dilatation or focal hepatic mass. The imaged portion of the hepatic vessels and inferior vena cava are patent. The gallbladder shows no cholelithiasis or pericholecystic fluid.  The wall of the gallbladder is not thickened. It measures 2.9 mm. The common bile duct is normal, measuring 4.8 mm. The kidneys demonstrate no evidence of hydronephrosis or solid renal mass. The spleen is homogeneous and measures 14.2 cm which is slightly enlarged. IMPRESSION: Negative abdominal ultrasound.     12/30/16: XR CHEST PA AND LATERAL: The cardiac silhouette is normal in size. There is some slight widening of the upper mediastinum. This can be seen with tortuous vessels. I could not entirely exclude some right paratracheal adenopathy. Correlation with old films or CT may add additional information. The lungs show increased interstitial markings throughout the lungs. No alveolar infiltrates to suggest pneumonia are seen; there are no signs of heart failure or pleural fluid. IMPRESSION: Slight widening of the mediastinum with fullness in the right paratracheal area. There is interstitial lung disease throughout both lungs.      01/25/18: US NECK: In region of palpable abnormalities at 3.7 cm cystic appearing mass with thick wall that could represent abscess, hematoma or neoplastic etiology. IMPRESSION: In region of palpable abnormalities at 3.7 cm cystic appearing mass with thick wall that could represent abscess, hematoma or neoplastic etiology.      Assessment/Plan    Johnathon Mclain is a very pleasant 42 y.o.  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     Polycythemia  - To assess for a myeloproliferative disorder I obtained a [id number] and Exon 12/CALR/MPL mutations which were negative. BCR ABL PCR was <0.001%.   - I believe that he likely has a secondary polycythemia from underlying sleep apnea. I originally did make a sleep referral however he did not follow up as scheduled as he wanted to see if he would qualify for an at home study. Unfortunately insurance did not approve this through our office but his primary provider was able  to obtain approval for this and was found to have moderate obstructive sleep apnea. He has not really been compliant with CPAP due to setting issues and is currently having this managed. He has seen sleep medicine once however did not return as he was able to do at home testing.   - Abdominal ultrasound did show mild splenomegaly.   - His pulmonary function tests are likely consistent with sleep apnea versus hypoxemia due to COPD however O2 sats on RA have been normal. Erythropoietin level was normal.   - Patient was initially started on phlebotomy for one unit (500 mL) to be taken every month with goal of Hct <50%. However he reports that on days of phlebotomy he does feel more weak, fatigued, and experiences headaches. Therefore will change his phlebotomy requirement to Hct >52. He does not require phlebotomy today. He currently does not take iron supplementation and was previously advised against this.     Obstructive Sleep Apnea  - He received a home sleep study and was found to have moderate obstructive sleep apnea. He was originally placed on supplemental nasal canula that was later changed to CPAP machine. He was initially noncompliant due to high pressure settings and intolerability which have improved and has been more compliant since the settings were decreased.     Neck Nodule  - He originally reported this in January and ultrasound was performed and was found to have a 3.7 cm cystic appearing mass which could represent abscess, hematoma or neoplastic etiology. I did offer his a surgery consult for further evaluation however he wanted to hold at that time given that the nodule had been decreased with antibiotics. However today he notes that this has continued to persist and has been tender and is tender on palpation therefore he is agreeable to a general surgery consult for further evaluation.     Obesity  - Patient is currently overweight and is likely contributing to his ongoing medical issues including  sleep apnea. He was continued to be advised of diet and exercise.    Elevated Liver Function Tests  - Abdominal US was negative with mild splenomegaly.   - I would be concerned for metabolic syndrome as patient is overweight, has diabetes and cholesterol abnormalities. I have strongly advised him of ongoing weight loss.   - Patient does not appear to be on a statin, however he is on Norco which contains acetaminophen and he was counseled on considering decreasing usage versus discontinuing with liver function abnormalities. He has also been taking chlorthalidone which can cause some LFT abnormalities however he has been on this for many years.   - Acute hepatitis panel was non reactive.     Mediastinal Widening  - This was seen on CXR in evaluation for secondary polycythemia therefore I did obtain a CT Chest with IV contrast to further evaluate this did not show any evidence of any abnormal adenopathy or masses and was felt to be due to benign findings.    ACO Quality measures  - Johnathon Mclain does not use smoke tobacco products. However he does continue to chew tobacco.   - We discussed the detrimental side effects and risks of using tobacco and he would prefer to cut back on his own. He was previously chewing 3 cans a day and has cut this down to 1.5 cans a day.   - During this visit, I spent <3 minutes counseling the patient regarding tobacco cessation.  - Patient's BMI is above range. BMI is above normal parameters. Recommendations include: exercise counseling and nutrition counseling.  - Current outpatient and discharge medications have been reconciled for the patient.      I will have the patient return for laboratory evaluation monthly for CBC and should receive phlebotomy for Hct >52 and he will return for follow up appointment in four months. He understands that should he have any questions or concerns prior to his appointment he should give us a call at any time and I would be happy to see him sooner. It  was a pleasure to see this patient in clinic today, thank you for allowing me to participate in the care of this patient.     I spent 25 minutes in regards to this patient’s care today. More than 18 minutes of the time was spent in direct interaction with the patient for the above problems and in counceling.            Shy Lowe MD  05/08/2018  10:53 AM

## 2018-05-08 ENCOUNTER — OFFICE VISIT (OUTPATIENT)
Dept: ONCOLOGY | Facility: CLINIC | Age: 43
End: 2018-05-08

## 2018-05-08 VITALS
RESPIRATION RATE: 18 BRPM | HEART RATE: 97 BPM | DIASTOLIC BLOOD PRESSURE: 70 MMHG | OXYGEN SATURATION: 98 % | SYSTOLIC BLOOD PRESSURE: 112 MMHG | TEMPERATURE: 97 F

## 2018-05-08 DIAGNOSIS — E66.9 OBESITY, UNSPECIFIED OBESITY SEVERITY, UNSPECIFIED OBESITY TYPE: ICD-10-CM

## 2018-05-08 DIAGNOSIS — D45 PV (POLYCYTHEMIA VERA) (HCC): Primary | ICD-10-CM

## 2018-05-08 DIAGNOSIS — R79.89 ELEVATED LFTS: ICD-10-CM

## 2018-05-08 DIAGNOSIS — R22.1 NECK NODULE: ICD-10-CM

## 2018-05-08 DIAGNOSIS — G47.33 OBSTRUCTIVE SLEEP APNEA SYNDROME: ICD-10-CM

## 2018-05-08 DIAGNOSIS — R93.89 MEDIASTINAL WIDENING: ICD-10-CM

## 2018-05-08 LAB
BASOPHILS # BLD AUTO: 0.02 10*3/MM3 (ref 0–0.3)
BASOPHILS NFR BLD AUTO: 0.1 % (ref 0–2)
DEPRECATED RDW RBC AUTO: 42.3 FL (ref 37–54)
EOSINOPHIL # BLD AUTO: 0.27 10*3/MM3 (ref 0–0.7)
EOSINOPHIL NFR BLD AUTO: 2 % (ref 0–5)
ERYTHROCYTE [DISTWIDTH] IN BLOOD BY AUTOMATED COUNT: 13.7 % (ref 11.5–14.5)
HCT VFR BLD AUTO: 51.2 % (ref 42–52)
HGB BLD-MCNC: 17.3 G/DL (ref 14–18)
IMM GRANULOCYTES # BLD: 0.07 10*3/MM3 (ref 0–0.03)
IMM GRANULOCYTES NFR BLD: 0.5 % (ref 0–0.5)
LYMPHOCYTES # BLD AUTO: 2.8 10*3/MM3 (ref 1–3)
LYMPHOCYTES NFR BLD AUTO: 20.5 % (ref 21–51)
MCH RBC QN AUTO: 28.8 PG (ref 27–33)
MCHC RBC AUTO-ENTMCNC: 33.8 G/DL (ref 33–37)
MCV RBC AUTO: 85.2 FL (ref 80–94)
MONOCYTES # BLD AUTO: 1.23 10*3/MM3 (ref 0.1–0.9)
MONOCYTES NFR BLD AUTO: 9 % (ref 0–10)
NEUTROPHILS # BLD AUTO: 9.3 10*3/MM3 (ref 1.4–6.5)
NEUTROPHILS NFR BLD AUTO: 67.9 % (ref 30–70)
PLATELET # BLD AUTO: 214 10*3/MM3 (ref 130–400)
PMV BLD AUTO: 10.1 FL (ref 6–10)
RBC # BLD AUTO: 6.01 10*6/MM3 (ref 4.7–6.1)
WBC NRBC COR # BLD: 13.69 10*3/MM3 (ref 4.5–12.5)

## 2018-05-08 PROCEDURE — 99214 OFFICE O/P EST MOD 30 MIN: CPT | Performed by: INTERNAL MEDICINE

## 2018-05-08 PROCEDURE — 85025 COMPLETE CBC W/AUTO DIFF WBC: CPT | Performed by: INTERNAL MEDICINE

## 2018-05-10 ENCOUNTER — OFFICE VISIT (OUTPATIENT)
Dept: SURGERY | Facility: CLINIC | Age: 43
End: 2018-05-10

## 2018-05-10 VITALS
HEIGHT: 68 IN | HEART RATE: 91 BPM | BODY MASS INDEX: 47.74 KG/M2 | SYSTOLIC BLOOD PRESSURE: 131 MMHG | DIASTOLIC BLOOD PRESSURE: 92 MMHG | WEIGHT: 315 LBS

## 2018-05-10 DIAGNOSIS — L72.3 SEBACEOUS CYST: Primary | ICD-10-CM

## 2018-05-10 DIAGNOSIS — L91.8 SKIN TAGS, MULTIPLE ACQUIRED: ICD-10-CM

## 2018-05-10 DIAGNOSIS — IMO0001 CLASS 3 OBESITY DUE TO EXCESS CALORIES WITH SERIOUS COMORBIDITY AND BODY MASS INDEX (BMI) OF 50.0 TO 59.9 IN ADULT: ICD-10-CM

## 2018-05-10 PROCEDURE — 99204 OFFICE O/P NEW MOD 45 MIN: CPT | Performed by: SURGERY

## 2018-05-10 RX ORDER — SULFAMETHOXAZOLE AND TRIMETHOPRIM 800; 160 MG/1; MG/1
1 TABLET ORAL 2 TIMES DAILY
Qty: 6 TABLET | Refills: 0 | Status: SHIPPED | OUTPATIENT
Start: 2018-05-10 | End: 2018-05-15

## 2018-05-10 NOTE — PROGRESS NOTES
Subjective   Johnathon Mclain is a 42 y.o. male.     History of Present Illness He has had a lump on the back of his neck for several years and drains at times. It has gotten tender the last few days. He also has multiple skin tags on the back of the neck. He is currently out of antibiotics. US showed a 3.7 cm.    The following portions of the patient's history were reviewed and updated as appropriate: current medications, past family history, past medical history, past social history, past surgical history and problem list.    Review of Systems   Constitutional: Negative for activity change, appetite change, chills, fever and unexpected weight change.   HENT: Negative for congestion, facial swelling and sore throat.    Eyes: Negative for photophobia and visual disturbance.   Respiratory: Positive for wheezing. Negative for chest tightness and shortness of breath.    Cardiovascular: Negative for chest pain, palpitations and leg swelling.   Gastrointestinal: Negative for abdominal distention, abdominal pain, anal bleeding, blood in stool, constipation, diarrhea, nausea, rectal pain and vomiting.   Endocrine: Negative for cold intolerance, heat intolerance, polydipsia and polyuria.   Genitourinary: Negative for difficulty urinating, dysuria, flank pain and urgency.   Musculoskeletal: Negative for back pain and myalgias.   Skin: Negative for rash and wound.   Allergic/Immunologic: Negative for immunocompromised state.   Neurological: Negative for dizziness, seizures, syncope, light-headedness, numbness and headaches.   Hematological: Negative for adenopathy. Does not bruise/bleed easily.   Psychiatric/Behavioral: Negative for behavioral problems and confusion. The patient is not nervous/anxious.         Objective   Physical Exam   Constitutional: He is oriented to person, place, and time. He appears well-developed and well-nourished. He does not appear ill. No distress.       HENT:   Head: Normocephalic. Head is without  laceration. Hair is normal.   Right Ear: Hearing and ear canal normal.   Left Ear: Hearing and ear canal normal.   Nose: Nose normal. No sinus tenderness. No epistaxis. Right sinus exhibits no maxillary sinus tenderness and no frontal sinus tenderness. Left sinus exhibits no maxillary sinus tenderness and no frontal sinus tenderness.   Eyes: Conjunctivae and lids are normal. Pupils are equal, round, and reactive to light.   Neck: Normal range of motion. No JVD present. No tracheal tenderness present. No tracheal deviation present. No thyroid mass and no thyromegaly present.   Cardiovascular: Normal rate and regular rhythm.  Exam reveals no gallop.    No murmur heard.  Pulmonary/Chest: Effort normal and breath sounds normal. No stridor. He has no wheezes. He exhibits no tenderness.   Abdominal: Soft. Bowel sounds are normal. He exhibits no distension, no ascites and no mass. There is no tenderness. There is no rebound and no guarding. No hernia.   Musculoskeletal: He exhibits no edema or deformity.   Lymphadenopathy:     He has no cervical adenopathy.     He has no axillary adenopathy.        Right: No inguinal and no supraclavicular adenopathy present.        Left: No inguinal and no supraclavicular adenopathy present.   Neurological: He is alert and oriented to person, place, and time. He exhibits normal muscle tone.   Skin: Skin is warm, dry and intact. No rash noted. No erythema. No pallor.   Psychiatric: He has a normal mood and affect. His behavior is normal. Thought content normal.   Vitals reviewed.  large tag on left mid back 1.5 cm    Assessment/Plan   Johnathon was seen today for cyst.    Diagnoses and all orders for this visit:    Sebaceous cyst    Skin tags, multiple acquired    excise cyst and tags in OR    Patient's Body mass index is 50.94 kg/m². BMI is above normal parameters. Recommendations include: nutrition counseling.

## 2018-05-11 ENCOUNTER — ANESTHESIA EVENT (OUTPATIENT)
Dept: PERIOP | Facility: HOSPITAL | Age: 43
End: 2018-05-11

## 2018-05-11 ENCOUNTER — ANESTHESIA (OUTPATIENT)
Dept: PERIOP | Facility: HOSPITAL | Age: 43
End: 2018-05-11

## 2018-05-11 ENCOUNTER — HOSPITAL ENCOUNTER (OUTPATIENT)
Facility: HOSPITAL | Age: 43
Setting detail: HOSPITAL OUTPATIENT SURGERY
Discharge: HOME | End: 2018-05-11
Attending: SURGERY | Admitting: SURGERY

## 2018-05-11 ENCOUNTER — APPOINTMENT (OUTPATIENT)
Dept: PREADMISSION TESTING | Facility: HOSPITAL | Age: 43
End: 2018-05-11

## 2018-05-11 VITALS
HEART RATE: 90 BPM | BODY MASS INDEX: 44.1 KG/M2 | TEMPERATURE: 97.3 F | RESPIRATION RATE: 16 BRPM | OXYGEN SATURATION: 98 % | HEIGHT: 71 IN | WEIGHT: 315 LBS | SYSTOLIC BLOOD PRESSURE: 106 MMHG | DIASTOLIC BLOOD PRESSURE: 68 MMHG

## 2018-05-11 DIAGNOSIS — L72.3 SEBACEOUS CYST: ICD-10-CM

## 2018-05-11 DIAGNOSIS — L91.8 SKIN TAGS, MULTIPLE ACQUIRED: ICD-10-CM

## 2018-05-11 LAB
GLUCOSE BLDC GLUCOMTR-MCNC: 82 MG/DL (ref 70–130)
GLUCOSE BLDC GLUCOMTR-MCNC: 89 MG/DL (ref 70–130)

## 2018-05-11 PROCEDURE — 25010000002 FENTANYL CITRATE (PF) 100 MCG/2ML SOLUTION: Performed by: NURSE ANESTHETIST, CERTIFIED REGISTERED

## 2018-05-11 PROCEDURE — 25010000002 PHENYLEPHRINE PER 1 ML: Performed by: NURSE ANESTHETIST, CERTIFIED REGISTERED

## 2018-05-11 PROCEDURE — 88304 TISSUE EXAM BY PATHOLOGIST: CPT | Performed by: SURGERY

## 2018-05-11 PROCEDURE — 25010000002 PROPOFOL 10 MG/ML EMULSION: Performed by: NURSE ANESTHETIST, CERTIFIED REGISTERED

## 2018-05-11 PROCEDURE — 11200 RMVL SKIN TAGS UP TO&INC 15: CPT | Performed by: SURGERY

## 2018-05-11 PROCEDURE — 82962 GLUCOSE BLOOD TEST: CPT

## 2018-05-11 PROCEDURE — 88311 DECALCIFY TISSUE: CPT | Performed by: SURGERY

## 2018-05-11 PROCEDURE — 12044 INTMD RPR N-HF/GENIT7.6-12.5: CPT | Performed by: SURGERY

## 2018-05-11 PROCEDURE — 11201 RMVL SKIN TAGS EA ADDL 10: CPT | Performed by: SURGERY

## 2018-05-11 PROCEDURE — 25010000002 MIDAZOLAM PER 1 MG: Performed by: NURSE ANESTHETIST, CERTIFIED REGISTERED

## 2018-05-11 PROCEDURE — 11426 EXC H-F-NK-SP B9+MARG >4 CM: CPT | Performed by: SURGERY

## 2018-05-11 PROCEDURE — 25010000002 ONDANSETRON PER 1 MG: Performed by: NURSE ANESTHETIST, CERTIFIED REGISTERED

## 2018-05-11 RX ORDER — PROPOFOL 10 MG/ML
VIAL (ML) INTRAVENOUS AS NEEDED
Status: DISCONTINUED | OUTPATIENT
Start: 2018-05-11 | End: 2018-05-11 | Stop reason: SURG

## 2018-05-11 RX ORDER — ONDANSETRON 2 MG/ML
4 INJECTION INTRAMUSCULAR; INTRAVENOUS ONCE AS NEEDED
Status: DISCONTINUED | OUTPATIENT
Start: 2018-05-11 | End: 2018-05-11 | Stop reason: HOSPADM

## 2018-05-11 RX ORDER — IPRATROPIUM BROMIDE AND ALBUTEROL SULFATE 2.5; .5 MG/3ML; MG/3ML
3 SOLUTION RESPIRATORY (INHALATION) ONCE AS NEEDED
Status: DISCONTINUED | OUTPATIENT
Start: 2018-05-11 | End: 2018-05-11 | Stop reason: HOSPADM

## 2018-05-11 RX ORDER — MAGNESIUM HYDROXIDE 1200 MG/15ML
LIQUID ORAL AS NEEDED
Status: DISCONTINUED | OUTPATIENT
Start: 2018-05-11 | End: 2018-05-11 | Stop reason: HOSPADM

## 2018-05-11 RX ORDER — MIDAZOLAM HYDROCHLORIDE 1 MG/ML
INJECTION INTRAMUSCULAR; INTRAVENOUS AS NEEDED
Status: DISCONTINUED | OUTPATIENT
Start: 2018-05-11 | End: 2018-05-11 | Stop reason: SURG

## 2018-05-11 RX ORDER — BACITRACIN, NEOMYCIN, POLYMYXIN B 400; 3.5; 5 [USP'U]/G; MG/G; [USP'U]/G
OINTMENT TOPICAL AS NEEDED
Status: DISCONTINUED | OUTPATIENT
Start: 2018-05-11 | End: 2018-05-11 | Stop reason: HOSPADM

## 2018-05-11 RX ORDER — SODIUM CHLORIDE, SODIUM LACTATE, POTASSIUM CHLORIDE, CALCIUM CHLORIDE 600; 310; 30; 20 MG/100ML; MG/100ML; MG/100ML; MG/100ML
125 INJECTION, SOLUTION INTRAVENOUS CONTINUOUS
Status: DISCONTINUED | OUTPATIENT
Start: 2018-05-11 | End: 2018-05-11 | Stop reason: HOSPADM

## 2018-05-11 RX ORDER — OXYCODONE HYDROCHLORIDE AND ACETAMINOPHEN 5; 325 MG/1; MG/1
2 TABLET ORAL EVERY 4 HOURS PRN
Qty: 30 TABLET | Refills: 0 | Status: SHIPPED | OUTPATIENT
Start: 2018-05-11 | End: 2018-05-21

## 2018-05-11 RX ORDER — FENTANYL CITRATE 50 UG/ML
50 INJECTION, SOLUTION INTRAMUSCULAR; INTRAVENOUS
Status: DISCONTINUED | OUTPATIENT
Start: 2018-05-11 | End: 2018-05-11 | Stop reason: HOSPADM

## 2018-05-11 RX ORDER — FENTANYL CITRATE 50 UG/ML
INJECTION, SOLUTION INTRAMUSCULAR; INTRAVENOUS AS NEEDED
Status: DISCONTINUED | OUTPATIENT
Start: 2018-05-11 | End: 2018-05-11 | Stop reason: SURG

## 2018-05-11 RX ORDER — OXYCODONE HYDROCHLORIDE AND ACETAMINOPHEN 5; 325 MG/1; MG/1
1 TABLET ORAL ONCE AS NEEDED
Status: DISCONTINUED | OUTPATIENT
Start: 2018-05-11 | End: 2018-05-11 | Stop reason: HOSPADM

## 2018-05-11 RX ORDER — BUPIVACAINE HYDROCHLORIDE 5 MG/ML
INJECTION, SOLUTION PERINEURAL AS NEEDED
Status: DISCONTINUED | OUTPATIENT
Start: 2018-05-11 | End: 2018-05-11 | Stop reason: HOSPADM

## 2018-05-11 RX ORDER — LIDOCAINE HYDROCHLORIDE 20 MG/ML
INJECTION, SOLUTION INFILTRATION; PERINEURAL AS NEEDED
Status: DISCONTINUED | OUTPATIENT
Start: 2018-05-11 | End: 2018-05-11 | Stop reason: SURG

## 2018-05-11 RX ORDER — MEPERIDINE HYDROCHLORIDE 50 MG/ML
12.5 INJECTION INTRAMUSCULAR; INTRAVENOUS; SUBCUTANEOUS
Status: DISCONTINUED | OUTPATIENT
Start: 2018-05-11 | End: 2018-05-11 | Stop reason: HOSPADM

## 2018-05-11 RX ORDER — OXYCODONE HYDROCHLORIDE AND ACETAMINOPHEN 5; 325 MG/1; MG/1
2 TABLET ORAL EVERY 4 HOURS PRN
Status: DISCONTINUED | OUTPATIENT
Start: 2018-05-11 | End: 2018-05-11 | Stop reason: HOSPADM

## 2018-05-11 RX ORDER — ONDANSETRON 2 MG/ML
INJECTION INTRAMUSCULAR; INTRAVENOUS AS NEEDED
Status: DISCONTINUED | OUTPATIENT
Start: 2018-05-11 | End: 2018-05-11 | Stop reason: SURG

## 2018-05-11 RX ORDER — SODIUM CHLORIDE 0.9 % (FLUSH) 0.9 %
1-10 SYRINGE (ML) INJECTION AS NEEDED
Status: DISCONTINUED | OUTPATIENT
Start: 2018-05-11 | End: 2018-05-11 | Stop reason: HOSPADM

## 2018-05-11 RX ADMIN — PROPOFOL 200 MG: 10 INJECTION, EMULSION INTRAVENOUS at 12:44

## 2018-05-11 RX ADMIN — EPHEDRINE SULFATE 15 MG: 50 INJECTION INTRAMUSCULAR; INTRAVENOUS; SUBCUTANEOUS at 12:58

## 2018-05-11 RX ADMIN — FENTANYL CITRATE 25 MCG: 50 INJECTION INTRAMUSCULAR; INTRAVENOUS at 13:16

## 2018-05-11 RX ADMIN — SODIUM CHLORIDE, POTASSIUM CHLORIDE, SODIUM LACTATE AND CALCIUM CHLORIDE 125 ML/HR: 600; 310; 30; 20 INJECTION, SOLUTION INTRAVENOUS at 11:28

## 2018-05-11 RX ADMIN — PHENYLEPHRINE HYDROCHLORIDE 200 MCG: 10 INJECTION, SOLUTION INTRAMUSCULAR; INTRAVENOUS; SUBCUTANEOUS at 13:07

## 2018-05-11 RX ADMIN — FENTANYL CITRATE 100 MCG: 50 INJECTION INTRAMUSCULAR; INTRAVENOUS at 12:44

## 2018-05-11 RX ADMIN — PHENYLEPHRINE HYDROCHLORIDE 200 MCG: 10 INJECTION, SOLUTION INTRAMUSCULAR; INTRAVENOUS; SUBCUTANEOUS at 13:00

## 2018-05-11 RX ADMIN — PHENYLEPHRINE HYDROCHLORIDE 100 MCG: 10 INJECTION, SOLUTION INTRAMUSCULAR; INTRAVENOUS; SUBCUTANEOUS at 13:16

## 2018-05-11 RX ADMIN — FENTANYL CITRATE 50 MCG: 50 INJECTION INTRAMUSCULAR; INTRAVENOUS at 13:49

## 2018-05-11 RX ADMIN — PHENYLEPHRINE HYDROCHLORIDE 100 MCG: 10 INJECTION, SOLUTION INTRAMUSCULAR; INTRAVENOUS; SUBCUTANEOUS at 12:50

## 2018-05-11 RX ADMIN — PHENYLEPHRINE HYDROCHLORIDE 200 MCG: 10 INJECTION, SOLUTION INTRAMUSCULAR; INTRAVENOUS; SUBCUTANEOUS at 12:54

## 2018-05-11 RX ADMIN — EPHEDRINE SULFATE 10 MG: 50 INJECTION INTRAMUSCULAR; INTRAVENOUS; SUBCUTANEOUS at 13:22

## 2018-05-11 RX ADMIN — EPHEDRINE SULFATE 15 MG: 50 INJECTION INTRAMUSCULAR; INTRAVENOUS; SUBCUTANEOUS at 13:16

## 2018-05-11 RX ADMIN — ONDANSETRON 4 MG: 2 INJECTION, SOLUTION INTRAMUSCULAR; INTRAVENOUS at 13:04

## 2018-05-11 RX ADMIN — MIDAZOLAM HYDROCHLORIDE 2 MG: 1 INJECTION, SOLUTION INTRAMUSCULAR; INTRAVENOUS at 12:39

## 2018-05-11 RX ADMIN — FENTANYL CITRATE 25 MCG: 50 INJECTION INTRAMUSCULAR; INTRAVENOUS at 13:21

## 2018-05-11 RX ADMIN — EPHEDRINE SULFATE 10 MG: 50 INJECTION INTRAMUSCULAR; INTRAVENOUS; SUBCUTANEOUS at 13:09

## 2018-05-11 RX ADMIN — LIDOCAINE HYDROCHLORIDE 40 MG: 20 INJECTION, SOLUTION INFILTRATION; PERINEURAL at 12:44

## 2018-05-11 NOTE — ANESTHESIA PROCEDURE NOTES
Airway    General Information and Staff    CRNA: JOEL BRAN    Indications and Patient Condition    Preoxygenated: yes      Final Airway Details  Final airway type: supraglottic airway      Successful airway: Supreme  Size 4    Number of attempts at approach: 1

## 2018-05-11 NOTE — ANESTHESIA POSTPROCEDURE EVALUATION
Patient: Johnathon Mclain    Procedure Summary     Date:  05/11/18 Room / Location:   [facility] /  [facility]    Anesthesia Start:  1239 Anesthesia Stop:  1351    Procedure:  SKIN LESION EXCISION NECK & UPPER BACK (N/A ) Diagnosis:       Sebaceous cyst      Skin tags, multiple acquired      (Sebaceous cyst [L72.3])      (Skin tags, multiple acquired [L91.8])    Surgeon:  New Camacho MD Provider:  Ronald Lopez MD    Anesthesia Type:  general ASA Status:  3          Anesthesia Type: general  Last vitals  BP   106/68 (05/11/18 1447)   Temp   97.3 °F (36.3 °C) (05/11/18 1447)   Pulse   90 (05/11/18 1447)   Resp   16 (05/11/18 1447)     SpO2   98 % (05/11/18 1447)     Post Anesthesia Care and Evaluation    Patient location during evaluation: PHASE II  Patient participation: complete - patient participated  Level of consciousness: awake and alert  Pain score: 1  Pain management: adequate  Airway patency: patent  Anesthetic complications: No anesthetic complications  PONV Status: controlled  Cardiovascular status: acceptable  Respiratory status: acceptable  Hydration status: acceptable

## 2018-05-11 NOTE — ANESTHESIA PREPROCEDURE EVALUATION
Anesthesia Evaluation     Patient summary reviewed and Nursing notes reviewed   NPO Solid Status: > 8 hours  NPO Liquid Status: > 8 hours           Airway   Mallampati: II  TM distance: >3 FB  Neck ROM: full  no difficulty expected  Dental    (+) poor dentition    Pulmonary    (+) COPD moderate, asthma, sleep apnea, decreased breath sounds,   Cardiovascular     Rhythm: regular  Rate: normal    (+) hypertension, hyperlipidemia,       Neuro/Psych  (+) psychiatric history,     GI/Hepatic/Renal/Endo    (+)  GERD,  diabetes mellitus,     Musculoskeletal     (+) back pain, chronic pain,   Abdominal    Substance History - negative use     OB/GYN negative ob/gyn ROS         Other   (+) blood dyscrasia, arthritis   history of cancer                    Anesthesia Plan    ASA 3     general     intravenous induction   Anesthetic plan and risks discussed with patient.  Use of blood products discussed with patient .   Plan discussed with CRNA.

## 2018-05-15 ENCOUNTER — HOSPITAL ENCOUNTER (EMERGENCY)
Facility: HOSPITAL | Age: 43
Discharge: HOME | End: 2018-05-15
Attending: FAMILY MEDICINE | Admitting: FAMILY MEDICINE

## 2018-05-15 VITALS
BODY MASS INDEX: 46.65 KG/M2 | RESPIRATION RATE: 18 BRPM | OXYGEN SATURATION: 97 % | HEIGHT: 69 IN | TEMPERATURE: 98 F | HEART RATE: 66 BPM | DIASTOLIC BLOOD PRESSURE: 76 MMHG | WEIGHT: 315 LBS | SYSTOLIC BLOOD PRESSURE: 132 MMHG

## 2018-05-15 DIAGNOSIS — T85.838A JP DRAIN BLEEDING, INITIAL ENCOUNTER: ICD-10-CM

## 2018-05-15 DIAGNOSIS — Z98.890 POST-OPERATIVE STATE: Primary | ICD-10-CM

## 2018-05-15 PROCEDURE — 99283 EMERGENCY DEPT VISIT LOW MDM: CPT

## 2018-05-15 RX ORDER — SULFAMETHOXAZOLE AND TRIMETHOPRIM 800; 160 MG/1; MG/1
1 TABLET ORAL ONCE
Status: COMPLETED | OUTPATIENT
Start: 2018-05-15 | End: 2018-05-15

## 2018-05-15 RX ORDER — SULFAMETHOXAZOLE AND TRIMETHOPRIM 800; 160 MG/1; MG/1
1 TABLET ORAL 2 TIMES DAILY
Qty: 4 TABLET | Refills: 0 | Status: SHIPPED | OUTPATIENT
Start: 2018-05-15 | End: 2018-05-17 | Stop reason: SDUPTHER

## 2018-05-15 RX ADMIN — SULFAMETHOXAZOLE AND TRIMETHOPRIM 160 MG: 800; 160 TABLET ORAL at 22:11

## 2018-05-16 LAB
LAB AP CASE REPORT: NORMAL
Lab: NORMAL
PATH REPORT.FINAL DX SPEC: NORMAL

## 2018-05-16 NOTE — ED PROVIDER NOTES
Subjective   History of Present Illness  41 y/o M here after having a sebaceous cyst removed from his posterior neck on 5/11/18 w/ LLOYD drain placed. Pt states that the drain began having decreased drainage over past 48 hours. No fever/chills. No pain or worsening swelling at operative site. Pt states that there is slight BRB from skin where LLOYD drain is located but that it is intermittent and easily controlled.  Review of Systems   Constitutional: Negative for chills, fatigue and fever.   Eyes: Negative for photophobia and visual disturbance.   Respiratory: Negative for cough, chest tightness, shortness of breath and wheezing.    Cardiovascular: Negative for chest pain and palpitations.   Gastrointestinal: Negative for abdominal distention and abdominal pain.   Genitourinary: Negative for difficulty urinating and dysuria.   Musculoskeletal: Negative for back pain and neck pain.   Skin: Negative for color change and pallor.        Pt had sebaceous cyst removed on 5/11 w/ drain placed.   Neurological: Negative for tremors, weakness, light-headedness, numbness and headaches.   Hematological: Does not bruise/bleed easily.   All other systems reviewed and are negative.      Past Medical History:   Diagnosis Date   • Asthma    • Back pain    • COPD (chronic obstructive pulmonary disease)    • CPAP (continuous positive airway pressure) dependence    • Diabetes mellitus    • Fibromyalgia    • GERD (gastroesophageal reflux disease)    • Gout    • Hyperlipidemia     mixed   • Hypertension    • Neuralgia and neuritis    • Osteoarthrosis    • Oxygen dependent     2L AT NIGHT    • Polycythemia    • Sleep apnea    • Type II diabetes mellitus, uncontrolled     uncomplicated       No Known Allergies    Past Surgical History:   Procedure Laterality Date   • EXCISION MASS HEAD/NECK N/A 5/11/2018    Procedure: SKIN LESION EXCISION NECK & UPPER BACK;  Surgeon: New Camacho MD;  Location: [facility];  Service: General       Family  History   Problem Relation Age of Onset   • Arthritis Mother    • COPD Mother    • Asthma Mother    • Cancer Mother    • Hyperlipidemia Mother    • Diabetes Father    • Heart disease Father    • Hyperlipidemia Father    • Hypertension Father    • Stroke Father        Social History     Social History   • Marital status: Legally      Spouse name: donald   • Number of children: 2     Occupational History   • disabled      Social History Main Topics   • Smoking status: Never Smoker   • Smokeless tobacco: Current User     Types: Chew   • Alcohol use No   • Drug use: No   • Sexual activity: Defer     Other Topics Concern   • Not on file           Objective   Physical Exam   Constitutional: He is oriented to person, place, and time. He appears well-developed and well-nourished. He is active.   HENT:   Head: Normocephalic and atraumatic.   Right Ear: Hearing, external ear and ear canal normal.   Left Ear: Hearing, external ear and ear canal normal.   Nose: Nose normal.   Mouth/Throat: Uvula is midline, oropharynx is clear and moist and mucous membranes are normal.   Eyes: Conjunctivae, EOM and lids are normal. Pupils are equal, round, and reactive to light.   Neck: Trachea normal, normal range of motion, full passive range of motion without pain and phonation normal. Neck supple.   Cardiovascular: Normal rate, regular rhythm and normal heart sounds.    Pulmonary/Chest: Effort normal and breath sounds normal.   Abdominal: Normal appearance.   Neurological: He is alert and oriented to person, place, and time. GCS eye subscore is 4. GCS verbal subscore is 5. GCS motor subscore is 6.   Skin: Skin is warm, dry and intact. Capillary refill takes less than 2 seconds.        Psychiatric: He has a normal mood and affect. His speech is normal and behavior is normal. Cognition and memory are normal.   Nursing note and vitals reviewed.      Procedures           ED Course  ED Course      LLOYD drain manipulated slightly with no  increase drainage. No fluctuance or induration. Pt completed course of post-operative abx. Will give another 48 hours of abx until pt can f/u with Dr Camacho. Dr Camacho's op note reviewed.             MDM  Number of Diagnoses or Management Options  LLOYD drain bleeding, initial encounter: new and requires workup  Post-operative state: new and requires workup     Amount and/or Complexity of Data Reviewed  Review and summarize past medical records: yes    Risk of Complications, Morbidity, and/or Mortality  Presenting problems: high  Diagnostic procedures: high  Management options: high    Patient Progress  Patient progress: stable        Final diagnoses:   Post-operative state   LLOYD drain bleeding, initial encounter            Lasha Anderson MD  05/15/18 2795

## 2018-05-16 NOTE — ED NOTES
Pt states that he had a cyst removed from the back of his neck on Friday and has a LLOYD drain and is concerned that the drainage is slowed down.      Lynda Gross RN  05/15/18 2113

## 2018-05-17 ENCOUNTER — OFFICE VISIT (OUTPATIENT)
Dept: SURGERY | Facility: CLINIC | Age: 43
End: 2018-05-17

## 2018-05-17 ENCOUNTER — TELEPHONE (OUTPATIENT)
Dept: SURGERY | Facility: CLINIC | Age: 43
End: 2018-05-17

## 2018-05-17 VITALS — HEIGHT: 69 IN | WEIGHT: 315 LBS | BODY MASS INDEX: 46.65 KG/M2

## 2018-05-17 DIAGNOSIS — L02.91 ABSCESS: Primary | ICD-10-CM

## 2018-05-17 PROCEDURE — 99024 POSTOP FOLLOW-UP VISIT: CPT | Performed by: SURGERY

## 2018-05-17 RX ORDER — SULFAMETHOXAZOLE AND TRIMETHOPRIM 800; 160 MG/1; MG/1
1 TABLET ORAL 2 TIMES DAILY
Qty: 20 TABLET | Refills: 0 | Status: SHIPPED | OUTPATIENT
Start: 2018-05-17 | End: 2018-05-27

## 2018-05-17 NOTE — PROGRESS NOTES
Subjective   Johnathon Mclain is a 42 y.o. male.     History of Present Illness His path was just a chronic abscess. The drain output has stopped.     The following portions of the patient's history were reviewed and updated as appropriate: current medications, past family history, past medical history, past social history, past surgical history and problem list.    Review of Systems    Objective   Physical Exam wound clean and drain has nothing in it. It was removed.     Assessment/Plan   Johnathon was seen today for follow-up.    Diagnoses and all orders for this visit:    Abscess    Other orders  -     sulfamethoxazole-trimethoprim (BACTRIM DS,SEPTRA DS) 800-160 MG per tablet; Take 1 tablet by mouth 2 (Two) Times a Day for 10 days.    return 1 wk.

## 2018-05-29 ENCOUNTER — OFFICE VISIT (OUTPATIENT)
Dept: SURGERY | Facility: CLINIC | Age: 43
End: 2018-05-29

## 2018-05-29 VITALS — WEIGHT: 315 LBS | BODY MASS INDEX: 46.65 KG/M2 | HEIGHT: 69 IN

## 2018-05-29 DIAGNOSIS — IMO0001 CLASS 3 OBESITY DUE TO EXCESS CALORIES WITH SERIOUS COMORBIDITY AND BODY MASS INDEX (BMI) OF 50.0 TO 59.9 IN ADULT: Primary | ICD-10-CM

## 2018-05-29 DIAGNOSIS — L02.91 ABSCESS: ICD-10-CM

## 2018-05-29 PROCEDURE — 99212 OFFICE O/P EST SF 10 MIN: CPT | Performed by: SURGERY

## 2018-05-29 RX ORDER — SULFAMETHOXAZOLE AND TRIMETHOPRIM 800; 160 MG/1; MG/1
1 TABLET ORAL 2 TIMES DAILY
Qty: 6 TABLET | Refills: 0 | Status: SHIPPED | OUTPATIENT
Start: 2018-05-29 | End: 2018-06-12

## 2018-05-29 NOTE — PROGRESS NOTES
Subjective   Johnathon Mclain is a 42 y.o. male.     History of Present Illness His wound is still draining some but is healing in .     The following portions of the patient's history were reviewed and updated as appropriate: current medications, past family history, past medical history, past social history, past surgical history and problem list.    Review of Systems    Objective   Physical Exam wound is open in the central part with mild drainage and no erythema    Assessment/Plan   Johnathon was seen today for follow-up.    Diagnoses and all orders for this visit:    Class 3 obesity due to excess calories with serious comorbidity and body mass index (BMI) of 50.0 to 59.9 in adult    Abscess    Other orders  -     sulfamethoxazole-trimethoprim (BACTRIM DS) 800-160 MG per tablet; Take 1 tablet by mouth 2 (Two) Times a Day.    recheck in 2 weeks.

## 2018-05-31 ENCOUNTER — OFFICE VISIT (OUTPATIENT)
Dept: FAMILY MEDICINE CLINIC | Facility: CLINIC | Age: 43
End: 2018-05-31

## 2018-05-31 VITALS
TEMPERATURE: 96.6 F | BODY MASS INDEX: 46.65 KG/M2 | DIASTOLIC BLOOD PRESSURE: 70 MMHG | HEART RATE: 83 BPM | SYSTOLIC BLOOD PRESSURE: 130 MMHG | WEIGHT: 315 LBS | OXYGEN SATURATION: 97 % | HEIGHT: 69 IN

## 2018-05-31 DIAGNOSIS — I10 ESSENTIAL HYPERTENSION: ICD-10-CM

## 2018-05-31 DIAGNOSIS — Z79.899 HIGH RISK MEDICATION USE: ICD-10-CM

## 2018-05-31 DIAGNOSIS — E78.2 MIXED HYPERLIPIDEMIA: ICD-10-CM

## 2018-05-31 DIAGNOSIS — E08.40 DIABETES MELLITUS DUE TO UNDERLYING CONDITION WITH DIABETIC NEUROPATHY, WITH LONG-TERM CURRENT USE OF INSULIN (HCC): Primary | ICD-10-CM

## 2018-05-31 DIAGNOSIS — Z79.4 DIABETES MELLITUS DUE TO UNDERLYING CONDITION WITH DIABETIC NEUROPATHY, WITH LONG-TERM CURRENT USE OF INSULIN (HCC): Primary | ICD-10-CM

## 2018-05-31 DIAGNOSIS — M79.2 NEURALGIA AND NEURITIS: ICD-10-CM

## 2018-05-31 PROCEDURE — 99214 OFFICE O/P EST MOD 30 MIN: CPT | Performed by: NURSE PRACTITIONER

## 2018-05-31 RX ORDER — HYDROCODONE BITARTRATE AND ACETAMINOPHEN 7.5; 325 MG/1; MG/1
1 TABLET ORAL EVERY 6 HOURS PRN
Qty: 60 TABLET | Refills: 0 | Status: SHIPPED | OUTPATIENT
Start: 2018-05-31 | End: 2018-06-26 | Stop reason: SDUPTHER

## 2018-05-31 NOTE — PROGRESS NOTES
Subjective   Johnathon Mclain is a 42 y.o. male.     Chief Complaint   Patient presents with   • Follow-up       History of Present Illness       Type 2 diabetes with current insulin use-stable    Hypertension-stable    Obesity-some weight and this month    Recent sebaceous cyst removal on his neck by  Caoda[facility] Gutierrez surgery.  No consultations.  Has follow-up scheduled in 2 weeks.  Cleaning daily and keeping covered with dry dressing.    Chronic back pain and joint pain-rates his pain a 6 on pain scale rating of 0-10.  Etiology is on file.  He describes his pain as an aching, throbbing and sharp pain is always present in his low back, neck and at times hips and knees.  Activity makes his pain worse.  Rest does help decrease this pain.  He currently receives Neurontin which is helpful with the radiculopathy which radiates into his lower extremities from his low back.  He also receives Norco for his chronic pain.  He has no history of substance abuse or addiction.  He has been seen by Ortho Nathaliara in the past for consult.  He has attended physical therapy in the past without improvement.      The following portions of the patient's history were reviewed and updated as appropriate: allergies, current medications, past family history, past medical history, past social history, past surgical history and problem list.    Review of Systems   Constitutional: Negative for appetite change, fatigue and unexpected weight change.   HENT: Negative for congestion, ear pain, nosebleeds, postnasal drip, rhinorrhea, sinus pain, sinus pressure, sore throat, trouble swallowing and voice change.    Eyes: Negative for pain and visual disturbance.   Respiratory: Negative for cough, shortness of breath and wheezing.    Cardiovascular: Negative for chest pain and palpitations.   Gastrointestinal: Negative for abdominal pain, blood in stool, constipation and diarrhea.   Endocrine: Negative for cold intolerance and polydipsia.   Genitourinary:  "Negative for difficulty urinating, dysuria, flank pain and hematuria.   Musculoskeletal: Positive for arthralgias and back pain. Negative for gait problem, joint swelling and myalgias.   Skin: Positive for wound. Negative for color change and rash.   Allergic/Immunologic: Negative.  Negative for environmental allergies and food allergies.   Neurological: Negative for syncope, numbness and headaches.   Hematological: Negative.    Psychiatric/Behavioral: Negative for dysphoric mood, sleep disturbance and suicidal ideas. The patient is not nervous/anxious.    All other systems reviewed and are negative.      Objective     /70   Pulse 83   Temp 96.6 °F (35.9 °C) (Tympanic)   Ht 175.3 cm (69.02\")   Wt (!) 153 kg (338 lb)   SpO2 97%   BMI 49.89 kg/m²   Admission on 05/11/2018, Discharged on 05/11/2018   Component Date Value Ref Range Status   • Glucose 05/11/2018 82  70 - 130 mg/dL Final   • Case Report 05/11/2018    Final                    Value:Surgical Pathology Report                         Case: [id number]                                  Authorizing Provider:  New Camacho MD         Collected:           05/11/2018 01:24 PM          Ordering Location:     [facility]      Received:            05/11/2018 03:04 PM                                 OPERATING ROOM DEPARTMENT                                                    Pathologist:           Rubi Stallings MD                                                        Specimen:    Neck, CYST EXCISION BACK OF NECK                                                          • Final Diagnosis 05/11/2018    Final                    Value:This result contains rich text formatting which cannot be displayed here.   • Glucose 05/11/2018 89  70 - 130 mg/dL Final       Physical Exam   Constitutional: He is oriented to person, place, and time. Vital signs are normal. He appears well-developed and well-nourished. No distress.   Visibly obese.    HENT: "   Head: Normocephalic and atraumatic.   Right Ear: External ear normal.   Left Ear: External ear normal.   Nose: Nose normal.   Mouth/Throat: Oropharynx is clear and moist.   Eyes: EOM are normal. Pupils are equal, round, and reactive to light.   Neck: Normal range of motion. Neck supple. No tracheal deviation present. No thyromegaly present.   Cardiovascular: Normal rate, regular rhythm, normal heart sounds and intact distal pulses.    No murmur heard.  Pulmonary/Chest: Effort normal and breath sounds normal. No respiratory distress. He has no wheezes. He has no rales. He exhibits no tenderness.   Abdominal: Soft. Bowel sounds are normal. He exhibits no distension and no mass. There is no tenderness. There is no rebound and no guarding.   Musculoskeletal:        Cervical back: He exhibits tenderness.        Lumbar back: He exhibits decreased range of motion, tenderness, pain and spasm.   Decreased lumbar curvature, there is pain with forward flexion. DTR's +2. No edema.    Lymphadenopathy:     He has no cervical adenopathy.   Neurological: He is alert and oriented to person, place, and time. He has normal reflexes.   Skin: Skin is warm and dry. Capillary refill takes less than 2 seconds. No rash noted. He is not diaphoretic. No erythema. No pallor.        Psychiatric: He has a normal mood and affect. His speech is normal and behavior is normal. Judgment and thought content normal. His affect is not inappropriate. Cognition and memory are normal.   Denies thoughts of hurting self or others.   Nursing note and vitals reviewed.      Assessment/Plan     Problem List Items Addressed This Visit        Cardiovascular and Mediastinum    Essential hypertension    Mixed hyperlipidemia    Overview     mixed            Endocrine    Diabetes mellitus with diabetic neuropathy, with long-term current use of insulin - Primary       Nervous and Auditory    Neuralgia and neuritis    Relevant Medications    HYDROcodone-acetaminophen  (NORCO) 7.5-325 MG per tablet       Other    High risk medication use        Refill Norco 7.5-325 one by mouth every 6 hours when necessary #60 with no refill.  I have discussed diagnosis in detail today allowing time for questions and answers. Pt is aware of reasons to seek urgent or emergent medical care as well as reasons to return to the clinic for evaluation. Possible side effects, interactions and progression of symptoms discussed as well. Pt / family states understanding.   Emotional support and active listening provided.   John has been reviewed as consistent.  Uds is on file.   As part of this patient's treatment plan they are being prescribed controlled substance/substances with potential for abuse. This patient has been made aware of the appropriate use of such medications, including potential risk of somnolence, limited ability to drive and / or work safely, and potential for overdose. It has also been made clear that these medications are for use by this patient only, without concomitant use of alcohol or other substances unless prescribed/advised by medical provider. Patient has completed controlled substance agreement detailing terms of continued prescribing of controlled substances including monitoring John reports, drug screens and pill counts. The patient was asked and states they are not receiving narcotic medication from any there provider or any provider that this office has not been made aware of. History and physical exam exhibit continued safe and appropriate use of controlled substances.   Goal: Improved quality of life and reduction in pain as evidenced by pt report.   Patient has been instructed and counseled regarding opioid misuse and risk of addiction.  We have discussed proper storage and disposal of controlled medication.  Reviewed recent biopsy results and surgical notes with patient.   Continue to watch for changes in his cyst removal site and notify pcp/surgeon immediately with  changes.     Follow up in one month, sooner if needed. Routine labs every 3-6 months.     Errors in dictation may reflect use of voice recognition software and not all errors in transcription may have been detected prior to signing.                This document has been electronically signed by:  TYSON Vinson, NP-C

## 2018-06-01 DIAGNOSIS — D45 PV (POLYCYTHEMIA VERA) (HCC): Primary | ICD-10-CM

## 2018-06-07 RX ORDER — HYDROXYZINE PAMOATE 25 MG/1
CAPSULE ORAL
Qty: 30 CAPSULE | Refills: 5 | Status: SHIPPED | OUTPATIENT
Start: 2018-06-07 | End: 2018-06-11 | Stop reason: SDUPTHER

## 2018-06-08 ENCOUNTER — LAB (OUTPATIENT)
Dept: ONCOLOGY | Facility: CLINIC | Age: 43
End: 2018-06-08

## 2018-06-08 VITALS
HEART RATE: 94 BPM | OXYGEN SATURATION: 98 % | DIASTOLIC BLOOD PRESSURE: 70 MMHG | RESPIRATION RATE: 18 BRPM | TEMPERATURE: 97.4 F | SYSTOLIC BLOOD PRESSURE: 123 MMHG

## 2018-06-08 DIAGNOSIS — D45 PV (POLYCYTHEMIA VERA) (HCC): ICD-10-CM

## 2018-06-08 LAB
BASOPHILS # BLD AUTO: 0.05 10*3/MM3 (ref 0–0.3)
BASOPHILS NFR BLD AUTO: 0.7 % (ref 0–2)
DEPRECATED RDW RBC AUTO: 46.2 FL (ref 37–54)
EOSINOPHIL # BLD AUTO: 0.62 10*3/MM3 (ref 0–0.7)
EOSINOPHIL NFR BLD AUTO: 9.2 % (ref 0–5)
ERYTHROCYTE [DISTWIDTH] IN BLOOD BY AUTOMATED COUNT: 14.5 % (ref 11.5–14.5)
HCT VFR BLD AUTO: 48 % (ref 42–52)
HGB BLD-MCNC: 16.5 G/DL (ref 14–18)
IMM GRANULOCYTES # BLD: 0.01 10*3/MM3 (ref 0–0.03)
IMM GRANULOCYTES NFR BLD: 0.1 % (ref 0–0.5)
LYMPHOCYTES # BLD AUTO: 2.07 10*3/MM3 (ref 1–3)
LYMPHOCYTES NFR BLD AUTO: 30.8 % (ref 21–51)
MCH RBC QN AUTO: 29.6 PG (ref 27–33)
MCHC RBC AUTO-ENTMCNC: 34.4 G/DL (ref 33–37)
MCV RBC AUTO: 86.2 FL (ref 80–94)
MONOCYTES # BLD AUTO: 0.85 10*3/MM3 (ref 0.1–0.9)
MONOCYTES NFR BLD AUTO: 12.7 % (ref 0–10)
NEUTROPHILS # BLD AUTO: 3.11 10*3/MM3 (ref 1.4–6.5)
NEUTROPHILS NFR BLD AUTO: 46.5 % (ref 30–70)
PLATELET # BLD AUTO: 215 10*3/MM3 (ref 130–400)
PMV BLD AUTO: 9.9 FL (ref 6–10)
RBC # BLD AUTO: 5.57 10*6/MM3 (ref 4.7–6.1)
WBC NRBC COR # BLD: 6.71 10*3/MM3 (ref 4.5–12.5)

## 2018-06-08 PROCEDURE — 85025 COMPLETE CBC W/AUTO DIFF WBC: CPT | Performed by: INTERNAL MEDICINE

## 2018-06-11 RX ORDER — HYDROXYZINE PAMOATE 25 MG/1
25 CAPSULE ORAL NIGHTLY
Qty: 30 CAPSULE | Refills: 5 | Status: SHIPPED | OUTPATIENT
Start: 2018-06-11 | End: 2019-01-10

## 2018-06-12 ENCOUNTER — OFFICE VISIT (OUTPATIENT)
Dept: SURGERY | Facility: CLINIC | Age: 43
End: 2018-06-12

## 2018-06-12 VITALS — BODY MASS INDEX: 46.65 KG/M2 | HEIGHT: 69 IN | WEIGHT: 315 LBS

## 2018-06-12 DIAGNOSIS — Z51.89 VISIT FOR WOUND CHECK: Primary | ICD-10-CM

## 2018-06-12 PROCEDURE — 99212 OFFICE O/P EST SF 10 MIN: CPT | Performed by: SURGERY

## 2018-06-12 NOTE — PROGRESS NOTES
Subjective   Johnathon Mclain is a 42 y.o. male.     History of Present Illness He has less drainage and the wound is healing in slowly.     The following portions of the patient's history were reviewed and updated as appropriate: current medications, past family history, past medical history, past social history, past surgical history and problem list.    Review of Systems    Objective   Physical Exam wound is clean and no erythema. Small open area in the center is granulating.    Assessment/Plan   Johnathon was seen today for follow-up.    Diagnoses and all orders for this visit:    Visit for wound check    recheck in 3 weeks.

## 2018-06-26 ENCOUNTER — OFFICE VISIT (OUTPATIENT)
Dept: FAMILY MEDICINE CLINIC | Facility: CLINIC | Age: 43
End: 2018-06-26

## 2018-06-26 VITALS
RESPIRATION RATE: 20 BRPM | WEIGHT: 315 LBS | DIASTOLIC BLOOD PRESSURE: 70 MMHG | HEART RATE: 88 BPM | SYSTOLIC BLOOD PRESSURE: 120 MMHG | BODY MASS INDEX: 46.65 KG/M2 | HEIGHT: 69 IN

## 2018-06-26 DIAGNOSIS — I10 ESSENTIAL HYPERTENSION: Primary | ICD-10-CM

## 2018-06-26 DIAGNOSIS — E78.2 MIXED HYPERLIPIDEMIA: ICD-10-CM

## 2018-06-26 DIAGNOSIS — Z79.899 HIGH RISK MEDICATION USE: ICD-10-CM

## 2018-06-26 DIAGNOSIS — IMO0002 UNCONTROLLED TYPE 2 DIABETES MELLITUS WITH DIABETIC PERIPHERAL ANGIOPATHY WITHOUT GANGRENE, WITH LONG-TERM CURRENT USE OF INSULIN: ICD-10-CM

## 2018-06-26 DIAGNOSIS — M79.2 NEURALGIA AND NEURITIS: ICD-10-CM

## 2018-06-26 PROCEDURE — 99214 OFFICE O/P EST MOD 30 MIN: CPT | Performed by: NURSE PRACTITIONER

## 2018-06-26 RX ORDER — GABAPENTIN 600 MG/1
600 TABLET ORAL 3 TIMES DAILY
Qty: 90 TABLET | Refills: 2 | Status: SHIPPED | OUTPATIENT
Start: 2018-06-26 | End: 2018-08-27 | Stop reason: SDUPTHER

## 2018-06-26 RX ORDER — HYDROCODONE BITARTRATE AND ACETAMINOPHEN 7.5; 325 MG/1; MG/1
1 TABLET ORAL EVERY 6 HOURS PRN
Qty: 60 TABLET | Refills: 0 | Status: SHIPPED | OUTPATIENT
Start: 2018-06-26 | End: 2018-07-26 | Stop reason: SDUPTHER

## 2018-06-26 NOTE — PROGRESS NOTES
Subjective   Johnathon Mclain is a 42 y.o. male.     Chief Complaint   Patient presents with   • Osteoarthritis   • Diabetes       History of Present Illness     Abscess surgical site still open and draining clear fluid.  He has a another appointment to follow-up next week with his surgeon.  Denies pain or fever.  Washing daily with supper moderate.  Essential     hypertension-stable with Coreg, chlorthalidone and lisinopril.    Type 2 diabetes with secretory disorder-stable with current medication regimen.  Currently taking Toujeo, glipizide, invokana. Most recent A1c was normal.    Chronic back pain and joint pain-rates his pain a 6 on pain scale rating of 0-10. Radiology is on file.  He describes his pain as an aching, throbbing and sharp pain is always present in his low back, neck and at times hips and knees.  Activity makes his pain worse.  Rest does help decrease this pain.  He currently receives Neurontin which is helpful with the radiculopathy which radiates into his lower extremities from his low back.  He also receives Norco for his chronic pain.  He has no history of substance abuse or addiction.  He has been seen by Ortho in the past for consult.  He has attended physical therapy in the past without improvement.     The following portions of the patient's history were reviewed and updated as appropriate: allergies, current medications, past family history, past medical history, past social history, past surgical history and problem list.    Review of Systems   Constitutional: Negative for activity change, appetite change, chills, fatigue, fever and unexpected weight change.   HENT: Negative for congestion, ear pain, sinus pain, sinus pressure and sore throat.    Eyes: Negative.    Respiratory: Positive for apnea (cpap ). Negative for cough, chest tightness, shortness of breath and wheezing.    Cardiovascular: Negative for chest pain and palpitations.   Gastrointestinal: Negative for blood in stool,  "constipation, diarrhea and vomiting.   Endocrine: Negative.    Genitourinary: Negative for dysuria and flank pain.   Musculoskeletal: Positive for arthralgias, back pain and neck pain (surgical site ). Negative for neck stiffness.   Skin: Positive for wound.   Allergic/Immunologic: Positive for environmental allergies.   Neurological: Negative.    Hematological: Negative.    Psychiatric/Behavioral: Negative for dysphoric mood and suicidal ideas. The patient is not nervous/anxious.        Objective     /70 (BP Location: Right arm)   Pulse 88   Resp 20   Ht 175.3 cm (69\")   Wt (!) 152 kg (336 lb)   BMI 49.62 kg/m²   Lab on 06/08/2018   Component Date Value Ref Range Status   • WBC 06/08/2018 6.71  4.50 - 12.50 10*3/mm3 Final   • RBC 06/08/2018 5.57  4.70 - 6.10 10*6/mm3 Final   • Hemoglobin 06/08/2018 16.5  14.0 - 18.0 g/dL Final   • Hematocrit 06/08/2018 48.0  42.0 - 52.0 % Final   • MCV 06/08/2018 86.2  80.0 - 94.0 fL Final   • MCH 06/08/2018 29.6  27.0 - 33.0 pg Final   • MCHC 06/08/2018 34.4  33.0 - 37.0 g/dL Final   • RDW 06/08/2018 14.5  11.5 - 14.5 % Final   • RDW-SD 06/08/2018 46.2  37.0 - 54.0 fl Final   • MPV 06/08/2018 9.9  6.0 - 10.0 fL Final   • Platelets 06/08/2018 215  130 - 400 10*3/mm3 Final   • Neutrophil % 06/08/2018 46.5  30.0 - 70.0 % Final   • Lymphocyte % 06/08/2018 30.8  21.0 - 51.0 % Final   • Monocyte % 06/08/2018 12.7* 0.0 - 10.0 % Final   • Eosinophil % 06/08/2018 9.2* 0.0 - 5.0 % Final   • Basophil % 06/08/2018 0.7  0.0 - 2.0 % Final   • Immature Grans % 06/08/2018 0.1  0.0 - 0.5 % Final   • Neutrophils, Absolute 06/08/2018 3.11  1.40 - 6.50 10*3/mm3 Final   • Lymphocytes, Absolute 06/08/2018 2.07  1.00 - 3.00 10*3/mm3 Final   • Monocytes, Absolute 06/08/2018 0.85  0.10 - 0.90 10*3/mm3 Final   • Eosinophils, Absolute 06/08/2018 0.62  0.00 - 0.70 10*3/mm3 Final   • Basophils, Absolute 06/08/2018 0.05  0.00 - 0.30 10*3/mm3 Final   • Immature Grans, Absolute 06/08/2018 0.01  0.00 - " 0.03 10*3/mm3 Final       Physical Exam   Constitutional: He is oriented to person, place, and time. Vital signs are normal. He appears well-developed and well-nourished. No distress.   Visibly obese.    HENT:   Head: Normocephalic and atraumatic.   Right Ear: External ear normal.   Left Ear: External ear normal.   Nose: Nose normal.   Mouth/Throat: Oropharynx is clear and moist.   Eyes: Conjunctivae and EOM are normal. Pupils are equal, round, and reactive to light. Right eye exhibits no discharge. Left eye exhibits no discharge.   Neck: Normal range of motion. Neck supple. No tracheal deviation present. No thyromegaly present.   Cardiovascular: Normal rate, regular rhythm, normal heart sounds and intact distal pulses.    No murmur heard.  Pulmonary/Chest: Effort normal and breath sounds normal. No respiratory distress. He has no wheezes. He has no rales. He exhibits no tenderness.   Abdominal: Soft. Bowel sounds are normal. He exhibits no distension and no mass. There is no tenderness. There is no rebound and no guarding.   Musculoskeletal:        Cervical back: He exhibits tenderness.        Lumbar back: He exhibits decreased range of motion, tenderness, pain and spasm.   Decreased lumbar curvature, there is pain with forward flexion. DTR's +2. No edema.    Lymphadenopathy:     He has no cervical adenopathy.   Neurological: He is alert and oriented to person, place, and time. He has normal reflexes.   Skin: Skin is warm and dry. Capillary refill takes less than 2 seconds. No rash noted. He is not diaphoretic. No erythema. No pallor.        Psychiatric: He has a normal mood and affect. His speech is normal and behavior is normal. Judgment and thought content normal. His affect is not inappropriate. Cognition and memory are normal.   Denies thoughts of hurting self or others.   Nursing note and vitals reviewed.      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 Keep covered when in public.   I have discussed diagnosis in detail today allowing time for questions and answers. Pt is aware of reasons to seek urgent or emergent medical care as well as reasons to return to the clinic for evaluation. Possible side effects, interactions and progression of symptoms discussed as well. Pt / family states understanding.   Emotional support and active listening provided.   Goal: Improved quality of life and reduction in pain as evidenced by pt report.   Pt has been educated/instructed on diabetic care and protocols.  Diabetic diet instructions provided.  Medication regimen reviewed.  Discussed possible side effects and interactions of current medications.  Sick day rules reviewed. Continue to monitor blood sugar and report abnormal readings as discussed today. Understanding stated by patient.   John has been reviewed as consistent.  Uds is on file.   Controlled substance agreement has been reviewed today.     Refill [place]oh 7.5-325 one by mouth every 6 hours when necessary #60 with no refill.    Refill Neurontin 600 mg 1 by mouth 3 times a day #90 with 2 refills.          Follow up in one month, sooner if needed. Routine labs every 3-6 months.     Errors in dictation may reflect use of voice recognition software and not all errors in transcription may have been detected prior to signing.         This document has been electronically signed by:  Erica  TYSON Michel, NP-C

## 2018-07-03 ENCOUNTER — OFFICE VISIT (OUTPATIENT)
Dept: SURGERY | Facility: CLINIC | Age: 43
End: 2018-07-03

## 2018-07-03 VITALS — BODY MASS INDEX: 46.65 KG/M2 | HEIGHT: 69 IN | WEIGHT: 315 LBS

## 2018-07-03 DIAGNOSIS — Z51.89 VISIT FOR WOUND CHECK: Primary | ICD-10-CM

## 2018-07-03 PROCEDURE — 99212 OFFICE O/P EST SF 10 MIN: CPT | Performed by: SURGERY

## 2018-07-03 NOTE — PROGRESS NOTES
Subjective   Johnathon Mclain is a 42 y.o. male.     History of Present Illness His wound on the back of his neck is closed now. No drainage.    The following portions of the patient's history were reviewed and updated as appropriate: current medications, past family history, past medical history, past social history, past surgical history and problem list.    Review of Systems  Neck abscess, obesity, skin tags    Objective   Physical Exam No drainage from neck wound. No sign of infection or abscess.     Assessment/Plan   Johnathon was seen today for follow-up.    Diagnoses and all orders for this visit:    Visit for wound check    return prn

## 2018-07-05 DIAGNOSIS — E78.2 MIXED HYPERLIPIDEMIA: ICD-10-CM

## 2018-07-05 DIAGNOSIS — M79.2 NEURALGIA AND NEURITIS: ICD-10-CM

## 2018-07-05 DIAGNOSIS — K21.00 GASTROESOPHAGEAL REFLUX DISEASE WITH ESOPHAGITIS: ICD-10-CM

## 2018-07-05 DIAGNOSIS — I10 ESSENTIAL HYPERTENSION: ICD-10-CM

## 2018-07-09 RX ORDER — LISINOPRIL 20 MG/1
TABLET ORAL
Qty: 60 TABLET | Refills: 5 | Status: SHIPPED | OUTPATIENT
Start: 2018-07-09 | End: 2018-12-26 | Stop reason: SDUPTHER

## 2018-07-09 RX ORDER — METFORMIN HYDROCHLORIDE 500 MG/1
TABLET, EXTENDED RELEASE ORAL
Qty: 120 TABLET | Refills: 5 | Status: SHIPPED | OUTPATIENT
Start: 2018-07-09 | End: 2018-11-27

## 2018-07-09 RX ORDER — COLCHICINE 0.6 MG/1
TABLET ORAL
Qty: 30 TABLET | Refills: 5 | Status: SHIPPED | OUTPATIENT
Start: 2018-07-09 | End: 2018-12-26 | Stop reason: SDUPTHER

## 2018-07-09 RX ORDER — OMEPRAZOLE 20 MG/1
CAPSULE, DELAYED RELEASE ORAL
Qty: 30 CAPSULE | Refills: 5 | Status: ON HOLD | OUTPATIENT
Start: 2018-07-09 | End: 2019-01-02

## 2018-07-09 RX ORDER — ICOSAPENT ETHYL 1000 MG/1
CAPSULE ORAL
Qty: 120 CAPSULE | Refills: 5 | Status: ON HOLD | OUTPATIENT
Start: 2018-07-09 | End: 2019-01-02

## 2018-07-09 RX ORDER — CARVEDILOL 6.25 MG/1
TABLET ORAL
Qty: 60 TABLET | Refills: 5 | Status: SHIPPED | OUTPATIENT
Start: 2018-07-09 | End: 2018-12-26 | Stop reason: SDUPTHER

## 2018-07-09 RX ORDER — FENOFIBRATE 48 MG/1
TABLET, COATED ORAL
Qty: 30 TABLET | Refills: 5 | Status: SHIPPED | OUTPATIENT
Start: 2018-07-09 | End: 2018-12-26 | Stop reason: SDUPTHER

## 2018-07-09 RX ORDER — GLIPIZIDE 5 MG/1
TABLET ORAL
Qty: 30 TABLET | Refills: 5 | Status: SHIPPED | OUTPATIENT
Start: 2018-07-09 | End: 2018-10-25

## 2018-07-09 RX ORDER — CANAGLIFLOZIN 100 MG/1
TABLET, FILM COATED ORAL
Qty: 30 TABLET | Refills: 5 | Status: SHIPPED | OUTPATIENT
Start: 2018-07-09 | End: 2018-07-26 | Stop reason: SINTOL

## 2018-07-09 RX ORDER — FEBUXOSTAT 80 MG/1
TABLET ORAL
Qty: 30 TABLET | Refills: 5 | Status: ON HOLD | OUTPATIENT
Start: 2018-07-09 | End: 2019-01-02

## 2018-07-09 RX ORDER — LORATADINE 10 MG/1
TABLET ORAL
Qty: 30 TABLET | Refills: 5 | Status: ON HOLD | OUTPATIENT
Start: 2018-07-09 | End: 2019-01-02

## 2018-07-26 ENCOUNTER — OFFICE VISIT (OUTPATIENT)
Dept: FAMILY MEDICINE CLINIC | Facility: CLINIC | Age: 43
End: 2018-07-26

## 2018-07-26 VITALS
OXYGEN SATURATION: 98 % | HEIGHT: 69 IN | TEMPERATURE: 98.5 F | DIASTOLIC BLOOD PRESSURE: 86 MMHG | BODY MASS INDEX: 46.65 KG/M2 | HEART RATE: 93 BPM | WEIGHT: 315 LBS | SYSTOLIC BLOOD PRESSURE: 126 MMHG

## 2018-07-26 DIAGNOSIS — E66.01 MORBID OBESITY WITH BMI OF 50.0-59.9, ADULT (HCC): ICD-10-CM

## 2018-07-26 DIAGNOSIS — M15.9 PRIMARY OSTEOARTHRITIS INVOLVING MULTIPLE JOINTS: ICD-10-CM

## 2018-07-26 DIAGNOSIS — IMO0002 UNCONTROLLED TYPE 2 DIABETES MELLITUS WITH DIABETIC PERIPHERAL ANGIOPATHY WITHOUT GANGRENE, WITH LONG-TERM CURRENT USE OF INSULIN: Primary | ICD-10-CM

## 2018-07-26 DIAGNOSIS — M79.2 NEURALGIA AND NEURITIS: ICD-10-CM

## 2018-07-26 PROCEDURE — 99214 OFFICE O/P EST MOD 30 MIN: CPT | Performed by: NURSE PRACTITIONER

## 2018-07-26 RX ORDER — HYDROCODONE BITARTRATE AND ACETAMINOPHEN 7.5; 325 MG/1; MG/1
1 TABLET ORAL EVERY 6 HOURS PRN
Qty: 60 TABLET | Refills: 0 | Status: SHIPPED | OUTPATIENT
Start: 2018-07-26 | End: 2018-08-27 | Stop reason: SDUPTHER

## 2018-07-26 NOTE — PROGRESS NOTES
Subjective   Johnathon Mclain is a 42 y.o. male.     Chief Complaint   Patient presents with   • Hypertension   • Diabetes       History of Present Illness     Patient is here today to follow-up on neuralgia and neuritis associated with type 2 diabetes.  He receives Neurontin for this pain.  He is currently on invokana, metformin and toujeo. Reports glucose well controlled with no hyperglycemic episodes.     Chronic back pain and joint pain - rates his pain a 6 on pain scale rating of 0-10. Radiology is on file.  He describes his pain as an aching, throbbing and sharp pain is always present in his low back, neck and at times hips and knees.  Activity makes his pain worse.  Rest does help decrease this pain.  He currently receives Neurontin which is helpful with the radiculopathy which radiates into his lower extremities from his low back.  He also receives Norco for his chronic pain.  He has no history of substance abuse or addiction.  He has been seen by Ortho in the past for consult.  He has attended physical therapy in the past without improvement.     Hypertension - stable      The following portions of the patient's history were reviewed and updated as appropriate: allergies, current medications, past family history, past medical history, past social history, past surgical history and problem list.    Review of Systems   Constitutional: Negative for activity change, appetite change, chills, fatigue, fever and unexpected weight change.   HENT: Negative for congestion, ear pain, sinus pain, sinus pressure and sore throat.    Eyes: Negative for pain, redness and itching.   Respiratory: Positive for apnea (cpap ). Negative for cough, chest tightness, shortness of breath and wheezing.    Cardiovascular: Negative for chest pain and palpitations.   Gastrointestinal: Negative for blood in stool, constipation, diarrhea and vomiting.   Endocrine: Negative.    Genitourinary: Negative for dysuria and flank pain.  "  Musculoskeletal: Positive for arthralgias and back pain. Negative for neck pain and neck stiffness.   Skin: Negative for wound.   Allergic/Immunologic: Positive for environmental allergies.   Neurological: Negative for dizziness, numbness and headaches.   Hematological: Negative.    Psychiatric/Behavioral: Negative for dysphoric mood and suicidal ideas. The patient is not nervous/anxious.    All other systems reviewed and are negative.      Objective     /86   Pulse 93   Temp 98.5 °F (36.9 °C) (Temporal Artery )   Ht 175.3 cm (69\")   Wt (!) 154 kg (340 lb)   SpO2 98%   BMI 50.21 kg/m²   Lab on 06/08/2018   Component Date Value Ref Range Status   • WBC 06/08/2018 6.71  4.50 - 12.50 10*3/mm3 Final   • RBC 06/08/2018 5.57  4.70 - 6.10 10*6/mm3 Final   • Hemoglobin 06/08/2018 16.5  14.0 - 18.0 g/dL Final   • Hematocrit 06/08/2018 48.0  42.0 - 52.0 % Final   • MCV 06/08/2018 86.2  80.0 - 94.0 fL Final   • MCH 06/08/2018 29.6  27.0 - 33.0 pg Final   • MCHC 06/08/2018 34.4  33.0 - 37.0 g/dL Final   • RDW 06/08/2018 14.5  11.5 - 14.5 % Final   • RDW-SD 06/08/2018 46.2  37.0 - 54.0 fl Final   • MPV 06/08/2018 9.9  6.0 - 10.0 fL Final   • Platelets 06/08/2018 215  130 - 400 10*3/mm3 Final   • Neutrophil % 06/08/2018 46.5  30.0 - 70.0 % Final   • Lymphocyte % 06/08/2018 30.8  21.0 - 51.0 % Final   • Monocyte % 06/08/2018 12.7* 0.0 - 10.0 % Final   • Eosinophil % 06/08/2018 9.2* 0.0 - 5.0 % Final   • Basophil % 06/08/2018 0.7  0.0 - 2.0 % Final   • Immature Grans % 06/08/2018 0.1  0.0 - 0.5 % Final   • Neutrophils, Absolute 06/08/2018 3.11  1.40 - 6.50 10*3/mm3 Final   • Lymphocytes, Absolute 06/08/2018 2.07  1.00 - 3.00 10*3/mm3 Final   • Monocytes, Absolute 06/08/2018 0.85  0.10 - 0.90 10*3/mm3 Final   • Eosinophils, Absolute 06/08/2018 0.62  0.00 - 0.70 10*3/mm3 Final   • Basophils, Absolute 06/08/2018 0.05  0.00 - 0.30 10*3/mm3 Final   • Immature Grans, Absolute 06/08/2018 0.01  0.00 - 0.03 10*3/mm3 Final "       Physical Exam   Constitutional: He is oriented to person, place, and time. Vital signs are normal. He appears well-developed and well-nourished. No distress.   Visibly obese.    HENT:   Head: Normocephalic and atraumatic.   Right Ear: External ear normal.   Left Ear: External ear normal.   Nose: Nose normal.   Mouth/Throat: Oropharynx is clear and moist.   Eyes: Pupils are equal, round, and reactive to light. EOM are normal.   Neck: Normal range of motion. Neck supple. No tracheal deviation present. No thyromegaly present.   Cardiovascular: Normal rate, regular rhythm, normal heart sounds and intact distal pulses.    No murmur heard.  Pulmonary/Chest: Effort normal and breath sounds normal. No respiratory distress. He has no wheezes. He has no rales. He exhibits no tenderness.   Abdominal: Soft. Bowel sounds are normal. He exhibits no distension and no mass. There is no tenderness. There is no rebound and no guarding.   Musculoskeletal:        Lumbar back: He exhibits decreased range of motion, tenderness, pain and spasm.   Decreased lumbar curvature, there is pain with forward flexion. DTR's +2. No edema.    Lymphadenopathy:     He has no cervical adenopathy.   Neurological: He is alert and oriented to person, place, and time. He has normal reflexes.   Skin: Skin is warm and dry. No rash noted. He is not diaphoretic. No erythema. No pallor.   Psychiatric: He has a normal mood and affect. His speech is normal and behavior is normal. Judgment and thought content normal. His affect is not inappropriate. Cognition and memory are normal.   Nursing note and vitals reviewed.      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  Monitor blood glucose closely.  Continue other medication regimens.  Report any elevations greater than 200.    Norco 7.5-325 one by mouth every 6 hours when necessary #60 no refill.  John has been reviewed as consistent.  Uds is on file.   Goal: Improved quality of life and reduction in pain as evidenced by pt report.   Proper body mechanics has been reviewed and discussed today.  Controlled substance agreement has been reviewed today.   Plan is to reevaluate monthly for continued need of controlled medication and treat accordingly.           Patient's Body mass index is 50.21 kg/m². BMI is above normal parameters. Recommendations include: exercise counseling and nutrition counseling.        I have discussed diagnosis in detail today allowing time for questions and answers. Pt is aware of reasons to seek urgent or emergent medical care as well as reasons to return to the clinic for evaluation. Possible side effects, interactions and progression of symptoms discussed as well. Pt / family states understanding.   Emotional support and active listening provided.     Follow up in one month, sooner if needed. Routine labs every 3-6 months.     Errors in dictation may reflect use of voice recognition software and not all errors in transcription may have been detected prior to signing.           This document has been electronically signed by:  TYSON Vinson, NP-C

## 2018-08-02 RX ORDER — ESCITALOPRAM OXALATE 10 MG/1
TABLET ORAL
Qty: 30 TABLET | Refills: 5 | Status: ON HOLD | OUTPATIENT
Start: 2018-08-02 | End: 2019-01-02

## 2018-08-04 NOTE — PROGRESS NOTES
"Johnathon Mclain  [id number]  1975 8/8/2018    Referring Provider:   TYSON Sepulveda     Reason for Follow Up:   Secondary Polycythemia  Obstructive Sleep Apnea     Chief Complaint:  Secondary Polycythemia      History of Present Illness:   Johnathon Mclain is a very pleasant 43 y.o.  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      Mr. Mclain has had an erythrocytosis since summer of 2016. He previously received phlebotomy with his primary care provider. He denied of any significant headaches or pruritis. He does not have any personal history of abnormal bleeding or thrombosis. He is a never smoker, however does have strong exposure to second hand smoke and has also worked in the coal mines for 14-15 years. His wife at the time during his initial consultation noted that he does snore and awake himself in the middle of his sleep, and had never been tested for obstructive sleep apnea. He was also previously on testosterone supplementation however is no longer taking this. He did undergo pulmonary function tests which were consistent with sleep apnea versus hypoxemia due to COPD. As part of his evaluation for secondary polycythemia he underwent a sleep apnea study and was found to have moderate obstructive sleep apnea and was at first place on nasal canula and later a CPAP machine. He was initially non compliant with CPAP due to intolerability from high pressure settings which has been decreased and patient reports better compliance and improvement of sleep.    Interim History:  Since his last visit with me he has been having hypoglycemia in the am and was recently taken off one of his \"sugar medications\" by his primary provider. He also had been following with Dr. Camacho in regards to tender neck cyst/abcess and on 5/11/18 he underwent skin neck excision which was significant for abscess/cyst and was placed on an antibiotic during " that period. He denies of any significant changes since his last visit otherwise.      The following portions of the patient's history were reviewed and updated as appropriate: allergies, current medications, past family history, past medical history, past social history, past surgical history and problem list.    No Known Allergies    Past Medical History:   Diagnosis Date   • Asthma    • Back pain    • COPD (chronic obstructive pulmonary disease) (CMS/[facility])    • CPAP (continuous positive airway pressure) dependence    • Diabetes mellitus (CMS/[facility])    • Fibromyalgia    • GERD (gastroesophageal reflux disease)    • Gout    • Hyperlipidemia     mixed   • Hypertension    • Neuralgia and neuritis    • Osteoarthrosis    • Oxygen dependent     2L AT NIGHT    • Polycythemia    • Sleep apnea    • Type II diabetes mellitus, uncontrolled (CMS/[facility])     uncomplicated       Past Surgical History:   Procedure Laterality Date   • EXCISION MASS HEAD/NECK N/A 5/11/2018    Procedure: SKIN LESION EXCISION NECK & UPPER BACK;  Surgeon: New Camacho MD;  Location: [facility];  Service: General       Social History     Social History   • Marital status: Legally      Spouse name: donald   • Number of children: 2   • Years of education: N/A     Occupational History   • disabled      Social History Main Topics   • Smoking status: Never Smoker   • Smokeless tobacco: Current User     Types: Chew   • Alcohol use No   • Drug use: No   • Sexual activity: Defer     Other Topics Concern   • Not on file     Social History Narrative   • No narrative on file       Family History   Problem Relation Age of Onset   • Arthritis Mother    • COPD Mother    • Asthma Mother    • Cancer Mother    • Hyperlipidemia Mother    • Diabetes Father    • Heart disease Father    • Hyperlipidemia Father    • Hypertension Father    • Stroke Father          Current Outpatient Prescriptions:   •  albuterol (PROVENTIL HFA;VENTOLIN HFA) 108 (90 Base) MCG/ACT  inhaler, Inhale 1 puff As Needed for Wheezing., Disp: 1 inhaler, Rfl: 5  •  aspirin 81 MG EC tablet, Take 1 tablet by mouth Daily., Disp: 90 tablet, Rfl: 1  •  carvedilol (COREG) 6.25 MG tablet, TAKE 1 Tablet BY MOUTH TWICE DAILY, Disp: 60 tablet, Rfl: 5  •  chlorthalidone (HYGROTON) 25 MG tablet, Take 1 tablet by mouth Daily., Disp: 30 tablet, Rfl: 5  •  cholecalciferol (VITAMIN D3) 1000 units tablet, Take 1 tablet by mouth Daily., Disp: 30 tablet, Rfl: 5  •  clobetasol (TEMOVATE) 0.05 % cream, Apply  topically 2 (Two) Times a Day., Disp: 30 g, Rfl: 5  •  colchicine 0.6 MG tablet, TAKE 1 Tablet BY MOUTH EVERY DAY, Disp: 30 tablet, Rfl: 5  •  escitalopram (LEXAPRO) 10 MG tablet, TAKE ONE TABLET BY MOUTH EVERY DAY, Disp: 30 tablet, Rfl: 5  •  fenofibrate (TRICOR) 48 MG tablet, TAKE 1 Tablet BY MOUTH EVERY DAY, Disp: 30 tablet, Rfl: 5  •  fluticasone (FLONASE) 50 MCG/ACT nasal spray, 2 sprays into each nostril Daily. Administer 2 sprays in each nostril for each dose., Disp: 1 bottle, Rfl: 5  •  gabapentin (NEURONTIN) 600 MG tablet, Take 1 tablet by mouth 3 (Three) Times a Day., Disp: 90 tablet, Rfl: 2  •  glipiZIDE (GLUCOTROL) 5 MG tablet, TAKE 1 Tablet BY MOUTH EVERY DAY, Disp: 30 tablet, Rfl: 5  •  HYDROcodone-acetaminophen (NORCO) 7.5-325 MG per tablet, Take 1 tablet by mouth Every 6 (Six) Hours As Needed for Moderate Pain  or Severe Pain ., Disp: 60 tablet, Rfl: 0  •  hydrOXYzine (VISTARIL) 25 MG capsule, Take 1 capsule by mouth Every Night., Disp: 30 capsule, Rfl: 5  •  Insulin Glargine (TOUJEO SOLOSTAR) 300 UNIT/ML solution pen-injector, Inject 20 Units under the skin Daily., Disp: 5 pen, Rfl: 5  •  ketoconazole (NIZORAL) 2 % shampoo, Apply  topically 2 (Two) Times a Week., Disp: 120 mL, Rfl: 5  •  lisinopril (PRINIVIL,ZESTRIL) 20 MG tablet, TAKE 1 Tablet BY MOUTH TWICE DAILY, Disp: 60 tablet, Rfl: 5  •  loratadine (CLARITIN) 10 MG tablet, TAKE 1 Tablet BY MOUTH EVERY DAY, Disp: 30 tablet, Rfl: 5  •  metFORMIN  (GLUCOPHAGE) 500 MG tablet, Take 1 tablet by mouth 2 (Two) Times a Day., Disp: 60 tablet, Rfl: 5  •  metFORMIN ER (GLUCOPHAGE-XR) 500 MG 24 hr tablet, TAKE TWO TABLETS BY MOUTH TWICE DAILY, Disp: 120 tablet, Rfl: 5  •  mupirocin (BACTROBAN) 2 % cream, Apply  topically 3 (Three) Times a Day., Disp: 1 each, Rfl: 5  •  omeprazole (priLOSEC) 20 MG capsule, TAKE 1 Capsule BY MOUTH EVERY DAY, Disp: 30 capsule, Rfl: 5  •  sildenafil (VIAGRA) 100 MG tablet, Take 1 tablet by mouth Daily As Needed for erectile dysfunction., Disp: 10 tablet, Rfl: 3  •  ULORIC 80 MG tablet tablet, TAKE 1 Tablet BY MOUTH EVERY DAY, Disp: 30 tablet, Rfl: 5  •  VASCEPA 1 g capsule capsule, TAKE 1 CAPSULE BY MOUTH TWICE DAILY WITH MEALS, Disp: 120 capsule, Rfl: 5        Review of Systems  Constitutional: No fever, chills, night sweats or weight loss. +weight has been stable in the last few months  HEENT:  No headaches, vision changes or hearing changes, no sinus drainage, sore throat.   Cardiovascular:  No palpitations, chest pain, syncopal episodes or edema.   Pulmonary:  No shortness of breath, hemoptysis, or cough.   Gastrointestinal:  No nausea or vomiting. No constipation or diarrhea. No change in appetite. No abdominal pain. No melena or hematochezia.   Genitourinary:  No hematuria, or changes in urination.   Musculoskeletal:  No pain, or joint problems.   Skin: No rashes or pruritus.   Endocrine:  No hot flashes or chills   Hematologic:  No history of free bleeding, spontaneous bleeding or clotting problems. No lymphadenopathy.    Immunologic:  No allergies or frequent infections.   Neurologic: No numbness, tingling, or weakness.   Psychiatric:  No anxiety or depression.            Physical Exam  Vital Signs: These were reviewed and listed as per patient’s electronic medical chart, 383lbs same since 8/14  Vitals:    08/08/18 0853   BP: 117/76   Pulse: 88   Resp: 18   Temp: 97.7 °F (36.5 °C)   SpO2: 98%     General: Awake, alert and oriented,  in no distress, obese  HEENT: Head is atraumatic, normocephalic, extraocular movements full, oropharynx clear, no scleral icterus, pink moist mucous membranes, large neck  Neck: supple, no jvd, lymphadenopathy, nodule on back of neck continues to persist and tender on palpation, no drainage  Cardiovascular: regular rate and rhythm without murmurs, rubs or gallops  Pulmonary: non-labored, clear to auscultation bilaterally, no wheezing, distant breath sounds due to body habitus  Abdomen: soft, non-tender, non-distended, normal active bowel sounds present, no organomegaly although difficult to assess due to obesity  Extremities: No clubbing, cyanosis or edema  Neurologic: Mental status as above, alert, awake and oriented, grossly non-focal exam  Skin: warm, dry, intact,         Labs / Studies:    Office Visit on 08/08/2018   Component Date Value   • WBC 08/08/2018 7.80    • RBC 08/08/2018 5.80    • Hemoglobin 08/08/2018 16.9    • Hematocrit 08/08/2018 49.4    • MCV 08/08/2018 85.2    • MCH 08/08/2018 29.1    • MCHC 08/08/2018 34.2    • RDW 08/08/2018 14.1    • RDW-SD 08/08/2018 43.7    • MPV 08/08/2018 10.0    • Platelets 08/08/2018 183    • Neutrophil % 08/08/2018 44.4    • Lymphocyte % 08/08/2018 35.0    • Monocyte % 08/08/2018 10.4*   • Eosinophil % 08/08/2018 9.6*   • Basophil % 08/08/2018 0.5    • Immature Grans % 08/08/2018 0.1    • Neutrophils, Absolute 08/08/2018 3.46    • Lymphocytes, Absolute 08/08/2018 2.73    • Monocytes, Absolute 08/08/2018 0.81    • Eosinophils, Absolute 08/08/2018 0.75*   • Basophils, Absolute 08/08/2018 0.04    • Immature Grans, Absolute 08/08/2018 0.01    Hospital Outpatient Visit on 08/08/2018   Component Date Value   • Site 08/08/2018 Arterial: left radial    • Cory's Test 08/08/2018 Positive    • pH, Arterial 08/08/2018 7.404    • pCO2, Arterial 08/08/2018 36.2    • pO2, Arterial 08/08/2018 74.9*   • HCO3, Arterial 08/08/2018 22.1    • Base Excess, Arterial 08/08/2018 -1.9    • O2  Saturation, Arterial 08/08/2018 95.4    • Hemoglobin, Blood Gas 08/08/2018 17.6*   • Hematocrit, Blood Gas 08/08/2018 52.0    • Oxyhemoglobin 08/08/2018 93.9    • Methemoglobin 08/08/2018 0.50    • Carboxyhemoglobin 08/08/2018 1.1    • A-a Gradiant 08/08/2018 25.2    • Temperature 08/08/2018 98.6    • Barometric Pressure for * 08/08/2018 730    • Modality 08/08/2018 Room Air    • FIO2 08/08/2018 21    Lab on 06/08/2018   Component Date Value   • WBC 06/08/2018 6.71    • RBC 06/08/2018 5.57    • Hemoglobin 06/08/2018 16.5    • Hematocrit 06/08/2018 48.0    • MCV 06/08/2018 86.2    • MCH 06/08/2018 29.6    • MCHC 06/08/2018 34.4    • RDW 06/08/2018 14.5    • RDW-SD 06/08/2018 46.2    • MPV 06/08/2018 9.9    • Platelets 06/08/2018 215    • Neutrophil % 06/08/2018 46.5    • Lymphocyte % 06/08/2018 30.8    • Monocyte % 06/08/2018 12.7*   • Eosinophil % 06/08/2018 9.2*   • Basophil % 06/08/2018 0.7    • Immature Grans % 06/08/2018 0.1    • Neutrophils, Absolute 06/08/2018 3.11    • Lymphocytes, Absolute 06/08/2018 2.07    • Monocytes, Absolute 06/08/2018 0.85    • Eosinophils, Absolute 06/08/2018 0.62    • Basophils, Absolute 06/08/2018 0.05    • Immature Grans, Absolute 06/08/2018 0.01    Admission on 05/11/2018, Discharged on 05/11/2018   Component Date Value   • Glucose 05/11/2018 82    • Case Report 05/11/2018                      Value:Surgical Pathology Report                         Case: [id number]                                  Authorizing Provider:  New Camacho MD         Collected:           05/11/2018 01:24 PM          Ordering Location:     [facility]      Received:            05/11/2018 03:04 PM                                 OPERATING ROOM DEPARTMENT                                                    Pathologist:           Rubi Stallings MD                                                        Specimen:    Neck, CYST EXCISION BACK OF NECK                                                           • Final Diagnosis 05/11/2018                      Value:This result contains rich text formatting which cannot be displayed here.   • Glucose 05/11/2018 89    Office Visit on 05/08/2018   Component Date Value   • WBC 05/08/2018 13.69*   • RBC 05/08/2018 6.01    • Hemoglobin 05/08/2018 17.3    • Hematocrit 05/08/2018 51.2    • MCV 05/08/2018 85.2    • MCH 05/08/2018 28.8    • MCHC 05/08/2018 33.8    • RDW 05/08/2018 13.7    • RDW-SD 05/08/2018 42.3    • MPV 05/08/2018 10.1*   • Platelets 05/08/2018 214    • Neutrophil % 05/08/2018 67.9    • Lymphocyte % 05/08/2018 20.5*   • Monocyte % 05/08/2018 9.0    • Eosinophil % 05/08/2018 2.0    • Basophil % 05/08/2018 0.1    • Immature Grans % 05/08/2018 0.5    • Neutrophils, Absolute 05/08/2018 9.30*   • Lymphocytes, Absolute 05/08/2018 2.80    • Monocytes, Absolute 05/08/2018 1.23*   • Eosinophils, Absolute 05/08/2018 0.27    • Basophils, Absolute 05/08/2018 0.02    • Immature Grans, Absolute 05/08/2018 0.07*   Office Visit on 04/25/2018   Component Date Value   • Glucose 04/25/2018 96    • BUN 04/25/2018 16    • Creatinine 04/25/2018 1.12    • Sodium 04/25/2018 138    • Potassium 04/25/2018 4.0    • Chloride 04/25/2018 103    • CO2 04/25/2018 25.7    • Calcium 04/25/2018 10.2*   • Total Protein 04/25/2018 6.9    • Albumin 04/25/2018 4.40    • ALT (SGPT) 04/25/2018 55*   • AST (SGOT) 04/25/2018 33    • Alkaline Phosphatase 04/25/2018 59    • Total Bilirubin 04/25/2018 1.1    • eGFR Non  Amer 04/25/2018 71    • Globulin 04/25/2018 2.5    • A/G Ratio 04/25/2018 1.8    • BUN/Creatinine Ratio 04/25/2018 14.3    • Anion Gap 04/25/2018 9.3    • TSH 04/25/2018 1.123    • Hemoglobin A1C 04/25/2018 5.70    • 25 Hydroxy, Vitamin D 04/25/2018 47.0    • Vitamin B-12 04/25/2018 382    • Total Cholesterol 04/25/2018 175    • Triglycerides 04/25/2018 263*   • HDL Cholesterol 04/25/2018 26*   • LDL Cholesterol  04/25/2018 96    • VLDL Cholesterol 04/25/2018 52.6    •  LDL/HDL Ratio 04/25/2018 3.71    • Microalbumin, Urine 04/25/2018 2.3    • WBC 04/25/2018 7.88    • RBC 04/25/2018 5.79    • Hemoglobin 04/25/2018 16.8    • Hematocrit 04/25/2018 49.0    • MCV 04/25/2018 84.6    • MCH 04/25/2018 29.0    • MCHC 04/25/2018 34.3    • RDW 04/25/2018 14.1    • RDW-SD 04/25/2018 43.3    • MPV 04/25/2018 10.7*   • Platelets 04/25/2018 216    • Neutrophil % 04/25/2018 47.2    • Lymphocyte % 04/25/2018 28.8    • Monocyte % 04/25/2018 11.8*   • Eosinophil % 04/25/2018 11.3*   • Basophil % 04/25/2018 0.5    • Immature Grans % 04/25/2018 0.4    • Neutrophils, Absolute 04/25/2018 3.72    • Lymphocytes, Absolute 04/25/2018 2.27    • Monocytes, Absolute 04/25/2018 0.93*   • Eosinophils, Absolute 04/25/2018 0.89*   • Basophils, Absolute 04/25/2018 0.04    • Immature Grans, Absolute 04/25/2018 0.03    • Osmolality Calc 04/25/2018 276.7    Lab on 03/08/2018   Component Date Value   • Glucose 03/08/2018 167*   • BUN 03/08/2018 21    • Creatinine 03/08/2018 1.28    • Sodium 03/08/2018 137    • Potassium 03/08/2018 3.9    • Chloride 03/08/2018 102    • CO2 03/08/2018 21.5*   • Calcium 03/08/2018 10.8*   • Total Protein 03/08/2018 6.8    • Albumin 03/08/2018 4.70    • ALT (SGPT) 03/08/2018 79*   • AST (SGOT) 03/08/2018 40*   • Alkaline Phosphatase 03/08/2018 65    • Total Bilirubin 03/08/2018 0.7    • eGFR Non  Amer 03/08/2018 61    • Globulin 03/08/2018 2.1    • A/G Ratio 03/08/2018 2.2    • BUN/Creatinine Ratio 03/08/2018 16.4    • Anion Gap 03/08/2018 13.5*   • WBC 03/08/2018 7.48    • RBC 03/08/2018 5.93    • Hemoglobin 03/08/2018 17.5    • Hematocrit 03/08/2018 51.2    • MCV 03/08/2018 86.3    • MCH 03/08/2018 29.5    • MCHC 03/08/2018 34.2    • RDW 03/08/2018 14.7*   • RDW-SD 03/08/2018 46.5    • MPV 03/08/2018 10.8*   • Platelets 03/08/2018 183    • Neutrophil % 03/08/2018 54.7    • Lymphocyte % 03/08/2018 25.9    • Monocyte % 03/08/2018 12.7*   • Eosinophil % 03/08/2018 6.0*   • Basophil %  2018 0.4    • Immature Grans % 2018 0.3    • Neutrophils, Absolute 2018 4.09    • Lymphocytes, Absolute 2018 1.94    • Monocytes, Absolute 2018 0.95*   • Eosinophils, Absolute 2018 0.45    • Basophils, Absolute 2018 0.03    • Immature Grans, Absolute 2018 0.02    • Osmolality Calc 2018 280.6    • Pulse 2018 87    • Volume Collected 2018 537    • Pre-Hgb 2018 17.5    • Pre-Hct 2018 51.2    • Pre-Blood Pressure 2018 121/75    • Post-Blood Pressure 2018 112/67         [id number]: US ABDOMEN COMPLETE: Visualized pancreas is unremarkable. The imaged portion of the abdominal aorta is nondilated.  The liver is homogeneous. There is no intrahepatic ductal dilatation or focal hepatic mass. The imaged portion of the hepatic vessels and inferior vena cava are patent. The gallbladder shows no cholelithiasis or pericholecystic fluid. The wall of the gallbladder is not thickened. It measures 2.9 mm. The common bile duct is normal, measuring 4.8 mm. The kidneys demonstrate no evidence of hydronephrosis or solid renal mass. The spleen is homogeneous and measures 14.2 cm which is slightly enlarged. IMPRESSION: Negative abdominal ultrasound.     16: XR CHEST PA AND LATERAL: The cardiac silhouette is normal in size. There is some slight widening of the upper mediastinum. This can be seen with tortuous vessels. I could not entirely exclude some right paratracheal adenopathy. Correlation with old films or CT may add additional information. The lungs show increased interstitial markings throughout the lungs. No alveolar infiltrates to suggest pneumonia are seen; there are no signs of heart failure or pleural fluid. IMPRESSION: Slight widening of the mediastinum with fullness in the right paratracheal area. There is interstitial lung disease throughout both lungs.      18: US NECK: In region of palpable abnormalities at 3.7 cm  cystic appearing mass with thick wall that could represent abscess, hematoma or neoplastic etiology. IMPRESSION: In region of palpable abnormalities at 3.7 cm cystic appearing mass with thick wall that could represent abscess, hematoma or neoplastic etiology.        PATHOLOGY:  05/11/18          Assessment/Plan    Johnathon Mclain is a very pleasant 43 y.o.  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     Secondary Polycythemia  - To assess for a myeloproliferative disorder I obtained a [id number] and Exon 12/CALR/MPL mutations which were negative. BCR ABL PCR was <0.001%.   - I believe that he has a secondary polycythemia from underlying sleep apnea and previous testosterone use.  - He was found to have moderate obstructive sleep apnea, and has been more compliant with his CPAP machine.  - Abdominal ultrasound did not reveal any liver or kidney masses  - ABG shows a normal carboxyhemoglobin  - His pulmonary function tests are likely consistent with sleep apnea versus hypoxemia due to COPD however O2 sats on RA have been normal. Erythropoietin level was normal.   - Patient was initially started on phlebotomy for one unit (500 mL) to be taken every month with goal of Hct <50%. However he reports that on days of phlebotomy he does feel more weak, fatigued, and experiences headaches. Therefore will change his phlebotomy requirement to Hct >52. He does not require phlebotomy today. He currently does not take iron supplementation and was previously advised against this.     Obstructive Sleep Apnea  - He received a home sleep study and was found to have moderate obstructive sleep apnea. He was originally placed on supplemental nasal canula that was later changed to CPAP machine. He was initially noncompliant due to high pressure settings and intolerability which have improved and has been more compliant since the settings were decreased.     Neck  Nodule  - He originally reported this in January and ultrasound was performed and was found to have a 3.7 cm cystic appearing mass which could represent abscess, hematoma or neoplastic etiology. I did offer his a surgery consult for further evaluation however he wanted to hold at that time given that the nodule had been decreased with antibiotics.   - Since then he has been following with Dr. Camacho as this area continued to be tender and on 5/11/18 he underwent a skin neck excision and his pathoogy was significant for chronic abscess.     ACO Quality measures  - Johnathon Mclain does not smoke tobacco. However he does continue to chew tobacco.   - We discussed the detrimental side effects and risks of using tobacco and he would prefer to cut back on his own. He was previously chewing 3 cans a day and has cut this down to 1.5 cans to now 1 can a day.   - During this visit, I again spent <3 minutes counseling the patient regarding tobacco cessation.  - Patient's BMI is >50. BMI is above normal parameters. Recommendations include: exercise counseling and nutrition counseling. I also advised calorie counting and decreasing caloric intake to help aid in weight loss.  - Current outpatient and discharge medications have been reconciled for the patient.    I will have the patient return for laboratory evaluation every 3 months for CBC and should receive phlebotomy for Hct >52 and he will return for follow up appointment in six months. He understands that should he have any questions or concerns prior to his appointment he should give us a call at any time and I would be happy to see him sooner. It was a pleasure to see this patient in clinic today, thank you for allowing me to participate in the care of this patient.     I spent 27 minutes in regards to this patient’s care today. More than 22 minutes of the time was spent in direct interaction with the patient for the above problems and in counceling.            Shy  MD Mynor  08/08/2018  10:24 AM

## 2018-08-08 ENCOUNTER — HOSPITAL ENCOUNTER (OUTPATIENT)
Dept: RESPIRATORY THERAPY | Facility: HOSPITAL | Age: 43
Discharge: HOME | End: 2018-08-08
Attending: INTERNAL MEDICINE | Admitting: INTERNAL MEDICINE

## 2018-08-08 ENCOUNTER — OFFICE VISIT (OUTPATIENT)
Dept: ONCOLOGY | Facility: CLINIC | Age: 43
End: 2018-08-08

## 2018-08-08 VITALS
HEART RATE: 88 BPM | WEIGHT: 315 LBS | BODY MASS INDEX: 50.25 KG/M2 | RESPIRATION RATE: 18 BRPM | DIASTOLIC BLOOD PRESSURE: 76 MMHG | OXYGEN SATURATION: 98 % | SYSTOLIC BLOOD PRESSURE: 117 MMHG | TEMPERATURE: 97.7 F

## 2018-08-08 DIAGNOSIS — G47.33 OBSTRUCTIVE SLEEP APNEA SYNDROME: ICD-10-CM

## 2018-08-08 DIAGNOSIS — R22.1 NECK NODULE: ICD-10-CM

## 2018-08-08 DIAGNOSIS — D45 POLYCYTHEMIA VERA (HCC): Primary | ICD-10-CM

## 2018-08-08 LAB
A-A DO2: 25.2 MMHG (ref 0–300)
ARTERIAL PATENCY WRIST A: POSITIVE
ATMOSPHERIC PRESS: 730 MMHG
BASE EXCESS BLDA CALC-SCNC: -1.9 MMOL/L
BASOPHILS # BLD AUTO: 0.04 10*3/MM3 (ref 0–0.3)
BASOPHILS NFR BLD AUTO: 0.5 % (ref 0–2)
BDY SITE: ABNORMAL
BODY TEMPERATURE: 98.6 C
COHGB MFR BLD: 1.1 % (ref 0–5)
DEPRECATED RDW RBC AUTO: 43.7 FL (ref 37–54)
EOSINOPHIL # BLD AUTO: 0.75 10*3/MM3 (ref 0–0.7)
EOSINOPHIL NFR BLD AUTO: 9.6 % (ref 0–5)
ERYTHROCYTE [DISTWIDTH] IN BLOOD BY AUTOMATED COUNT: 14.1 % (ref 11.5–14.5)
HCO3 BLDA-SCNC: 22.1 MMOL/L (ref 22–26)
HCT VFR BLD AUTO: 49.4 % (ref 42–52)
HCT VFR BLD CALC: 52 % (ref 42–52)
HGB BLD-MCNC: 16.9 G/DL (ref 14–18)
HGB BLDA-MCNC: 17.6 G/DL (ref 12–16)
HOROWITZ INDEX BLD+IHG-RTO: 21 %
IMM GRANULOCYTES # BLD: 0.01 10*3/MM3 (ref 0–0.03)
IMM GRANULOCYTES NFR BLD: 0.1 % (ref 0–0.5)
LYMPHOCYTES # BLD AUTO: 2.73 10*3/MM3 (ref 1–3)
LYMPHOCYTES NFR BLD AUTO: 35 % (ref 21–51)
MCH RBC QN AUTO: 29.1 PG (ref 27–33)
MCHC RBC AUTO-ENTMCNC: 34.2 G/DL (ref 33–37)
MCV RBC AUTO: 85.2 FL (ref 80–94)
METHGB BLD QL: 0.5 % (ref 0–3)
MODALITY: ABNORMAL
MONOCYTES # BLD AUTO: 0.81 10*3/MM3 (ref 0.1–0.9)
MONOCYTES NFR BLD AUTO: 10.4 % (ref 0–10)
NEUTROPHILS # BLD AUTO: 3.46 10*3/MM3 (ref 1.4–6.5)
NEUTROPHILS NFR BLD AUTO: 44.4 % (ref 30–70)
OXYHGB MFR BLDV: 93.9 % (ref 85–100)
PCO2 BLDA: 36.2 MM HG (ref 35–45)
PH BLDA: 7.4 PH UNITS (ref 7.35–7.45)
PLATELET # BLD AUTO: 183 10*3/MM3 (ref 130–400)
PMV BLD AUTO: 10 FL (ref 6–10)
PO2 BLDA: 74.9 MM HG (ref 80–100)
RBC # BLD AUTO: 5.8 10*6/MM3 (ref 4.7–6.1)
SAO2 % BLDCOA: 95.4 % (ref 90–100)
WBC NRBC COR # BLD: 7.8 10*3/MM3 (ref 4.5–12.5)

## 2018-08-08 PROCEDURE — 82375 ASSAY CARBOXYHB QUANT: CPT | Performed by: INTERNAL MEDICINE

## 2018-08-08 PROCEDURE — 82805 BLOOD GASES W/O2 SATURATION: CPT | Performed by: INTERNAL MEDICINE

## 2018-08-08 PROCEDURE — 36600 WITHDRAWAL OF ARTERIAL BLOOD: CPT | Performed by: INTERNAL MEDICINE

## 2018-08-08 PROCEDURE — 83050 HGB METHEMOGLOBIN QUAN: CPT | Performed by: INTERNAL MEDICINE

## 2018-08-08 PROCEDURE — 99214 OFFICE O/P EST MOD 30 MIN: CPT | Performed by: INTERNAL MEDICINE

## 2018-08-08 PROCEDURE — 85025 COMPLETE CBC W/AUTO DIFF WBC: CPT | Performed by: INTERNAL MEDICINE

## 2018-08-27 ENCOUNTER — OFFICE VISIT (OUTPATIENT)
Dept: FAMILY MEDICINE CLINIC | Facility: CLINIC | Age: 43
End: 2018-08-27

## 2018-08-27 VITALS
SYSTOLIC BLOOD PRESSURE: 115 MMHG | BODY MASS INDEX: 46.65 KG/M2 | OXYGEN SATURATION: 96 % | DIASTOLIC BLOOD PRESSURE: 70 MMHG | TEMPERATURE: 97.9 F | HEIGHT: 69 IN | WEIGHT: 315 LBS | HEART RATE: 87 BPM

## 2018-08-27 DIAGNOSIS — IMO0002 UNCONTROLLED TYPE 2 DIABETES MELLITUS WITH DIABETIC PERIPHERAL ANGIOPATHY WITHOUT GANGRENE, WITH LONG-TERM CURRENT USE OF INSULIN: ICD-10-CM

## 2018-08-27 DIAGNOSIS — M79.2 NEURALGIA AND NEURITIS: ICD-10-CM

## 2018-08-27 DIAGNOSIS — E08.40 DIABETES MELLITUS DUE TO UNDERLYING CONDITION WITH DIABETIC NEUROPATHY, WITH LONG-TERM CURRENT USE OF INSULIN (HCC): ICD-10-CM

## 2018-08-27 DIAGNOSIS — IMO0001 CLASS 3 OBESITY DUE TO EXCESS CALORIES WITH SERIOUS COMORBIDITY AND BODY MASS INDEX (BMI) OF 50.0 TO 59.9 IN ADULT: Primary | ICD-10-CM

## 2018-08-27 DIAGNOSIS — Z79.4 DIABETES MELLITUS DUE TO UNDERLYING CONDITION WITH DIABETIC NEUROPATHY, WITH LONG-TERM CURRENT USE OF INSULIN (HCC): ICD-10-CM

## 2018-08-27 PROBLEM — Z51.89 VISIT FOR WOUND CHECK: Status: RESOLVED | Noted: 2018-06-12 | Resolved: 2018-08-27

## 2018-08-27 PROCEDURE — 99214 OFFICE O/P EST MOD 30 MIN: CPT | Performed by: NURSE PRACTITIONER

## 2018-08-27 RX ORDER — PHENTERMINE HYDROCHLORIDE 37.5 MG/1
37.5 TABLET ORAL
Qty: 30 TABLET | Refills: 0 | Status: SHIPPED | OUTPATIENT
Start: 2018-08-27 | End: 2018-09-17 | Stop reason: SDUPTHER

## 2018-08-27 RX ORDER — HYDROCODONE BITARTRATE AND ACETAMINOPHEN 7.5; 325 MG/1; MG/1
1 TABLET ORAL EVERY 6 HOURS PRN
Qty: 60 TABLET | Refills: 0 | Status: SHIPPED | OUTPATIENT
Start: 2018-08-27 | End: 2018-09-17 | Stop reason: SDUPTHER

## 2018-08-27 RX ORDER — GABAPENTIN 600 MG/1
600 TABLET ORAL 3 TIMES DAILY
Qty: 90 TABLET | Refills: 2 | Status: SHIPPED | OUTPATIENT
Start: 2018-08-27 | End: 2018-10-25 | Stop reason: SDUPTHER

## 2018-08-27 NOTE — PROGRESS NOTES
Subjective   Johnathon Mclain is a 43 y.o. male.     Chief Complaint   Patient presents with   • Follow-up   • Hypertension   • Diabetes       History of Present Illness     Obesity -  has been working hard on diet and making lifestyle changes.  He is having difficulty controlling his appetite.  Patient states he has taken Adipex in the past and is requesting to start back on Adipex to help him with morbid obesity.  Patient denies any chest pain or shortness of breath.  Patient reports that he has been seen by cardiology in the past for screening and denies any cardiac problems or complications.    Dm with neuropathy and circulatory complications - monitoring glucose closely.  Compliant with medications.  Working on diet.  Currently receives Neurontin for diabetic neuropathy.      Chronic back pain and joint pain - rates his pain a 6 on pain scale rating of 0-10. Radiology is on file.  He describes his pain as an aching, throbbing and sharp pain is always present in his low back, neck and at times hips and knees.  Activity makes his pain worse.  Rest does help decrease this pain.  He currently receives Neurontin which is helpful with the radiculopathy which radiates into his lower extremities from his low back.  He also receives Norco for his chronic pain.  He has no history of substance abuse or addiction.  He has been seen by Ortho in the past for consult.  He has attended physical therapy in the past without improvement.              The following portions of the patient's history were reviewed and updated as appropriate: allergies, current medications, past family history, past medical history, past social history, past surgical history and problem list.    Review of Systems   Constitutional: Positive for appetite change and unexpected weight change. Negative for fatigue.   HENT: Negative for congestion, ear pain, nosebleeds, postnasal drip, rhinorrhea, sore throat, trouble swallowing and voice change.    Eyes:  "Negative for pain and visual disturbance.   Respiratory: Negative for cough, chest tightness, shortness of breath and wheezing.    Cardiovascular: Negative for chest pain and palpitations.   Gastrointestinal: Negative for abdominal pain, blood in stool, constipation and diarrhea.   Endocrine: Negative for cold intolerance and polydipsia.   Genitourinary: Negative for difficulty urinating, flank pain and hematuria.   Musculoskeletal: Positive for arthralgias and back pain. Negative for gait problem, joint swelling and myalgias.   Skin: Negative for color change and rash.   Allergic/Immunologic: Negative.    Neurological: Positive for headaches. Negative for syncope and numbness.   Hematological: Negative.    Psychiatric/Behavioral: Negative for confusion, dysphoric mood, self-injury, sleep disturbance and suicidal ideas.   All other systems reviewed and are negative.      Objective     /70   Pulse 87   Temp 97.9 °F (36.6 °C) (Temporal Artery )   Ht 175.3 cm (69\")   Wt (!) 156 kg (344 lb)   SpO2 96%   BMI 50.80 kg/m²   Hospital Outpatient Visit on 08/08/2018   Component Date Value Ref Range Status   • Site 08/08/2018 Arterial: left radial   Final   • Cory's Test 08/08/2018 Positive   Final   • pH, Arterial 08/08/2018 7.404  7.350 - 7.450 pH units Final   • pCO2, Arterial 08/08/2018 36.2  35.0 - 45.0 mm Hg Final   • pO2, Arterial 08/08/2018 74.9* 80.0 - 100.0 mm Hg Final   • HCO3, Arterial 08/08/2018 22.1  22.0 - 26.0 mmol/L Final   • Base Excess, Arterial 08/08/2018 -1.9  mmol/L Final   • O2 Saturation, Arterial 08/08/2018 95.4  90.0 - 100.0 % Final   • Hemoglobin, Blood Gas 08/08/2018 17.6* 12 - 16 g/dL Final   • Hematocrit, Blood Gas 08/08/2018 52.0  42.0 - 52.0 % Final   • Oxyhemoglobin 08/08/2018 93.9  85 - 100 % Final   • Methemoglobin 08/08/2018 0.50  0.00 - 3.00 % Final   • Carboxyhemoglobin 08/08/2018 1.1  0 - 5 % Final   • A-a Gradiant 08/08/2018 25.2  0.0 - 300.0 mmHg Final   • Temperature " 08/08/2018 98.6  C Final   • Barometric Pressure for Blood Gas 08/08/2018 730  mmHg Final   • Modality 08/08/2018 Room Air   Final   • FIO2 08/08/2018 21  % Final       Physical Exam   Constitutional: He is oriented to person, place, and time. Vital signs are normal. He appears well-developed and well-nourished. No distress.   Visibly obese.    HENT:   Head: Normocephalic and atraumatic.   Right Ear: External ear normal.   Left Ear: External ear normal.   Nose: Nose normal.   Mouth/Throat: Oropharynx is clear and moist. No oropharyngeal exudate.   Eyes: Pupils are equal, round, and reactive to light. Conjunctivae and EOM are normal. Right eye exhibits no discharge. Left eye exhibits no discharge.   Neck: Normal range of motion. Neck supple. No tracheal deviation present. No thyromegaly present.   Cardiovascular: Normal rate, regular rhythm, normal heart sounds and intact distal pulses.    No murmur heard.  Pulmonary/Chest: Effort normal and breath sounds normal. No respiratory distress. He has no wheezes. He has no rales. He exhibits no tenderness.   Abdominal: Soft. Bowel sounds are normal. He exhibits no distension and no mass. There is no tenderness. There is no rebound and no guarding.   Musculoskeletal:        Lumbar back: He exhibits decreased range of motion, tenderness, pain and spasm.   Decreased lumbar curvature, there is pain with forward flexion. DTR's +2. No edema.    Lymphadenopathy:     He has no cervical adenopathy.   Neurological: He is alert and oriented to person, place, and time. He has normal reflexes.   Skin: Skin is warm and dry. Capillary refill takes less than 2 seconds. No rash noted. He is not diaphoretic. No erythema. No pallor.   Psychiatric: He has a normal mood and affect. His speech is normal and behavior is normal. Judgment and thought content normal. His affect is not inappropriate. Cognition and memory are normal.   Nursing note and vitals reviewed.      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 (CMS/[facility])    Overview     uncomplicated            Endocrine    Diabetes mellitus with diabetic neuropathy, with long-term current use of insulin (CMS/[facility])       Nervous and Auditory    Neuralgia and neuritis    Relevant Medications    HYDROcodone-acetaminophen (NORCO) 7.5-325 MG per tablet    gabapentin (NEURONTIN) 600 MG tablet       Other    Class 3 obesity with serious comorbidity and body mass index (BMI) of 50.0 to 59.9 in adult (CMS/[facility]) - Primary          Adipex 37.5 one by mouth daily #30 no refill.  Neurontin 600 mg 1 by mouth 3 times a day #90 with 2 refills.  Norco 7.5 one by mouth every 6 hours when necessary #60 with no refill  John has been reviewed as consistent.  Uds is on file.   Goal: Improved quality of life and reduction in pain as evidenced by pt report.   Goal: pt will have gradual weight loss and be free of side effects.      Patient's Body mass index is 50.8 kg/m². BMI is above normal parameters. Recommendations include: exercise counseling and nutrition counseling.  Controlled substance agreement has been reviewed today.         I have discussed diagnosis in detail today allowing time for questions and answers. Pt is aware of reasons to seek urgent or emergent medical care as well as reasons to return to the clinic for evaluation. Possible side effects, interactions and progression of symptoms discussed as well. Pt / family states understanding.   Emotional support and active listening provided.     Pt has been instructed today regarding low fat heart smart diet. Weight management and routine exercise has been recommended. Avoid high fat foods, starchy foods and processed foods. Increase lean meats, fresh vegetables and fresh fruits.   Instructed on 3241-7262 calorie daily diet with low fat and low carb choices. Heart healthy diet. Goal is  weight loss of five pounds each month and three pounds each additional month with no side effects. Will evaluate monthly. Instructed on possible side effects and reasons to stop medication as well as reasons for emergent care.       Follow up in one month, sooner if needed. Routine labs every 3-6 months.     Errors in dictation may reflect use of voice recognition software and not all errors in transcription may have been detected prior to signing.             This document has been electronically signed by:  TYSON Vinson, NP-C

## 2018-09-17 ENCOUNTER — OFFICE VISIT (OUTPATIENT)
Dept: FAMILY MEDICINE CLINIC | Facility: CLINIC | Age: 43
End: 2018-09-17

## 2018-09-17 VITALS
OXYGEN SATURATION: 98 % | DIASTOLIC BLOOD PRESSURE: 84 MMHG | SYSTOLIC BLOOD PRESSURE: 120 MMHG | HEART RATE: 99 BPM | TEMPERATURE: 97.5 F | HEIGHT: 69 IN | BODY MASS INDEX: 46.65 KG/M2 | WEIGHT: 315 LBS

## 2018-09-17 DIAGNOSIS — E08.40 DIABETES MELLITUS DUE TO UNDERLYING CONDITION WITH DIABETIC NEUROPATHY, WITH LONG-TERM CURRENT USE OF INSULIN (HCC): ICD-10-CM

## 2018-09-17 DIAGNOSIS — M15.9 PRIMARY OSTEOARTHRITIS INVOLVING MULTIPLE JOINTS: ICD-10-CM

## 2018-09-17 DIAGNOSIS — J01.00 ACUTE NON-RECURRENT MAXILLARY SINUSITIS: ICD-10-CM

## 2018-09-17 DIAGNOSIS — Z12.5 SCREENING FOR PROSTATE CANCER: Primary | ICD-10-CM

## 2018-09-17 DIAGNOSIS — I10 ESSENTIAL HYPERTENSION: ICD-10-CM

## 2018-09-17 DIAGNOSIS — E66.01 MORBID OBESITY WITH BMI OF 50.0-59.9, ADULT (HCC): ICD-10-CM

## 2018-09-17 DIAGNOSIS — Z79.4 DIABETES MELLITUS DUE TO UNDERLYING CONDITION WITH DIABETIC NEUROPATHY, WITH LONG-TERM CURRENT USE OF INSULIN (HCC): ICD-10-CM

## 2018-09-17 DIAGNOSIS — N52.9 IMPOTENCE DUE TO ERECTILE DYSFUNCTION: ICD-10-CM

## 2018-09-17 DIAGNOSIS — M79.2 NEURALGIA AND NEURITIS: ICD-10-CM

## 2018-09-17 DIAGNOSIS — Z79.899 HIGH RISK MEDICATION USE: ICD-10-CM

## 2018-09-17 DIAGNOSIS — E55.9 VITAMIN D DEFICIENCY: ICD-10-CM

## 2018-09-17 PROCEDURE — 99214 OFFICE O/P EST MOD 30 MIN: CPT | Performed by: NURSE PRACTITIONER

## 2018-09-17 RX ORDER — AMOXICILLIN 875 MG/1
875 TABLET, COATED ORAL EVERY 12 HOURS SCHEDULED
Qty: 20 TABLET | Refills: 0 | Status: SHIPPED | OUTPATIENT
Start: 2018-09-17 | End: 2018-10-25

## 2018-09-17 RX ORDER — HYDROCODONE BITARTRATE AND ACETAMINOPHEN 7.5; 325 MG/1; MG/1
1 TABLET ORAL EVERY 6 HOURS PRN
Qty: 60 TABLET | Refills: 0 | Status: SHIPPED | OUTPATIENT
Start: 2018-09-17 | End: 2018-10-25 | Stop reason: SDUPTHER

## 2018-09-17 RX ORDER — PHENTERMINE HYDROCHLORIDE 37.5 MG/1
37.5 TABLET ORAL
Qty: 30 TABLET | Refills: 0 | Status: SHIPPED | OUTPATIENT
Start: 2018-09-17 | End: 2018-10-25 | Stop reason: SDUPTHER

## 2018-09-17 NOTE — PROGRESS NOTES
Subjective   Johnathon Mclain is a 43 y.o. male.     Chief Complaint   Patient presents with   • Hypertension   • Hyperlipidemia   • COPD       History of Present Illness       Hypertension -stable    Type 2 diabetes-working on weight loss.  Most recent fasting glucose was within normal ranges.  Yesterday his random glucose reading was 131.  He denies any signs or symptoms of hyperglycemia or hypoglycemia.    Obesity-patient states good appetite control with Adipex-P.  He shows good weight loss this month and will like to continue Adipex.  Denies any side effects.    Sinus pressure and drainage ×3 days.  Moderate in intensity.  Some low grade fever.  Thick sinus drainage.    COPD-no recent exacerbation.    Gout-no recent exacerbation with current medication regimen.    Polycythemia vera-under the care of hematology.    Chronic back pain and joint pain - rates his pain a 6 on pain scale rating of 0-10. Radiology is on file.  He describes his pain as an aching, throbbing and sharp pain is always present in his low back, neck and at times hips and knees.  Activity makes his pain worse.  Rest does help decrease this pain.  He currently receives Neurontin which is helpful with the radiculopathy which radiates into his lower extremities from his low back.  He also receives Norco for his chronic pain.  He has no history of substance abuse or addiction.  He has been seen by Ortho in the past for consult.  He has attended physical therapy in the past without improvement.      The following portions of the patient's history were reviewed and updated as appropriate: allergies, current medications, past family history, past medical history, past social history, past surgical history and problem list.    Review of Systems   Constitutional: Negative for appetite change, fatigue and unexpected weight change.   HENT: Positive for sinus pain and sinus pressure. Negative for congestion, ear pain, nosebleeds, postnasal drip, rhinorrhea,  "sore throat, trouble swallowing and voice change.    Eyes: Negative for pain and visual disturbance.   Respiratory: Negative for cough, shortness of breath and wheezing.    Cardiovascular: Negative for chest pain and palpitations.   Gastrointestinal: Negative for abdominal pain, blood in stool, constipation and diarrhea.   Endocrine: Negative for cold intolerance and polydipsia.   Genitourinary: Negative for difficulty urinating, flank pain and hematuria.   Musculoskeletal: Positive for back pain. Negative for arthralgias, gait problem, joint swelling and myalgias.   Skin: Negative for color change and rash.   Allergic/Immunologic: Negative.    Neurological: Negative for syncope, numbness and headaches.   Hematological: Negative.    Psychiatric/Behavioral: Negative for sleep disturbance and suicidal ideas.   All other systems reviewed and are negative.      Objective     /84   Pulse 99   Temp 97.5 °F (36.4 °C) (Tympanic)   Ht 175.3 cm (69\")   Wt (!) 152 kg (335 lb)   SpO2 98%   BMI 49.47 kg/m²   Hospital Outpatient Visit on 08/08/2018   Component Date Value Ref Range Status   • Site 08/08/2018 Arterial: left radial   Final   • Cory's Test 08/08/2018 Positive   Final   • pH, Arterial 08/08/2018 7.404  7.350 - 7.450 pH units Final   • pCO2, Arterial 08/08/2018 36.2  35.0 - 45.0 mm Hg Final   • pO2, Arterial 08/08/2018 74.9* 80.0 - 100.0 mm Hg Final   • HCO3, Arterial 08/08/2018 22.1  22.0 - 26.0 mmol/L Final   • Base Excess, Arterial 08/08/2018 -1.9  mmol/L Final   • O2 Saturation, Arterial 08/08/2018 95.4  90.0 - 100.0 % Final   • Hemoglobin, Blood Gas 08/08/2018 17.6* 12 - 16 g/dL Final   • Hematocrit, Blood Gas 08/08/2018 52.0  42.0 - 52.0 % Final   • Oxyhemoglobin 08/08/2018 93.9  85 - 100 % Final   • Methemoglobin 08/08/2018 0.50  0.00 - 3.00 % Final   • Carboxyhemoglobin 08/08/2018 1.1  0 - 5 % Final   • A-a Gradiant 08/08/2018 25.2  0.0 - 300.0 mmHg Final   • Temperature 08/08/2018 98.6  C Final "   • Barometric Pressure for Blood Gas 08/08/2018 730  mmHg Final   • Modality 08/08/2018 Room Air   Final   • FIO2 08/08/2018 21  % Final       Physical Exam   Constitutional: He is oriented to person, place, and time. Vital signs are normal. He appears well-developed and well-nourished. No distress.   Visibly obese.    HENT:   Head: Normocephalic and atraumatic.   Right Ear: External ear normal.   Left Ear: External ear normal.   Nose: Mucosal edema present. Right sinus exhibits maxillary sinus tenderness. Left sinus exhibits maxillary sinus tenderness.   Mouth/Throat: Uvula is midline. Oropharyngeal exudate present. No posterior oropharyngeal edema or posterior oropharyngeal erythema.   Eyes: Pupils are equal, round, and reactive to light. Conjunctivae and EOM are normal. Right eye exhibits no discharge. Left eye exhibits no discharge.   Neck: Normal range of motion. Neck supple. No tracheal deviation present. No thyromegaly present.   Cardiovascular: Normal rate, regular rhythm, normal heart sounds and intact distal pulses.    No murmur heard.  Pulmonary/Chest: Effort normal and breath sounds normal. No respiratory distress. He has no wheezes. He has no rales. He exhibits no tenderness.   Abdominal: Soft. Bowel sounds are normal. He exhibits no distension and no mass. There is no tenderness. There is no rebound and no guarding.   Musculoskeletal:        Lumbar back: He exhibits decreased range of motion, tenderness, pain and spasm.   Decreased lumbar curvature, there is pain with forward flexion. DTR's +2. No edema.    Lymphadenopathy:     He has no cervical adenopathy.   Neurological: He is alert and oriented to person, place, and time. He has normal reflexes.   Skin: Skin is warm and dry. Capillary refill takes less than 2 seconds. No rash noted. He is not diaphoretic. No erythema. No pallor.   Psychiatric: He has a normal mood and affect. His speech is normal and behavior is normal. Judgment and thought  content normal. His affect is not inappropriate. Cognition and memory are normal.   Nursing note and vitals reviewed.      Assessment/Plan     Problem List Items Addressed This Visit        Cardiovascular and Mediastinum    Essential hypertension    Relevant Orders    CBC & Differential    Comprehensive Metabolic Panel    TSH    Hemoglobin A1c    Vitamin D 25 Hydroxy    Lipid Panel    MicroAlbumin, Urine, Random - Urine, Clean Catch    Uric Acid       Digestive    Morbid obesity with BMI of 50.0-59.9, adult (CMS/[facility])    Relevant Orders    CBC & Differential    Comprehensive Metabolic Panel    TSH    Hemoglobin A1c    Vitamin D 25 Hydroxy    Lipid Panel    MicroAlbumin, Urine, Random - Urine, Clean Catch    Uric Acid       Endocrine    Diabetes mellitus with diabetic neuropathy, with long-term current use of insulin (CMS/[facility])    Relevant Orders    CBC & Differential    Comprehensive Metabolic Panel    TSH    Hemoglobin A1c    Vitamin D 25 Hydroxy    Lipid Panel    MicroAlbumin, Urine, Random - Urine, Clean Catch    Uric Acid       Nervous and Auditory    Neuralgia and neuritis    Relevant Medications    HYDROcodone-acetaminophen (NORCO) 7.5-325 MG per tablet    Other Relevant Orders    CBC & Differential    Comprehensive Metabolic Panel    TSH    Hemoglobin A1c    Vitamin D 25 Hydroxy    Lipid Panel    MicroAlbumin, Urine, Random - Urine, Clean Catch    Uric Acid       Musculoskeletal and Integument    Osteoarthrosis    Relevant Orders    CBC & Differential    Comprehensive Metabolic Panel    TSH    Hemoglobin A1c    Vitamin D 25 Hydroxy    Lipid Panel    MicroAlbumin, Urine, Random - Urine, Clean Catch    Uric Acid       Genitourinary    Impotence due to erectile dysfunction    Relevant Orders    Vitamin D 25 Hydroxy       Other    High risk medication use    Relevant Orders    CBC & Differential    Comprehensive Metabolic Panel    TSH    Hemoglobin A1c    Vitamin D 25 Hydroxy    Lipid Panel    MicroAlbumin, Urine,  Random - Urine, Clean Catch    Uric Acid      Other Visit Diagnoses     Screening for prostate cancer    -  Primary    Vitamin D deficiency        Relevant Orders    Vitamin D 25 Hydroxy    Acute non-recurrent maxillary sinusitis        Relevant Medications    amoxicillin (AMOXIL) 875 MG tablet          Norco as-325 one by mouth every 6 hours when necessary #60 with no refill.    John has been reviewed as consistent.  Uds is on file.   Proper body mechanics has been reviewed and discussed today.  Goal: Improved quality of life and reduction in pain as evidenced by pt report.     Fluids, rest, symptomatic treatment advised. Steam therapy. Dispose of tooth bush after 24 hours of starting antibiotics if ordered. Complete antibiotics as ordered.  Discussed possible side effects/interactions of medication. Discussed symptoms to report as well as reasons to seek urgent or emergent medical attention. Understanding stated.   Recommend follow up in 2-3 days if not improved, sooner if needed.        Patient's Body mass index is 49.47 kg/m². BMI is above normal parameters. Recommendations include: exercise counseling, nutrition counseling and pharmacological intervention.    Adipex 37.5 mg 1 by mouth daily #30 with no refill.      I have discussed diagnosis in detail today allowing time for questions and answers. Pt is aware of reasons to seek urgent or emergent medical care as well as reasons to return to the clinic for evaluation. Possible side effects, interactions and progression of symptoms discussed as well. Pt / family states understanding.   Emotional support and active listening provided.       Follow up in one month, sooner if needed. Routine labs every 3-6 months.     Errors in dictation may reflect use of voice recognition software and not all errors in transcription may have been detected prior to signing.             This document has been electronically signed by:  TYSON Vinson, NP-C

## 2018-10-11 ENCOUNTER — LAB (OUTPATIENT)
Dept: FAMILY MEDICINE CLINIC | Facility: CLINIC | Age: 43
End: 2018-10-11

## 2018-10-11 DIAGNOSIS — Z79.4 DIABETES MELLITUS DUE TO UNDERLYING CONDITION WITH DIABETIC NEUROPATHY, WITH LONG-TERM CURRENT USE OF INSULIN (HCC): ICD-10-CM

## 2018-10-11 DIAGNOSIS — M79.2 NEURALGIA AND NEURITIS: ICD-10-CM

## 2018-10-11 DIAGNOSIS — Z79.899 HIGH RISK MEDICATION USE: ICD-10-CM

## 2018-10-11 DIAGNOSIS — M15.9 PRIMARY OSTEOARTHRITIS INVOLVING MULTIPLE JOINTS: ICD-10-CM

## 2018-10-11 DIAGNOSIS — N52.9 IMPOTENCE DUE TO ERECTILE DYSFUNCTION: ICD-10-CM

## 2018-10-11 DIAGNOSIS — I10 ESSENTIAL HYPERTENSION: ICD-10-CM

## 2018-10-11 DIAGNOSIS — E08.40 DIABETES MELLITUS DUE TO UNDERLYING CONDITION WITH DIABETIC NEUROPATHY, WITH LONG-TERM CURRENT USE OF INSULIN (HCC): ICD-10-CM

## 2018-10-11 DIAGNOSIS — E66.01 MORBID OBESITY WITH BMI OF 50.0-59.9, ADULT (HCC): ICD-10-CM

## 2018-10-11 DIAGNOSIS — E55.9 VITAMIN D DEFICIENCY: ICD-10-CM

## 2018-10-11 LAB
25(OH)D3 SERPL-MCNC: 31 NG/ML
ALBUMIN SERPL-MCNC: 4.4 G/DL (ref 3.5–5)
ALBUMIN/GLOB SERPL: 2.2 G/DL (ref 1.5–2.5)
ALP SERPL-CCNC: 66 U/L (ref 40–129)
ALT SERPL W P-5'-P-CCNC: 49 U/L (ref 10–44)
ANION GAP SERPL CALCULATED.3IONS-SCNC: 8.9 MMOL/L (ref 3.6–11.2)
AST SERPL-CCNC: 28 U/L (ref 10–34)
BASOPHILS # BLD AUTO: 0.05 10*3/MM3 (ref 0–0.3)
BASOPHILS NFR BLD AUTO: 0.7 % (ref 0–2)
BILIRUB SERPL-MCNC: 0.6 MG/DL (ref 0.2–1.8)
BUN BLD-MCNC: 21 MG/DL (ref 7–21)
BUN/CREAT SERPL: 19.1 (ref 7–25)
CALCIUM SPEC-SCNC: 9.4 MG/DL (ref 7.7–10)
CHLORIDE SERPL-SCNC: 104 MMOL/L (ref 99–112)
CHOLEST SERPL-MCNC: 143 MG/DL (ref 0–200)
CO2 SERPL-SCNC: 25.1 MMOL/L (ref 24.3–31.9)
CREAT BLD-MCNC: 1.1 MG/DL (ref 0.43–1.29)
DEPRECATED RDW RBC AUTO: 45.2 FL (ref 37–54)
EOSINOPHIL # BLD AUTO: 1.01 10*3/MM3 (ref 0–0.7)
EOSINOPHIL NFR BLD AUTO: 13.4 % (ref 0–5)
ERYTHROCYTE [DISTWIDTH] IN BLOOD BY AUTOMATED COUNT: 14.3 % (ref 11.5–14.5)
GFR SERPL CREATININE-BSD FRML MDRD: 72 ML/MIN/1.73
GLOBULIN UR ELPH-MCNC: 2 GM/DL
GLUCOSE BLD-MCNC: 127 MG/DL (ref 70–110)
HBA1C MFR BLD: 5.9 % (ref 4.5–5.7)
HCT VFR BLD AUTO: 49.2 % (ref 42–52)
HDLC SERPL-MCNC: 24 MG/DL (ref 60–100)
HGB BLD-MCNC: 16.9 G/DL (ref 14–18)
IMM GRANULOCYTES # BLD: 0.01 10*3/MM3 (ref 0–0.03)
IMM GRANULOCYTES NFR BLD: 0.1 % (ref 0–0.5)
LDLC SERPL CALC-MCNC: 65 MG/DL (ref 0–100)
LDLC/HDLC SERPL: 2.72 {RATIO}
LYMPHOCYTES # BLD AUTO: 2.7 10*3/MM3 (ref 1–3)
LYMPHOCYTES NFR BLD AUTO: 35.9 % (ref 21–51)
MCH RBC QN AUTO: 29.7 PG (ref 27–33)
MCHC RBC AUTO-ENTMCNC: 34.3 G/DL (ref 33–37)
MCV RBC AUTO: 86.5 FL (ref 80–94)
MONOCYTES # BLD AUTO: 0.93 10*3/MM3 (ref 0.1–0.9)
MONOCYTES NFR BLD AUTO: 12.4 % (ref 0–10)
NEUTROPHILS # BLD AUTO: 2.82 10*3/MM3 (ref 1.4–6.5)
NEUTROPHILS NFR BLD AUTO: 37.5 % (ref 30–70)
OSMOLALITY SERPL CALC.SUM OF ELEC: 280.2 MOSM/KG (ref 273–305)
PLATELET # BLD AUTO: 211 10*3/MM3 (ref 130–400)
PMV BLD AUTO: 10.7 FL (ref 6–10)
POTASSIUM BLD-SCNC: 4.4 MMOL/L (ref 3.5–5.3)
PROT SERPL-MCNC: 6.4 G/DL (ref 6–8)
RBC # BLD AUTO: 5.69 10*6/MM3 (ref 4.7–6.1)
SODIUM BLD-SCNC: 138 MMOL/L (ref 135–153)
TRIGL SERPL-MCNC: 269 MG/DL (ref 0–150)
TSH SERPL DL<=0.05 MIU/L-ACNC: 0.61 MIU/ML (ref 0.55–4.78)
URATE SERPL-MCNC: 3.3 MG/DL (ref 3.7–7)
VLDLC SERPL-MCNC: 53.8 MG/DL
WBC NRBC COR # BLD: 7.52 10*3/MM3 (ref 4.5–12.5)

## 2018-10-11 PROCEDURE — 84443 ASSAY THYROID STIM HORMONE: CPT | Performed by: NURSE PRACTITIONER

## 2018-10-11 PROCEDURE — 36415 COLL VENOUS BLD VENIPUNCTURE: CPT | Performed by: NURSE PRACTITIONER

## 2018-10-11 PROCEDURE — 82306 VITAMIN D 25 HYDROXY: CPT | Performed by: NURSE PRACTITIONER

## 2018-10-11 PROCEDURE — 84550 ASSAY OF BLOOD/URIC ACID: CPT | Performed by: NURSE PRACTITIONER

## 2018-10-11 PROCEDURE — 83036 HEMOGLOBIN GLYCOSYLATED A1C: CPT | Performed by: NURSE PRACTITIONER

## 2018-10-11 PROCEDURE — 80053 COMPREHEN METABOLIC PANEL: CPT | Performed by: NURSE PRACTITIONER

## 2018-10-11 PROCEDURE — 80061 LIPID PANEL: CPT | Performed by: NURSE PRACTITIONER

## 2018-10-11 PROCEDURE — 85025 COMPLETE CBC W/AUTO DIFF WBC: CPT | Performed by: NURSE PRACTITIONER

## 2018-10-25 ENCOUNTER — OFFICE VISIT (OUTPATIENT)
Dept: FAMILY MEDICINE CLINIC | Facility: CLINIC | Age: 43
End: 2018-10-25

## 2018-10-25 VITALS
HEIGHT: 69 IN | HEART RATE: 102 BPM | WEIGHT: 315 LBS | OXYGEN SATURATION: 96 % | BODY MASS INDEX: 46.65 KG/M2 | SYSTOLIC BLOOD PRESSURE: 120 MMHG | DIASTOLIC BLOOD PRESSURE: 90 MMHG | TEMPERATURE: 97.1 F

## 2018-10-25 DIAGNOSIS — Z79.4 DIABETES MELLITUS DUE TO UNDERLYING CONDITION WITH DIABETIC NEUROPATHY, WITH LONG-TERM CURRENT USE OF INSULIN (HCC): ICD-10-CM

## 2018-10-25 DIAGNOSIS — M79.2 NEURALGIA AND NEURITIS: ICD-10-CM

## 2018-10-25 DIAGNOSIS — D45 POLYCYTHEMIA VERA (HCC): Primary | ICD-10-CM

## 2018-10-25 DIAGNOSIS — M15.9 PRIMARY OSTEOARTHRITIS INVOLVING MULTIPLE JOINTS: ICD-10-CM

## 2018-10-25 DIAGNOSIS — J44.9 CHRONIC OBSTRUCTIVE PULMONARY DISEASE, UNSPECIFIED COPD TYPE (HCC): ICD-10-CM

## 2018-10-25 DIAGNOSIS — E08.40 DIABETES MELLITUS DUE TO UNDERLYING CONDITION WITH DIABETIC NEUROPATHY, WITH LONG-TERM CURRENT USE OF INSULIN (HCC): ICD-10-CM

## 2018-10-25 DIAGNOSIS — E66.01 MORBID OBESITY WITH BMI OF 50.0-59.9, ADULT (HCC): ICD-10-CM

## 2018-10-25 PROCEDURE — 99214 OFFICE O/P EST MOD 30 MIN: CPT | Performed by: NURSE PRACTITIONER

## 2018-10-25 RX ORDER — GABAPENTIN 600 MG/1
600 TABLET ORAL 3 TIMES DAILY
Qty: 90 TABLET | Refills: 2 | Status: SHIPPED | OUTPATIENT
Start: 2018-10-25 | End: 2018-12-26 | Stop reason: SDUPTHER

## 2018-10-25 RX ORDER — HYDROCODONE BITARTRATE AND ACETAMINOPHEN 7.5; 325 MG/1; MG/1
1 TABLET ORAL EVERY 6 HOURS PRN
Qty: 60 TABLET | Refills: 0 | Status: SHIPPED | OUTPATIENT
Start: 2018-10-25 | End: 2018-11-27 | Stop reason: SDUPTHER

## 2018-10-25 RX ORDER — PHENTERMINE HYDROCHLORIDE 37.5 MG/1
37.5 TABLET ORAL
Qty: 30 TABLET | Refills: 0 | Status: SHIPPED | OUTPATIENT
Start: 2018-10-25 | End: 2018-11-27 | Stop reason: SDUPTHER

## 2018-10-25 NOTE — PROGRESS NOTES
Subjective   Johnathon Mclain is a 43 y.o. male.     Chief Complaint   Patient presents with   • Hypertension   • Hyperlipidemia   • COPD   • Diabetes   • Osteoarthritis       History of Present Illness     Chronic back pain and joint pain - rates his pain a 6 on pain scale rating of 0-10. Radiology is on file.  He describes his pain as an aching, throbbing and sharp pain is always present in his low back, neck and at times hips and knees.  Activity makes his pain worse.  Rest does help decrease this pain.  He currently receives Neurontin which is helpful with the radiculopathy which radiates into his lower extremities from his low back.  He also receives Norco for his chronic pain.  He has no history of substance abuse or addiction.  He has been seen by Ortho in the past for consult.  He has attended physical therapy in the past without improvement.              Class III obesity/diabetes with diabetic neuropathy of both feet-patient is taking Adipex 37.5 mg daily.  He has not shown weight loss this month.  Patient reports that he was unable to walk due to an exacerbation of arthritis pain.  He does wish to continue Adipex and try to get some additional weight off.  His appetite has decreased and his glucose readings are much improved as a result of decreased appetite.  Most recent A1c is 5.9.  Patient will like to further discuss stopping some more of his oral diabetic medications.  He continues on  insulin 20 units daily.    Recent lab results to review.  Polycythemia-patient is under the care of hematology.      The following portions of the patient's history were reviewed and updated as appropriate: allergies, current medications, past family history, past medical history, past social history, past surgical history and problem list.    Review of Systems   Constitutional: Negative for appetite change, fatigue and unexpected weight change.   HENT: Negative for congestion, ear pain, nosebleeds, postnasal drip,  "rhinorrhea, sinus pain, sinus pressure, sore throat, trouble swallowing and voice change.    Eyes: Negative for pain and visual disturbance.   Respiratory: Negative for cough, shortness of breath and wheezing.    Cardiovascular: Negative for chest pain and palpitations.   Gastrointestinal: Negative for abdominal pain, blood in stool, constipation and diarrhea.   Endocrine: Negative for cold intolerance and polydipsia.   Genitourinary: Negative for difficulty urinating, dysuria, flank pain and hematuria.   Musculoskeletal: Positive for arthralgias and back pain. Negative for gait problem, joint swelling and myalgias.   Skin: Negative for color change and rash.   Allergic/Immunologic: Negative.    Neurological: Negative for syncope, numbness and headaches.   Hematological: Negative.    Psychiatric/Behavioral: Negative for sleep disturbance and suicidal ideas.   All other systems reviewed and are negative.      Objective     /90   Pulse 102   Temp 97.1 °F (36.2 °C) (Tympanic)   Ht 175.3 cm (69\")   Wt (!) 152 kg (336 lb)   SpO2 96%   BMI 49.62 kg/m²   Lab on 10/11/2018   Component Date Value Ref Range Status   • Glucose 10/11/2018 127* 70 - 110 mg/dL Final   • BUN 10/11/2018 21  7 - 21 mg/dL Final   • Creatinine 10/11/2018 1.10  0.43 - 1.29 mg/dL Final   • Sodium 10/11/2018 138  135 - 153 mmol/L Final   • Potassium 10/11/2018 4.4  3.5 - 5.3 mmol/L Final   • Chloride 10/11/2018 104  99 - 112 mmol/L Final   • CO2 10/11/2018 25.1  24.3 - 31.9 mmol/L Final   • Calcium 10/11/2018 9.4  7.7 - 10.0 mg/dL Final   • Total Protein 10/11/2018 6.4  6.0 - 8.0 g/dL Final   • Albumin 10/11/2018 4.40  3.50 - 5.00 g/dL Final   • ALT (SGPT) 10/11/2018 49* 10 - 44 U/L Final   • AST (SGOT) 10/11/2018 28  10 - 34 U/L Final   • Alkaline Phosphatase 10/11/2018 66  40 - 129 U/L Final   • Total Bilirubin 10/11/2018 0.6  0.2 - 1.8 mg/dL Final   • eGFR Non African Amer 10/11/2018 72  >60 mL/min/1.73 Final   • Globulin 10/11/2018 2.0  " gm/dL Final   • A/G Ratio 10/11/2018 2.2  1.5 - 2.5 g/dL Final   • BUN/Creatinine Ratio 10/11/2018 19.1  7.0 - 25.0 Final   • Anion Gap 10/11/2018 8.9  3.6 - 11.2 mmol/L Final   • TSH 10/11/2018 0.608  0.550 - 4.780 mIU/mL Final   • Hemoglobin A1C 10/11/2018 5.90* 4.50 - 5.70 % Final   • 25 Hydroxy, Vitamin D 10/11/2018 31.0  ng/ml Final   • Total Cholesterol 10/11/2018 143  0 - 200 mg/dL Final   • Triglycerides 10/11/2018 269* 0 - 150 mg/dL Final   • HDL Cholesterol 10/11/2018 24* 60 - 100 mg/dL Final   • LDL Cholesterol  10/11/2018 65  0 - 100 mg/dL Final   • VLDL Cholesterol 10/11/2018 53.8  mg/dL Final   • LDL/HDL Ratio 10/11/2018 2.72   Final   • Uric Acid 10/11/2018 3.3* 3.7 - 7.0 mg/dL Final   • WBC 10/11/2018 7.52  4.50 - 12.50 10*3/mm3 Final   • RBC 10/11/2018 5.69  4.70 - 6.10 10*6/mm3 Final   • Hemoglobin 10/11/2018 16.9  14.0 - 18.0 g/dL Final   • Hematocrit 10/11/2018 49.2  42.0 - 52.0 % Final   • MCV 10/11/2018 86.5  80.0 - 94.0 fL Final   • MCH 10/11/2018 29.7  27.0 - 33.0 pg Final   • MCHC 10/11/2018 34.3  33.0 - 37.0 g/dL Final   • RDW 10/11/2018 14.3  11.5 - 14.5 % Final   • RDW-SD 10/11/2018 45.2  37.0 - 54.0 fl Final   • MPV 10/11/2018 10.7* 6.0 - 10.0 fL Final   • Platelets 10/11/2018 211  130 - 400 10*3/mm3 Final   • Neutrophil % 10/11/2018 37.5  30.0 - 70.0 % Final   • Lymphocyte % 10/11/2018 35.9  21.0 - 51.0 % Final   • Monocyte % 10/11/2018 12.4* 0.0 - 10.0 % Final   • Eosinophil % 10/11/2018 13.4* 0.0 - 5.0 % Final   • Basophil % 10/11/2018 0.7  0.0 - 2.0 % Final   • Immature Grans % 10/11/2018 0.1  0.0 - 0.5 % Final   • Neutrophils, Absolute 10/11/2018 2.82  1.40 - 6.50 10*3/mm3 Final   • Lymphocytes, Absolute 10/11/2018 2.70  1.00 - 3.00 10*3/mm3 Final   • Monocytes, Absolute 10/11/2018 0.93* 0.10 - 0.90 10*3/mm3 Final   • Eosinophils, Absolute 10/11/2018 1.01* 0.00 - 0.70 10*3/mm3 Final   • Basophils, Absolute 10/11/2018 0.05  0.00 - 0.30 10*3/mm3 Final   • Immature Grans, Absolute  10/11/2018 0.01  0.00 - 0.03 10*3/mm3 Final   • Osmolality Calc 10/11/2018 280.2  273.0 - 305.0 mOsm/kg Final       Physical Exam   Constitutional: He is oriented to person, place, and time. Vital signs are normal. He appears well-developed and well-nourished. No distress.   Visibly obese.    HENT:   Head: Normocephalic and atraumatic.   Right Ear: External ear normal.   Left Ear: External ear normal.   Nose: Nose normal.   Mouth/Throat: Oropharynx is clear and moist. No oropharyngeal exudate.   Eyes: Pupils are equal, round, and reactive to light. Conjunctivae and EOM are normal. Right eye exhibits no discharge. Left eye exhibits no discharge.   Neck: Normal range of motion. Neck supple. No tracheal deviation present. No thyromegaly present.   Cardiovascular: Normal rate, regular rhythm, normal heart sounds and intact distal pulses.    No murmur heard.  Pulmonary/Chest: Effort normal and breath sounds normal. No respiratory distress. He has no wheezes. He has no rales. He exhibits no tenderness.   Abdominal: Soft. Bowel sounds are normal. He exhibits no distension and no mass. There is no tenderness. There is no rebound and no guarding.   Musculoskeletal:        Lumbar back: He exhibits decreased range of motion, tenderness, pain and spasm.   Decreased lumbar curvature, there is pain with forward flexion. DTR's +2. No edema.    Lymphadenopathy:     He has no cervical adenopathy.   Neurological: He is alert and oriented to person, place, and time. He has normal reflexes.   Skin: Skin is warm and dry. Capillary refill takes less than 2 seconds. No rash noted. He is not diaphoretic. No erythema. No pallor.   Psychiatric: He has a normal mood and affect. His speech is normal and behavior is normal. Judgment and thought content normal. His affect is not inappropriate. Cognition and memory are normal.   Nursing note and vitals reviewed.      Assessment/Plan     Problem List Items Addressed This Visit        Respiratory     COPD (chronic obstructive pulmonary disease) (CMS/[facility])       Digestive    Morbid obesity with BMI of 50.0-59.9, adult (CMS/[facility])       Endocrine    Diabetes mellitus with diabetic neuropathy, with long-term current use of insulin (CMS/[facility])       Nervous and Auditory    Neuralgia and neuritis    Relevant Medications    HYDROcodone-acetaminophen (NORCO) 7.5-325 MG per tablet    gabapentin (NEURONTIN) 600 MG tablet    Other Relevant Orders    Urine Drug Screen - Urine, Clean Catch       Musculoskeletal and Integument    Osteoarthrosis       Hematopoietic and Hemostatic    Polycythemia vera (CMS/[facility]) - Primary    Overview     Under the care of hematology                October 2018 lab results reviewed and discussed with patient.    John has been reviewed as consistent.  Uds is on file.   Goal: Improved quality of life and reduction in pain as evidenced by pt report.   Goal: pt will have gradual weight loss and be free of side effects.   Proper body mechanics has been reviewed and discussed today.  John #[id number] reviewed and discussed with patient.    Pt has been instructed today regarding low fat heart smart diet. Weight management and routine exercise has been recommended. Avoid high fat foods, starchy foods and processed foods. Increase lean meats, fresh vegetables and fresh fruits.   We will continue Adipex 37.5 mg 1 by mouth daily.  Patient will resume walking at least 30 minutes daily.  We have reviewed possible side effects and reasons to stop this medication.  If he has no weight loss next month we will discontinue this medication.    Neurontin 600 mg 1 by mouth 3 times a day #90 with 2 refills ear at this is for diabetic neuropathy.    Norco 7.5-325 mg 1 by mouth every 6 hours when necessary #60 with no refill.  This is for chronic joint pain.     Patient's Body mass index is 49.62 kg/m². BMI is above normal parameters. Recommendations include: exercise counseling and nutrition counseling.      I have  discussed diagnosis in detail today allowing time for questions and answers. Pt is aware of reasons to seek urgent or emergent medical care as well as reasons to return to the clinic for evaluation. Possible side effects, interactions and progression of symptoms discussed as well. Pt / family states understanding.   Emotional support and active listening provided.     Stop Glipizide.  Continue metformin 500 mg twice daily.  Continue current insulin dose.  Monitor glucoses 2-3 times a day and report any readings less than 70 or greater than 200.      Follow up in one month, sooner if needed. Routine labs every 3-6 months.     Errors in dictation may reflect use of voice recognition software and not all errors in transcription may have been detected prior to signing.             This document has been electronically signed by:  TYSON Vinson, NP-C

## 2018-11-27 ENCOUNTER — OFFICE VISIT (OUTPATIENT)
Dept: FAMILY MEDICINE CLINIC | Facility: CLINIC | Age: 43
End: 2018-11-27

## 2018-11-27 VITALS
OXYGEN SATURATION: 95 % | WEIGHT: 315 LBS | HEIGHT: 69 IN | BODY MASS INDEX: 46.65 KG/M2 | SYSTOLIC BLOOD PRESSURE: 116 MMHG | HEART RATE: 117 BPM | DIASTOLIC BLOOD PRESSURE: 80 MMHG | TEMPERATURE: 97.5 F

## 2018-11-27 DIAGNOSIS — M79.2 NEURALGIA AND NEURITIS: ICD-10-CM

## 2018-11-27 DIAGNOSIS — M15.9 PRIMARY OSTEOARTHRITIS INVOLVING MULTIPLE JOINTS: ICD-10-CM

## 2018-11-27 DIAGNOSIS — Z79.4 DIABETES MELLITUS DUE TO UNDERLYING CONDITION WITH DIABETIC NEUROPATHY, WITH LONG-TERM CURRENT USE OF INSULIN (HCC): ICD-10-CM

## 2018-11-27 DIAGNOSIS — IMO0002 UNCONTROLLED TYPE 2 DIABETES MELLITUS WITH DIABETIC PERIPHERAL ANGIOPATHY WITHOUT GANGRENE, WITHOUT LONG-TERM CURRENT USE OF INSULIN: ICD-10-CM

## 2018-11-27 DIAGNOSIS — E66.01 MORBID OBESITY WITH BMI OF 50.0-59.9, ADULT (HCC): Primary | ICD-10-CM

## 2018-11-27 DIAGNOSIS — Z72.0 TOBACCO DIPPER: ICD-10-CM

## 2018-11-27 DIAGNOSIS — E08.40 DIABETES MELLITUS DUE TO UNDERLYING CONDITION WITH DIABETIC NEUROPATHY, WITH LONG-TERM CURRENT USE OF INSULIN (HCC): ICD-10-CM

## 2018-11-27 DIAGNOSIS — L40.9 SCALP PSORIASIS: ICD-10-CM

## 2018-11-27 PROCEDURE — 99407 BEHAV CHNG SMOKING > 10 MIN: CPT | Performed by: NURSE PRACTITIONER

## 2018-11-27 PROCEDURE — 99214 OFFICE O/P EST MOD 30 MIN: CPT | Performed by: NURSE PRACTITIONER

## 2018-11-27 RX ORDER — HYDROCODONE BITARTRATE AND ACETAMINOPHEN 7.5; 325 MG/1; MG/1
1 TABLET ORAL EVERY 6 HOURS PRN
Qty: 60 TABLET | Refills: 0 | Status: SHIPPED | OUTPATIENT
Start: 2018-11-27 | End: 2018-12-26 | Stop reason: SDUPTHER

## 2018-11-27 RX ORDER — KETOCONAZOLE 20 MG/ML
SHAMPOO TOPICAL 2 TIMES WEEKLY
Qty: 120 ML | Refills: 5 | Status: SHIPPED | OUTPATIENT
Start: 2018-11-29 | End: 2019-12-10 | Stop reason: SDUPTHER

## 2018-11-27 RX ORDER — PHENTERMINE HYDROCHLORIDE 37.5 MG/1
37.5 TABLET ORAL
Qty: 30 TABLET | Refills: 0 | Status: SHIPPED | OUTPATIENT
Start: 2018-11-27 | End: 2018-12-26 | Stop reason: SDUPTHER

## 2018-11-27 NOTE — PROGRESS NOTES
Subjective   Johnathon Mclain is a 43 y.o. male.     Chief Complaint   Patient presents with   • Back Pain       History of Present Illness:    Lab review  New complaint of right shoulder pain.  Patient states his been hurting on and off for several months.  He has a history of chronic osteoarthritis.  No recent x-rays of the right shoulder.  Pain is worse with activity.    Tobacco abuse-patient chews/dips tobacco on a daily basis.  He would like to discuss tobacco cessation today.    Morbid obesity with BMI greater than 50.  Patient is taking Adipex.  He states that he did overeat during the Thanksgiving holiday.  He denies any side effects and would like to continue Adipex.  He does understand that failure to lose weight on Adipex this month will result in discontinuation.    Type 2 diabetes-patient reports that he has not been checking his glucose level routinely.  He does report a couple of hypoglycemic episodes after overeating.  Patient is currently taking 20 units of long-acting insulin daily.  Patient does receive Neurontin for diabetic neuropathy and nerve pain.    Chronic back pain and joint pain - rates his pain a 6 on pain scale rating of 0-10. Radiology is on file.  He describes his pain as an aching, throbbing and sharp pain is always present in his low back, neck and at times hips and knees.  Activity makes his pain worse.  Rest does help decrease this pain.  He currently receives Neurontin which is helpful with the radiculopathy which radiates into his lower extremities from his low back.  He also receives Norco for his chronic pain.  He has no history of substance abuse or addiction.  He has been seen by Ortho in the past for consult.  He has attended physical therapy in the past without improvement.           The following portions of the patient's history were reviewed and updated as appropriate:  Allergies, current medications, past family history, past medical history, past social history, past  "surgical history and problem list.    Review of Systems   Constitutional: Negative.    HENT: Positive for sinus pressure. Negative for congestion and ear pain.    Eyes: Negative.    Respiratory: Negative.  Negative for cough, chest tightness, shortness of breath and wheezing.    Cardiovascular: Negative.  Negative for chest pain, palpitations and leg swelling.   Gastrointestinal: Negative.    Endocrine: Negative.    Genitourinary: Negative.    Musculoskeletal: Positive for arthralgias, back pain and neck pain. Negative for neck stiffness.   Allergic/Immunologic: Negative.    Neurological: Negative.    Psychiatric/Behavioral: Negative.    All other systems reviewed and are negative.      Objective     /80   Pulse 117   Temp 97.5 °F (36.4 °C) (Oral)   Ht 175.3 cm (69.02\")   Wt (!) 155 kg (341 lb)   SpO2 95%   BMI 50.33 kg/m²   Lab on 10/11/2018   Component Date Value Ref Range Status   • Glucose 10/11/2018 127* 70 - 110 mg/dL Final   • BUN 10/11/2018 21  7 - 21 mg/dL Final   • Creatinine 10/11/2018 1.10  0.43 - 1.29 mg/dL Final   • Sodium 10/11/2018 138  135 - 153 mmol/L Final   • Potassium 10/11/2018 4.4  3.5 - 5.3 mmol/L Final   • Chloride 10/11/2018 104  99 - 112 mmol/L Final   • CO2 10/11/2018 25.1  24.3 - 31.9 mmol/L Final   • Calcium 10/11/2018 9.4  7.7 - 10.0 mg/dL Final   • Total Protein 10/11/2018 6.4  6.0 - 8.0 g/dL Final   • Albumin 10/11/2018 4.40  3.50 - 5.00 g/dL Final   • ALT (SGPT) 10/11/2018 49* 10 - 44 U/L Final   • AST (SGOT) 10/11/2018 28  10 - 34 U/L Final   • Alkaline Phosphatase 10/11/2018 66  40 - 129 U/L Final   • Total Bilirubin 10/11/2018 0.6  0.2 - 1.8 mg/dL Final   • eGFR Non African Amer 10/11/2018 72  >60 mL/min/1.73 Final   • Globulin 10/11/2018 2.0  gm/dL Final   • A/G Ratio 10/11/2018 2.2  1.5 - 2.5 g/dL Final   • BUN/Creatinine Ratio 10/11/2018 19.1  7.0 - 25.0 Final   • Anion Gap 10/11/2018 8.9  3.6 - 11.2 mmol/L Final   • TSH 10/11/2018 0.608  0.550 - 4.780 mIU/mL Final "   • Hemoglobin A1C 10/11/2018 5.90* 4.50 - 5.70 % Final   • 25 Hydroxy, Vitamin D 10/11/2018 31.0  ng/ml Final   • Total Cholesterol 10/11/2018 143  0 - 200 mg/dL Final   • Triglycerides 10/11/2018 269* 0 - 150 mg/dL Final   • HDL Cholesterol 10/11/2018 24* 60 - 100 mg/dL Final   • LDL Cholesterol  10/11/2018 65  0 - 100 mg/dL Final   • VLDL Cholesterol 10/11/2018 53.8  mg/dL Final   • LDL/HDL Ratio 10/11/2018 2.72   Final   • Uric Acid 10/11/2018 3.3* 3.7 - 7.0 mg/dL Final   • WBC 10/11/2018 7.52  4.50 - 12.50 10*3/mm3 Final   • RBC 10/11/2018 5.69  4.70 - 6.10 10*6/mm3 Final   • Hemoglobin 10/11/2018 16.9  14.0 - 18.0 g/dL Final   • Hematocrit 10/11/2018 49.2  42.0 - 52.0 % Final   • MCV 10/11/2018 86.5  80.0 - 94.0 fL Final   • MCH 10/11/2018 29.7  27.0 - 33.0 pg Final   • MCHC 10/11/2018 34.3  33.0 - 37.0 g/dL Final   • RDW 10/11/2018 14.3  11.5 - 14.5 % Final   • RDW-SD 10/11/2018 45.2  37.0 - 54.0 fl Final   • MPV 10/11/2018 10.7* 6.0 - 10.0 fL Final   • Platelets 10/11/2018 211  130 - 400 10*3/mm3 Final   • Neutrophil % 10/11/2018 37.5  30.0 - 70.0 % Final   • Lymphocyte % 10/11/2018 35.9  21.0 - 51.0 % Final   • Monocyte % 10/11/2018 12.4* 0.0 - 10.0 % Final   • Eosinophil % 10/11/2018 13.4* 0.0 - 5.0 % Final   • Basophil % 10/11/2018 0.7  0.0 - 2.0 % Final   • Immature Grans % 10/11/2018 0.1  0.0 - 0.5 % Final   • Neutrophils, Absolute 10/11/2018 2.82  1.40 - 6.50 10*3/mm3 Final   • Lymphocytes, Absolute 10/11/2018 2.70  1.00 - 3.00 10*3/mm3 Final   • Monocytes, Absolute 10/11/2018 0.93* 0.10 - 0.90 10*3/mm3 Final   • Eosinophils, Absolute 10/11/2018 1.01* 0.00 - 0.70 10*3/mm3 Final   • Basophils, Absolute 10/11/2018 0.05  0.00 - 0.30 10*3/mm3 Final   • Immature Grans, Absolute 10/11/2018 0.01  0.00 - 0.03 10*3/mm3 Final   • Osmolality Calc 10/11/2018 280.2  273.0 - 305.0 mOsm/kg Final       Physical Exam   Constitutional: He is oriented to person, place, and time. Vital signs are normal. He appears  well-developed and well-nourished. No distress.   Visibly obese.    HENT:   Head: Normocephalic and atraumatic.   Right Ear: External ear normal.   Left Ear: External ear normal.   Nose: Nose normal.   Mouth/Throat: Oropharynx is clear and moist. No oropharyngeal exudate.   Eyes: Conjunctivae and EOM are normal. Pupils are equal, round, and reactive to light. Right eye exhibits no discharge. Left eye exhibits no discharge.   Neck: Normal range of motion. Neck supple. No tracheal deviation present. No thyromegaly present.   Cardiovascular: Normal rate, regular rhythm, normal heart sounds and intact distal pulses.   No murmur heard.  Pulmonary/Chest: Effort normal and breath sounds normal. No respiratory distress. He has no wheezes. He has no rales. He exhibits no tenderness.   Abdominal: Soft. Bowel sounds are normal. He exhibits no distension and no mass. There is no tenderness. There is no rebound and no guarding.   Musculoskeletal:        Right shoulder: He exhibits decreased range of motion and crepitus.        Lumbar back: He exhibits decreased range of motion, tenderness, pain and spasm.   Decreased lumbar curvature, there is pain with forward flexion. DTR's +2. No edema.    Lymphadenopathy:     He has no cervical adenopathy.   Neurological: He is alert and oriented to person, place, and time. He has normal reflexes.   Skin: Skin is warm and dry. Capillary refill takes less than 2 seconds. No rash noted. He is not diaphoretic. No erythema. No pallor.   Psychiatric: He has a normal mood and affect. His speech is normal and behavior is normal. Judgment and thought content normal. His affect is not inappropriate. Cognition and memory are normal.   Nursing note and vitals reviewed.      Assessment/Plan     Problem List Items Addressed This Visit        Digestive    Morbid obesity with BMI of 50.0-59.9, adult (CMS/[facility]) - Primary       Endocrine    Diabetes mellitus with diabetic neuropathy, with long-term current  use of insulin (CMS/HCC)    Relevant Medications    Insulin Glargine (TOUJEO SOLOSTAR) 300 UNIT/ML solution pen-injector    metFORMIN (GLUCOPHAGE) 500 MG tablet       Nervous and Auditory    Neuralgia and neuritis    Relevant Medications    HYDROcodone-acetaminophen (NORCO) 7.5-325 MG per tablet       Musculoskeletal and Integument    Osteoarthrosis    Relevant Orders    XR Shoulder 2+ View Right       Other          Other Visit Diagnoses     Uncontrolled type 2 diabetes mellitus with diabetic peripheral angiopathy without gangrene, without long-term current use of insulin (CMS/HCC)        Relevant Medications    Insulin Glargine (TOUJEO SOLOSTAR) 300 UNIT/ML solution pen-injector    metFORMIN (GLUCOPHAGE) 500 MG tablet    Scalp psoriasis        Relevant Medications    ketoconazole (NIZORAL) 2 % shampoo (Start on 11/29/2018)                   Patient's Body mass index is 50.33 kg/m². BMI is above normal parameters. Recommendations include: exercise counseling and nutrition counseling.    I advised Johnathon of the risks of continuing to use tobacco, and I provided him with tobacco cessation educational materials in the After Visit Summary.     During this visit, I spent 11 minutes counseling the patient regarding tobacco cessation.    Smoke cessation education provided.  We have established a quit date. Reviewed the adverse effects of dipping/chewing. Discussed community programs as well as medical options to assist with cessation. Total time today spent on tobacco cessation education 11 minutes. We have discussed the risk versus benefits of nicotine patches, Wellbutrin and Chantix.  We have discussed methods to distract from the habit of having a tobacco use such as  i carrying a strong chewing gum.  Individualized plan of care has been developed.     Start nicotine gum.    I have discussed diagnosis in detail today allowing time for questions and answers. Patient is aware of reasons to seek urgent or  emergent medical care as well as reasons to return to the clinic for evaluation. Possible side effects, interactions and progression of symptoms discussed as well. Patient / family states understanding.   Emotional support and active listening provided.       Current Outpatient Medications:   •  albuterol (PROVENTIL HFA;VENTOLIN HFA) 108 (90 Base) MCG/ACT inhaler, Inhale 1 puff As Needed for Wheezing., Disp: 1 inhaler, Rfl: 5  •  aspirin 81 MG EC tablet, Take 1 tablet by mouth Daily., Disp: 90 tablet, Rfl: 1  •  carvedilol (COREG) 6.25 MG tablet, TAKE 1 Tablet BY MOUTH TWICE DAILY, Disp: 60 tablet, Rfl: 5  •  chlorthalidone (HYGROTON) 25 MG tablet, Take 1 tablet by mouth Daily., Disp: 30 tablet, Rfl: 5  •  cholecalciferol (VITAMIN D3) 1000 units tablet, Take 1 tablet by mouth Daily., Disp: 30 tablet, Rfl: 5  •  clobetasol (TEMOVATE) 0.05 % cream, Apply  topically 2 (Two) Times a Day., Disp: 30 g, Rfl: 5  •  colchicine 0.6 MG tablet, TAKE 1 Tablet BY MOUTH EVERY DAY, Disp: 30 tablet, Rfl: 5  •  escitalopram (LEXAPRO) 10 MG tablet, TAKE ONE TABLET BY MOUTH EVERY DAY, Disp: 30 tablet, Rfl: 5  •  fenofibrate (TRICOR) 48 MG tablet, TAKE 1 Tablet BY MOUTH EVERY DAY, Disp: 30 tablet, Rfl: 5  •  fluticasone (FLONASE) 50 MCG/ACT nasal spray, 2 sprays into each nostril Daily. Administer 2 sprays in each nostril for each dose., Disp: 1 bottle, Rfl: 5  •  gabapentin (NEURONTIN) 600 MG tablet, Take 1 tablet by mouth 3 (Three) Times a Day., Disp: 90 tablet, Rfl: 2  •  HYDROcodone-acetaminophen (NORCO) 7.5-325 MG per tablet, Take 1 tablet by mouth Every 6 (Six) Hours As Needed for Moderate Pain  or Severe Pain ., Disp: 60 tablet, Rfl: 0  •  hydrOXYzine (VISTARIL) 25 MG capsule, Take 1 capsule by mouth Every Night., Disp: 30 capsule, Rfl: 5  •  Insulin Glargine (TOUJEO SOLOSTAR) 300 UNIT/ML solution pen-injector, Inject 21 Units under the skin into the appropriate area as directed Daily., Disp: 5 pen, Rfl: 5  •  [START ON  11/29/2018] ketoconazole (NIZORAL) 2 % shampoo, Apply  topically to the appropriate area as directed 2 (Two) Times a Week., Disp: 120 mL, Rfl: 5  •  lisinopril (PRINIVIL,ZESTRIL) 20 MG tablet, TAKE 1 Tablet BY MOUTH TWICE DAILY, Disp: 60 tablet, Rfl: 5  •  loratadine (CLARITIN) 10 MG tablet, TAKE 1 Tablet BY MOUTH EVERY DAY, Disp: 30 tablet, Rfl: 5  •  metFORMIN (GLUCOPHAGE) 500 MG tablet, Take 1 tablet by mouth 2 (Two) Times a Day With Meals., Disp: 60 tablet, Rfl: 5  •  mupirocin (BACTROBAN) 2 % cream, Apply  topically 3 (Three) Times a Day., Disp: 1 each, Rfl: 5  •  omeprazole (priLOSEC) 20 MG capsule, TAKE 1 Capsule BY MOUTH EVERY DAY, Disp: 30 capsule, Rfl: 5  •  phentermine (ADIPEX-P) 37.5 MG tablet, Take 1 tablet by mouth Every Morning Before Breakfast., Disp: 30 tablet, Rfl: 0  •  sildenafil (VIAGRA) 100 MG tablet, Take 1 tablet by mouth Daily As Needed for erectile dysfunction., Disp: 10 tablet, Rfl: 3  •  ULORIC 80 MG tablet tablet, TAKE 1 Tablet BY MOUTH EVERY DAY, Disp: 30 tablet, Rfl: 5  •  VASCEPA 1 g capsule capsule, TAKE 1 CAPSULE BY MOUTH TWICE DAILY WITH MEALS, Disp: 120 capsule, Rfl: 5  •  Insulin Pen Needle (B-D UF III MINI PEN NEEDLES) 31G X 5 MM misc, 1 application Daily., Disp: 30 each, Rfl: 11  •  nicotine polacrilex (EQL NICOTINE POLACRILEX) 4 MG gum, Chew 1 each As Needed for Smoking Cessation., Disp: 150 each, Rfl: 11    Proper body mechanics has been reviewed and discussed today.      As part of this patient's treatment plan they are being prescribed controlled substance/substances with potential for abuse. This patient has been made aware of the appropriate use of such medications, including potential risk of somnolence, limited ability to drive and / or work safely, and potential for overdose. It has also been made clear that these medications are for use by this patient only, without concomitant use of alcohol or other substances unless prescribed/advised by medical provider. Patient  has completed controlled substance agreement detailing terms of continued prescribing of controlled substances including monitoring John reports, drug screens and pill counts. The patient was asked and states they are not receiving narcotic medication from any there provider or any provider that this office has not been made aware of. History and physical exam exhibit continued safe and appropriate use of controlled substances.   Goal: Improved quality of life and reduction in pain as evidenced by pt report.   John # [id number] has been reviewed as consistent.  UDS is on file.     Patient has been instructed and counseled regarding opioid misuse and risk of addiction.  We have discussed proper storage and disposal of controlled medication.    Norco and Adipex-P prescriptions have been sent electronically to his pharmacy.  Patient is at low risk for substance abuse as he has no history of substance abuse or addiction.  We will continue to evaluate him monthly and randomly for the need to continue controlled medication.    Follow up in one month, sooner if needed. Routine labs every 3-6 months.   Dictated utilizing Dragon dictation         This document has been electronically signed by:  TYSON Vinson, NP-C

## 2018-11-30 ENCOUNTER — TELEPHONE (OUTPATIENT)
Dept: FAMILY MEDICINE CLINIC | Facility: CLINIC | Age: 43
End: 2018-11-30

## 2018-11-30 DIAGNOSIS — R93.89 ABNORMAL X-RAY: Primary | ICD-10-CM

## 2018-11-30 NOTE — TELEPHONE ENCOUNTER
Pt is aware of this information.   ----- Message from TYSON Sepulveda sent at 11/28/2018  3:08 PM EST -----  X-rays right shoulder is abnormal.  Set  patient up with orthopedic provider of his choice for further evaluation.

## 2018-12-03 DIAGNOSIS — I10 ESSENTIAL HYPERTENSION: ICD-10-CM

## 2018-12-03 RX ORDER — CHLORTHALIDONE 25 MG/1
25 TABLET ORAL DAILY
Qty: 30 TABLET | Refills: 5 | Status: SHIPPED | OUTPATIENT
Start: 2018-12-03 | End: 2019-01-10 | Stop reason: SDUPTHER

## 2018-12-12 ENCOUNTER — OFFICE VISIT (OUTPATIENT)
Dept: ORTHOPEDIC SURGERY | Facility: CLINIC | Age: 43
End: 2018-12-12

## 2018-12-12 VITALS
SYSTOLIC BLOOD PRESSURE: 111 MMHG | WEIGHT: 315 LBS | BODY MASS INDEX: 42.66 KG/M2 | DIASTOLIC BLOOD PRESSURE: 75 MMHG | HEIGHT: 72 IN | HEART RATE: 91 BPM

## 2018-12-12 DIAGNOSIS — M75.21 BICEPS TENDINITIS OF RIGHT UPPER EXTREMITY: Primary | ICD-10-CM

## 2018-12-12 DIAGNOSIS — M25.511 ACUTE PAIN OF RIGHT SHOULDER: ICD-10-CM

## 2018-12-12 PROCEDURE — 20550 NJX 1 TENDON SHEATH/LIGAMENT: CPT | Performed by: ORTHOPAEDIC SURGERY

## 2018-12-12 PROCEDURE — 99406 BEHAV CHNG SMOKING 3-10 MIN: CPT | Performed by: ORTHOPAEDIC SURGERY

## 2018-12-12 PROCEDURE — 99203 OFFICE O/P NEW LOW 30 MIN: CPT | Performed by: ORTHOPAEDIC SURGERY

## 2018-12-12 RX ADMIN — LIDOCAINE HYDROCHLORIDE 5 ML: 20 INJECTION, SOLUTION INFILTRATION; PERINEURAL at 13:36

## 2018-12-12 RX ADMIN — METHYLPREDNISOLONE ACETATE 40 MG: 40 INJECTION, SUSPENSION INTRA-ARTICULAR; INTRALESIONAL; INTRAMUSCULAR; SOFT TISSUE at 13:36

## 2018-12-12 NOTE — PROGRESS NOTES
New Patient Visit         Patient: Johnathon Mclain  YOB: 1975  Date of Encounter: 12/12/2018      Chief  Complaint:   Chief Complaint   Patient presents with   • Right Shoulder - Pain         HPI:  Johnathon Mclain, 43 y.o. male presents today for evaluation of right shoulder pain of about 2 months duration no specific injury.  He localizes pain to the anterior and lateral aspects of his shoulder.  The pain does radiate toward his elbow.  He has not experienced significant weakness or numbness.  His pain is worse with activity and overhead movement.  He has some pain when sleeping.  He denies significant neck pain.  He has had no trauma.    Medical History:  Patient Active Problem List   Diagnosis   • Neuralgia and neuritis   • Essential hypertension   • Impotence due to erectile dysfunction   • Polycythemia vera (CMS/[facility])   • Abscess   • Osteoarthrosis   • Diabetes mellitus with diabetic neuropathy, with long-term current use of insulin (CMS/[facility])   • COPD (chronic obstructive pulmonary disease) (CMS/[facility])   • Gastroesophageal reflux disease without esophagitis   • Mixed hyperlipidemia   • Chronic idiopathic gout of multiple sites   • High risk medication use   • Obstructive sleep apnea syndrome   • Primary insomnia   • Reactive depression   • Skin tags, multiple acquired   • Encounter for screening for malignant neoplasm of prostate   • Disorder of bone    • Sebaceous cyst   • Class 3 obesity with serious comorbidity and body mass index (BMI) of 50.0 to 59.9 in adult   • Morbid obesity with BMI of 50.0-59.9, adult (CMS/[facility])   •      Past Medical History:   Diagnosis Date   • Asthma    • Back pain    • COPD (chronic obstructive pulmonary disease) (CMS/[facility])    • CPAP (continuous positive airway pressure) dependence    • Diabetes mellitus (CMS/[facility])    • Fibromyalgia    • GERD (gastroesophageal reflux disease)    • Gout    • Hyperlipidemia     mixed   • Hypertension    • Neuralgia and  neuritis    • Osteoarthrosis    • Oxygen dependent     2L AT NIGHT    • Polycythemia    • Sleep apnea    • Type II diabetes mellitus, uncontrolled (CMS/[facility])     uncomplicated       Social History:  Social History     Socioeconomic History   • Marital status: Legally      Spouse name: donald   • Number of children: 2   • Years of education: Not on file   • Highest education level: Not on file   Social Needs   • Financial resource strain: Somewhat hard   • Food insecurity - worry: Never true   • Food insecurity - inability: Never true   • Transportation needs - medical: No   • Transportation needs - non-medical: No   Occupational History   • Occupation: disabled   Tobacco Use   • Smoking status: Never Smoker   • Smokeless tobacco: Current User     Types: Chew   Substance and Sexual Activity   • Alcohol use: No   • Drug use: No   • Sexual activity: Defer   Other Topics Concern   • Not on file   Social History Narrative   • Not on file   Patient's Body mass index is 43.4 kg/m². BMI is above normal parameters. Recommendations include: educational material.  I advised Johnathon of the risks of continuing to use tobacco, and I provided him with tobacco cessation educational materials in the After Visit Summary.     During this visit, I spent 3 minutes counseling the patient regarding tobacco cessation.        Surgical History:  History reviewed. No pertinent surgical history.     Family History   Problem Relation Age of Onset   • Arthritis Mother    • COPD Mother    • Asthma Mother    • Cancer Mother    • Hyperlipidemia Mother    • Diabetes Father    • Heart disease Father    • Hyperlipidemia Father    • Hypertension Father    • Stroke Father      Family History   Problem Relation Age of Onset   • Arthritis Mother    • COPD Mother    • Asthma Mother    • Cancer Mother    • Hyperlipidemia Mother    • Diabetes Father    • Heart disease Father    • Hyperlipidemia Father    • Hypertension Father    • Stroke Father         Current Outpatient Medications:   •  albuterol (PROVENTIL HFA;VENTOLIN HFA) 108 (90 Base) MCG/ACT inhaler, Inhale 1 puff As Needed for Wheezing., Disp: 1 inhaler, Rfl: 5  •  aspirin 81 MG EC tablet, Take 1 tablet by mouth Daily., Disp: 90 tablet, Rfl: 1  •  carvedilol (COREG) 6.25 MG tablet, TAKE 1 Tablet BY MOUTH TWICE DAILY, Disp: 60 tablet, Rfl: 5  •  chlorthalidone (HYGROTON) 25 MG tablet, Take 1 tablet by mouth Daily., Disp: 30 tablet, Rfl: 5  •  cholecalciferol (VITAMIN D3) 1000 units tablet, Take 1 tablet by mouth Daily., Disp: 30 tablet, Rfl: 5  •  clobetasol (TEMOVATE) 0.05 % cream, Apply  topically 2 (Two) Times a Day., Disp: 30 g, Rfl: 5  •  colchicine 0.6 MG tablet, TAKE 1 Tablet BY MOUTH EVERY DAY, Disp: 30 tablet, Rfl: 5  •  escitalopram (LEXAPRO) 10 MG tablet, TAKE ONE TABLET BY MOUTH EVERY DAY, Disp: 30 tablet, Rfl: 5  •  fenofibrate (TRICOR) 48 MG tablet, TAKE 1 Tablet BY MOUTH EVERY DAY, Disp: 30 tablet, Rfl: 5  •  fluticasone (FLONASE) 50 MCG/ACT nasal spray, 2 sprays into each nostril Daily. Administer 2 sprays in each nostril for each dose., Disp: 1 bottle, Rfl: 5  •  gabapentin (NEURONTIN) 600 MG tablet, Take 1 tablet by mouth 3 (Three) Times a Day., Disp: 90 tablet, Rfl: 2  •  HYDROcodone-acetaminophen (NORCO) 7.5-325 MG per tablet, Take 1 tablet by mouth Every 6 (Six) Hours As Needed for Moderate Pain  or Severe Pain ., Disp: 60 tablet, Rfl: 0  •  hydrOXYzine (VISTARIL) 25 MG capsule, Take 1 capsule by mouth Every Night., Disp: 30 capsule, Rfl: 5  •  Insulin Glargine (TOUJEO SOLOSTAR) 300 UNIT/ML solution pen-injector, Inject 21 Units under the skin into the appropriate area as directed Daily., Disp: 5 pen, Rfl: 5  •  Insulin Pen Needle (B-D UF III MINI PEN NEEDLES) 31G X 5 MM misc, 1 application Daily., Disp: 30 each, Rfl: 11  •  ketoconazole (NIZORAL) 2 % shampoo, Apply  topically to the appropriate area as directed 2 (Two) Times a Week., Disp: 120 mL, Rfl: 5  •  lisinopril  (PRINIVIL,ZESTRIL) 20 MG tablet, TAKE 1 Tablet BY MOUTH TWICE DAILY, Disp: 60 tablet, Rfl: 5  •  loratadine (CLARITIN) 10 MG tablet, TAKE 1 Tablet BY MOUTH EVERY DAY, Disp: 30 tablet, Rfl: 5  •  metFORMIN (GLUCOPHAGE) 500 MG tablet, Take 1 tablet by mouth 2 (Two) Times a Day With Meals., Disp: 60 tablet, Rfl: 5  •  mupirocin (BACTROBAN) 2 % cream, Apply  topically 3 (Three) Times a Day., Disp: 1 each, Rfl: 5  •  nicotine polacrilex (EQL NICOTINE POLACRILEX) 4 MG gum, Chew 1 each As Needed for Smoking Cessation., Disp: 150 each, Rfl: 11  •  omeprazole (priLOSEC) 20 MG capsule, TAKE 1 Capsule BY MOUTH EVERY DAY, Disp: 30 capsule, Rfl: 5  •  phentermine (ADIPEX-P) 37.5 MG tablet, Take 1 tablet by mouth Every Morning Before Breakfast., Disp: 30 tablet, Rfl: 0  •  sildenafil (VIAGRA) 100 MG tablet, Take 1 tablet by mouth Daily As Needed for erectile dysfunction., Disp: 10 tablet, Rfl: 3  •  ULORIC 80 MG tablet tablet, TAKE 1 Tablet BY MOUTH EVERY DAY, Disp: 30 tablet, Rfl: 5  •  VASCEPA 1 g capsule capsule, TAKE 1 CAPSULE BY MOUTH TWICE DAILY WITH MEALS, Disp: 120 capsule, Rfl: 5    No Known Allergies    Review of Systems   Constitutional: Negative.    HENT: Positive for sinus pain.    Eyes: Negative.    Respiratory: Negative.    Cardiovascular: Negative.    Gastrointestinal: Negative.    Endocrine: Negative.    Genitourinary: Negative.    Musculoskeletal: Positive for arthralgias, back pain, joint swelling and myalgias.   Skin: Negative.    Allergic/Immunologic: Negative.    Neurological: Negative.    Hematological: Negative.    Psychiatric/Behavioral: Negative.          Radiology: Radiographs reviewed AP lateral right shoulder are negative.      Examination:   Right shoulder evaluation shows full mobility with minimal signs of impingement.  He has moderate tenderness anterior aspect of the shoulder along the bicipital groove and positive speed's test compared to Juan's test which produces minimal pain.  He has full  strength with external rotation full strength with Juan's maneuver, full internal rotation, negative pushoff test, no tenderness along the acromioclavicular joint and no pain with crossarm abduction.      Assessment & Plan:   43 y.o. male presents with clinical findings consistent with biceps tendinitis proximally.  We discussed his options today and he was given Depo-Medrol 40 mg lidocaine block along the bicipital groove.  He is to follow-up in approximately 4 weeks' time.         Diagnosis Plan   1. Biceps tendinitis of right upper extremity     2. Acute pain of right shoulder         Right bicipital groove  Date/Time: 12/12/2018 1:36 PM  Performed by: Anam Arroyo MD  Authorized by: Anam Arroyo MD   Consent: Verbal consent obtained.  Risks and benefits: risks, benefits and alternatives were discussed  Consent given by: patient  Patient understanding: patient states understanding of the procedure being performed  Imaging studies: imaging studies available  Patient identity confirmed: verbally with patient  Preparation: Patient was prepped and draped in the usual sterile fashion.  Local anesthesia used: yes  Anesthesia: local infiltration    Anesthesia:  Local anesthesia used: yes    Sedation:  Patient sedated: no    Medications administered: 5 mL lidocaine 2%; 40 mg methylPREDNISolone acetate 40 MG/ML              Cc:  Erica Michel APRN      Scribed for Anam Arroyo MD by Marlyn Arroyo RN.1:28 PM 12/12/2018

## 2018-12-14 RX ORDER — METHYLPREDNISOLONE ACETATE 40 MG/ML
40 INJECTION, SUSPENSION INTRA-ARTICULAR; INTRALESIONAL; INTRAMUSCULAR; SOFT TISSUE
Status: COMPLETED | OUTPATIENT
Start: 2018-12-12 | End: 2018-12-12

## 2018-12-14 RX ORDER — LIDOCAINE HYDROCHLORIDE 20 MG/ML
5 INJECTION, SOLUTION INFILTRATION; PERINEURAL
Status: COMPLETED | OUTPATIENT
Start: 2018-12-12 | End: 2018-12-12

## 2018-12-26 ENCOUNTER — OFFICE VISIT (OUTPATIENT)
Dept: FAMILY MEDICINE CLINIC | Facility: CLINIC | Age: 43
End: 2018-12-26

## 2018-12-26 VITALS
HEART RATE: 91 BPM | OXYGEN SATURATION: 98 % | BODY MASS INDEX: 42.66 KG/M2 | HEIGHT: 72 IN | TEMPERATURE: 98.3 F | WEIGHT: 315 LBS | DIASTOLIC BLOOD PRESSURE: 80 MMHG | SYSTOLIC BLOOD PRESSURE: 120 MMHG

## 2018-12-26 DIAGNOSIS — Z79.4 TYPE 2 DIABETES MELLITUS WITH DIABETIC NEUROPATHY, WITH LONG-TERM CURRENT USE OF INSULIN (HCC): Primary | ICD-10-CM

## 2018-12-26 DIAGNOSIS — E66.01 MORBID OBESITY WITH BMI OF 50.0-59.9, ADULT (HCC): ICD-10-CM

## 2018-12-26 DIAGNOSIS — E11.40 TYPE 2 DIABETES MELLITUS WITH DIABETIC NEUROPATHY, WITH LONG-TERM CURRENT USE OF INSULIN (HCC): Primary | ICD-10-CM

## 2018-12-26 DIAGNOSIS — M79.2 NEURALGIA AND NEURITIS: ICD-10-CM

## 2018-12-26 DIAGNOSIS — M15.9 PRIMARY OSTEOARTHRITIS INVOLVING MULTIPLE JOINTS: ICD-10-CM

## 2018-12-26 DIAGNOSIS — Z23 ENCOUNTER FOR VACCINATION: ICD-10-CM

## 2018-12-26 DIAGNOSIS — I10 ESSENTIAL HYPERTENSION: ICD-10-CM

## 2018-12-26 PROCEDURE — 99214 OFFICE O/P EST MOD 30 MIN: CPT | Performed by: NURSE PRACTITIONER

## 2018-12-26 PROCEDURE — 90632 HEPA VACCINE ADULT IM: CPT | Performed by: NURSE PRACTITIONER

## 2018-12-26 PROCEDURE — 90471 IMMUNIZATION ADMIN: CPT | Performed by: NURSE PRACTITIONER

## 2018-12-26 RX ORDER — COLCHICINE 0.6 MG/1
TABLET ORAL
Qty: 30 TABLET | Refills: 5 | Status: ON HOLD | OUTPATIENT
Start: 2018-12-26 | End: 2019-01-02

## 2018-12-26 RX ORDER — HYDROCODONE BITARTRATE AND ACETAMINOPHEN 7.5; 325 MG/1; MG/1
1 TABLET ORAL EVERY 6 HOURS PRN
Qty: 60 TABLET | Refills: 0 | Status: SHIPPED | OUTPATIENT
Start: 2018-12-26 | End: 2019-01-24 | Stop reason: SDUPTHER

## 2018-12-26 RX ORDER — FENOFIBRATE 48 MG/1
TABLET, COATED ORAL
Qty: 30 TABLET | Refills: 5 | Status: ON HOLD | OUTPATIENT
Start: 2018-12-26 | End: 2019-01-02

## 2018-12-26 RX ORDER — METFORMIN HYDROCHLORIDE 500 MG/1
TABLET, EXTENDED RELEASE ORAL
Qty: 120 TABLET | Refills: 5 | Status: ON HOLD | OUTPATIENT
Start: 2018-12-26 | End: 2019-01-02

## 2018-12-26 RX ORDER — CARVEDILOL 6.25 MG/1
TABLET ORAL
Qty: 60 TABLET | Refills: 5 | Status: ON HOLD | OUTPATIENT
Start: 2018-12-26 | End: 2019-01-02

## 2018-12-26 RX ORDER — PHENTERMINE HYDROCHLORIDE 37.5 MG/1
37.5 TABLET ORAL
Qty: 30 TABLET | Refills: 0 | Status: SHIPPED | OUTPATIENT
Start: 2018-12-26 | End: 2019-01-10

## 2018-12-26 RX ORDER — LISINOPRIL 20 MG/1
TABLET ORAL
Qty: 60 TABLET | Refills: 5 | Status: ON HOLD | OUTPATIENT
Start: 2018-12-26 | End: 2019-01-02

## 2018-12-26 RX ORDER — GABAPENTIN 600 MG/1
600 TABLET ORAL 3 TIMES DAILY
Qty: 90 TABLET | Refills: 2 | Status: SHIPPED | OUTPATIENT
Start: 2018-12-26 | End: 2019-02-25 | Stop reason: SDUPTHER

## 2018-12-26 NOTE — PROGRESS NOTES
Subjective   Johnathon Mclain is a 43 y.o. male.     Chief Complaint   Patient presents with   • Diabetes       History of Present Illness:      Preventative measure-patient is hepatitis A vaccine    Right shoulder pain-patient had an abnormal x-ray and has been seen for consult with ortho.  Orthopedic provider gave him a steroid injection and feels he has some tendinitis and calcification likely from old injury as a child.  Patient reports that this injection helped for about 30 minutes and that was it.    Obesity - pt reports working on diet and exercise. Making lifestyle changes.   Adipex does decrease his appetite. He reports overeating during the holiday season.       Type 2 diabetes-patient reports that he has not been checking his glucose level routinely.  He does report a couple of hyperglycemic episodes after overeating during the holiday season.  Patient is currently taking 20 units of long-acting insulin daily.  Patient does receive Neurontin for diabetic neuropathy and nerve pain.     Chronic back pain and joint pain - rates his pain a 6 on pain scale rating of 0-10. Radiology is on file.  He describes his pain as an aching, throbbing and sharp pain is always present in his low back, neck and at times hips and knees.  Activity makes his pain worse.  Rest does help decrease this pain.  He currently receives Neurontin which is helpful with the radiculopathy which radiates into his lower extremities from his low back.  He also receives Norco for his chronic pain.  He has no history of substance abuse or addiction.  He has been seen by Ortho in the past for consult.  He has attended physical therapy in the past without improvement.           The following portions of the patient's history were reviewed and updated as appropriate:  Allergies, current medications, past family history, past medical history, past social history, past surgical history and problem list.    Review of Systems   Constitutional: Positive  "for unexpected weight change. Negative for activity change, appetite change and fatigue.   HENT: Positive for postnasal drip. Negative for congestion, ear pain, nosebleeds, rhinorrhea, sore throat, trouble swallowing and voice change.    Eyes: Negative for pain and visual disturbance.   Respiratory: Negative for cough, shortness of breath and wheezing.    Cardiovascular: Negative for chest pain and palpitations.   Gastrointestinal: Negative for abdominal pain, blood in stool, constipation and diarrhea.   Endocrine: Negative for cold intolerance and polydipsia.   Genitourinary: Negative for difficulty urinating, flank pain and hematuria.   Musculoskeletal: Positive for arthralgias and back pain. Negative for gait problem, joint swelling, myalgias and neck stiffness.   Skin: Negative for color change and rash.   Allergic/Immunologic: Negative.  Negative for environmental allergies and food allergies.   Neurological: Positive for numbness (feet at times ). Negative for dizziness, syncope and headaches.   Hematological: Negative.    Psychiatric/Behavioral: Negative for behavioral problems, dysphoric mood, self-injury, sleep disturbance and suicidal ideas.   All other systems reviewed and are negative.      Objective     /80   Pulse 91   Temp 98.3 °F (36.8 °C) (Temporal)   Ht 182.9 cm (72\")   Wt (!) 151 kg (333 lb)   SpO2 98%   BMI 45.16 kg/m²   Lab on 10/11/2018   Component Date Value Ref Range Status   • Glucose 10/11/2018 127* 70 - 110 mg/dL Final   • BUN 10/11/2018 21  7 - 21 mg/dL Final   • Creatinine 10/11/2018 1.10  0.43 - 1.29 mg/dL Final   • Sodium 10/11/2018 138  135 - 153 mmol/L Final   • Potassium 10/11/2018 4.4  3.5 - 5.3 mmol/L Final   • Chloride 10/11/2018 104  99 - 112 mmol/L Final   • CO2 10/11/2018 25.1  24.3 - 31.9 mmol/L Final   • Calcium 10/11/2018 9.4  7.7 - 10.0 mg/dL Final   • Total Protein 10/11/2018 6.4  6.0 - 8.0 g/dL Final   • Albumin 10/11/2018 4.40  3.50 - 5.00 g/dL Final   • ALT " (SGPT) 10/11/2018 49* 10 - 44 U/L Final   • AST (SGOT) 10/11/2018 28  10 - 34 U/L Final   • Alkaline Phosphatase 10/11/2018 66  40 - 129 U/L Final   • Total Bilirubin 10/11/2018 0.6  0.2 - 1.8 mg/dL Final   • eGFR Non African Amer 10/11/2018 72  >60 mL/min/1.73 Final   • Globulin 10/11/2018 2.0  gm/dL Final   • A/G Ratio 10/11/2018 2.2  1.5 - 2.5 g/dL Final   • BUN/Creatinine Ratio 10/11/2018 19.1  7.0 - 25.0 Final   • Anion Gap 10/11/2018 8.9  3.6 - 11.2 mmol/L Final   • TSH 10/11/2018 0.608  0.550 - 4.780 mIU/mL Final   • Hemoglobin A1C 10/11/2018 5.90* 4.50 - 5.70 % Final   • 25 Hydroxy, Vitamin D 10/11/2018 31.0  ng/ml Final   • Total Cholesterol 10/11/2018 143  0 - 200 mg/dL Final   • Triglycerides 10/11/2018 269* 0 - 150 mg/dL Final   • HDL Cholesterol 10/11/2018 24* 60 - 100 mg/dL Final   • LDL Cholesterol  10/11/2018 65  0 - 100 mg/dL Final   • VLDL Cholesterol 10/11/2018 53.8  mg/dL Final   • LDL/HDL Ratio 10/11/2018 2.72   Final   • Uric Acid 10/11/2018 3.3* 3.7 - 7.0 mg/dL Final   • WBC 10/11/2018 7.52  4.50 - 12.50 10*3/mm3 Final   • RBC 10/11/2018 5.69  4.70 - 6.10 10*6/mm3 Final   • Hemoglobin 10/11/2018 16.9  14.0 - 18.0 g/dL Final   • Hematocrit 10/11/2018 49.2  42.0 - 52.0 % Final   • MCV 10/11/2018 86.5  80.0 - 94.0 fL Final   • MCH 10/11/2018 29.7  27.0 - 33.0 pg Final   • MCHC 10/11/2018 34.3  33.0 - 37.0 g/dL Final   • RDW 10/11/2018 14.3  11.5 - 14.5 % Final   • RDW-SD 10/11/2018 45.2  37.0 - 54.0 fl Final   • MPV 10/11/2018 10.7* 6.0 - 10.0 fL Final   • Platelets 10/11/2018 211  130 - 400 10*3/mm3 Final   • Neutrophil % 10/11/2018 37.5  30.0 - 70.0 % Final   • Lymphocyte % 10/11/2018 35.9  21.0 - 51.0 % Final   • Monocyte % 10/11/2018 12.4* 0.0 - 10.0 % Final   • Eosinophil % 10/11/2018 13.4* 0.0 - 5.0 % Final   • Basophil % 10/11/2018 0.7  0.0 - 2.0 % Final   • Immature Grans % 10/11/2018 0.1  0.0 - 0.5 % Final   • Neutrophils, Absolute 10/11/2018 2.82  1.40 - 6.50 10*3/mm3 Final   •  Lymphocytes, Absolute 10/11/2018 2.70  1.00 - 3.00 10*3/mm3 Final   • Monocytes, Absolute 10/11/2018 0.93* 0.10 - 0.90 10*3/mm3 Final   • Eosinophils, Absolute 10/11/2018 1.01* 0.00 - 0.70 10*3/mm3 Final   • Basophils, Absolute 10/11/2018 0.05  0.00 - 0.30 10*3/mm3 Final   • Immature Grans, Absolute 10/11/2018 0.01  0.00 - 0.03 10*3/mm3 Final   • Osmolality Calc 10/11/2018 280.2  273.0 - 305.0 mOsm/kg Final       Physical Exam   Constitutional: He is oriented to person, place, and time. Vital signs are normal. He appears well-developed and well-nourished. No distress.   Visibly obese.    HENT:   Head: Normocephalic and atraumatic.   Right Ear: External ear normal.   Left Ear: External ear normal.   Nose: Nose normal.   Mouth/Throat: Oropharynx is clear and moist. No oropharyngeal exudate.   Eyes: Conjunctivae and EOM are normal. Pupils are equal, round, and reactive to light. Right eye exhibits no discharge. Left eye exhibits no discharge.   Neck: Normal range of motion. Neck supple. No tracheal deviation present. No thyromegaly present.   Cardiovascular: Normal rate, regular rhythm, normal heart sounds and intact distal pulses.   No murmur heard.  Pulmonary/Chest: Effort normal and breath sounds normal. No respiratory distress. He has no wheezes. He has no rales. He exhibits no tenderness.   Abdominal: Soft. Bowel sounds are normal. He exhibits no distension and no mass. There is no tenderness. There is no rebound and no guarding.   Musculoskeletal:        Right shoulder: He exhibits crepitus. He exhibits normal range of motion (improved range of motion ).        Lumbar back: He exhibits decreased range of motion, tenderness, pain and spasm.   Decreased lumbar curvature, there is pain with forward flexion. DTR's +2. No edema.    Lymphadenopathy:     He has no cervical adenopathy.   Neurological: He is alert and oriented to person, place, and time. He has normal reflexes.   Skin: Skin is warm and dry. Capillary  refill takes less than 2 seconds. No rash noted. He is not diaphoretic. No erythema. No pallor.   Psychiatric: He has a normal mood and affect. His speech is normal and behavior is normal. Judgment and thought content normal. His affect is not inappropriate. Cognition and memory are normal.   Nursing note and vitals reviewed.      Assessment/Plan     Problem List Items Addressed This Visit        Digestive    Morbid obesity with BMI of 50.0-59.9, adult (CMS/[facility])    Relevant Medications    phentermine (ADIPEX-P) 37.5 MG tablet       Endocrine    Diabetes mellitus with diabetic neuropathy, with long-term current use of insulin (CMS/[facility]) - Primary    Relevant Medications    gabapentin (NEURONTIN) 600 MG tablet       Nervous and Auditory    Neuralgia and neuritis    Relevant Medications    HYDROcodone-acetaminophen (NORCO) 7.5-325 MG per tablet    gabapentin (NEURONTIN) 600 MG tablet       Musculoskeletal and Integument    Osteoarthrosis       Other    Encounter for vaccination    Relevant Orders    Hepatitis A Vaccine Adult IM        Proper body mechanics has been reviewed and discussed today.      As part of this patient's treatment plan they are being prescribed controlled substance/substances with potential for abuse. This patient has been made aware of the appropriate use of such medications, including potential risk of somnolence, limited ability to drive and / or work safely, and potential for overdose. It has also been made clear that these medications are for use by this patient only, without concomitant use of alcohol or other substances unless prescribed/advised by medical provider. Patient has completed controlled substance agreement detailing terms of continued prescribing of controlled substances including monitoring John reports, drug screens and pill counts. The patient was asked and states they are not receiving narcotic medication from any there provider or any provider that this office has not been  made aware of. History and physical exam exhibit continued safe and appropriate use of controlled substances.   Goal: Improved quality of life and reduction in pain as evidenced by pt report.   John # [id number] has been reviewed as consistent.  UDS is on file.  UDS from date 10/25/2018 reviewed as consistent.    Patient has been instructed and counseled regarding opioid misuse and risk of addiction.  We have discussed proper storage and disposal of controlled medication.    Patient is at moderate risk for abuse or misuse due to receiving more than 1 controlled medication and having chronic pain.  He will be monitored closely and evaluated at each visit as well as randomly for the need to continue current medication and compliance.  At this time I will continue Norco, Neurontin and Adipex-P.  I have discussed with patient that if he does not show a weight loss over the next month he will no longer receiving Adipex-P.  I have discussed with him that the holiday season is over and he needs to get strictly back on his diet.       Patient's Body mass index is 45.16 kg/m². BMI is above normal parameters. Recommendations include: exercise counseling and nutrition counseling.    Reviewed  recent orthopedic consult note and recent imaging with patient.  I have encouraged him to keep his follow up with orthopedic provider .  I have discussed diagnosis in detail today allowing time for questions and answers. Patient is aware of reasons to seek urgent or emergent medical care as well as reasons to return to the clinic for evaluation. Possible side effects, interactions and progression of symptoms discussed as well. Patient / family states understanding.   Emotional support and active listening provided.     Follow up in one month, sooner if needed. Routine labs every 3-6 months.     Dictated utilizing Dragon dictation          This document has been electronically signed by:  TYSON Vinson, NP-C

## 2019-01-01 PROCEDURE — 99284 EMERGENCY DEPT VISIT MOD MDM: CPT

## 2019-01-01 PROCEDURE — 36415 COLL VENOUS BLD VENIPUNCTURE: CPT

## 2019-01-02 ENCOUNTER — HOSPITAL ENCOUNTER (EMERGENCY)
Facility: HOSPITAL | Age: 44
Discharge: PSYCHIATRIC OR SUBSTANCE USE FACILITY | End: 2019-01-02
Attending: EMERGENCY MEDICINE | Admitting: EMERGENCY MEDICINE

## 2019-01-02 ENCOUNTER — HOSPITAL ENCOUNTER (INPATIENT)
Facility: HOSPITAL | Age: 44
LOS: 1 days | Discharge: HOME | End: 2019-01-03
Attending: PSYCHIATRY & NEUROLOGY | Admitting: PSYCHIATRY & NEUROLOGY

## 2019-01-02 VITALS
RESPIRATION RATE: 18 BRPM | BODY MASS INDEX: 44.1 KG/M2 | OXYGEN SATURATION: 99 % | SYSTOLIC BLOOD PRESSURE: 132 MMHG | TEMPERATURE: 98.6 F | HEART RATE: 88 BPM | WEIGHT: 315 LBS | HEIGHT: 71 IN | DIASTOLIC BLOOD PRESSURE: 82 MMHG

## 2019-01-02 DIAGNOSIS — R45.851 SUICIDAL IDEATIONS: Primary | ICD-10-CM

## 2019-01-02 PROBLEM — F32.A DEPRESSION WITH SUICIDAL IDEATION: Status: ACTIVE | Noted: 2019-01-02

## 2019-01-02 LAB
6-ACETYL MORPHINE: NEGATIVE
ALBUMIN SERPL-MCNC: 4.4 G/DL (ref 3.5–5)
ALBUMIN/GLOB SERPL: 1.9 G/DL (ref 1.5–2.5)
ALP SERPL-CCNC: 66 U/L (ref 40–129)
ALT SERPL W P-5'-P-CCNC: 56 U/L (ref 10–44)
AMPHET+METHAMPHET UR QL: NEGATIVE
ANION GAP SERPL CALCULATED.3IONS-SCNC: 8 MMOL/L (ref 3.6–11.2)
AST SERPL-CCNC: 32 U/L (ref 10–34)
BARBITURATES UR QL SCN: NEGATIVE
BASOPHILS # BLD AUTO: 0.05 10*3/MM3 (ref 0–0.3)
BASOPHILS NFR BLD AUTO: 0.5 % (ref 0–2)
BENZODIAZ UR QL SCN: NEGATIVE
BILIRUB SERPL-MCNC: 1 MG/DL (ref 0.2–1.8)
BILIRUB UR QL STRIP: NEGATIVE
BUN BLD-MCNC: 23 MG/DL (ref 7–21)
BUN/CREAT SERPL: 20.9 (ref 7–25)
BUPRENORPHINE SERPL-MCNC: NEGATIVE NG/ML
CALCIUM SPEC-SCNC: 9.3 MG/DL (ref 7.7–10)
CANNABINOIDS SERPL QL: NEGATIVE
CHLORIDE SERPL-SCNC: 103 MMOL/L (ref 99–112)
CLARITY UR: CLEAR
CO2 SERPL-SCNC: 23 MMOL/L (ref 24.3–31.9)
COCAINE UR QL: NEGATIVE
COLOR UR: ABNORMAL
CREAT BLD-MCNC: 1.1 MG/DL (ref 0.43–1.29)
DEPRECATED RDW RBC AUTO: 42.7 FL (ref 37–54)
EOSINOPHIL # BLD AUTO: 0.8 10*3/MM3 (ref 0–0.7)
EOSINOPHIL NFR BLD AUTO: 7.5 % (ref 0–5)
ERYTHROCYTE [DISTWIDTH] IN BLOOD BY AUTOMATED COUNT: 13.1 % (ref 11.5–14.5)
ETHANOL BLD-MCNC: <10 MG/DL
ETHANOL UR QL: <0.01 %
GFR SERPL CREATININE-BSD FRML MDRD: 72 ML/MIN/1.73
GLOBULIN UR ELPH-MCNC: 2.3 GM/DL
GLUCOSE BLD-MCNC: 141 MG/DL (ref 70–110)
GLUCOSE BLDC GLUCOMTR-MCNC: 134 MG/DL (ref 70–130)
GLUCOSE BLDC GLUCOMTR-MCNC: 91 MG/DL (ref 70–130)
GLUCOSE BLDC GLUCOMTR-MCNC: 99 MG/DL (ref 70–130)
GLUCOSE UR STRIP-MCNC: NEGATIVE MG/DL
HCT VFR BLD AUTO: 48.7 % (ref 42–52)
HGB BLD-MCNC: 17.2 G/DL (ref 14–18)
HGB UR QL STRIP.AUTO: NEGATIVE
IMM GRANULOCYTES # BLD AUTO: 0.03 10*3/MM3 (ref 0–0.03)
IMM GRANULOCYTES NFR BLD AUTO: 0.3 % (ref 0–0.5)
KETONES UR QL STRIP: NEGATIVE
LEUKOCYTE ESTERASE UR QL STRIP.AUTO: NEGATIVE
LYMPHOCYTES # BLD AUTO: 3.79 10*3/MM3 (ref 1–3)
LYMPHOCYTES NFR BLD AUTO: 35.7 % (ref 21–51)
MCH RBC QN AUTO: 31.3 PG (ref 27–33)
MCHC RBC AUTO-ENTMCNC: 35.3 G/DL (ref 33–37)
MCV RBC AUTO: 88.7 FL (ref 80–94)
METHADONE UR QL SCN: NEGATIVE
MONOCYTES # BLD AUTO: 0.8 10*3/MM3 (ref 0.1–0.9)
MONOCYTES NFR BLD AUTO: 7.5 % (ref 0–10)
NEUTROPHILS # BLD AUTO: 5.14 10*3/MM3 (ref 1.4–6.5)
NEUTROPHILS NFR BLD AUTO: 48.5 % (ref 30–70)
NITRITE UR QL STRIP: NEGATIVE
OPIATES UR QL: NEGATIVE
OSMOLALITY SERPL CALC.SUM OF ELEC: 274.3 MOSM/KG (ref 273–305)
OXYCODONE UR QL SCN: NEGATIVE
PCP UR QL SCN: NEGATIVE
PH UR STRIP.AUTO: 7 [PH] (ref 5–8)
PLATELET # BLD AUTO: 211 10*3/MM3 (ref 130–400)
PMV BLD AUTO: 10.1 FL (ref 6–10)
POTASSIUM BLD-SCNC: 3.6 MMOL/L (ref 3.5–5.3)
PROT SERPL-MCNC: 6.7 G/DL (ref 6–8)
PROT UR QL STRIP: NEGATIVE
RBC # BLD AUTO: 5.49 10*6/MM3 (ref 4.7–6.1)
SODIUM BLD-SCNC: 134 MMOL/L (ref 135–153)
SP GR UR STRIP: 1.02 (ref 1–1.03)
UROBILINOGEN UR QL STRIP: ABNORMAL
WBC NRBC COR # BLD: 10.61 10*3/MM3 (ref 4.5–12.5)

## 2019-01-02 PROCEDURE — 93005 ELECTROCARDIOGRAM TRACING: CPT | Performed by: PSYCHIATRY & NEUROLOGY

## 2019-01-02 PROCEDURE — 80307 DRUG TEST PRSMV CHEM ANLYZR: CPT | Performed by: PHYSICIAN ASSISTANT

## 2019-01-02 PROCEDURE — 80053 COMPREHEN METABOLIC PANEL: CPT | Performed by: PHYSICIAN ASSISTANT

## 2019-01-02 PROCEDURE — 94799 UNLISTED PULMONARY SVC/PX: CPT

## 2019-01-02 PROCEDURE — 82962 GLUCOSE BLOOD TEST: CPT

## 2019-01-02 PROCEDURE — 85025 COMPLETE CBC W/AUTO DIFF WBC: CPT | Performed by: PHYSICIAN ASSISTANT

## 2019-01-02 PROCEDURE — 63710000001 INSULIN DETEMIR PER 5 UNITS: Performed by: PSYCHIATRY & NEUROLOGY

## 2019-01-02 PROCEDURE — 93010 ELECTROCARDIOGRAM REPORT: CPT | Performed by: INTERNAL MEDICINE

## 2019-01-02 PROCEDURE — 81003 URINALYSIS AUTO W/O SCOPE: CPT | Performed by: PHYSICIAN ASSISTANT

## 2019-01-02 RX ORDER — CARVEDILOL 6.25 MG/1
6.25 TABLET ORAL 2 TIMES DAILY WITH MEALS
Status: CANCELLED | OUTPATIENT
Start: 2019-01-02

## 2019-01-02 RX ORDER — METFORMIN HYDROCHLORIDE 500 MG/1
1000 TABLET, EXTENDED RELEASE ORAL 2 TIMES DAILY
COMMUNITY
End: 2019-01-10 | Stop reason: SDUPTHER

## 2019-01-02 RX ORDER — GLIPIZIDE 5 MG/1
5 TABLET ORAL DAILY
Status: CANCELLED | OUTPATIENT
Start: 2019-01-02

## 2019-01-02 RX ORDER — FENOFIBRATE 48 MG/1
48 TABLET, COATED ORAL DAILY
COMMUNITY
End: 2019-01-10 | Stop reason: SDUPTHER

## 2019-01-02 RX ORDER — COLCHICINE 0.6 MG/1
0.6 TABLET ORAL DAILY
COMMUNITY
End: 2019-01-10 | Stop reason: SDUPTHER

## 2019-01-02 RX ORDER — BENZTROPINE MESYLATE 1 MG/ML
0.5 INJECTION INTRAMUSCULAR; INTRAVENOUS DAILY PRN
Status: DISCONTINUED | OUTPATIENT
Start: 2019-01-02 | End: 2019-01-03 | Stop reason: HOSPADM

## 2019-01-02 RX ORDER — HYDROCODONE BITARTRATE AND ACETAMINOPHEN 7.5; 325 MG/1; MG/1
1 TABLET ORAL 2 TIMES DAILY PRN
Status: DISCONTINUED | OUTPATIENT
Start: 2019-01-02 | End: 2019-01-03 | Stop reason: HOSPADM

## 2019-01-02 RX ORDER — FEBUXOSTAT 80 MG/1
80 TABLET, FILM COATED ORAL DAILY
Status: CANCELLED | OUTPATIENT
Start: 2019-01-02

## 2019-01-02 RX ORDER — HYDROCODONE BITARTRATE AND ACETAMINOPHEN 7.5; 325 MG/1; MG/1
1 TABLET ORAL EVERY 6 HOURS PRN
Status: CANCELLED | OUTPATIENT
Start: 2019-01-02

## 2019-01-02 RX ORDER — ESCITALOPRAM OXALATE 10 MG/1
10 TABLET ORAL DAILY
Status: CANCELLED | OUTPATIENT
Start: 2019-01-02

## 2019-01-02 RX ORDER — ASPIRIN 81 MG/1
81 TABLET ORAL DAILY
Status: CANCELLED | OUTPATIENT
Start: 2019-01-02

## 2019-01-02 RX ORDER — NICOTINE 21 MG/24HR
1 PATCH, TRANSDERMAL 24 HOURS TRANSDERMAL EVERY 24 HOURS
Status: DISCONTINUED | OUTPATIENT
Start: 2019-01-02 | End: 2019-01-03 | Stop reason: HOSPADM

## 2019-01-02 RX ORDER — LOPERAMIDE HYDROCHLORIDE 2 MG/1
2 CAPSULE ORAL 4 TIMES DAILY PRN
Status: DISCONTINUED | OUTPATIENT
Start: 2019-01-02 | End: 2019-01-03 | Stop reason: HOSPADM

## 2019-01-02 RX ORDER — FAMOTIDINE 20 MG/1
20 TABLET, FILM COATED ORAL 2 TIMES DAILY PRN
Status: DISCONTINUED | OUTPATIENT
Start: 2019-01-02 | End: 2019-01-03 | Stop reason: HOSPADM

## 2019-01-02 RX ORDER — ESCITALOPRAM OXALATE 10 MG/1
10 TABLET ORAL DAILY
COMMUNITY
End: 2019-01-03 | Stop reason: HOSPADM

## 2019-01-02 RX ORDER — GABAPENTIN 300 MG/1
600 CAPSULE ORAL EVERY 8 HOURS SCHEDULED
Status: DISCONTINUED | OUTPATIENT
Start: 2019-01-02 | End: 2019-01-03 | Stop reason: HOSPADM

## 2019-01-02 RX ORDER — GLIPIZIDE 5 MG/1
5 TABLET ORAL DAILY
Status: DISCONTINUED | OUTPATIENT
Start: 2019-01-02 | End: 2019-01-03 | Stop reason: HOSPADM

## 2019-01-02 RX ORDER — ALUMINA, MAGNESIA, AND SIMETHICONE 2400; 2400; 240 MG/30ML; MG/30ML; MG/30ML
15 SUSPENSION ORAL EVERY 6 HOURS PRN
Status: DISCONTINUED | OUTPATIENT
Start: 2019-01-02 | End: 2019-01-03 | Stop reason: HOSPADM

## 2019-01-02 RX ORDER — ICOSAPENT ETHYL 1000 MG/1
1 CAPSULE ORAL 2 TIMES DAILY WITH MEALS
COMMUNITY
End: 2019-01-10 | Stop reason: SDUPTHER

## 2019-01-02 RX ORDER — FEBUXOSTAT 80 MG/1
80 TABLET, FILM COATED ORAL DAILY
Status: DISCONTINUED | OUTPATIENT
Start: 2019-01-02 | End: 2019-01-03 | Stop reason: HOSPADM

## 2019-01-02 RX ORDER — GLIPIZIDE 5 MG/1
5 TABLET ORAL DAILY
COMMUNITY
End: 2019-01-10 | Stop reason: SDUPTHER

## 2019-01-02 RX ORDER — NICOTINE POLACRILEX 4 MG
15 LOZENGE BUCCAL
Status: DISCONTINUED | OUTPATIENT
Start: 2019-01-02 | End: 2019-01-03 | Stop reason: HOSPADM

## 2019-01-02 RX ORDER — COLCHICINE 0.6 MG/1
0.6 TABLET ORAL DAILY
Status: DISCONTINUED | OUTPATIENT
Start: 2019-01-02 | End: 2019-01-03 | Stop reason: HOSPADM

## 2019-01-02 RX ORDER — BENZTROPINE MESYLATE 1 MG/1
1 TABLET ORAL DAILY PRN
Status: DISCONTINUED | OUTPATIENT
Start: 2019-01-02 | End: 2019-01-03 | Stop reason: HOSPADM

## 2019-01-02 RX ORDER — IBUPROFEN 600 MG/1
600 TABLET ORAL EVERY 6 HOURS PRN
Status: DISCONTINUED | OUTPATIENT
Start: 2019-01-02 | End: 2019-01-03 | Stop reason: HOSPADM

## 2019-01-02 RX ORDER — TRAZODONE HYDROCHLORIDE 50 MG/1
50 TABLET ORAL NIGHTLY PRN
Status: DISCONTINUED | OUTPATIENT
Start: 2019-01-02 | End: 2019-01-03 | Stop reason: HOSPADM

## 2019-01-02 RX ORDER — LISINOPRIL 10 MG/1
20 TABLET ORAL 2 TIMES DAILY
Status: CANCELLED | OUTPATIENT
Start: 2019-01-02

## 2019-01-02 RX ORDER — LISINOPRIL 20 MG/1
20 TABLET ORAL 2 TIMES DAILY
COMMUNITY
End: 2019-01-10 | Stop reason: SDUPTHER

## 2019-01-02 RX ORDER — DEXTROSE MONOHYDRATE 25 G/50ML
25 INJECTION, SOLUTION INTRAVENOUS
Status: DISCONTINUED | OUTPATIENT
Start: 2019-01-02 | End: 2019-01-03 | Stop reason: HOSPADM

## 2019-01-02 RX ORDER — CHLORTHALIDONE 50 MG/1
25 TABLET ORAL DAILY
Status: DISCONTINUED | OUTPATIENT
Start: 2019-01-02 | End: 2019-01-03 | Stop reason: HOSPADM

## 2019-01-02 RX ORDER — PANTOPRAZOLE SODIUM 40 MG/1
40 TABLET, DELAYED RELEASE ORAL EVERY MORNING
Status: CANCELLED | OUTPATIENT
Start: 2019-01-03

## 2019-01-02 RX ORDER — ASPIRIN 81 MG/1
81 TABLET ORAL DAILY
Status: DISCONTINUED | OUTPATIENT
Start: 2019-01-02 | End: 2019-01-03 | Stop reason: HOSPADM

## 2019-01-02 RX ORDER — HYDROXYZINE HYDROCHLORIDE 25 MG/1
25 TABLET, FILM COATED ORAL NIGHTLY
Status: CANCELLED | OUTPATIENT
Start: 2019-01-02

## 2019-01-02 RX ORDER — KETOCONAZOLE 20 MG/ML
SHAMPOO TOPICAL
Status: DISCONTINUED | OUTPATIENT
Start: 2019-01-03 | End: 2019-01-03 | Stop reason: HOSPADM

## 2019-01-02 RX ORDER — ECHINACEA PURPUREA EXTRACT 125 MG
2 TABLET ORAL AS NEEDED
Status: DISCONTINUED | OUTPATIENT
Start: 2019-01-02 | End: 2019-01-03 | Stop reason: HOSPADM

## 2019-01-02 RX ORDER — CHLORTHALIDONE 50 MG/1
25 TABLET ORAL DAILY
Status: CANCELLED | OUTPATIENT
Start: 2019-01-02

## 2019-01-02 RX ORDER — BENZONATATE 100 MG/1
100 CAPSULE ORAL 3 TIMES DAILY PRN
Status: DISCONTINUED | OUTPATIENT
Start: 2019-01-02 | End: 2019-01-03 | Stop reason: HOSPADM

## 2019-01-02 RX ORDER — HYDROXYZINE HYDROCHLORIDE 25 MG/1
25 TABLET, FILM COATED ORAL NIGHTLY
Status: DISCONTINUED | OUTPATIENT
Start: 2019-01-02 | End: 2019-01-03 | Stop reason: HOSPADM

## 2019-01-02 RX ORDER — HYDROXYZINE 50 MG/1
50 TABLET, FILM COATED ORAL EVERY 6 HOURS PRN
Status: DISCONTINUED | OUTPATIENT
Start: 2019-01-02 | End: 2019-01-03 | Stop reason: HOSPADM

## 2019-01-02 RX ORDER — CARVEDILOL 6.25 MG/1
6.25 TABLET ORAL 2 TIMES DAILY WITH MEALS
COMMUNITY
End: 2019-01-10 | Stop reason: SDUPTHER

## 2019-01-02 RX ORDER — PANTOPRAZOLE SODIUM 40 MG/1
40 TABLET, DELAYED RELEASE ORAL EVERY MORNING
Status: DISCONTINUED | OUTPATIENT
Start: 2019-01-03 | End: 2019-01-03 | Stop reason: HOSPADM

## 2019-01-02 RX ORDER — CARVEDILOL 6.25 MG/1
6.25 TABLET ORAL 2 TIMES DAILY WITH MEALS
Status: DISCONTINUED | OUTPATIENT
Start: 2019-01-02 | End: 2019-01-03 | Stop reason: HOSPADM

## 2019-01-02 RX ORDER — LISINOPRIL 10 MG/1
20 TABLET ORAL 2 TIMES DAILY
Status: DISCONTINUED | OUTPATIENT
Start: 2019-01-02 | End: 2019-01-03 | Stop reason: HOSPADM

## 2019-01-02 RX ORDER — COLCHICINE 0.6 MG/1
0.6 TABLET ORAL DAILY
Status: CANCELLED | OUTPATIENT
Start: 2019-01-02

## 2019-01-02 RX ORDER — KETOCONAZOLE 20 MG/ML
1 SHAMPOO TOPICAL 2 TIMES WEEKLY
Status: CANCELLED | OUTPATIENT
Start: 2019-01-03

## 2019-01-02 RX ORDER — FEBUXOSTAT 80 MG/1
80 TABLET, FILM COATED ORAL DAILY
COMMUNITY
End: 2019-01-10 | Stop reason: SDUPTHER

## 2019-01-02 RX ORDER — GABAPENTIN 300 MG/1
600 CAPSULE ORAL EVERY 8 HOURS SCHEDULED
Status: CANCELLED | OUTPATIENT
Start: 2019-01-02

## 2019-01-02 RX ORDER — ONDANSETRON 4 MG/1
4 TABLET, FILM COATED ORAL EVERY 6 HOURS PRN
Status: DISCONTINUED | OUTPATIENT
Start: 2019-01-02 | End: 2019-01-03 | Stop reason: HOSPADM

## 2019-01-02 RX ORDER — OMEPRAZOLE 20 MG/1
20 CAPSULE, DELAYED RELEASE ORAL DAILY
COMMUNITY
End: 2019-01-10 | Stop reason: SDUPTHER

## 2019-01-02 RX ADMIN — GABAPENTIN 600 MG: 300 CAPSULE ORAL at 21:43

## 2019-01-02 RX ADMIN — TRAZODONE HYDROCHLORIDE 50 MG: 50 TABLET ORAL at 01:41

## 2019-01-02 RX ADMIN — METFORMIN HYDROCHLORIDE 1000 MG: 500 TABLET ORAL at 17:08

## 2019-01-02 RX ADMIN — LISINOPRIL 20 MG: 10 TABLET ORAL at 20:09

## 2019-01-02 RX ADMIN — ASPIRIN 81 MG: 81 TABLET ORAL at 14:58

## 2019-01-02 RX ADMIN — GLIPIZIDE 5 MG: 5 TABLET ORAL at 14:58

## 2019-01-02 RX ADMIN — TRAZODONE HYDROCHLORIDE 50 MG: 50 TABLET ORAL at 21:43

## 2019-01-02 RX ADMIN — INSULIN DETEMIR 21 UNITS: 100 INJECTION, SOLUTION SUBCUTANEOUS at 17:06

## 2019-01-02 RX ADMIN — COLCHICINE 0.6 MG: 0.6 TABLET, FILM COATED ORAL at 14:58

## 2019-01-02 RX ADMIN — NICOTINE 1 PATCH: 21 PATCH TRANSDERMAL at 10:18

## 2019-01-02 RX ADMIN — GABAPENTIN 600 MG: 300 CAPSULE ORAL at 14:58

## 2019-01-02 RX ADMIN — CARVEDILOL 6.25 MG: 6.25 TABLET, FILM COATED ORAL at 20:08

## 2019-01-02 RX ADMIN — SERTRALINE 50 MG: 50 TABLET, FILM COATED ORAL at 13:00

## 2019-01-02 RX ADMIN — FEBUXOSTAT 80 MG: 80 TABLET ORAL at 14:58

## 2019-01-02 RX ADMIN — HYDROXYZINE HYDROCHLORIDE 25 MG: 25 TABLET, FILM COATED ORAL at 20:08

## 2019-01-02 RX ADMIN — CHLORTHALIDONE 25 MG: 50 TABLET ORAL at 14:58

## 2019-01-02 NOTE — PLAN OF CARE
Problem: Suicidal Behavior (Adult)  Goal: Suicidal Behavior is Absent/Minimized/Managed  Outcome: Ongoing (interventions implemented as appropriate)      Problem: Mood Impairment (Depressive Signs/Symptoms) (Adult)  Goal: Improved Mood Symptoms (Depressive Signs/Symptoms)  Outcome: Ongoing (interventions implemented as appropriate)      Problem: Feelings of Worthlessness, Hopelessness, Excessive Guilt (Depressive Signs/Symptoms) (Adult)  Goal: Enhanced Self-Esteem/Confidence (Depressive Signs/Symptoms)  Outcome: Ongoing (interventions implemented as appropriate)      Problem: Sleep Impairment (Depressive Signs/Symptoms) (Adult)  Goal: Improved Sleep Hygiene (Depressive Signs/Symptoms)  Outcome: Ongoing (interventions implemented as appropriate)      Problem: Weight/Appetite Change (Depressive Signs/Symptoms) (Adult)  Goal: Nutrition/Weight Optimized (Depressive Signs/Symptoms)  Outcome: Ongoing (interventions implemented as appropriate)      Problem: Cognitive Impairment (Depressive Signs/Symptoms) (Adult)  Goal: Improved Ability to Think/Concentrate (Depressive Signs/Symptoms)  Outcome: Ongoing (interventions implemented as appropriate)

## 2019-01-02 NOTE — ED PROVIDER NOTES
Subjective     History provided by:  Patient   used: No    Mental Health Problem   Presenting symptoms: suicidal thoughts    Duration:  3 weeks  Context: stressful life event    Context comment:  Argument with with ex wife   Relieved by:  Nothing  Worsened by:  Nothing  Ineffective treatments:  None tried  Associated symptoms: anhedonia and feelings of worthlessness        Review of Systems   Constitutional: Negative.    HENT: Negative.    Eyes: Negative.    Respiratory: Negative.    Cardiovascular: Negative.    Gastrointestinal: Negative.    Endocrine: Negative.    Genitourinary: Negative.    Musculoskeletal: Negative.    Skin: Negative.    Allergic/Immunologic: Negative.    Neurological: Negative.    Hematological: Negative.    Psychiatric/Behavioral: Positive for suicidal ideas.   All other systems reviewed and are negative.      Past Medical History:   Diagnosis Date   • Asthma    • Back pain    • COPD (chronic obstructive pulmonary disease) (CMS/[facility])    • CPAP (continuous positive airway pressure) dependence    • Diabetes mellitus (CMS/[facility])    • Fibromyalgia    • GERD (gastroesophageal reflux disease)    • Gout    • Hyperlipidemia     mixed   • Hypertension    • Neuralgia and neuritis    • Osteoarthrosis    • Oxygen dependent     2L AT NIGHT    • Polycythemia    • Sleep apnea    • Type II diabetes mellitus, uncontrolled (CMS/[facility])     uncomplicated       No Known Allergies    Past Surgical History:   Procedure Laterality Date   • EXCISION MASS HEAD/NECK N/A 5/11/2018    Procedure: SKIN LESION EXCISION NECK & UPPER BACK;  Surgeon: New Camacho MD;  Location: [facility] OR;  Service: General       Family History   Problem Relation Age of Onset   • Arthritis Mother    • COPD Mother    • Asthma Mother    • Cancer Mother    • Hyperlipidemia Mother    • Diabetes Father    • Heart disease Father    • Hyperlipidemia Father    • Hypertension Father    • Stroke Father        Social History      Socioeconomic History   • Marital status: Legally      Spouse name: donald   • Number of children: 2   • Years of education: Not on file   • Highest education level: Not on file   Social Needs   • Financial resource strain: Somewhat hard   • Food insecurity - worry: Never true   • Food insecurity - inability: Never true   • Transportation needs - medical: No   • Transportation needs - non-medical: No   Occupational History   • Occupation: disabled   Tobacco Use   • Smoking status: Never Smoker   • Smokeless tobacco: Current User     Types: Chew   Substance and Sexual Activity   • Alcohol use: No   • Drug use: No   • Sexual activity: Defer           Objective   Physical Exam   Constitutional: He is oriented to person, place, and time. He appears well-developed and well-nourished.   HENT:   Head: Normocephalic and atraumatic.   Right Ear: External ear normal.   Left Ear: External ear normal.   Nose: Nose normal.   Mouth/Throat: Oropharynx is clear and moist.   Eyes: Conjunctivae and EOM are normal. Pupils are equal, round, and reactive to light.   Neck: Normal range of motion. Neck supple.   Cardiovascular: Normal rate, regular rhythm, normal heart sounds and intact distal pulses.   Pulmonary/Chest: Effort normal and breath sounds normal.   Abdominal: Soft. Bowel sounds are normal.   Musculoskeletal: Normal range of motion.   Neurological: He is alert and oriented to person, place, and time.   Skin: Skin is warm and dry.   Psychiatric: His speech is normal. Judgment normal. He is withdrawn. He is not actively hallucinating. Cognition and memory are normal. He exhibits a depressed mood. He expresses suicidal ideation. He expresses suicidal plans. He is attentive.   Nursing note and vitals reviewed.      Procedures           ED Course                  MDM      Final diagnoses:   Suicidal ideations            Lidia Hernández PA  01/02/19 0111
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
## 2019-01-02 NOTE — NURSING NOTE
1205 NOTIFIED DR SOSA THAT THE PHARMACY HAS VERIFIED THE PATIENTS HOME MEDS AND THEY ARE AVAILABLE ON [employer] FOR HER REVIEW

## 2019-01-02 NOTE — NURSING NOTE
Intake assessment completed. The pt self presents seeking help with suicidal thoughts. He endorses a plan of shooting himself and states that he has guns at his house. States that he has been going through a divorce and is concerned his ex wife is going to take his children from him. Reports suicidal thoughts present for past week, and are getting worse. He denies history of mental illness or psychiatric admission. He denies abuse of drugs or etoh. States he is having trouble with sleep and appetite. Rates depression 10/10, anxiety 10/10. He denies HI. Denies AVH at this time.

## 2019-01-02 NOTE — PLAN OF CARE
Problem: Patient Care Overview  Goal: Plan of Care Review  Outcome: Ongoing (interventions implemented as appropriate)  PATIENT VERBALIZES POOR SLLE AND APPETITE. DEPRESSION (8) NO NEW ORDERS NOTED.   01/02/19 1526   Coping/Psychosocial   Plan of Care Reviewed With patient   Coping/Psychosocial   Patient Agreement with Plan of Care agrees   Plan of Care Review   Progress no change       Problem: Overarching Goals (Adult)  Goal: Adheres to Safety Considerations for Self and Others  Outcome: Ongoing (interventions implemented as appropriate)    Goal: Optimized Coping Skills in Response to Life Stressors  Outcome: Ongoing (interventions implemented as appropriate)    Goal: Develops/Participates in Therapeutic [place] to Support Successful Transition  Outcome: Ongoing (interventions implemented as appropriate)
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
## 2019-01-03 VITALS
DIASTOLIC BLOOD PRESSURE: 70 MMHG | WEIGHT: 315 LBS | SYSTOLIC BLOOD PRESSURE: 115 MMHG | RESPIRATION RATE: 18 BRPM | HEIGHT: 71 IN | BODY MASS INDEX: 44.1 KG/M2 | OXYGEN SATURATION: 97 % | HEART RATE: 84 BPM | TEMPERATURE: 97.8 F

## 2019-01-03 LAB
GLUCOSE BLDC GLUCOMTR-MCNC: 102 MG/DL (ref 70–130)
GLUCOSE BLDC GLUCOMTR-MCNC: 88 MG/DL (ref 70–130)

## 2019-01-03 PROCEDURE — 82962 GLUCOSE BLOOD TEST: CPT

## 2019-01-03 RX ORDER — TRAZODONE HYDROCHLORIDE 50 MG/1
50 TABLET ORAL NIGHTLY PRN
Qty: 15 TABLET | Refills: 0 | Status: SHIPPED | OUTPATIENT
Start: 2019-01-03 | End: 2019-01-10 | Stop reason: SDUPTHER

## 2019-01-03 RX ADMIN — GABAPENTIN 600 MG: 300 CAPSULE ORAL at 06:35

## 2019-01-03 RX ADMIN — ASPIRIN 81 MG: 81 TABLET ORAL at 08:49

## 2019-01-03 RX ADMIN — CHLORTHALIDONE 25 MG: 50 TABLET ORAL at 08:49

## 2019-01-03 RX ADMIN — CARVEDILOL 6.25 MG: 6.25 TABLET, FILM COATED ORAL at 08:50

## 2019-01-03 RX ADMIN — FEBUXOSTAT 80 MG: 80 TABLET ORAL at 08:49

## 2019-01-03 RX ADMIN — GLIPIZIDE 5 MG: 5 TABLET ORAL at 08:49

## 2019-01-03 RX ADMIN — LISINOPRIL 20 MG: 10 TABLET ORAL at 08:50

## 2019-01-03 RX ADMIN — NICOTINE 1 PATCH: 21 PATCH TRANSDERMAL at 08:50

## 2019-01-03 RX ADMIN — PANTOPRAZOLE SODIUM 40 MG: 40 TABLET, DELAYED RELEASE ORAL at 06:35

## 2019-01-03 RX ADMIN — SERTRALINE 50 MG: 50 TABLET, FILM COATED ORAL at 08:49

## 2019-01-03 RX ADMIN — COLCHICINE 0.6 MG: 0.6 TABLET, FILM COATED ORAL at 08:49

## 2019-01-03 RX ADMIN — KETOCONAZOLE: 20 SHAMPOO, SUSPENSION TOPICAL at 08:48

## 2019-01-03 RX ADMIN — METFORMIN HYDROCHLORIDE 1000 MG: 500 TABLET ORAL at 08:49

## 2019-01-03 NOTE — DISCHARGE SUMMARY
Date of Discharge:  1/3/2019    Presenting Problem/History of Present Illness  Depression with suicidal ideation [F32.9, R45.851]   Interval history  patient was seen today glucose a lot better he reports doing a lot better, he says he is not depressed anymore, he thinks medication has helped, he rates depression at 2 but does not describe any symptoms, he denies anxiety, rates at 0, he has slept all night long.  He denies SI or HI hallucinations or paranoia he is well oriented, he he thinks he is ready for discharge, and will also follow-up with this physician in the office,  Hospital Course  Patient was hospitalized on 1/2/2019  Who was admitted after he presented to the emergency room reporting suicidal thoughts with plans to shoot himself and stated that he had a gun.  He was placed on special precautions level III, I saw him on 1/2/2019 when I did initial history and physical examination when he was able to identify stressors, stated that he had given the gun to his sister before he came to the hospital, and denied that he has had suicidal thoughts since she has come in the hospital.  I started him on Zoloft 50 mg daily by mouth.  Also placed on his home medications for diabetes, gout. and hypertension after they were verified by the pharmacy.  He was seen again on 1/3/2019 when he reported improvement as described above and was requesting discharge.    Procedures Performed         Consults:   Consults     No orders found for last 30 day(s).          Pertinent Test Results: CMP showed elevated glucose of 141, low sodium at 134.  Elevated BUN at 23 and AST at 56  DBC showed elevated used over-the-counter 7.5 and A LC of 3.79   Urinalysis was negative  .  Drug screen was negative  See them at one level prior to admission was less than 10 sign EKG showed normal sinus rhythm         Discharge Medications     Your medication list      ASK your doctor about these medications      Instructions Last Dose Given Next  Dose Due   aspirin 81 MG EC tablet      Take 1 tablet by mouth Daily.       carvedilol 6.25 MG tablet  Commonly known as:  COREG      Take 6.25 mg by mouth 2 (Two) Times a Day With Meals.       chlorthalidone 25 MG tablet  Commonly known as:  HYGROTON      Take 1 tablet by mouth Daily.       colchicine 0.6 MG tablet      Take 0.6 mg by mouth Daily.       escitalopram 10 MG tablet  Commonly known as:  LEXAPRO      Take 10 mg by mouth Daily.       febuxostat 80 MG tablet tablet  Commonly known as:  ULORIC      Take 80 mg by mouth Daily.       fenofibrate 48 MG tablet  Commonly known as:  TRICOR      Take 48 mg by mouth Daily.       gabapentin 600 MG tablet  Commonly known as:  NEURONTIN      Take 1 tablet by mouth 3 (Three) Times a Day.       glipiZIDE 5 MG tablet  Commonly known as:  GLUCOTROL      Take 5 mg by mouth Daily.       HYDROcodone-acetaminophen 7.5-325 MG per tablet  Commonly known as:  NORCO      Take 1 tablet by mouth Every 6 (Six) Hours As Needed for Moderate Pain  or Severe Pain .       hydrOXYzine 25 MG capsule  Commonly known as:  VISTARIL      Take 1 capsule by mouth Every Night.       icosapent ethyl 1 g capsule capsule  Commonly known as:  VASCEPA      Take 1 g by mouth 2 (Two) Times a Day With Meals.       Insulin Glargine 300 UNIT/ML solution pen-injector  Commonly known as:  TOUJEO SOLOSTAR      Inject 21 Units under the skin into the appropriate area as directed Daily.       ketoconazole 2 % shampoo  Commonly known as:  NIZORAL      Apply  topically to the appropriate area as directed 2 (Two) Times a Week.       lisinopril 20 MG tablet  Commonly known as:  PRINIVIL,ZESTRIL      Take 20 mg by mouth 2 (Two) Times a Day.       metFORMIN  MG 24 hr tablet  Commonly known as:  GLUCOPHAGE-XR      Take 1,000 mg by mouth 2 (Two) Times a Day.       omeprazole 20 MG capsule  Commonly known as:  priLOSEC      Take 20 mg by mouth Daily.       phentermine 37.5 MG tablet  Commonly known as:  ADIPEX-P       Take 1 tablet by mouth Every Morning Before Breakfast.              Lab Results (last 24 hours)     Procedure Component Value Units Date/Time    POC Glucose Once [[id number]]  (Normal) Collected:  01/03/19 0640    Specimen:  Blood Updated:  01/03/19 0652     Glucose 102 mg/dL     POC Glucose Once [[id number]]  (Normal) Collected:  01/02/19 2012    Specimen:  Blood Updated:  01/02/19 2018     Glucose 91 mg/dL     POC Glucose Once [[id number]]  (Abnormal) Collected:  01/02/19 1610    Specimen:  Blood Updated:  01/02/19 1618     Glucose 134 mg/dL         Follow-up Appointments  Future Appointments   Date Time Provider Department Center   1/14/2019  8:10 AM Anam Arroyo MD [facility] None   1/24/2019  9:00 AM Erica Michel APRN [facility] None   2/5/2019  9:00 AM LORI NURSE LAB [facility]   2/5/2019  9:30 AM Shy Lowe MD [facility]         Discharge Diagnosis: Major depression single episode without psychosis  #2 tobacco use disorder  #3 opiate dependence iatrogenic  #4 diabetes  #5 gout and  #6  hypertension      Condition at Discharge: Improved and stable              Esperanza Fischer MD  01/03/19  9:57 AM

## 2019-01-03 NOTE — PLAN OF CARE
Problem: Patient Care Overview  Goal: Plan of Care Review  Outcome: Ongoing (interventions implemented as appropriate)  Patient up to dayroom for free time and snack; interacting well with peers; reports good appetite and sleep; rates anxiety 7; denies depression/SI/HI/AVH; calm and cooperative; reports meds are helping.   01/03/19 0440   Coping/Psychosocial   Plan of Care Reviewed With patient   Coping/Psychosocial   Patient Agreement with Plan of Care agrees   Plan of Care Review   Progress improving

## 2019-01-03 NOTE — DISCHARGE INSTR - APPOINTMENTS
Dr Esperanza Fischer  [address] [place] [id number]  [phone]    January 10 2018 at 3:15pm

## 2019-01-03 NOTE — PLAN OF CARE
"Problem: Patient Care Overview  Goal: Interprofessional Rounds/Family Conf  Outcome: Ongoing (interventions implemented as appropriate)   01/03/19 1000   Interdisciplinary Rounds/Family Conf   Summary Staffed patient's case with Dr. Fischer.   Interdisciplinary Rounds/Family Conf   Participants social work;psychiatrist   Data:     Therapist met with patient following initial assessment started yesterday.  Continued to discuss progress and treatment goals.  Educated patient about importance of safety and aftercare plans.  Patient reports feeling much better today and less depressed.  He reported feeling more hopeful and discussed looking forward to returning home to spend time with his nieces.  Patient discussed plan to follow up with Dr. Fischer and [facility] upon discharge.  He denied concerns.      Therapist spoke with patient and his sister Carolann, via phone.  Reviewed disposition and aftercare planning.  Carolann remains supportive of patient's treatment and reports she will transport patient home upon discharge today.    Assessment:    Patient is observed to display less restricted affect and \"okay\" mood.  Patient adamantly denied any thoughts to harm himself or others.  He denied HI and AVH.  Patient oriented x3 with fair insight.  He reported fair sleep and fair appetite.    Plan:    Therapist will continue to assist daily with aftercare and safety planning as needed.  Patient appears stable to discharge home today.  Safety planning completed with patient's sister, Carolann.  Patient consented for Dr. Fischer for medications and for ADALID Sawyer for therapy, which shall be scheduled today.      "

## 2019-01-04 ENCOUNTER — TRANSITIONAL CARE MANAGEMENT TELEPHONE ENCOUNTER (OUTPATIENT)
Dept: FAMILY MEDICINE CLINIC | Facility: CLINIC | Age: 44
End: 2019-01-04

## 2019-01-04 NOTE — OUTREACH NOTE
TODD call completed.  Please refer to TCM call flowsheet for call documentation.    Pt states the new medication zoloft he was started on at the hospital is making him a little drowsy, otherwise he's doing well and has no complaints. Encd him to stay with the medication and that it should improve as he continues to take it and he verb understanding and states he will continue taking it. Encd him to call the office to notify PCP of any new or worsening side effects and he verb understanding and states he will. He denies any questions, concerns, or needs at time of call. He confirms PCP/TODD appt 1/10/19.

## 2019-01-09 ENCOUNTER — OFFICE VISIT (OUTPATIENT)
Dept: PSYCHIATRY | Facility: CLINIC | Age: 44
End: 2019-01-09

## 2019-01-09 DIAGNOSIS — F32.0 CURRENT MILD EPISODE OF MAJOR DEPRESSIVE DISORDER WITHOUT PRIOR EPISODE (HCC): Primary | ICD-10-CM

## 2019-01-09 PROCEDURE — 90832 PSYTX W PT 30 MINUTES: CPT | Performed by: SOCIAL WORKER
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
## 2019-01-10 ENCOUNTER — OFFICE VISIT (OUTPATIENT)
Dept: FAMILY MEDICINE CLINIC | Facility: CLINIC | Age: 44
End: 2019-01-10

## 2019-01-10 VITALS
TEMPERATURE: 98.2 F | SYSTOLIC BLOOD PRESSURE: 130 MMHG | DIASTOLIC BLOOD PRESSURE: 80 MMHG | HEART RATE: 93 BPM | WEIGHT: 315 LBS | BODY MASS INDEX: 44.1 KG/M2 | OXYGEN SATURATION: 95 % | HEIGHT: 71 IN

## 2019-01-10 DIAGNOSIS — H66.92 OTITIS OF LEFT EAR: Primary | ICD-10-CM

## 2019-01-10 DIAGNOSIS — IMO0002 UNCONTROLLED TYPE 2 DIABETES MELLITUS WITH DIABETIC PERIPHERAL ANGIOPATHY WITHOUT GANGRENE, WITHOUT LONG-TERM CURRENT USE OF INSULIN: ICD-10-CM

## 2019-01-10 DIAGNOSIS — I10 ESSENTIAL HYPERTENSION: ICD-10-CM

## 2019-01-10 PROCEDURE — 99496 TRANSJ CARE MGMT HIGH F2F 7D: CPT | Performed by: NURSE PRACTITIONER

## 2019-01-10 RX ORDER — GLIPIZIDE 5 MG/1
5 TABLET ORAL DAILY
Qty: 30 TABLET | Refills: 5 | Status: SHIPPED | OUTPATIENT
Start: 2019-01-10 | End: 2019-04-25

## 2019-01-10 RX ORDER — CETIRIZINE HYDROCHLORIDE 10 MG/1
10 TABLET ORAL DAILY PRN
Qty: 30 TABLET | Refills: 5 | Status: SHIPPED | OUTPATIENT
Start: 2019-01-10 | End: 2019-12-31 | Stop reason: SDUPTHER

## 2019-01-10 RX ORDER — TRAZODONE HYDROCHLORIDE 50 MG/1
25-50 TABLET ORAL NIGHTLY PRN
Qty: 30 TABLET | Refills: 5 | Status: SHIPPED | OUTPATIENT
Start: 2019-01-10 | End: 2019-07-22 | Stop reason: SDUPTHER

## 2019-01-10 RX ORDER — CARVEDILOL 6.25 MG/1
6.25 TABLET ORAL 2 TIMES DAILY WITH MEALS
Qty: 60 TABLET | Refills: 5 | Status: SHIPPED | OUTPATIENT
Start: 2019-01-10 | End: 2019-01-10 | Stop reason: SDUPTHER

## 2019-01-10 RX ORDER — OMEPRAZOLE 20 MG/1
20 CAPSULE, DELAYED RELEASE ORAL DAILY
Qty: 30 CAPSULE | Refills: 5 | Status: SHIPPED | OUTPATIENT
Start: 2019-01-10 | End: 2019-12-31 | Stop reason: SDUPTHER

## 2019-01-10 RX ORDER — FENOFIBRATE 48 MG/1
48 TABLET, COATED ORAL DAILY
Qty: 30 TABLET | Refills: 5 | Status: SHIPPED | OUTPATIENT
Start: 2019-01-10 | End: 2019-04-25

## 2019-01-10 RX ORDER — COLCHICINE 0.6 MG/1
0.6 TABLET ORAL DAILY
Qty: 30 TABLET | Refills: 5 | Status: SHIPPED | OUTPATIENT
Start: 2019-01-10 | End: 2019-12-10 | Stop reason: SDUPTHER

## 2019-01-10 RX ORDER — OFLOXACIN 3 MG/ML
5 SOLUTION AURICULAR (OTIC) 2 TIMES DAILY
Qty: 5 ML | Refills: 0 | Status: SHIPPED | OUTPATIENT
Start: 2019-01-10 | End: 2019-01-17

## 2019-01-10 RX ORDER — FEBUXOSTAT 80 MG/1
80 TABLET, FILM COATED ORAL DAILY
Qty: 30 TABLET | Refills: 5 | Status: SHIPPED | OUTPATIENT
Start: 2019-01-10 | End: 2019-12-31 | Stop reason: SDUPTHER

## 2019-01-10 RX ORDER — CARVEDILOL 6.25 MG/1
6.25 TABLET ORAL 2 TIMES DAILY WITH MEALS
Qty: 60 TABLET | Refills: 5 | Status: SHIPPED | OUTPATIENT
Start: 2019-01-10 | End: 2019-03-25 | Stop reason: SDDI

## 2019-01-10 RX ORDER — AMOXICILLIN 875 MG/1
875 TABLET, COATED ORAL EVERY 12 HOURS SCHEDULED
Qty: 20 TABLET | Refills: 0 | Status: SHIPPED | OUTPATIENT
Start: 2019-01-10 | End: 2019-01-24

## 2019-01-10 RX ORDER — CHLORTHALIDONE 25 MG/1
25 TABLET ORAL DAILY
Qty: 30 TABLET | Refills: 5 | Status: ON HOLD | OUTPATIENT
Start: 2019-01-10 | End: 2019-10-16 | Stop reason: ALTCHOICE

## 2019-01-10 RX ORDER — ASPIRIN 81 MG/1
81 TABLET ORAL DAILY
Qty: 90 TABLET | Refills: 1 | Status: SHIPPED | OUTPATIENT
Start: 2019-01-10 | End: 2019-12-31 | Stop reason: SDUPTHER

## 2019-01-10 RX ORDER — METFORMIN HYDROCHLORIDE 500 MG/1
1000 TABLET, EXTENDED RELEASE ORAL 2 TIMES DAILY
Qty: 60 TABLET | Refills: 5 | Status: SHIPPED | OUTPATIENT
Start: 2019-01-10 | End: 2019-04-25

## 2019-01-10 RX ORDER — ICOSAPENT ETHYL 1000 MG/1
1 CAPSULE ORAL 2 TIMES DAILY WITH MEALS
Qty: 120 CAPSULE | Refills: 5 | Status: SHIPPED | OUTPATIENT
Start: 2019-01-10 | End: 2019-12-31 | Stop reason: SDUPTHER

## 2019-01-10 RX ORDER — LISINOPRIL 20 MG/1
20 TABLET ORAL 2 TIMES DAILY
Qty: 30 TABLET | Refills: 5 | Status: SHIPPED | OUTPATIENT
Start: 2019-01-10 | End: 2019-03-25

## 2019-01-10 RX ORDER — AZITHROMYCIN 250 MG/1
TABLET, FILM COATED ORAL
Qty: 6 TABLET | Refills: 0 | Status: SHIPPED | OUTPATIENT
Start: 2019-01-10 | End: 2019-01-24

## 2019-01-10 NOTE — PROGRESS NOTES
Transitional Care Follow Up Visit  Subjective      Johnathon Mclain is a 43 y.o. male who presents for a transitional care management visit.    Patient was admitted to the [facility] with major depression with suicidal ideation on 01/02/2019.  Patient presented to the emergency room reporting suicidal thoughts with plans to shoot himself and stated that he had a gun.  He was placed on a special precautions level III.  He was treated by a behavioral health and released on 01/03/2019.  Patient was started on trazodone at night to help him sleep as well as Zoloft.  Patient is reporting that the trazodone seems to make him drowsy and sleepy.  He feels as if he can sleep all day.  Patient denies any further thoughts of hurting self or others.  Patient is currently going through a custody hearings for his children following a recent separation and a Divorce proceedings.      Type 2 diabetes with convocation - glucose levels have improved.    Patient has acute complaint today of left ear pain.  Ear has been hurting for the last 3 days.  Reviewed in intensity.        Within 48 business hours after discharge our office contacted him via telephone to coordinate his care and needs.      I reviewed and discussed the details of that call along with the discharge summary, hospital problems, inpatient lab results, inpatient diagnostic studies, and consultation reports with Johnathon.     Current outpatient and discharge medications have been reconciled for the patient.    Date of TCM Phone Call 1/4/2019   [facility]   Date of Admission 1/2/2019   Date of Discharge 1/3/2019   Discharge Disposition Home or Self Care     Risk for Readmission (LACE) Score: 9 (1/3/2019  6:01 AM)      History of Present Illness   Course During Hospital Stay:  Patient was admitted on 01/02/2019 after presenting to the [facility] emergency room with depression and suicidal ideation.  He was treated inpatient, and started on  trazodone and Zoloft and released on 01/03/2019.  Patient denies any further thoughts of hurting self or others.  He is scheduled with behavioral health.  Patient has already had one office visit with Ricki Treviño for counseling.  He does state that this was very helpful.          The following portions of the patient's history were reviewed and updated as appropriate: allergies, current medications, past family history, past medical history, past social history, past surgical history and problem list.    Review of Systems   Constitutional: Negative for activity change, appetite change, fatigue and unexpected weight change.   HENT: Positive for ear pain. Negative for congestion, nosebleeds, postnasal drip, rhinorrhea, sore throat, trouble swallowing and voice change.    Eyes: Negative for pain and visual disturbance.   Respiratory: Negative for cough, shortness of breath and wheezing.    Cardiovascular: Negative for chest pain and palpitations.   Gastrointestinal: Negative for abdominal pain, blood in stool, constipation and diarrhea.   Endocrine: Negative for cold intolerance and polydipsia.   Genitourinary: Negative for difficulty urinating, flank pain and hematuria.   Musculoskeletal: Positive for arthralgias and back pain. Negative for gait problem, joint swelling and myalgias.   Skin: Negative for color change and rash.   Allergic/Immunologic: Negative.    Neurological: Negative for syncope, numbness and headaches.   Hematological: Negative.    Psychiatric/Behavioral: Negative for decreased concentration, dysphoric mood, self-injury, sleep disturbance and suicidal ideas. The patient is not nervous/anxious.    All other systems reviewed and are negative.      Objective   Physical Exam   Constitutional: He is oriented to person, place, and time. Vital signs are normal. He appears well-developed and well-nourished. No distress.   Talkative and friendly    HENT:   Head: Normocephalic and atraumatic.   Right Ear:  Hearing, tympanic membrane, external ear and ear canal normal.   Left Ear: External ear normal. There is drainage and tenderness. A middle ear effusion is present.   Nose: Nose normal.   Mouth/Throat: Oropharynx is clear and moist. No oropharyngeal exudate.   Eyes: Conjunctivae and EOM are normal. Pupils are equal, round, and reactive to light. Right eye exhibits no discharge. Left eye exhibits no discharge.   Neck: Normal range of motion. Neck supple. No tracheal deviation present. No thyromegaly present.   Cardiovascular: Normal rate, regular rhythm, normal heart sounds and intact distal pulses.   No murmur heard.  Pulmonary/Chest: Effort normal and breath sounds normal. No respiratory distress. He has no wheezes. He has no rales. He exhibits no tenderness.   Abdominal: Soft. Bowel sounds are normal. He exhibits no distension and no mass. There is no tenderness. There is no rebound and no guarding.   Musculoskeletal:        Right shoulder: He exhibits crepitus. He exhibits normal range of motion (improved range of motion ).        Lumbar back: He exhibits decreased range of motion, tenderness, pain and spasm.   Decreased lumbar curvature, there is pain with forward flexion. DTR's +2. No edema.    Lymphadenopathy:     He has no cervical adenopathy.   Neurological: He is alert and oriented to person, place, and time. He has normal reflexes.   Skin: Skin is warm and dry. Capillary refill takes less than 2 seconds. No rash noted. He is not diaphoretic. No erythema. No pallor.   Psychiatric: He has a normal mood and affect. His speech is normal and behavior is normal. Judgment and thought content normal. His affect is not inappropriate. Cognition and memory are normal.   Nursing note and vitals reviewed.      Assessment/Plan   Johnathon was seen today for hospital follow up.    Diagnoses and all orders for this visit:    Otitis of left ear    Essential hypertension  Comments:  stable   Orders:  -     chlorthalidone  (HYGROTON) 25 MG tablet; Take 1 tablet by mouth Daily.    Uncontrolled type 2 diabetes mellitus with diabetic peripheral angiopathy without gangrene, without long-term current use of insulin (CMS/[facility])  -     Insulin Glargine (TOUJEO SOLOSTAR) 300 UNIT/ML solution pen-injector; Inject 21 Units under the skin into the appropriate area as directed Daily.    Other orders  -     aspirin 81 MG EC tablet; Take 1 tablet by mouth Daily.  -     Discontinue: carvedilol (COREG) 6.25 MG tablet; Take 1 tablet by mouth 2 (Two) Times a Day With Meals.  -     Cholecalciferol (VITAMIN D) 1000 units tablet; Take 1 tablet by mouth Daily.  -     cetirizine (zyrTEC) 10 MG tablet; Take 1 tablet by mouth Daily As Needed for Allergies.  -     carvedilol (COREG) 6.25 MG tablet; Take 1 tablet by mouth 2 (Two) Times a Day With Meals.  -     colchicine 0.6 MG tablet; Take 1 tablet by mouth Daily.  -     febuxostat (ULORIC) 80 MG tablet tablet; Take 1 tablet by mouth Daily.  -     fenofibrate (TRICOR) 48 MG tablet; Take 1 tablet by mouth Daily.  -     glipiZIDE (GLUCOTROL) 5 MG tablet; Take 1 tablet by mouth Daily.  -     icosapent ethyl (VASCEPA) 1 g capsule capsule; Take 1 g by mouth 2 (Two) Times a Day With Meals.  -     lisinopril (PRINIVIL,ZESTRIL) 20 MG tablet; Take 1 tablet by mouth 2 (Two) Times a Day.  -     metFORMIN ER (GLUCOPHAGE-XR) 500 MG 24 hr tablet; Take 2 tablets by mouth 2 (Two) Times a Day.  -     omeprazole (priLOSEC) 20 MG capsule; Take 1 capsule by mouth Daily.  -     sertraline (ZOLOFT) 50 MG tablet; Take 1 tablet by mouth Daily for 30 days.  -     traZODone (DESYREL) 50 MG tablet; Take 0.5-1 tablets by mouth At Night As Needed for Sleep.  -     azithromycin (ZITHROMAX Z-HEATHER) 250 MG tablet; Take 2 tablets the first day, then 1 tablet daily for 4 days.  -     ofloxacin (FLOXIN) 0.3 % otic solution; Administer 5 drops into the left ear 2 (Two) Times a Day for 7 days.  -     amoxicillin (AMOXIL) 875 MG tablet; Take 1 tablet  by mouth Every 12 (Twelve) Hours.      Recent hospitalization records reviewed and discussed with patient.   Remain under the care of Behavior Health.  May decrease trazodone to 25 - 50 mg at night.   Pt to stop Vistaril.   Continue Zoloft.  I have discussed diagnosis in detail today allowing time for questions and answers. Pt is aware of reasons to seek urgent or emergent medical care as well as reasons to return to the clinic for evaluation. Possible side effects, interactions and progression of symptoms discussed as well. Pt / family states understanding.   Emotional support and active listening provided.     Fluids, rest, symptomatic treatment advised. Steam therapy. Dispose of tooth bush after 24 hours of starting antibiotics if ordered. Complete antibiotics as ordered.  Discussed possible side effects/interactions of medication. Discussed symptoms to report as well as reasons to seek urgent or emergent medical attention. Understanding stated.   Recommend follow up in 2-3 days if not improved, sooner if needed.     Follow up in 2 weeks, sooner if needed.

## 2019-01-21 ENCOUNTER — TELEPHONE (OUTPATIENT)
Dept: FAMILY MEDICINE CLINIC | Facility: CLINIC | Age: 44
End: 2019-01-21

## 2019-01-21 NOTE — PROGRESS NOTES
Patient is requesting refill on trazodone.  He was started on this during his stay at the [facility].  Patient was provided with refill during his hospital follow-up.  We'll have staff call and notify the patient.

## 2019-01-21 NOTE — TELEPHONE ENCOUNTER
Pt is aware of this information.   ----- Message from TYSON Sepulveda sent at 1/21/2019 11:45 AM EST -----  Call [facility] pharmacy and make sure they received patient's trazodone orders sent in and his hospital follow up, I believe it was 01/10/2019.  Please call the patient and let him know that the medication was sent on that day.

## 2019-01-22 DIAGNOSIS — E66.01 MORBID OBESITY WITH BMI OF 50.0-59.9, ADULT (HCC): ICD-10-CM

## 2019-01-23 RX ORDER — PHENTERMINE HYDROCHLORIDE 37.5 MG/1
TABLET ORAL
Qty: 30 TABLET | OUTPATIENT
Start: 2019-01-23

## 2019-01-24 ENCOUNTER — OFFICE VISIT (OUTPATIENT)
Dept: FAMILY MEDICINE CLINIC | Facility: CLINIC | Age: 44
End: 2019-01-24

## 2019-01-24 VITALS
DIASTOLIC BLOOD PRESSURE: 90 MMHG | SYSTOLIC BLOOD PRESSURE: 130 MMHG | OXYGEN SATURATION: 97 % | WEIGHT: 315 LBS | BODY MASS INDEX: 44.1 KG/M2 | TEMPERATURE: 98.2 F | HEART RATE: 91 BPM | HEIGHT: 71 IN

## 2019-01-24 DIAGNOSIS — M79.2 NEURALGIA AND NEURITIS: ICD-10-CM

## 2019-01-24 DIAGNOSIS — M54.50 CHRONIC BILATERAL LOW BACK PAIN WITHOUT SCIATICA: ICD-10-CM

## 2019-01-24 DIAGNOSIS — E11.40 TYPE 2 DIABETES MELLITUS WITH DIABETIC NEUROPATHY, WITH LONG-TERM CURRENT USE OF INSULIN (HCC): ICD-10-CM

## 2019-01-24 DIAGNOSIS — G89.29 CHRONIC BILATERAL LOW BACK PAIN WITHOUT SCIATICA: ICD-10-CM

## 2019-01-24 DIAGNOSIS — Z79.4 TYPE 2 DIABETES MELLITUS WITH DIABETIC NEUROPATHY, WITH LONG-TERM CURRENT USE OF INSULIN (HCC): ICD-10-CM

## 2019-01-24 DIAGNOSIS — R05.9 COUGH: Primary | ICD-10-CM

## 2019-01-24 PROCEDURE — G0439 PPPS, SUBSEQ VISIT: HCPCS | Performed by: NURSE PRACTITIONER

## 2019-01-24 RX ORDER — HYDROCODONE BITARTRATE AND ACETAMINOPHEN 7.5; 325 MG/1; MG/1
1 TABLET ORAL EVERY 6 HOURS PRN
Qty: 60 TABLET | Refills: 0 | Status: SHIPPED | OUTPATIENT
Start: 2019-01-24 | End: 2019-02-25 | Stop reason: SDUPTHER

## 2019-01-24 NOTE — PROGRESS NOTES
QUICK REFERENCE INFORMATION:  The ABCs of the Annual Wellness Visit    Subsequent Medicare Wellness Visit    HEALTH RISK ASSESSMENT    1975    Recent Hospitalizations:  Recently treated at the following:  [facility].        Current Medical Providers:  Patient Care Team:  Erica Michel APRN as PCP - General  Shy Lowe MD as Consulting Physician (Hematology and Oncology)        Smoking Status:  Social History     Tobacco Use   Smoking Status Never Smoker   Smokeless Tobacco Current User   • Types: Chew       Alcohol Consumption:  Social History     Substance and Sexual Activity   Alcohol Use No       Depression Screen:   PHQ-2/PHQ-9 Depression Screening 1/24/2019   Little interest or pleasure in doing things 0   Feeling down, depressed, or hopeless 2   Trouble falling or staying asleep, or sleeping too much 2   Feeling tired or having little energy 0   Poor appetite or overeating 0   Feeling bad about yourself - or that you are a failure or have let yourself or your family down 0   Trouble concentrating on things, such as reading the newspaper or watching television 0   Moving or speaking so slowly that other people could have noticed. Or the opposite - being so fidgety or restless that you have been moving around a lot more than usual 0   Thoughts that you would be better off dead, or of hurting yourself in some way 1   Total Score 5   If you checked off any problems, how difficult have these problems made it for you to do your work, take care of things at home, or get along with other people? Somewhat difficult       Health Habits and Functional and Cognitive Screening:  Functional & Cognitive Status 1/24/2019   Do you have difficulty preparing food and eating? No   Do you have difficulty bathing yourself, getting dressed or grooming yourself? No   Do you have difficulty using the toilet? No   Do you have difficulty moving around from place to place? No   Do you have trouble  with steps or getting out of a bed or a chair? No   In the past year have you fallen or experienced a near fall? Yes   Current Diet Well Balanced Diet   Dental Exam Not up to date   Eye Exam Up to date   Exercise (times per week) 1 times per week   Current Exercise Activities Include Housecleaning   Do you need help using the phone?  No   Are you deaf or do you have serious difficulty hearing?  Yes   Do you need help with transportation? No   Do you need help shopping? No   Do you need help preparing meals?  No   Do you need help with housework?  Yes   Do you need help with laundry? Yes   Do you need help taking your medications? Yes   Do you need help managing money? Yes   Do you ever drive or ride in a car without wearing a seat belt? Yes   Have you felt unusual stress, anger or loneliness in the last month? Yes   Who do you live with? Alone   If you need help, do you have trouble finding someone available to you? No   Have you been bothered in the last four weeks by sexual problems? No   Do you have difficulty concentrating, remembering or making decisions? No           Does the patient have evidence of cognitive impairment? No    Aspirin use counseling: Taking ASA appropriately as indicated      Recent Lab Results:  CMP:  Lab Results   Component Value Date    BUN 23 (H) 01/02/2019    CREATININE 1.10 01/02/2019    EGFRIFNONA 72 01/02/2019    BCR 20.9 01/02/2019     (L) 01/02/2019    K 3.6 01/02/2019    CO2 23.0 (L) 01/02/2019    CALCIUM 9.3 01/02/2019    ALBUMIN 4.40 01/02/2019    LABIL2 1.8 06/08/2016    BILITOT 1.0 01/02/2019    ALKPHOS 66 01/02/2019    AST 32 01/02/2019    ALT 56 (H) 01/02/2019     Lipid Panel:  Lab Results   Component Value Date    CHOL 143 10/11/2018    TRIG 269 (H) 10/11/2018    HDL 24 (L) 10/11/2018    VLDL 53.8 10/11/2018    LDLHDL 2.72 10/11/2018     HbA1c:  Lab Results   Component Value Date    HGBA1C 5.90 (H) 10/11/2018     Fall Risk Assessment  Fallen in past 6 months: 0-->  No  Mental Status: 0--> no mental status change  Mobility: 0--> No mobility issues  Medications: 1--> Narcotics  Total Fall Risk Score: 1    PHQ-9 Depression Screening  Little interest or pleasure in doing things? 0   Feeling down, depressed, or hopeless? 2   Trouble falling or staying asleep, or sleeping too much? 2   Feeling tired or having little energy? 0   Poor appetite or overeating? 0   Feeling bad about yourself - or that you are a failure or have let yourself or your family down? 0   Trouble concentrating on things, such as reading the newspaper or watching television? 0   Moving or speaking so slowly that other people could have noticed? Or the opposite - being so fidgety or restless that you have been moving around a lot more than usual? 0   Thoughts that you would be better off dead, or of hurting yourself in some way? 0   PHQ-9 Total Score 5   If you checked off any problems, how difficult have these problems made it for you to do your work, take care of things at home, or get along with other people? Somewhat difficult     She is currently under the care of behavioral health.  He states he will remain under the care of behavioral health and continue with his current medication.  Denies thoughts of hurting self or others.  Visual Acuity:   Visual Acuity Screening    Right eye Left eye Both eyes   Without correction: 20/200 20/70 20/70   With correction:           Visual Acuity Screening    Right eye Left eye Both eyes   Without correction: 20/200 20/70 20/70   With correction:      hearing adequate per whisper test     Age-appropriate Screening Schedule:  Refer to the list below for future screening recommendations based on patient's age, sex and/or medical conditions. Orders for these recommended tests are listed in the plan section. The patient has been provided with a written plan.    Health Maintenance   Topic Date Due   • DIABETIC EYE EXAM  02/12/2019   • HEMOGLOBIN A1C  04/11/2019   • DIABETIC  FOOT EXAM  04/25/2019   • URINE MICROALBUMIN  04/25/2019   • LIPID PANEL  10/11/2019   • TDAP/TD VACCINES (2 - Td) 03/06/2027   • PNEUMOCOCCAL VACCINE (19-64 MEDIUM RISK)  Addressed   • INFLUENZA VACCINE  Addressed        Subjective   History of Present Illness    Johnathon Mclain is a 43 y.o. male who presents for an Subsequent Wellness Visit.    Depression  Patient was hospitalized on 01/02/2019 with depression and treated at [facility].  He is currently under the care of behavioral health for counseling.  He denies any thoughts of hurting self or others.  He remains in Court regarding some recent custody issues of his children.    Hypertension -stable     Type 2 diabetes-working on weight loss.   some episodes of hyperglycemia when he is noncompliant.  Patient reports that he is following a diabetic diet most of the time.  He is taking his medications as prescribed.       Obesity - working on lifestyle changes and healthy diet.  Trying to be more active.       COPD-no recent exacerbation.     Gout-no recent exacerbation with current medication regimen.     Polycythemia vera-under the care of hematology.     Chronic back pain and joint pain - rates his pain a 6 on pain scale rating of 0-10. Radiology is on file.  He describes his pain as an aching, throbbing and sharp pain is always present in his low back, neck and at times hips and knees.  Activity makes his pain worse.  Rest does help decrease this pain.  He currently receives Neurontin which is helpful with the radiculopathy which radiates into his lower extremities from his low back.  He also receives Norco for his chronic pain.  He has no history of substance abuse or addiction.  He has been seen by Ortho in the past for consult.  He has attended physical therapy in the past without improvement.   2017 x-rays are on file.  No MRI on file.  Patient will require open MRI.      The following portions of the patient's history were reviewed and updated as  appropriate: allergies, current medications, past family history, past medical history, past social history, past surgical history and problem list.    Outpatient Medications Prior to Visit   Medication Sig Dispense Refill   • aspirin 81 MG EC tablet Take 1 tablet by mouth Daily. 90 tablet 1   • carvedilol (COREG) 6.25 MG tablet Take 1 tablet by mouth 2 (Two) Times a Day With Meals. 60 tablet 5   • cetirizine (zyrTEC) 10 MG tablet Take 1 tablet by mouth Daily As Needed for Allergies. 30 tablet 5   • chlorthalidone (HYGROTON) 25 MG tablet Take 1 tablet by mouth Daily. 30 tablet 5   • Cholecalciferol (VITAMIN D) 1000 units tablet Take 1 tablet by mouth Daily. 30 tablet 5   • colchicine 0.6 MG tablet Take 1 tablet by mouth Daily. 30 tablet 5   • febuxostat (ULORIC) 80 MG tablet tablet Take 1 tablet by mouth Daily. 30 tablet 5   • fenofibrate (TRICOR) 48 MG tablet Take 1 tablet by mouth Daily. 30 tablet 5   • gabapentin (NEURONTIN) 600 MG tablet Take 1 tablet by mouth 3 (Three) Times a Day. 90 tablet 2   • glipiZIDE (GLUCOTROL) 5 MG tablet Take 1 tablet by mouth Daily. 30 tablet 5   • icosapent ethyl (VASCEPA) 1 g capsule capsule Take 1 g by mouth 2 (Two) Times a Day With Meals. 120 capsule 5   • Insulin Glargine (TOUJEO SOLOSTAR) 300 UNIT/ML solution pen-injector Inject 21 Units under the skin into the appropriate area as directed Daily. 5 pen 5   • ketoconazole (NIZORAL) 2 % shampoo Apply  topically to the appropriate area as directed 2 (Two) Times a Week. (Patient taking differently: Apply 1 application topically to the appropriate area as directed 2 (Two) Times a Week. Prior to [facility] Admission, Patient was on: takes on mondays and thursdays) 120 mL 5   • lisinopril (PRINIVIL,ZESTRIL) 20 MG tablet Take 1 tablet by mouth 2 (Two) Times a Day. 30 tablet 5   • metFORMIN ER (GLUCOPHAGE-XR) 500 MG 24 hr tablet Take 2 tablets by mouth 2 (Two) Times a Day. 60 tablet 5   • omeprazole (priLOSEC) 20 MG capsule Take 1 capsule  by mouth Daily. 30 capsule 5   • sertraline (ZOLOFT) 50 MG tablet Take 1 tablet by mouth Daily for 30 days. 30 tablet 5   • traZODone (DESYREL) 50 MG tablet Take 0.5-1 tablets by mouth At Night As Needed for Sleep. 30 tablet 5   • HYDROcodone-acetaminophen (NORCO) 7.5-325 MG per tablet Take 1 tablet by mouth Every 6 (Six) Hours As Needed for Moderate Pain  or Severe Pain . 60 tablet 0   • amoxicillin (AMOXIL) 875 MG tablet Take 1 tablet by mouth Every 12 (Twelve) Hours. 20 tablet 0   • azithromycin (ZITHROMAX Z-HEATHER) 250 MG tablet Take 2 tablets the first day, then 1 tablet daily for 4 days. 6 tablet 0     No facility-administered medications prior to visit.        Patient Active Problem List   Diagnosis   • Neuralgia and neuritis   • Essential hypertension   • Impotence due to erectile dysfunction   • Polycythemia vera (CMS/[facility])   • Abscess   • Osteoarthrosis   • Diabetes mellitus with diabetic neuropathy, with long-term current use of insulin (CMS/[facility])   • COPD (chronic obstructive pulmonary disease) (CMS/[facility])   • Gastroesophageal reflux disease without esophagitis   • Mixed hyperlipidemia   • Chronic idiopathic gout of multiple sites   • High risk medication use   • Obstructive sleep apnea syndrome   • Primary insomnia   • Reactive depression   • Skin tags, multiple acquired   • Encounter for vaccination   • Disorder of bone    • Sebaceous cyst   • Class 3 obesity with serious comorbidity and body mass index (BMI) of 50.0 to 59.9 in adult   • Morbid obesity with BMI of 50.0-59.9, adult (CMS/[facility])   •    • Depression with suicidal ideation   • Otitis of left ear       Advance Care Planning:  has NO advance directive - not interested in additional information    Identification of Risk Factors:  Risk factors include: weight , unhealthy diet, cardiovascular risk, chronic pain and depression.    Review of Systems   Constitutional: Negative for activity change, appetite change, fatigue and unexpected weight  change.   HENT: Negative for congestion, ear pain, nosebleeds, postnasal drip, rhinorrhea, sore throat, trouble swallowing and voice change.    Eyes: Negative for pain and visual disturbance.   Respiratory: Negative for cough, chest tightness, shortness of breath and wheezing.    Cardiovascular: Negative for chest pain and palpitations.   Gastrointestinal: Negative for abdominal pain, blood in stool, constipation and diarrhea.   Endocrine: Negative for cold intolerance and polydipsia.   Genitourinary: Negative for difficulty urinating, flank pain and hematuria.   Musculoskeletal: Positive for arthralgias and back pain. Negative for gait problem, joint swelling and myalgias.   Skin: Negative for color change and rash.   Allergic/Immunologic: Negative.    Neurological: Negative for syncope, numbness and headaches.   Hematological: Negative.    Psychiatric/Behavioral: Negative for behavioral problems, dysphoric mood, self-injury, sleep disturbance and suicidal ideas. The patient is not nervous/anxious.    All other systems reviewed and are negative.      Compared to one year ago, the patient feels his physical health is the same.  Compared to one year ago, the patient feels his mental health is worse.    Objective     Physical Exam   Constitutional: He is oriented to person, place, and time. Vital signs are normal. He appears well-developed and well-nourished. No distress.   Visibly obese.  Talkative and friendly 43-year-old male here for a Medicare wellness exam.   HENT:   Head: Normocephalic and atraumatic.   Right Ear: External ear normal.   Left Ear: External ear normal.   Nose: Nose normal.   Mouth/Throat: Oropharynx is clear and moist. No oropharyngeal exudate.   Eyes: Conjunctivae and EOM are normal. Pupils are equal, round, and reactive to light. Right eye exhibits no discharge. Left eye exhibits no discharge.   Neck: Normal range of motion. Neck supple. No tracheal deviation present. No thyromegaly present.  "  Cardiovascular: Normal rate, regular rhythm, normal heart sounds and intact distal pulses.   No murmur heard.  Pulmonary/Chest: Effort normal and breath sounds normal. No respiratory distress. He has no wheezes. He has no rales. He exhibits no tenderness.   Abdominal: Soft. Bowel sounds are normal. He exhibits no distension and no mass. There is no tenderness. There is no rebound and no guarding.   Musculoskeletal:        Right shoulder: He exhibits decreased range of motion and crepitus.        Lumbar back: He exhibits decreased range of motion, tenderness, pain and spasm.   Decreased lumbar curvature, there is pain with forward flexion. DTR's +2. No edema.    Lymphadenopathy:     He has no cervical adenopathy.   Neurological: He is alert and oriented to person, place, and time. He has normal reflexes.   Skin: Skin is warm and dry. Capillary refill takes less than 2 seconds. No rash noted. He is not diaphoretic. No erythema. No pallor.   Psychiatric: He has a normal mood and affect. His speech is normal and behavior is normal. Judgment and thought content normal. His affect is not inappropriate. Cognition and memory are normal.   Nursing note and vitals reviewed.      Vitals:    01/24/19 0918   BP: 130/90   Pulse: 91   Temp: 98.2 °F (36.8 °C)   TempSrc: Tympanic   SpO2: 97%   Weight: (!) 151 kg (332 lb)   Height: 180.3 cm (71\")       Patient's Body mass index is 46.3 kg/m². BMI is above normal parameters. Recommendations include: exercise counseling and nutrition counseling.      Assessment/Plan   Patient Self-Management and Personalized Health Advice  The patient has been provided with information about: diet, exercise, weight management, prevention of cardiac or vascular disease, the relationship between weight and GERD and mental health concerns and preventive services including:   · Diabetes screening, see lab orders, Exercise counseling provided, Fall Risk assessment done, Glaucoma screening recommended, " Influenza vaccine, Nutrition counseling provided, Pneumococcal vaccine , Prostate cancer screening discussed.    Visit Diagnoses:    ICD-10-CM ICD-9-CM   1. Cough R05 786.2   2. Neuralgia and neuritis M79.2 729.2   3. Chronic bilateral low back pain without sciatica M54.5 724.2    G89.29 338.29   4. Type 2 diabetes mellitus with diabetic neuropathy, with long-term current use of insulin (CMS/[facility]) E11.40 250.60    Z79.4 357.2     V58.67       Orders Placed This Encounter   Procedures   • XR Chest PA & Lateral     Standing Status:   Future     Standing Expiration Date:   1/24/2020     Order Specific Question:   Reason for Exam:     Answer:   cough   • MRI Lumbar Spine Without Contrast     Standing Status:   Future     Standing Expiration Date:   1/24/2020     Scheduling Instructions:      Needs open MRI   • XR Spine Lumbar 2 or 3 View     Standing Status:   Future     Standing Expiration Date:   1/24/2020     Order Specific Question:   Reason for Exam:     Answer:   pain       Outpatient Encounter Medications as of 1/24/2019   Medication Sig Dispense Refill   • aspirin 81 MG EC tablet Take 1 tablet by mouth Daily. 90 tablet 1   • carvedilol (COREG) 6.25 MG tablet Take 1 tablet by mouth 2 (Two) Times a Day With Meals. 60 tablet 5   • cetirizine (zyrTEC) 10 MG tablet Take 1 tablet by mouth Daily As Needed for Allergies. 30 tablet 5   • chlorthalidone (HYGROTON) 25 MG tablet Take 1 tablet by mouth Daily. 30 tablet 5   • Cholecalciferol (VITAMIN D) 1000 units tablet Take 1 tablet by mouth Daily. 30 tablet 5   • colchicine 0.6 MG tablet Take 1 tablet by mouth Daily. 30 tablet 5   • febuxostat (ULORIC) 80 MG tablet tablet Take 1 tablet by mouth Daily. 30 tablet 5   • fenofibrate (TRICOR) 48 MG tablet Take 1 tablet by mouth Daily. 30 tablet 5   • gabapentin (NEURONTIN) 600 MG tablet Take 1 tablet by mouth 3 (Three) Times a Day. 90 tablet 2   • glipiZIDE (GLUCOTROL) 5 MG tablet Take 1 tablet by mouth Daily. 30 tablet 5   •  HYDROcodone-acetaminophen (NORCO) 7.5-325 MG per tablet Take 1 tablet by mouth Every 6 (Six) Hours As Needed for Moderate Pain  or Severe Pain . 60 tablet 0   • icosapent ethyl (VASCEPA) 1 g capsule capsule Take 1 g by mouth 2 (Two) Times a Day With Meals. 120 capsule 5   • Insulin Glargine (TOUJEO SOLOSTAR) 300 UNIT/ML solution pen-injector Inject 21 Units under the skin into the appropriate area as directed Daily. 5 pen 5   • ketoconazole (NIZORAL) 2 % shampoo Apply  topically to the appropriate area as directed 2 (Two) Times a Week. (Patient taking differently: Apply 1 application topically to the appropriate area as directed 2 (Two) Times a Week. Prior to [facility] [place] Admission, Patient was on: takes on mondays and thursdays) 120 mL 5   • lisinopril (PRINIVIL,ZESTRIL) 20 MG tablet Take 1 tablet by mouth 2 (Two) Times a Day. 30 tablet 5   • metFORMIN ER (GLUCOPHAGE-XR) 500 MG 24 hr tablet Take 2 tablets by mouth 2 (Two) Times a Day. 60 tablet 5   • omeprazole (priLOSEC) 20 MG capsule Take 1 capsule by mouth Daily. 30 capsule 5   • sertraline (ZOLOFT) 50 MG tablet Take 1 tablet by mouth Daily for 30 days. 30 tablet 5   • traZODone (DESYREL) 50 MG tablet Take 0.5-1 tablets by mouth At Night As Needed for Sleep. 30 tablet 5   • [DISCONTINUED] HYDROcodone-acetaminophen (NORCO) 7.5-325 MG per tablet Take 1 tablet by mouth Every 6 (Six) Hours As Needed for Moderate Pain  or Severe Pain . 60 tablet 0   • [DISCONTINUED] amoxicillin (AMOXIL) 875 MG tablet Take 1 tablet by mouth Every 12 (Twelve) Hours. 20 tablet 0   • [DISCONTINUED] azithromycin (ZITHROMAX Z-HEATHER) 250 MG tablet Take 2 tablets the first day, then 1 tablet daily for 4 days. 6 tablet 0     No facility-administered encounter medications on file as of 1/24/2019.      Medicare wellness exam has been performed today.  Medication list has been reviewed and discussed with patient.  Recommended preventative and screenings has been discussed with patient.    I have  discussed diagnosis in detail today allowing time for questions and answers. Pt is aware of reasons to seek urgent or emergent medical care as well as reasons to return to the clinic for evaluation. Possible side effects, interactions and progression of symptoms discussed as well. Pt / family states understanding.   Emotional support and active listening provided.   As part of this patient's treatment plan they are being prescribed controlled substance/substances with potential for abuse. This patient has been made aware of the appropriate use of such medications, including potential risk of somnolence, limited ability to drive and / or work safely, and potential for overdose. It has also been made clear that these medications are for use by this patient only, without concomitant use of alcohol or other substances unless prescribed/advised by medical provider. Patient has completed controlled substance agreement detailing terms of continued prescribing of controlled substances including monitoring John reports, drug screens and pill counts. The patient was asked and states they are not receiving narcotic medication from any there provider or any provider that this office has not been made aware of. History and physical exam exhibit continued safe and appropriate use of controlled substances.   Goal: pt will report improved anxiety/depression symptoms.   Goal: Improved quality of life and reduction in pain as evidenced by pt report.   Proper body mechanics has been reviewed and discussed today.  John has been reviewed as consistent.  UDS is on file.       Continue current medications.  Norco 7.5-325 one by mouth every 6 hours when necessary #60 with no refill.    Pt has been educated/instructed on diabetic care and protocols.  Diabetic diet instructions provided.  Medication regimen reviewed.  Discussed possible side effects and interactions of current medications.  Sick day rules reviewed. Continue to monitor  blood sugar and report abnormal readings as discussed today. Understanding stated by patient.   Pt has been instructed today regarding low fat heart smart diet. Weight management and routine exercise has been recommended. Avoid high fat foods, starchy foods and processed foods. Increase lean meats, fresh vegetables and fresh fruits.     Follow up in one month, sooner if needed. Routine labs every 3-6 months.   Fasting labs in 2 months.       Reviewed use of high risk medication in the elderly: yes  Reviewed for potential of harmful drug interactions in the elderly: yes    Follow Up:  Return in about 1 month (around 2/24/2019) for procedure 30 minute block/cryotherapy .     An After Visit Summary and PPPS with all of these plans were given to the patient.

## 2019-01-30 ENCOUNTER — OFFICE VISIT (OUTPATIENT)
Dept: PSYCHIATRY | Facility: CLINIC | Age: 44
End: 2019-01-30

## 2019-01-30 DIAGNOSIS — F32.0 CURRENT MILD EPISODE OF MAJOR DEPRESSIVE DISORDER WITHOUT PRIOR EPISODE (HCC): Primary | ICD-10-CM

## 2019-01-30 PROCEDURE — 90832 PSYTX W PT 30 MINUTES: CPT | Performed by: SOCIAL WORKER

## 2019-01-30 NOTE — PROGRESS NOTES
PROGRESS NOTE  Data:  Johnathon Mclain came in 1/30/2019 for his regularly scheduled therapy session starting at 8:30 AM and ending at 9 AM with Ricki Treviño LCSW in the [facility] .  Pt. Reports his mood has improved significantly.  He is smiling and making improved eye contact.  Patient reports that he has not been isolating since last seen.  Identifies that this is a factor in our to improvement of mood.  Patient continues to be somewhat evasive as to the EPO that his ex-wife has put in place.  He acknowledges that he did have to get a  and they have a court date coming up.  His daughters have been contacting him by texting,  this helps with the loss of not having the opportunity to see them.  His empty bottle of Zoloft in asking that therapist contact the PCP here in the office to have a prescription called in.  He does not want to run out of the medicine as he acknowledges that his depression has improved since beginning to take it.    (Scales based on 0 - 10 with 10 being the worst)  Depression: 3 Anxiety: 3   Distress: 3 Sleep: 1   Tasks Completed on Time: 1 Mood: 3   Number of Panic Attacks: 0 Appetite: 0     Mental Status Exam  Hygiene:  fair  Dress:  casual  Attitude:  Guarded  Motor Activity:  Appropriate  Speech:  Normal  Mood:  within normal limits  Affect:  calm and pleasant  Thought Processes:  Linear  Thought Content:  normal  Suicidal Thoughts:  denies  Homicidal Thoughts:  denies  Crisis Safety Plan: yes, to come to the emergency room.  Hallucinations:  denies      Clinical Maneuvering/Intervention:   Processing the above with patient.  Validating patient's emotions and feelings as well as his concerns.  Continued building rapport and comfort level in the therapeutic setting.  Exploring grieving and the grief cycle with patient. Allowed patient to freely discuss issues without interruption or judgment. Provided safe, confidential environment to facilitate the development of positive  therapeutic relationship and encourage open, honest communication. Assisted patient in identifying risk factors which would indicate the need for higher level of care including thoughts to harm self or others and/or self-harming behavior and encouraged patient to contact this office, call 911, or present to the nearest emergency room should any of these events occur. Discussed crisis intervention services and means to access.  Patient adamantly and convincingly denies current suicidal or homicidal ideation or perceptual disturbance.    Prognosis: Good with Ongoing Treatment     GAF: No impairment    Assessment divorce and the dissolving of his marriage as well as family are the precipitating factors to patient's depression    Diagnoses and all orders for this visit:    Current mild episode of major depressive disorder without prior episode (CMS/[facility])      Patient's Support Network Includes:  children and extended family    Plan     Patient will adhere to medication regimen as prescribed and report any side effects. Patient will contact this office, call 911 or present to the nearest emergency room should suicidal or homicidal ideations occur. Provide Cognitive Behavioral Therapy and Solution Focused Therapy to improve functioning, maintain stability, and avoid decompensation and the need for higher level of care.          Return in about 1 month (around 2/28/2019).

## 2019-02-25 ENCOUNTER — PROCEDURE VISIT (OUTPATIENT)
Dept: FAMILY MEDICINE CLINIC | Facility: CLINIC | Age: 44
End: 2019-02-25

## 2019-02-25 VITALS
BODY MASS INDEX: 44.1 KG/M2 | OXYGEN SATURATION: 98 % | DIASTOLIC BLOOD PRESSURE: 80 MMHG | HEIGHT: 71 IN | WEIGHT: 315 LBS | SYSTOLIC BLOOD PRESSURE: 118 MMHG | HEART RATE: 92 BPM | TEMPERATURE: 99.1 F

## 2019-02-25 DIAGNOSIS — M79.2 NEURALGIA AND NEURITIS: ICD-10-CM

## 2019-02-25 DIAGNOSIS — L91.8 SKIN TAGS, MULTIPLE ACQUIRED: ICD-10-CM

## 2019-02-25 DIAGNOSIS — M15.9 PRIMARY OSTEOARTHRITIS INVOLVING MULTIPLE JOINTS: ICD-10-CM

## 2019-02-25 DIAGNOSIS — E66.01 MORBID OBESITY WITH BMI OF 50.0-59.9, ADULT (HCC): Primary | ICD-10-CM

## 2019-02-25 DIAGNOSIS — Z79.4 TYPE 2 DIABETES MELLITUS WITH DIABETIC NEUROPATHY, WITH LONG-TERM CURRENT USE OF INSULIN (HCC): ICD-10-CM

## 2019-02-25 DIAGNOSIS — E11.40 TYPE 2 DIABETES MELLITUS WITH DIABETIC NEUROPATHY, WITH LONG-TERM CURRENT USE OF INSULIN (HCC): ICD-10-CM

## 2019-02-25 PROCEDURE — 99214 OFFICE O/P EST MOD 30 MIN: CPT | Performed by: NURSE PRACTITIONER

## 2019-02-25 PROCEDURE — 11201 RMVL SKIN TAGS EA ADDL 10: CPT | Performed by: NURSE PRACTITIONER

## 2019-02-25 PROCEDURE — 11200 RMVL SKIN TAGS UP TO&INC 15: CPT | Performed by: NURSE PRACTITIONER

## 2019-02-25 RX ORDER — HYDROCODONE BITARTRATE AND ACETAMINOPHEN 7.5; 325 MG/1; MG/1
1 TABLET ORAL EVERY 6 HOURS PRN
Qty: 60 TABLET | Refills: 0 | Status: SHIPPED | OUTPATIENT
Start: 2019-02-25 | End: 2019-03-25 | Stop reason: SDUPTHER

## 2019-02-25 RX ORDER — GABAPENTIN 600 MG/1
600 TABLET ORAL 3 TIMES DAILY
Qty: 90 TABLET | Refills: 2 | Status: SHIPPED | OUTPATIENT
Start: 2019-02-25 | End: 2019-04-25 | Stop reason: SDUPTHER

## 2019-02-25 NOTE — ASSESSMENT & PLAN NOTE
Bleeding skin tag lesions  Reviewed treatment options and agreed on a trial of cryotherapy. See procedure note.

## 2019-02-25 NOTE — PROGRESS NOTES
Subjective   Johnathon Mclain is a 43 y.o. male.     Chief Complaint   Patient presents with   • Depression   • skin lesions       History of Present Illness:    Bleeding skin lesions on neck and underarms. Catching on clothing and bleeding. Requesting cryotherapy.     Chronic back pain and joint pain - rates his pain a 6 on pain scale rating of 0-10. Radiology is on file.  He describes his pain as an aching, throbbing and sharp pain is always present in his low back, neck and at times hips and knees.  Activity makes his pain worse.  Rest does help decrease this pain.  He currently receives Neurontin which is helpful with the radiculopathy which radiates into his lower extremities from his low back.  He also receives Norco for his chronic pain.  He has no history of substance abuse or addiction.  He has been seen by Ortho in the past for consult.  He has attended physical therapy in the past without improvement.       DM with hyperglycemia and diabetic foot pain - working on diet and lifestyle changes.     Depression - stable with antidepressant and counseling.      The following portions of the patient's history were reviewed and updated as appropriate:  Allergies, current medications, past family history, past medical history, past social history, past surgical history and problem list.    Review of Systems   Constitutional: Negative for appetite change, fatigue and unexpected weight change.   HENT: Negative for congestion, ear pain, nosebleeds, postnasal drip, rhinorrhea, sore throat, trouble swallowing and voice change.    Eyes: Negative for pain and visual disturbance.   Respiratory: Negative for cough, shortness of breath and wheezing.    Cardiovascular: Negative for chest pain and palpitations.   Gastrointestinal: Negative for abdominal pain, blood in stool, constipation and diarrhea.   Endocrine: Negative for cold intolerance and polydipsia.   Genitourinary: Negative for difficulty urinating, flank pain and  "hematuria.   Musculoskeletal: Positive for arthralgias and back pain. Negative for gait problem, joint swelling and myalgias.   Skin: Positive for color change. Negative for rash.   Allergic/Immunologic: Negative.    Neurological: Negative for syncope, numbness and headaches.   Hematological: Negative.    Psychiatric/Behavioral: Negative for dysphoric mood, self-injury, sleep disturbance and suicidal ideas.   All other systems reviewed and are negative.      Objective     /80   Pulse 92   Temp 99.1 °F (37.3 °C) (Tympanic)   Ht 180.3 cm (71\")   Wt (!) 151 kg (332 lb)   SpO2 98%   BMI 46.30 kg/m²   Admission on 01/02/2019, Discharged on 01/03/2019   Component Date Value Ref Range Status   • Glucose 01/02/2019 99  70 - 130 mg/dL Final   • Glucose 01/02/2019 134* 70 - 130 mg/dL Final   • Glucose 01/02/2019 91  70 - 130 mg/dL Final   • Glucose 01/03/2019 102  70 - 130 mg/dL Final   • Glucose 01/03/2019 88  70 - 130 mg/dL Final       Physical Exam   Constitutional: He is oriented to person, place, and time. Vital signs are normal. He appears well-developed and well-nourished. No distress.   Visibly obese.    HENT:   Head: Normocephalic and atraumatic.   Right Ear: External ear normal.   Left Ear: External ear normal.   Nose: Mucosal edema present. Right sinus exhibits maxillary sinus tenderness. Left sinus exhibits maxillary sinus tenderness.   Mouth/Throat: Uvula is midline. Oropharyngeal exudate present. No posterior oropharyngeal edema or posterior oropharyngeal erythema.   Eyes: Conjunctivae and EOM are normal. Pupils are equal, round, and reactive to light. Right eye exhibits no discharge. Left eye exhibits no discharge.   Neck: Normal range of motion. Neck supple. No tracheal deviation present. No thyromegaly present.   Cardiovascular: Normal rate, regular rhythm, normal heart sounds and intact distal pulses.   No murmur heard.  Pulmonary/Chest: Effort normal and breath sounds normal. No respiratory " distress. He has no wheezes. He has no rales. He exhibits no tenderness.   Abdominal: Soft. Bowel sounds are normal. He exhibits no distension and no mass. There is no tenderness. There is no rebound and no guarding.   Musculoskeletal:        Lumbar back: He exhibits decreased range of motion, tenderness, pain and spasm.   Decreased lumbar curvature, there is pain with forward flexion. DTR's +2. No edema.    Lymphadenopathy:     He has no cervical adenopathy.   Neurological: He is alert and oriented to person, place, and time. He has normal reflexes.   Skin: Skin is warm and dry. Capillary refill takes less than 2 seconds. Lesion noted. No rash noted. He is not diaphoretic. No erythema. No pallor.        Psychiatric: He has a normal mood and affect. His speech is normal and behavior is normal. Judgment and thought content normal. His affect is not inappropriate. Cognition and memory are normal.   Nursing note and vitals reviewed.      Assessment/Plan     Problem List Items Addressed This Visit        Digestive    Morbid obesity with BMI of 50.0-59.9, adult (CMS/[facility]) - Primary       Endocrine    Diabetes mellitus with diabetic neuropathy, with long-term current use of insulin (CMS/[facility])    Relevant Medications    gabapentin (NEURONTIN) 600 MG tablet       Nervous and Auditory    Neuralgia and neuritis (Chronic)    Relevant Medications    HYDROcodone-acetaminophen (NORCO) 7.5-325 MG per tablet    gabapentin (NEURONTIN) 600 MG tablet       Musculoskeletal and Integument    Osteoarthrosis    Skin tags, multiple acquired    Current Assessment & Plan     Bleeding skin tag lesions  Reviewed treatment options and agreed on a trial of cryotherapy. See procedure note.                 Tolerated cryotherapy well.   Proper body mechanics has been reviewed and discussed today.      As part of this patient's treatment plan they are being prescribed controlled substance/substances with potential for abuse. This patient has been made  aware of the appropriate use of such medications, including potential risk of somnolence, limited ability to drive and / or work safely, and potential for overdose. It has also been made clear that these medications are for use by this patient only, without concomitant use of alcohol or other substances unless prescribed/advised by medical provider. Patient has completed controlled substance agreement detailing terms of continued prescribing of controlled substances including monitoring John reports, drug screens and pill counts. The patient was asked and states they are not receiving narcotic medication from any there provider or any provider that this office has not been made aware of. History and physical exam exhibit continued safe and appropriate use of controlled substances.   Goal: Improved quality of life and reduction in pain as evidenced by pt report.   John  has been reviewed as consistent.  UDS is on file.   Prescription for Neurontin and Norco provided.   Pt requesting a refill on lexapro. It is not listed on his medication list. Will have staff call pharmacy for detailed list.     Patient has been instructed and counseled regarding opioid misuse and risk of addiction.  We have discussed proper storage and disposal of controlled medication.               Patient's Body mass index is 46.3 kg/m². BMI is above normal parameters. Recommendations include: exercise counseling and nutrition counseling.      I have discussed diagnosis in detail today allowing time for questions and answers. Patient is aware of reasons to seek urgent or emergent medical care as well as reasons to return to the clinic for evaluation. Possible side effects, interactions and progression of symptoms discussed as well. Patient / family states understanding.   Emotional support and active listening provided.     Follow up in one month, sooner if needed. Routine labs every 3-6 months.     Dictated utilizing Dragon  dictation      This document has been electronically signed by:  TYSON Vinson, NP-C

## 2019-02-27 RX ORDER — ESCITALOPRAM OXALATE 10 MG/1
TABLET ORAL
Qty: 30 TABLET | Refills: 5 | OUTPATIENT
Start: 2019-02-27

## 2019-03-11 ENCOUNTER — OFFICE VISIT (OUTPATIENT)
Dept: FAMILY MEDICINE CLINIC | Facility: CLINIC | Age: 44
End: 2019-03-11

## 2019-03-11 VITALS
WEIGHT: 315 LBS | OXYGEN SATURATION: 98 % | DIASTOLIC BLOOD PRESSURE: 60 MMHG | SYSTOLIC BLOOD PRESSURE: 99 MMHG | HEIGHT: 71 IN | TEMPERATURE: 103.2 F | HEART RATE: 78 BPM | BODY MASS INDEX: 44.1 KG/M2

## 2019-03-11 DIAGNOSIS — Z79.4 TYPE 2 DIABETES MELLITUS WITH DIABETIC NEUROPATHY, WITH LONG-TERM CURRENT USE OF INSULIN (HCC): Primary | ICD-10-CM

## 2019-03-11 DIAGNOSIS — R68.89 FLU-LIKE SYMPTOMS: ICD-10-CM

## 2019-03-11 DIAGNOSIS — J11.1 INFLUENZA: ICD-10-CM

## 2019-03-11 DIAGNOSIS — E11.40 TYPE 2 DIABETES MELLITUS WITH DIABETIC NEUROPATHY, WITH LONG-TERM CURRENT USE OF INSULIN (HCC): Primary | ICD-10-CM

## 2019-03-11 DIAGNOSIS — J02.9 SORE THROAT: ICD-10-CM

## 2019-03-11 LAB
EXPIRATION DATE: ABNORMAL
EXPIRATION DATE: NORMAL
FLUAV AG NPH QL: POSITIVE
FLUBV AG NPH QL: POSITIVE
INTERNAL CONTROL: ABNORMAL
INTERNAL CONTROL: NORMAL
Lab: ABNORMAL
Lab: NORMAL
S PYO AG THROAT QL: NEGATIVE

## 2019-03-11 PROCEDURE — 87880 STREP A ASSAY W/OPTIC: CPT | Performed by: NURSE PRACTITIONER

## 2019-03-11 PROCEDURE — 87804 INFLUENZA ASSAY W/OPTIC: CPT | Performed by: NURSE PRACTITIONER

## 2019-03-11 PROCEDURE — 99214 OFFICE O/P EST MOD 30 MIN: CPT | Performed by: NURSE PRACTITIONER

## 2019-03-11 RX ORDER — AZITHROMYCIN 250 MG/1
TABLET, FILM COATED ORAL
Qty: 6 TABLET | Refills: 0 | Status: SHIPPED | OUTPATIENT
Start: 2019-03-11 | End: 2019-03-25

## 2019-03-11 RX ORDER — OSELTAMIVIR PHOSPHATE 75 MG/1
75 CAPSULE ORAL 2 TIMES DAILY
Qty: 10 CAPSULE | Refills: 0 | Status: SHIPPED | OUTPATIENT
Start: 2019-03-11 | End: 2019-04-25

## 2019-03-11 RX ORDER — IBUPROFEN 600 MG/1
600 TABLET ORAL 3 TIMES DAILY PRN
Qty: 30 TABLET | Refills: 0 | Status: SHIPPED | OUTPATIENT
Start: 2019-03-11 | End: 2019-12-31

## 2019-03-11 RX ORDER — GUAIFENESIN/DEXTROMETHORPHAN 100-10MG/5
5 SYRUP ORAL 3 TIMES DAILY PRN
Qty: 236 ML | Refills: 0 | Status: SHIPPED | OUTPATIENT
Start: 2019-03-11 | End: 2019-03-25

## 2019-03-11 NOTE — PROGRESS NOTES
Subjective   Johnathon Mclain is a 43 y.o. male.     Chief Complaint   Patient presents with   • URI       History of Present Illness:    Patient presents today with flulike symptoms for the past 24 hours.  Did  have a flu vaccine this year.  Fever, body aches, chills, decreased appetite, fatigue and cough present. Temp > 103 today.     Diabetes-patient has very little appetite.  He is drinking well.    The following portions of the patient's history were reviewed and updated as appropriate:  Allergies, current medications, past family history, past medical history, past social history, past surgical history and problem list.    Review of Systems   Constitutional: Positive for activity change, appetite change, chills, fatigue and fever. Negative for unexpected weight change.   HENT: Positive for sinus pressure, sinus pain and sore throat. Negative for congestion, ear pain, nosebleeds, postnasal drip, rhinorrhea, trouble swallowing and voice change.    Eyes: Negative for pain and visual disturbance.   Respiratory: Positive for cough. Negative for shortness of breath and wheezing.    Cardiovascular: Negative for chest pain and palpitations.   Gastrointestinal: Negative for abdominal pain, blood in stool, constipation and diarrhea.   Endocrine: Negative for cold intolerance and polydipsia.   Genitourinary: Negative for difficulty urinating, flank pain and hematuria.   Musculoskeletal: Positive for arthralgias and back pain. Negative for gait problem, joint swelling and myalgias.   Skin: Negative for color change and rash.   Allergic/Immunologic: Negative.    Neurological: Positive for headaches. Negative for syncope, facial asymmetry and numbness.   Hematological: Negative.    Psychiatric/Behavioral: Negative for dysphoric mood, self-injury, sleep disturbance and suicidal ideas.   All other systems reviewed and are negative.      Objective     BP 99/60   Pulse 78   Temp (!) 103.2 °F (39.6 °C) (Tympanic)   Ht 180.3 cm  "(71\")   Wt (!) 151 kg (332 lb)   SpO2 98%   BMI 46.30 kg/m²   Admission on 01/02/2019, Discharged on 01/03/2019   Component Date Value Ref Range Status   • Glucose 01/02/2019 99  70 - 130 mg/dL Final   • Glucose 01/02/2019 134* 70 - 130 mg/dL Final   • Glucose 01/02/2019 91  70 - 130 mg/dL Final   • Glucose 01/03/2019 102  70 - 130 mg/dL Final   • Glucose 01/03/2019 88  70 - 130 mg/dL Final       Physical Exam   Constitutional: He is oriented to person, place, and time. Vital signs are normal. He appears well-developed and well-nourished. He appears ill. No distress.   Visibly obese.    HENT:   Head: Normocephalic and atraumatic.   Right Ear: External ear and ear canal normal. Tympanic membrane is injected.   Left Ear: External ear and ear canal normal. Tympanic membrane is injected.   Nose: Mucosal edema and rhinorrhea present. Right sinus exhibits no maxillary sinus tenderness and no frontal sinus tenderness. Left sinus exhibits no maxillary sinus tenderness and no frontal sinus tenderness.   Mouth/Throat: Mucous membranes are normal. Oropharyngeal exudate (thin, clear PND noted) and posterior oropharyngeal erythema (injected) present.   Eyes: Conjunctivae and EOM are normal. Pupils are equal, round, and reactive to light. Right eye exhibits no discharge. Left eye exhibits no discharge.   Neck: Normal range of motion. Neck supple. No tracheal deviation present. No thyromegaly present.   Cardiovascular: Normal rate, regular rhythm, normal heart sounds and intact distal pulses.   No murmur heard.  Pulmonary/Chest: Effort normal and breath sounds normal. No stridor. No respiratory distress. He has no wheezes. He has no rales. He exhibits tenderness (chest wall tenderness due to cough).   Abdominal: Soft. Normal appearance and bowel sounds are normal. He exhibits no distension and no mass. There is no tenderness. There is no rebound and no guarding.   Musculoskeletal:        Lumbar back: He exhibits decreased range " of motion, tenderness, pain and spasm.   Decreased lumbar curvature, there is pain with forward flexion. DTR's +2. No edema.    Lymphadenopathy:     He has cervical adenopathy (firm, mobile nodes).        Right cervical: Superficial cervical adenopathy present.        Left cervical: Superficial cervical adenopathy present.   Neurological: He is alert and oriented to person, place, and time. He has normal reflexes.   Skin: Skin is warm and dry. Capillary refill takes less than 2 seconds. No rash noted. He is not diaphoretic. No erythema. No pallor.   Psychiatric: He has a normal mood and affect. His speech is normal and behavior is normal. Judgment and thought content normal. His affect is not inappropriate. Cognition and memory are normal.   Nursing note and vitals reviewed.      Assessment/Plan     Problem List Items Addressed This Visit        Respiratory    Influenza    Overview     + today          Relevant Medications    oseltamivir (TAMIFLU) 75 MG capsule       Endocrine    Diabetes mellitus with diabetic neuropathy, with long-term current use of insulin (CMS/[facility]) - Primary      Other Visit Diagnoses     Flu-like symptoms        Relevant Medications    ibuprofen (ADVIL,MOTRIN) 600 MG tablet    oseltamivir (TAMIFLU) 75 MG capsule    azithromycin (ZITHROMAX Z-HEATHER) 250 MG tablet    guaifenesin-dextromethorphan (ROBITUSSIN DM) 100-10 MG/5ML syrup    Other Relevant Orders    POC Influenza A / B            Influenza A positive.  Fluids, rest, symptomatic treatment advised. Steam therapy. Dispose of tooth bush after 24 hours of starting antibiotics if ordered. Complete antibiotics as ordered.  Discussed possible side effects/interactions of medication. Discussed symptoms to report as well as reasons to seek urgent or emergent medical attention. Understanding stated.   Recommend follow up in 2-3 days if not improved, sooner if needed.   Start tamiflu.   Pt has been educated/instructed on diabetic care and protocols.   Diabetic diet instructions provided.  Medication regimen reviewed.  Discussed possible side effects and interactions of current medications.  Sick day rules reviewed. Continue to monitor blood sugar and report abnormal readings as discussed today. Understanding stated by patient.        Patient's Body mass index is 46.3 kg/m². BMI is above normal parameters. Recommendations include: exercise counseling and nutrition counseling.      I have discussed diagnosis in detail today allowing time for questions and answers. Patient is aware of reasons to seek urgent or emergent medical care as well as reasons to return to the clinic for evaluation. Possible side effects, interactions and progression of symptoms discussed as well. Patient / family states understanding.   Emotional support and active listening provided.     RTC 2-5 days if not improved, sooner if condition worsens/changes. Symptomatic care advised as well as reasons for urgent or emergent care. Pt / family state understanding.     Routine follow up monthly and as needed.           This document has been electronically signed by:  TYSON Vinson, NP-C

## 2019-03-14 ENCOUNTER — TELEPHONE (OUTPATIENT)
Dept: FAMILY MEDICINE CLINIC | Facility: CLINIC | Age: 44
End: 2019-03-14

## 2019-03-14 NOTE — TELEPHONE ENCOUNTER
Patient's sister is aware of this information.   ----- Message from TYSON Sepulveda sent at 3/14/2019  1:18 PM EDT -----  No that is a normal bp.     ----- Message -----  From: Lucretia Escobar MA  Sent: 3/14/2019  12:27 PM  To: TYSON Sepulveda    Patient's sister wants to know if you want him to go back on his BP medicine? She said its been running 130/60.

## 2019-03-25 ENCOUNTER — OFFICE VISIT (OUTPATIENT)
Dept: FAMILY MEDICINE CLINIC | Facility: CLINIC | Age: 44
End: 2019-03-25

## 2019-03-25 VITALS
RESPIRATION RATE: 20 BRPM | WEIGHT: 315 LBS | BODY MASS INDEX: 44.1 KG/M2 | DIASTOLIC BLOOD PRESSURE: 70 MMHG | HEART RATE: 72 BPM | HEIGHT: 71 IN | SYSTOLIC BLOOD PRESSURE: 130 MMHG | OXYGEN SATURATION: 97 %

## 2019-03-25 DIAGNOSIS — Z79.4 TYPE 2 DIABETES MELLITUS WITH DIABETIC NEUROPATHY, WITH LONG-TERM CURRENT USE OF INSULIN (HCC): Primary | ICD-10-CM

## 2019-03-25 DIAGNOSIS — F32.9 REACTIVE DEPRESSION: ICD-10-CM

## 2019-03-25 DIAGNOSIS — I10 ESSENTIAL HYPERTENSION: ICD-10-CM

## 2019-03-25 DIAGNOSIS — M79.2 NEURALGIA AND NEURITIS: ICD-10-CM

## 2019-03-25 DIAGNOSIS — M15.9 PRIMARY OSTEOARTHRITIS INVOLVING MULTIPLE JOINTS: ICD-10-CM

## 2019-03-25 DIAGNOSIS — E55.9 VITAMIN D DEFICIENCY: ICD-10-CM

## 2019-03-25 DIAGNOSIS — E11.40 TYPE 2 DIABETES MELLITUS WITH DIABETIC NEUROPATHY, WITH LONG-TERM CURRENT USE OF INSULIN (HCC): Primary | ICD-10-CM

## 2019-03-25 PROBLEM — F32.A DEPRESSION WITH SUICIDAL IDEATION: Status: RESOLVED | Noted: 2019-01-02 | Resolved: 2019-03-25

## 2019-03-25 PROBLEM — R45.851 DEPRESSION WITH SUICIDAL IDEATION: Status: RESOLVED | Noted: 2019-01-02 | Resolved: 2019-03-25

## 2019-03-25 PROCEDURE — 99214 OFFICE O/P EST MOD 30 MIN: CPT | Performed by: NURSE PRACTITIONER

## 2019-03-25 RX ORDER — HYDROCODONE BITARTRATE AND ACETAMINOPHEN 7.5; 325 MG/1; MG/1
1 TABLET ORAL EVERY 6 HOURS PRN
Qty: 60 TABLET | Refills: 0 | Status: SHIPPED | OUTPATIENT
Start: 2019-03-25 | End: 2019-04-25 | Stop reason: SDUPTHER

## 2019-03-25 RX ORDER — HYDROCODONE BITARTRATE AND ACETAMINOPHEN 7.5; 325 MG/1; MG/1
1 TABLET ORAL EVERY 6 HOURS PRN
Qty: 60 TABLET | Refills: 0 | Status: SHIPPED | OUTPATIENT
Start: 2019-03-25 | End: 2019-03-25 | Stop reason: SDUPTHER

## 2019-03-25 NOTE — PROGRESS NOTES
Subjective   Johnathon Mclain is a 43 y.o. male.     Chief Complaint   Patient presents with   • Osteoarthritis   • Diabetes       History of Present Illness:    Hypertension-patient stopped all blood pressure medications.  His blood pressure has been remaining stable.  He states he did not want to take them anymore and felt like he was doing well without them.  He would like to try to stay off of them if possible.  Blood pressure is good today.    Type 2 diabetes with long-term current insulin use-patient reports stable glucose levels.  He is compliant with oral and injectable medications.  Patient has diabetic neuropathy of both feet.  Neurontin is helpful for this pain.    Obesity - working on lifestyle changes and healthy diet.  Trying to be more active.        COPD-no recent exacerbation.     Gout-no recent exacerbation with current medication regimen.     Polycythemia vera-under the care of hematology.     Chronic back pain and joint pain - rates his pain a 6 on pain scale rating of 0-10. Radiology is on file.  He describes his pain as an aching, throbbing and sharp pain is always present in his low back, neck and at times hips and knees.  Activity makes his pain worse.  Rest does help decrease this pain.  He currently receives Neurontin which is helpful with the radiculopathy which radiates into his lower extremities from his low back.  He also receives Norco for his chronic pain.  He has no history of substance abuse or addiction.  He has been seen by Ortho in the past for consult.  He has attended physical therapy in the past without improvement.   2017 x-rays are on file.  No MRI on file.  Patient will require open MRI.          Depression-patient is going through a divorce and custody porras.  He denies thoughts of hurting himself or others.  He had missed a few appointments with licensed certified  due to court appearances.  He does not wish to reschedule.      The following portions of the  "patient's history were reviewed and updated as appropriate:  Allergies, current medications, past family history, past medical history, past social history, past surgical history and problem list.    Review of Systems   Constitutional: Negative for activity change, appetite change, chills, fatigue and fever.   HENT: Negative for ear pain, facial swelling, hearing loss, sinus pressure, sore throat, trouble swallowing and voice change.    Eyes: Negative for pain, discharge and visual disturbance.   Respiratory: Negative for apnea, cough, chest tightness, shortness of breath and wheezing.    Cardiovascular: Negative for chest pain, palpitations and leg swelling.   Gastrointestinal: Negative for abdominal pain, blood in stool, constipation, diarrhea, nausea and vomiting.   Genitourinary: Negative for dysuria.   Musculoskeletal: Positive for arthralgias and back pain. Negative for neck stiffness.   Skin: Negative for color change.   Neurological: Negative for headaches.   Psychiatric/Behavioral: Negative for confusion, dysphoric mood, self-injury and suicidal ideas. The patient is not nervous/anxious.        Objective     /70   Pulse 72   Resp 20   Ht 180.3 cm (70.98\")   Wt (!) 150 kg (330 lb)   SpO2 97%   BMI 46.05 kg/m²   Office Visit on 03/11/2019   Component Date Value Ref Range Status   • Rapid Influenza A Ag 03/11/2019 Positive* Negative Final   • Rapid Influenza B Ag 03/11/2019 Positive* Negative Final   • Internal Control 03/11/2019 Passed  Passed Final   • Lot Number 03/11/2019 [id number]   Final   • Expiration Date 03/11/2019 03/21/2021   Final   • Rapid Strep A Screen 03/11/2019 Negative  Negative, VALID, INVALID, Not Performed Final   • Internal Control 03/11/2019 Passed  Passed Final   • Lot Number 03/11/2019 [id number]   Final   • Expiration Date 03/11/2019 04/30/2020   Final       Physical Exam   Constitutional: He is oriented to person, place, and time. Vital signs are normal. He appears " well-developed and well-nourished. No distress.   Visibly obese.  Talkative and friendly 43-year-old.  Reports feeling much better now that he is getting over the flu.   HENT:   Head: Normocephalic and atraumatic.   Right Ear: External ear normal.   Left Ear: External ear normal.   Nose: Nose normal.   Mouth/Throat: Oropharynx is clear and moist. No oropharyngeal exudate.   Eyes: Conjunctivae and EOM are normal. Pupils are equal, round, and reactive to light. Right eye exhibits no discharge. Left eye exhibits no discharge.   Neck: Normal range of motion. Neck supple. No tracheal deviation present. No thyromegaly present.   Cardiovascular: Normal rate, regular rhythm, normal heart sounds and intact distal pulses.   No murmur heard.  Pulmonary/Chest: Effort normal and breath sounds normal. No respiratory distress. He has no wheezes. He has no rales. He exhibits no tenderness.   Abdominal: Soft. Bowel sounds are normal. He exhibits no distension and no mass. There is no tenderness. There is no rebound and no guarding.   Musculoskeletal:        Right shoulder: He exhibits decreased range of motion and crepitus.        Lumbar back: He exhibits decreased range of motion, tenderness, pain and spasm.   Decreased lumbar curvature, there is pain with forward flexion. DTR's +2. No edema.    Lymphadenopathy:     He has no cervical adenopathy.   Neurological: He is alert and oriented to person, place, and time. He has normal reflexes.   Skin: Skin is warm and dry. Capillary refill takes less than 2 seconds. No rash noted. He is not diaphoretic. No erythema. No pallor.   Psychiatric: He has a normal mood and affect. His speech is normal and behavior is normal. Judgment and thought content normal. His affect is not inappropriate. Cognition and memory are normal.   Nursing note and vitals reviewed.      Assessment/Plan     Problem List Items Addressed This Visit        Cardiovascular and Mediastinum    Essential hypertension     Relevant Orders    CBC & Differential    Comprehensive Metabolic Panel    TSH    Hemoglobin A1c    Vitamin D 25 Hydroxy    Vitamin B12    Lipid Panel    MicroAlbumin, Urine, Random - Urine, Clean Catch    Uric Acid       Endocrine    Diabetes mellitus with diabetic neuropathy, with long-term current use of insulin (CMS/[facility]) - Primary    Relevant Orders    CBC & Differential    Comprehensive Metabolic Panel    TSH    Hemoglobin A1c    Vitamin D 25 Hydroxy    Vitamin B12    Lipid Panel    MicroAlbumin, Urine, Random - Urine, Clean Catch    Uric Acid       Nervous and Auditory    Neuralgia and neuritis (Chronic)    Relevant Medications    HYDROcodone-acetaminophen (NORCO) 7.5-325 MG per tablet    Other Relevant Orders    CBC & Differential    Comprehensive Metabolic Panel    TSH    Hemoglobin A1c    Vitamin D 25 Hydroxy    Vitamin B12    Lipid Panel    MicroAlbumin, Urine, Random - Urine, Clean Catch    Uric Acid       Musculoskeletal and Integument    Osteoarthrosis    Current Assessment & Plan     Proper body mechanics has been reviewed and discussed today.      As part of this patient's treatment plan they are being prescribed controlled substance/substances with potential for abuse. This patient has been made aware of the appropriate use of such medications, including potential risk of somnolence, limited ability to drive and / or work safely, and potential for overdose. It has also been made clear that these medications are for use by this patient only, without concomitant use of alcohol or other substances unless prescribed/advised by medical provider. Patient has completed controlled substance agreement detailing terms of continued prescribing of controlled substances including monitoring John reports, drug screens and pill counts. The patient was asked and states they are not receiving narcotic medication from any there provider or any provider that this office has not been made aware of. History and physical  exam exhibit continued safe and appropriate use of controlled substances.   Goal: Improved quality of life and reduction in pain as evidenced by pt report.   John #[id number] has been reviewed as consistent.  UDS is on file.     Patient has been instructed and counseled regarding opioid misuse and risk of addiction.  We have discussed proper storage and disposal of controlled medication.  Pt is at low risk for substance abuse or addiction. Pt will be monitored closely for compliance and the need to continue plan of care.   Norco prescription provided            Other    Reactive depression    Current Assessment & Plan     Psychological condition is improving with treatment.  Continue current treatment regimen.  Psychological condition  will be reassessed at the next regular appointment.   states he is compliant with Zoloft.         Relevant Medications    sertraline (ZOLOFT) 50 MG tablet      Other Visit Diagnoses     Vitamin D deficiency        Relevant Orders    Vitamin D 25 Hydroxy                   Patient's Body mass index is 46.05 kg/m². BMI is above normal parameters. Recommendations include: exercise counseling and nutrition counseling.    I advised Johnathon of the risks of continuing to use tobacco, and I provided him with tobacco cessation educational materials in the After Visit Summary.     During this visit, I spent 2 minutes counseling the patient regarding tobacco cessation.  Stop smokeless tobacco.     I have discussed diagnosis in detail today allowing time for questions and answers. Patient is aware of reasons to seek urgent or emergent medical care as well as reasons to return to the clinic for evaluation. Possible side effects, interactions and progression of symptoms discussed as well. Patient / family states understanding.   Emotional support and active listening provided.     Recommend he make a follow-up appointment with Ricki Adam for further counseling.    Follow up in one month, sooner if  needed. Routine labs every 3-6 months.           This document has been electronically signed by:  TYSON Vinson, NP-C

## 2019-03-25 NOTE — ASSESSMENT & PLAN NOTE
Psychological condition is improving with treatment.  Continue current treatment regimen.  Psychological condition  will be reassessed at the next regular appointment.   states he is compliant with Zoloft.

## 2019-03-25 NOTE — ASSESSMENT & PLAN NOTE
Proper body mechanics has been reviewed and discussed today.      As part of this patient's treatment plan they are being prescribed controlled substance/substances with potential for abuse. This patient has been made aware of the appropriate use of such medications, including potential risk of somnolence, limited ability to drive and / or work safely, and potential for overdose. It has also been made clear that these medications are for use by this patient only, without concomitant use of alcohol or other substances unless prescribed/advised by medical provider. Patient has completed controlled substance agreement detailing terms of continued prescribing of controlled substances including monitoring John reports, drug screens and pill counts. The patient was asked and states they are not receiving narcotic medication from any there provider or any provider that this office has not been made aware of. History and physical exam exhibit continued safe and appropriate use of controlled substances.   Goal: Improved quality of life and reduction in pain as evidenced by pt report.   John #[id number] has been reviewed as consistent.  UDS is on file.     Patient has been instructed and counseled regarding opioid misuse and risk of addiction.  We have discussed proper storage and disposal of controlled medication.  Pt is at low risk for substance abuse or addiction. Pt will be monitored closely for compliance and the need to continue plan of care.   Norco prescription provided

## 2019-03-27 ENCOUNTER — OFFICE VISIT (OUTPATIENT)
Dept: PSYCHIATRY | Facility: CLINIC | Age: 44
End: 2019-03-27

## 2019-03-27 DIAGNOSIS — F32.0 CURRENT MILD EPISODE OF MAJOR DEPRESSIVE DISORDER WITHOUT PRIOR EPISODE (HCC): Primary | ICD-10-CM

## 2019-03-27 PROCEDURE — 90832 PSYTX W PT 30 MINUTES: CPT | Performed by: SOCIAL WORKER

## 2019-03-27 NOTE — PROGRESS NOTES
"PROGRESS NOTE  Data:  Johnathon Mclain came in 3/27/2019 for his regularly scheduled therapy session starting at 8:30 AM and ending at 9 AM with Ricki Treviño LCSW in the [facility].  Patient's mood was much improved, in fact today he was actually laughing and smiling throughout the session.  Patient reports he is staying very busy, primarily with fishing.  Patient did share that his divorce is final.  Actually he has been \"talking to someone\" for the past 3 months.  Patient goes to court April 2 in regard to getting visitation to see his daughters.  Patient is looking forward to being able to take his [place] to the lake with friends and his girls.  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  (Scales based on 0 - 10 with 10 being the worst)  Depression: 0 Anxiety: 0   Distress: 0 Sleep: 3   Tasks Completed on Time: 4 Mood: 1   Number of Panic Attacks: 0 Appetite: 0     Mental Status Exam  Hygiene:  fair  Dress:  casual  Attitude:  Cooperative  Motor Activity:  Appropriate  Speech:  Normal  Mood:  within normal limits  Affect:  calm and pleasant  Thought Processes:  Goal directed  Thought Content:  normal  Suicidal Thoughts:  denies  Homicidal Thoughts:  denies  Crisis Safety Plan: yes, to come to the emergency room.  Hallucinations:  denies      Clinical Maneuvering/Intervention:  Processing the above with patient.  Validating patient's emotions and feelings as well as his concerns.  Identification of patient strengths and providing affirmation as to his accomplishments.  Patient using positive coping strategies to deal with his emotions and no longer is feeling hopeless and helpless.'s health issues have improved as well which gives him another reason for being optimistic about the future  Allowed patient to freely discuss issues without interruption or judgment. Provided safe, confidential environment to facilitate the development of positive therapeutic " relationship and encourage open, honest communication. Assisted patient in identifying risk factors which would indicate the need for higher level of care including thoughts to harm self or others and/or self-harming behavior and encouraged patient to contact this office, call 911, or present to the nearest emergency room should any of these events occur. Discussed crisis intervention services and means to access.  Patient adamantly and convincingly denies current suicidal or homicidal ideation or perceptual disturbance.    Prognosis: Good with Ongoing Treatment     GAF: No impairment    Assessment Patient's depression was a result of his marriage and ending and the fact that his now ex-wife refuses him visitation of his daughters.  Being Away from his growth has been extremely difficult thing for him to handle.  Court date is coming up and he is hopeful that he will actually given opportunity to see his girls instead of just talking to them on a daily basis.    Diagnoses and all orders for this visit:    Current mild episode of major depressive disorder without prior episode (CMS/[facility])        Patient's Support Network Includes:  daughter and extended family    Plan   Will begin titrating's therapy sessions as patient has displayed significant improvement.  Patient's personality is 1 to is able to come from his issues and move forward.  The ending of his marriage was the biggest challenge he has encountered.  Patient will adhere to medication regimen as prescribed and report any side effects. Patient will contact this office, call 911 or present to the nearest emergency room should suicidal or homicidal ideations occur. Provide Cognitive Behavioral Therapy and Solution Focused Therapy to improve functioning, maintain stability, and avoid decompensation and the need for higher level of care.          Return in about 2 months (around 5/30/2019).

## 2019-04-23 ENCOUNTER — LAB (OUTPATIENT)
Dept: FAMILY MEDICINE CLINIC | Facility: CLINIC | Age: 44
End: 2019-04-23

## 2019-04-23 DIAGNOSIS — Z79.4 TYPE 2 DIABETES MELLITUS WITH DIABETIC NEUROPATHY, WITH LONG-TERM CURRENT USE OF INSULIN (HCC): ICD-10-CM

## 2019-04-23 DIAGNOSIS — M79.2 NEURALGIA AND NEURITIS: ICD-10-CM

## 2019-04-23 DIAGNOSIS — E55.9 VITAMIN D DEFICIENCY: ICD-10-CM

## 2019-04-23 DIAGNOSIS — E11.40 TYPE 2 DIABETES MELLITUS WITH DIABETIC NEUROPATHY, WITH LONG-TERM CURRENT USE OF INSULIN (HCC): ICD-10-CM

## 2019-04-23 DIAGNOSIS — I10 ESSENTIAL HYPERTENSION: ICD-10-CM

## 2019-04-23 LAB
25(OH)D3 SERPL-MCNC: 30.8 NG/ML (ref 30–100)
ALBUMIN SERPL-MCNC: 4.3 G/DL (ref 3.5–5.2)
ALBUMIN UR-MCNC: <1.2 MG/L
ALBUMIN/GLOB SERPL: 2 G/DL
ALP SERPL-CCNC: 61 U/L (ref 39–117)
ALT SERPL W P-5'-P-CCNC: 41 U/L (ref 1–41)
ANION GAP SERPL CALCULATED.3IONS-SCNC: 15.8 MMOL/L
AST SERPL-CCNC: 24 U/L (ref 1–40)
BASOPHILS # BLD AUTO: 0.06 10*3/MM3 (ref 0–0.2)
BASOPHILS NFR BLD AUTO: 0.8 % (ref 0–1.5)
BILIRUB SERPL-MCNC: 0.8 MG/DL (ref 0.2–1.2)
BUN BLD-MCNC: 21 MG/DL (ref 6–20)
BUN/CREAT SERPL: 23.6 (ref 7–25)
CALCIUM SPEC-SCNC: 9.5 MG/DL (ref 8.6–10.5)
CHLORIDE SERPL-SCNC: 98 MMOL/L (ref 98–107)
CHOLEST SERPL-MCNC: 179 MG/DL (ref 0–200)
CO2 SERPL-SCNC: 24.2 MMOL/L (ref 22–29)
CREAT BLD-MCNC: 0.89 MG/DL (ref 0.76–1.27)
DEPRECATED RDW RBC AUTO: 43.7 FL (ref 37–54)
EOSINOPHIL # BLD AUTO: 0.64 10*3/MM3 (ref 0–0.4)
EOSINOPHIL NFR BLD AUTO: 8.9 % (ref 0.3–6.2)
ERYTHROCYTE [DISTWIDTH] IN BLOOD BY AUTOMATED COUNT: 12.9 % (ref 12.3–15.4)
GFR SERPL CREATININE-BSD FRML MDRD: 92 ML/MIN/1.73
GLOBULIN UR ELPH-MCNC: 2.2 GM/DL
GLUCOSE BLD-MCNC: 139 MG/DL (ref 65–99)
HBA1C MFR BLD: 5.4 % (ref 4.8–5.6)
HCT VFR BLD AUTO: 52.6 % (ref 37.5–51)
HDLC SERPL-MCNC: 25 MG/DL (ref 40–60)
HGB BLD-MCNC: 17.6 G/DL (ref 13–17.7)
IMM GRANULOCYTES # BLD AUTO: 0.03 10*3/MM3 (ref 0–0.05)
IMM GRANULOCYTES NFR BLD AUTO: 0.4 % (ref 0–0.5)
LDLC SERPL CALC-MCNC: 84 MG/DL (ref 0–100)
LDLC/HDLC SERPL: 3.34 {RATIO}
LYMPHOCYTES # BLD AUTO: 2.13 10*3/MM3 (ref 0.7–3.1)
LYMPHOCYTES NFR BLD AUTO: 29.7 % (ref 19.6–45.3)
MCH RBC QN AUTO: 31 PG (ref 26.6–33)
MCHC RBC AUTO-ENTMCNC: 33.5 G/DL (ref 31.5–35.7)
MCV RBC AUTO: 92.6 FL (ref 79–97)
MONOCYTES # BLD AUTO: 0.67 10*3/MM3 (ref 0.1–0.9)
MONOCYTES NFR BLD AUTO: 9.4 % (ref 5–12)
NEUTROPHILS # BLD AUTO: 3.63 10*3/MM3 (ref 1.7–7)
NEUTROPHILS NFR BLD AUTO: 50.8 % (ref 42.7–76)
NRBC BLD AUTO-RTO: 0 /100 WBC (ref 0–0.2)
PLATELET # BLD AUTO: 204 10*3/MM3 (ref 140–450)
PMV BLD AUTO: 10.4 FL (ref 6–12)
POTASSIUM BLD-SCNC: 4.3 MMOL/L (ref 3.5–5.2)
PROT SERPL-MCNC: 6.5 G/DL (ref 6–8.5)
RBC # BLD AUTO: 5.68 10*6/MM3 (ref 4.14–5.8)
SODIUM BLD-SCNC: 138 MMOL/L (ref 136–145)
TRIGL SERPL-MCNC: 352 MG/DL (ref 0–150)
TSH SERPL DL<=0.05 MIU/L-ACNC: 1.12 MIU/ML (ref 0.27–4.2)
URATE SERPL-MCNC: 2.1 MG/DL (ref 3.4–7)
VIT B12 BLD-MCNC: 401 PG/ML (ref 211–946)
VLDLC SERPL-MCNC: 70.4 MG/DL (ref 5–40)
WBC NRBC COR # BLD: 7.16 10*3/MM3 (ref 3.4–10.8)

## 2019-04-23 PROCEDURE — 85025 COMPLETE CBC W/AUTO DIFF WBC: CPT | Performed by: NURSE PRACTITIONER

## 2019-04-23 PROCEDURE — 82607 VITAMIN B-12: CPT | Performed by: NURSE PRACTITIONER

## 2019-04-23 PROCEDURE — 80053 COMPREHEN METABOLIC PANEL: CPT | Performed by: NURSE PRACTITIONER

## 2019-04-23 PROCEDURE — 83036 HEMOGLOBIN GLYCOSYLATED A1C: CPT | Performed by: NURSE PRACTITIONER

## 2019-04-23 PROCEDURE — 80061 LIPID PANEL: CPT | Performed by: NURSE PRACTITIONER

## 2019-04-23 PROCEDURE — 84550 ASSAY OF BLOOD/URIC ACID: CPT | Performed by: NURSE PRACTITIONER

## 2019-04-23 PROCEDURE — 82043 UR ALBUMIN QUANTITATIVE: CPT | Performed by: NURSE PRACTITIONER

## 2019-04-23 PROCEDURE — 82306 VITAMIN D 25 HYDROXY: CPT | Performed by: NURSE PRACTITIONER

## 2019-04-23 PROCEDURE — 84443 ASSAY THYROID STIM HORMONE: CPT | Performed by: NURSE PRACTITIONER

## 2019-04-25 ENCOUNTER — PROCEDURE VISIT (OUTPATIENT)
Dept: FAMILY MEDICINE CLINIC | Facility: CLINIC | Age: 44
End: 2019-04-25

## 2019-04-25 VITALS
DIASTOLIC BLOOD PRESSURE: 80 MMHG | HEIGHT: 71 IN | HEART RATE: 112 BPM | TEMPERATURE: 98.8 F | BODY MASS INDEX: 44.1 KG/M2 | WEIGHT: 315 LBS | OXYGEN SATURATION: 97 % | SYSTOLIC BLOOD PRESSURE: 140 MMHG

## 2019-04-25 DIAGNOSIS — E66.01 MORBID OBESITY WITH BMI OF 50.0-59.9, ADULT (HCC): ICD-10-CM

## 2019-04-25 DIAGNOSIS — F41.9 ANXIETY: ICD-10-CM

## 2019-04-25 DIAGNOSIS — Z79.4 TYPE 2 DIABETES MELLITUS WITH DIABETIC NEUROPATHY, WITH LONG-TERM CURRENT USE OF INSULIN (HCC): Primary | ICD-10-CM

## 2019-04-25 DIAGNOSIS — E11.40 TYPE 2 DIABETES MELLITUS WITH DIABETIC NEUROPATHY, WITH LONG-TERM CURRENT USE OF INSULIN (HCC): Primary | ICD-10-CM

## 2019-04-25 DIAGNOSIS — M15.9 PRIMARY OSTEOARTHRITIS INVOLVING MULTIPLE JOINTS: ICD-10-CM

## 2019-04-25 DIAGNOSIS — L91.8 SKIN TAGS, MULTIPLE ACQUIRED: ICD-10-CM

## 2019-04-25 DIAGNOSIS — M79.2 NEURALGIA AND NEURITIS: ICD-10-CM

## 2019-04-25 PROCEDURE — 11200 RMVL SKIN TAGS UP TO&INC 15: CPT | Performed by: NURSE PRACTITIONER

## 2019-04-25 PROCEDURE — 99214 OFFICE O/P EST MOD 30 MIN: CPT | Performed by: NURSE PRACTITIONER

## 2019-04-25 PROCEDURE — 11201 RMVL SKIN TAGS EA ADDL 10: CPT | Performed by: NURSE PRACTITIONER

## 2019-04-25 RX ORDER — METFORMIN HYDROCHLORIDE 500 MG/1
500 TABLET, EXTENDED RELEASE ORAL
Qty: 30 TABLET | Refills: 5 | Status: SHIPPED | OUTPATIENT
Start: 2019-04-25 | End: 2019-12-31 | Stop reason: SDUPTHER

## 2019-04-25 RX ORDER — BUSPIRONE HYDROCHLORIDE 10 MG/1
10 TABLET ORAL 2 TIMES DAILY PRN
Qty: 60 TABLET | Refills: 3 | Status: SHIPPED | OUTPATIENT
Start: 2019-04-25 | End: 2019-08-19 | Stop reason: SDUPTHER

## 2019-04-25 RX ORDER — GABAPENTIN 600 MG/1
600 TABLET ORAL 3 TIMES DAILY
Qty: 90 TABLET | Refills: 2 | Status: SHIPPED | OUTPATIENT
Start: 2019-04-25 | End: 2019-07-24 | Stop reason: SDUPTHER

## 2019-04-25 RX ORDER — FENOFIBRATE 120 MG/1
120 TABLET ORAL NIGHTLY
Qty: 30 TABLET | Refills: 5 | Status: SHIPPED | OUTPATIENT
Start: 2019-04-25 | End: 2019-12-31 | Stop reason: SDUPTHER

## 2019-04-25 RX ORDER — HYDROCODONE BITARTRATE AND ACETAMINOPHEN 7.5; 325 MG/1; MG/1
1 TABLET ORAL EVERY 6 HOURS PRN
Qty: 60 TABLET | Refills: 0 | Status: SHIPPED | OUTPATIENT
Start: 2019-04-25 | End: 2019-05-23 | Stop reason: SDUPTHER

## 2019-04-25 RX ORDER — FENOFIBRATE 120 MG/1
120 TABLET ORAL NIGHTLY
Qty: 30 TABLET | Refills: 5 | Status: SHIPPED | OUTPATIENT
Start: 2019-04-25 | End: 2019-04-25 | Stop reason: SDUPTHER

## 2019-04-25 NOTE — ASSESSMENT & PLAN NOTE
Cryotherapy performed to a total of 24 skin tags.  There are well over 100 skin tags remaining.  We will gradually work on these as they are catching on his clothing causing irritation and bleeding.

## 2019-04-25 NOTE — ASSESSMENT & PLAN NOTE
Obesity is worsening.  Discussed the patient's BMI.  The BMI is above average; BMI management plan is completed.  General weight loss/lifestyle modification strategies discussed (elicit support from others; identify saboteurs; non-food rewards, etc).  Regular aerobic exercise program discussed.   Pt has been instructed today regarding low fat heart smart diet. Weight management and routine exercise has been recommended. Avoid high fat foods, starchy foods and processed foods. Increase lean meats, fresh vegetables and fresh fruits.   Decline request for Adipex due to current anxiety exacerbation

## 2019-04-25 NOTE — ASSESSMENT & PLAN NOTE
Proper body mechanics has been reviewed and discussed today.      As part of this patient's treatment plan they are being prescribed controlled substance/substances with potential for abuse. This patient has been made aware of the appropriate use of such medications, including potential risk of somnolence, limited ability to drive and / or work safely, and potential for overdose. It has also been made clear that these medications are for use by this patient only, without concomitant use of alcohol or other substances unless prescribed/advised by medical provider. Patient has completed controlled substance agreement detailing terms of continued prescribing of controlled substances including monitoring John reports, drug screens and pill counts. The patient was asked and states they are not receiving narcotic medication from any there provider or any provider that this office has not been made aware of. History and physical exam exhibit continued safe and appropriate use of controlled substances.   Goal: Improved quality of life and reduction in pain as evidenced by pt report.   John #[id number] has been reviewed as consistent.  UDS is on file.     Patient has been instructed and counseled regarding opioid misuse and risk of addiction.  We have discussed proper storage and disposal of controlled medication.  Pt is at low risk for substance abuse or addiction. Pt will be monitored closely for compliance and the need to continue plan of care.

## 2019-04-25 NOTE — PROGRESS NOTES
Subjective   Johnathon Mclain is a 43 y.o. male.     Chief Complaint   Patient presents with   • Hypertension   • Hyperlipidemia   • leg pain       History of Present Illness:    Obesity -Patient has weight gain this month.  He would like to discuss starting back on Adipex.  He states he started back drinking regular pop and not been exercising as much.     Anxiety-patient is going through a custody porras.  Episodes of anxiety which causes him to lose his temper at times.  He lost his temper and a court hearing and said some things he really regrets.  He would like to talk about some medication he could take to just kind of calm him down and decrease his anxiety when he is in 1 of the stressful situations such as court hearings for custody.  Reports taking his Zoloft and trazodone.    Chronic back pain and joint pain - rates his pain a 6 on pain scale rating of 0-10. Radiology is on file.  He describes his pain as an aching, throbbing and sharp pain is always present in his low back, neck and at times hips and knees.  Activity makes his pain worse.  Rest does help decrease this pain.  He currently receives Neurontin which is helpful with the radiculopathy which radiates into his lower extremities from his low back.  He also receives Norco for his chronic pain.  He has no history of substance abuse or addiction.  He has been seen by Ortho in the past for consult.  He has attended physical therapy in the past without improvement.   2017 x-rays are on file.  No MRI on file.  Patient will require open MRI.     Hypertension-stable with a decrease sodium intake.    Type 2 diabetes- patient states he has been having some episodes of low glucose.  He does not like to aid in the mornings.  Eats one large meal in the evening and snacks a little bit.  He reports that he has started drinking regular soda and has really quit dieting.  Patient has a significant weight gain this month.  He currently receives glipizide, metformin and  "Toujeo.     labs to review today.    Irritating skin tags/lesions on his neck, chest and axillary areas bilateral.  Skin tags catch on his clothing and bleed.  Requesting removal today.          The following portions of the patient's history were reviewed and updated as appropriate:  Allergies, current medications, past family history, past medical history, past social history, past surgical history and problem list.    Review of Systems   Constitutional: Positive for unexpected weight change. Negative for appetite change and fatigue.   HENT: Negative for congestion, ear pain, nosebleeds, postnasal drip, rhinorrhea, sore throat, trouble swallowing and voice change.    Eyes: Negative for pain and visual disturbance.   Respiratory: Negative for cough, shortness of breath and wheezing.    Cardiovascular: Negative for chest pain, palpitations and leg swelling.   Gastrointestinal: Negative for abdominal pain, blood in stool, constipation and diarrhea.   Endocrine: Negative for cold intolerance and polydipsia.   Genitourinary: Negative for difficulty urinating, flank pain and hematuria.   Musculoskeletal: Positive for arthralgias and back pain. Negative for gait problem, joint swelling and myalgias.   Skin: Positive for color change. Negative for rash.   Allergic/Immunologic: Negative.    Neurological: Negative for dizziness, syncope, facial asymmetry, numbness and headaches.   Hematological: Negative.    Psychiatric/Behavioral: Positive for agitation (During stressful situations). Negative for self-injury, sleep disturbance and suicidal ideas. The patient is nervous/anxious.    All other systems reviewed and are negative.    Fasting glucose on 4/23/2019 was 139, A1c 5.4   Total cholesterol 179, triglycerides 352, HDL 25, LDL 84.  Currently taking fenofibrate 48 mg daily.      Objective     /80   Pulse 112   Temp 98.8 °F (37.1 °C) (Tympanic)   Ht 180.3 cm (70.98\")   Wt (!) 155 kg (341 lb)   SpO2 97%   BMI " 47.59 kg/m²   Lab on 04/23/2019   Component Date Value Ref Range Status   • Glucose 04/23/2019 139* 65 - 99 mg/dL Final   • BUN 04/23/2019 21* 6 - 20 mg/dL Final   • Creatinine 04/23/2019 0.89  0.76 - 1.27 mg/dL Final   • Sodium 04/23/2019 138  136 - 145 mmol/L Final   • Potassium 04/23/2019 4.3  3.5 - 5.2 mmol/L Final   • Chloride 04/23/2019 98  98 - 107 mmol/L Final   • CO2 04/23/2019 24.2  22.0 - 29.0 mmol/L Final   • Calcium 04/23/2019 9.5  8.6 - 10.5 mg/dL Final   • Total Protein 04/23/2019 6.5  6.0 - 8.5 g/dL Final   • Albumin 04/23/2019 4.30  3.50 - 5.20 g/dL Final   • ALT (SGPT) 04/23/2019 41  1 - 41 U/L Final   • AST (SGOT) 04/23/2019 24  1 - 40 U/L Final   • Alkaline Phosphatase 04/23/2019 61  39 - 117 U/L Final   • Total Bilirubin 04/23/2019 0.8  0.2 - 1.2 mg/dL Final   • eGFR Non African Amer 04/23/2019 92  >60 mL/min/1.73 Final   • Globulin 04/23/2019 2.2  gm/dL Final   • A/G Ratio 04/23/2019 2.0  g/dL Final   • BUN/Creatinine Ratio 04/23/2019 23.6  7.0 - 25.0 Final   • Anion Gap 04/23/2019 15.8  mmol/L Final   • TSH 04/23/2019 1.120  0.270 - 4.200 mIU/mL Final   • Hemoglobin A1C 04/23/2019 5.40  4.80 - 5.60 % Final   • 25 Hydroxy, Vitamin D 04/23/2019 30.8  30.0 - 100.0 ng/ml Final   • Vitamin B-12 04/23/2019 401  211 - 946 pg/mL Final   • Total Cholesterol 04/23/2019 179  0 - 200 mg/dL Final   • Triglycerides 04/23/2019 352* 0 - 150 mg/dL Final   • HDL Cholesterol 04/23/2019 25* 40 - 60 mg/dL Final   • LDL Cholesterol  04/23/2019 84  0 - 100 mg/dL Final   • VLDL Cholesterol 04/23/2019 70.4* 5 - 40 mg/dL Final   • LDL/HDL Ratio 04/23/2019 3.34   Final   • Microalbumin, Urine 04/23/2019 <1.2  mg/L Final   • Uric Acid 04/23/2019 2.1* 3.4 - 7.0 mg/dL Final   • WBC 04/23/2019 7.16  3.40 - 10.80 10*3/mm3 Final   • RBC 04/23/2019 5.68  4.14 - 5.80 10*6/mm3 Final   • Hemoglobin 04/23/2019 17.6  13.0 - 17.7 g/dL Final   • Hematocrit 04/23/2019 52.6* 37.5 - 51.0 % Final   • MCV 04/23/2019 92.6  79.0 - 97.0 fL  Final   • MCH 04/23/2019 31.0  26.6 - 33.0 pg Final   • MCHC 04/23/2019 33.5  31.5 - 35.7 g/dL Final   • RDW 04/23/2019 12.9  12.3 - 15.4 % Final   • RDW-SD 04/23/2019 43.7  37.0 - 54.0 fl Final   • MPV 04/23/2019 10.4  6.0 - 12.0 fL Final   • Platelets 04/23/2019 204  140 - 450 10*3/mm3 Final   • Neutrophil % 04/23/2019 50.8  42.7 - 76.0 % Final   • Lymphocyte % 04/23/2019 29.7  19.6 - 45.3 % Final   • Monocyte % 04/23/2019 9.4  5.0 - 12.0 % Final   • Eosinophil % 04/23/2019 8.9* 0.3 - 6.2 % Final   • Basophil % 04/23/2019 0.8  0.0 - 1.5 % Final   • Immature Grans % 04/23/2019 0.4  0.0 - 0.5 % Final   • Neutrophils, Absolute 04/23/2019 3.63  1.70 - 7.00 10*3/mm3 Final   • Lymphocytes, Absolute 04/23/2019 2.13  0.70 - 3.10 10*3/mm3 Final   • Monocytes, Absolute 04/23/2019 0.67  0.10 - 0.90 10*3/mm3 Final   • Eosinophils, Absolute 04/23/2019 0.64* 0.00 - 0.40 10*3/mm3 Final   • Basophils, Absolute 04/23/2019 0.06  0.00 - 0.20 10*3/mm3 Final   • Immature Grans, Absolute 04/23/2019 0.03  0.00 - 0.05 10*3/mm3 Final   • nRBC 04/23/2019 0.0  0.0 - 0.2 /100 WBC Final       Physical Exam   Constitutional: He is oriented to person, place, and time. Vital signs are normal. He appears well-developed and well-nourished. No distress.   Visibly obese.  He is in a very talkative and friendly mood today.   HENT:   Head: Normocephalic and atraumatic.   Right Ear: External ear normal.   Left Ear: External ear normal.   Nose: Nose normal.   Mouth/Throat: Oropharynx is clear and moist. No oropharyngeal exudate.   Eyes: Conjunctivae and EOM are normal. Pupils are equal, round, and reactive to light. Right eye exhibits no discharge. Left eye exhibits no discharge.   Neck: Normal range of motion. Neck supple. No tracheal deviation present. No thyromegaly present.   Cardiovascular: Normal rate, regular rhythm, normal heart sounds and intact distal pulses.   No murmur heard.  Pulmonary/Chest: Effort normal and breath sounds normal. No  respiratory distress. He has no wheezes. He has no rales. He exhibits no tenderness.   Abdominal: Soft. Bowel sounds are normal. He exhibits no distension and no mass. There is no tenderness. There is no rebound and no guarding.   Musculoskeletal:        Right shoulder: He exhibits decreased range of motion and crepitus.        Lumbar back: He exhibits decreased range of motion, tenderness, pain and spasm.   Decreased lumbar curvature, there is pain with forward flexion. DTR's +2. No edema.    Lymphadenopathy:     He has no cervical adenopathy.   Neurological: He is alert and oriented to person, place, and time. He has normal reflexes.   Skin: Skin is warm and dry. Capillary refill takes less than 2 seconds. No rash noted. He is not diaphoretic. No erythema. No pallor.        There are multiple ( more than 100) skin tags in each axillary region.  Can see dried blood around some of the skin tags where they are catching on clothing.   Psychiatric: He has a normal mood and affect. His speech is normal and behavior is normal. Judgment and thought content normal. His affect is not inappropriate. Cognition and memory are normal.   Nursing note and vitals reviewed.      Assessment/Plan     Problem List Items Addressed This Visit        Digestive    Morbid obesity with BMI of 50.0-59.9, adult (CMS/[facility])    Current Assessment & Plan     Obesity is worsening.  Discussed the patient's BMI.  The BMI is above average; BMI management plan is completed.  General weight loss/lifestyle modification strategies discussed (elicit support from others; identify saboteurs; non-food rewards, etc).  Regular aerobic exercise program discussed.   Pt has been instructed today regarding low fat heart smart diet. Weight management and routine exercise has been recommended. Avoid high fat foods, starchy foods and processed foods. Increase lean meats, fresh vegetables and fresh fruits.   Decline request for Adipex due to current anxiety  exacerbation             Endocrine    Diabetes mellitus with diabetic neuropathy, with long-term current use of insulin (CMS/[facility]) - Primary    Current Assessment & Plan     Diabetes is improving with treatment.   Reminded to bring in blood sugar diary at next visit.  Dietary recommendations for ADA diet.  Regular aerobic exercise.  Discussed ways to avoid symptomatic hypoglycemia.  Discussed sick day management.  Discussed foot care.  Reminded to get yearly retinal exam.  Medication changes per orders.  Diabetes will be reassessed in 1 month.         Relevant Medications    metFORMIN ER (GLUCOPHAGE-XR) 500 MG 24 hr tablet    gabapentin (NEURONTIN) 600 MG tablet       Nervous and Auditory    Neuralgia and neuritis (Chronic)    Current Assessment & Plan     Continue Neurontin         Relevant Medications    gabapentin (NEURONTIN) 600 MG tablet    HYDROcodone-acetaminophen (NORCO) 7.5-325 MG per tablet    Other Relevant Orders    Urine Drug Screen - Urine, Clean Catch       Musculoskeletal and Integument    Osteoarthrosis    Current Assessment & Plan     Proper body mechanics has been reviewed and discussed today.      As part of this patient's treatment plan they are being prescribed controlled substance/substances with potential for abuse. This patient has been made aware of the appropriate use of such medications, including potential risk of somnolence, limited ability to drive and / or work safely, and potential for overdose. It has also been made clear that these medications are for use by this patient only, without concomitant use of alcohol or other substances unless prescribed/advised by medical provider. Patient has completed controlled substance agreement detailing terms of continued prescribing of controlled substances including monitoring John reports, drug screens and pill counts. The patient was asked and states they are not receiving narcotic medication from any there provider or any provider that this  office has not been made aware of. History and physical exam exhibit continued safe and appropriate use of controlled substances.   Goal: Improved quality of life and reduction in pain as evidenced by pt report.   John #[id number] has been reviewed as consistent.  UDS is on file.     Patient has been instructed and counseled regarding opioid misuse and risk of addiction.  We have discussed proper storage and disposal of controlled medication.  Pt is at low risk for substance abuse or addiction. Pt will be monitored closely for compliance and the need to continue plan of care.          Skin tags, multiple acquired    Overview     Bleeding and catching on clothing          Current Assessment & Plan     Cryotherapy performed to a total of 24 skin tags.  There are well over 100 skin tags remaining.  We will gradually work on these as they are catching on his clothing causing irritation and bleeding.             Other    Anxiety    Relevant Medications    busPIRone (BUSPAR) 10 MG tablet        Stop Glipizide   Decrease metformin to 500 mg daily   Continue same dose of 21 units insulin each day  Pt has been educated/instructed on diabetic care and protocols.  Diabetic diet instructions provided.  Medication regimen reviewed.  Discussed possible side effects and interactions of current medications.  Sick day rules reviewed. Continue to monitor blood sugar and report abnormal readings as discussed today. Understanding stated by patient.   April 2019 lab results reviewed and discussed .           Patient's Body mass index is 47.59 kg/m². BMI is above normal parameters. Recommendations include: exercise counseling and nutrition counseling.    I advised Johnathon of the risks of continuing to use tobacco, and I provided him with tobacco cessation educational materials in the After Visit Summary.     During this visit, I spent 2 minutes counseling the patient regarding tobacco cessation.  Stop oral tobacco use.     I have  discussed diagnosis in detail today allowing time for questions and answers. Patient is aware of reasons to seek urgent or emergent medical care as well as reasons to return to the clinic for evaluation. Possible side effects, interactions and progression of symptoms discussed as well. Patient / family states understanding.   Emotional support and active listening provided.     Follow up in one month, sooner if needed. Routine labs every 3-6 months.     Dictated utilizing Dragon dictation        This document has been electronically signed by:  TYSON Vinson, NP-C

## 2019-05-08 ENCOUNTER — PROCEDURE VISIT (OUTPATIENT)
Dept: FAMILY MEDICINE CLINIC | Facility: CLINIC | Age: 44
End: 2019-05-08

## 2019-05-08 VITALS
HEIGHT: 71 IN | BODY MASS INDEX: 44.1 KG/M2 | TEMPERATURE: 98.2 F | OXYGEN SATURATION: 98 % | DIASTOLIC BLOOD PRESSURE: 90 MMHG | HEART RATE: 109 BPM | WEIGHT: 315 LBS | SYSTOLIC BLOOD PRESSURE: 130 MMHG

## 2019-05-08 DIAGNOSIS — R10.84 GENERALIZED ABDOMINAL PAIN: Primary | ICD-10-CM

## 2019-05-08 DIAGNOSIS — K42.9 UMBILICAL HERNIA WITHOUT OBSTRUCTION AND WITHOUT GANGRENE: ICD-10-CM

## 2019-05-08 DIAGNOSIS — J44.9 CHRONIC OBSTRUCTIVE PULMONARY DISEASE, UNSPECIFIED COPD TYPE (HCC): ICD-10-CM

## 2019-05-08 PROCEDURE — 99214 OFFICE O/P EST MOD 30 MIN: CPT | Performed by: NURSE PRACTITIONER

## 2019-05-08 NOTE — PROGRESS NOTES
Subjective   Johnathon Mclain is a 43 y.o. male.     Chief Complaint   Patient presents with   • Other     knot on stomach   • go over labs       History of Present Illness:    Patient reports he had a knot on his abdomen pop up last week.  He denies any straining or lifting.  Denies any constipation.  This area is tender.  The seatbelt does hurt when it rubs against it.  He does have family history of umbilical hernias.  Patient is overweight.      Reports an exacerbation of cough at times.  Is requesting new inhaler.      The following portions of the patient's history were reviewed and updated as appropriate:  Allergies, current medications, past family history, past medical history, past social history, past surgical history and problem list.    Review of Systems   Constitutional: Negative for activity change, appetite change, fatigue and unexpected weight change.   HENT: Negative for congestion, ear pain, nosebleeds, postnasal drip, rhinorrhea, sore throat, trouble swallowing and voice change.    Eyes: Negative for pain and visual disturbance.   Respiratory: Negative for cough, shortness of breath and wheezing.    Cardiovascular: Negative for chest pain and palpitations.   Gastrointestinal: Negative for abdominal distention, blood in stool, constipation, diarrhea, nausea and vomiting.   Endocrine: Negative for cold intolerance and polydipsia.   Genitourinary: Negative for difficulty urinating, flank pain and hematuria.   Musculoskeletal: Positive for arthralgias and back pain. Negative for gait problem, joint swelling and myalgias.   Skin: Negative for color change and rash.   Allergic/Immunologic: Negative.    Neurological: Negative for syncope, numbness and headaches.   Hematological: Negative.    Psychiatric/Behavioral: Negative for self-injury, sleep disturbance and suicidal ideas. The patient is not nervous/anxious.    All other systems reviewed and are negative.      Objective     /90   Pulse 109    "Temp 98.2 °F (36.8 °C) (Tympanic)   Ht 180.3 cm (70.98\")   Wt (!) 156 kg (343 lb)   SpO2 98%   BMI 47.87 kg/m²   Lab on 04/23/2019   Component Date Value Ref Range Status   • Glucose 04/23/2019 139* 65 - 99 mg/dL Final   • BUN 04/23/2019 21* 6 - 20 mg/dL Final   • Creatinine 04/23/2019 0.89  0.76 - 1.27 mg/dL Final   • Sodium 04/23/2019 138  136 - 145 mmol/L Final   • Potassium 04/23/2019 4.3  3.5 - 5.2 mmol/L Final   • Chloride 04/23/2019 98  98 - 107 mmol/L Final   • CO2 04/23/2019 24.2  22.0 - 29.0 mmol/L Final   • Calcium 04/23/2019 9.5  8.6 - 10.5 mg/dL Final   • Total Protein 04/23/2019 6.5  6.0 - 8.5 g/dL Final   • Albumin 04/23/2019 4.30  3.50 - 5.20 g/dL Final   • ALT (SGPT) 04/23/2019 41  1 - 41 U/L Final   • AST (SGOT) 04/23/2019 24  1 - 40 U/L Final   • Alkaline Phosphatase 04/23/2019 61  39 - 117 U/L Final   • Total Bilirubin 04/23/2019 0.8  0.2 - 1.2 mg/dL Final   • eGFR Non African Amer 04/23/2019 92  >60 mL/min/1.73 Final   • Globulin 04/23/2019 2.2  gm/dL Final   • A/G Ratio 04/23/2019 2.0  g/dL Final   • BUN/Creatinine Ratio 04/23/2019 23.6  7.0 - 25.0 Final   • Anion Gap 04/23/2019 15.8  mmol/L Final   • TSH 04/23/2019 1.120  0.270 - 4.200 mIU/mL Final   • Hemoglobin A1C 04/23/2019 5.40  4.80 - 5.60 % Final   • 25 Hydroxy, Vitamin D 04/23/2019 30.8  30.0 - 100.0 ng/ml Final   • Vitamin B-12 04/23/2019 401  211 - 946 pg/mL Final   • Total Cholesterol 04/23/2019 179  0 - 200 mg/dL Final   • Triglycerides 04/23/2019 352* 0 - 150 mg/dL Final   • HDL Cholesterol 04/23/2019 25* 40 - 60 mg/dL Final   • LDL Cholesterol  04/23/2019 84  0 - 100 mg/dL Final   • VLDL Cholesterol 04/23/2019 70.4* 5 - 40 mg/dL Final   • LDL/HDL Ratio 04/23/2019 3.34   Final   • Microalbumin, Urine 04/23/2019 <1.2  mg/L Final   • Uric Acid 04/23/2019 2.1* 3.4 - 7.0 mg/dL Final   • WBC 04/23/2019 7.16  3.40 - 10.80 10*3/mm3 Final   • RBC 04/23/2019 5.68  4.14 - 5.80 10*6/mm3 Final   • Hemoglobin 04/23/2019 17.6  13.0 - 17.7 " g/dL Final   • Hematocrit 04/23/2019 52.6* 37.5 - 51.0 % Final   • MCV 04/23/2019 92.6  79.0 - 97.0 fL Final   • MCH 04/23/2019 31.0  26.6 - 33.0 pg Final   • MCHC 04/23/2019 33.5  31.5 - 35.7 g/dL Final   • RDW 04/23/2019 12.9  12.3 - 15.4 % Final   • RDW-SD 04/23/2019 43.7  37.0 - 54.0 fl Final   • MPV 04/23/2019 10.4  6.0 - 12.0 fL Final   • Platelets 04/23/2019 204  140 - 450 10*3/mm3 Final   • Neutrophil % 04/23/2019 50.8  42.7 - 76.0 % Final   • Lymphocyte % 04/23/2019 29.7  19.6 - 45.3 % Final   • Monocyte % 04/23/2019 9.4  5.0 - 12.0 % Final   • Eosinophil % 04/23/2019 8.9* 0.3 - 6.2 % Final   • Basophil % 04/23/2019 0.8  0.0 - 1.5 % Final   • Immature Grans % 04/23/2019 0.4  0.0 - 0.5 % Final   • Neutrophils, Absolute 04/23/2019 3.63  1.70 - 7.00 10*3/mm3 Final   • Lymphocytes, Absolute 04/23/2019 2.13  0.70 - 3.10 10*3/mm3 Final   • Monocytes, Absolute 04/23/2019 0.67  0.10 - 0.90 10*3/mm3 Final   • Eosinophils, Absolute 04/23/2019 0.64* 0.00 - 0.40 10*3/mm3 Final   • Basophils, Absolute 04/23/2019 0.06  0.00 - 0.20 10*3/mm3 Final   • Immature Grans, Absolute 04/23/2019 0.03  0.00 - 0.05 10*3/mm3 Final   • nRBC 04/23/2019 0.0  0.0 - 0.2 /100 WBC Final       Physical Exam   Constitutional: He is oriented to person, place, and time. Vital signs are normal. He appears well-developed and well-nourished. No distress.   Visibly obese.  Talkative and friendly 43-year-old male.   HENT:   Head: Normocephalic and atraumatic.   Eyes: EOM are normal. Pupils are equal, round, and reactive to light.   Neck: Normal range of motion. Neck supple. No tracheal deviation present. No thyromegaly present.   Cardiovascular: Normal rate, regular rhythm, normal heart sounds and intact distal pulses.   No murmur heard.  Pulmonary/Chest: Effort normal and breath sounds normal. No respiratory distress.   Abdominal: Soft. Bowel sounds are normal. There is tenderness in the periumbilical area. A hernia is present.        Musculoskeletal:        Cervical back: He exhibits tenderness.        Thoracic back: He exhibits no tenderness.        Lumbar back: He exhibits tenderness.   Lymphadenopathy:     He has no cervical adenopathy.   Neurological: He is alert and oriented to person, place, and time.   Skin: Skin is warm and dry. Capillary refill takes less than 2 seconds. No bruising and no rash noted. He is not diaphoretic. No cyanosis or erythema. Nails show no clubbing.   Psychiatric: He has a normal mood and affect. His speech is normal and behavior is normal. Judgment and thought content normal. Cognition and memory are normal.       Assessment/Plan     Problem List Items Addressed This Visit        Respiratory    COPD (chronic obstructive pulmonary disease) (CMS/[facility])    Current Assessment & Plan     Add breo.  Recommend patient see pulmonology.  Patient declines at this time as he is in a custody porras and does not want more doctor's appointments that would likely need rescheduled for court dates.             Relevant Medications    Fluticasone Furoate-Vilanterol (BREO ELLIPTA) 100-25 MCG/INH inhaler       Other    Umbilical hernia without obstruction and without gangrene    Current Assessment & Plan     Weight loss is recommended.  Avoid straining and lifting.  Patient declines surgical consult at this time.  Will obtain CT of the abdomen for further evaluation.           Other Visit Diagnoses     Generalized abdominal pain    -  Primary    Relevant Orders    CT Abdomen Pelvis With Contrast    Comprehensive Metabolic Panel          Proper body mechanics has been reviewed and discussed today.       Patient's Body mass index is 47.87 kg/m². BMI is above normal parameters. Recommendations include: exercise counseling and nutrition counseling.      I have discussed diagnosis in detail today allowing time for questions and answers. Patient is aware of reasons to seek urgent or emergent medical care as well as reasons to return  to the clinic for evaluation. Possible side effects, interactions and progression of symptoms discussed as well. Patient / family states understanding.   Emotional support and active listening provided.     Scheduled follow-up in 2 weeks, sooner if needed.          This document has been electronically signed by:  TYSON Vinson, NP-C

## 2019-05-08 NOTE — ASSESSMENT & PLAN NOTE
Add breo.  Recommend patient see pulmonology.  Patient declines at this time as he is in a custody porras and does not want more doctor's appointments that would likely need rescheduled for court dates.

## 2019-05-08 NOTE — ASSESSMENT & PLAN NOTE
Weight loss is recommended.  Avoid straining and lifting.  Patient declines surgical consult at this time.  Will obtain CT of the abdomen for further evaluation.

## 2019-05-23 ENCOUNTER — OFFICE VISIT (OUTPATIENT)
Dept: FAMILY MEDICINE CLINIC | Facility: CLINIC | Age: 44
End: 2019-05-23

## 2019-05-23 VITALS
BODY MASS INDEX: 44.1 KG/M2 | WEIGHT: 315 LBS | TEMPERATURE: 97.8 F | DIASTOLIC BLOOD PRESSURE: 90 MMHG | HEART RATE: 103 BPM | HEIGHT: 71 IN | SYSTOLIC BLOOD PRESSURE: 130 MMHG | OXYGEN SATURATION: 97 %

## 2019-05-23 DIAGNOSIS — E11.40 TYPE 2 DIABETES MELLITUS WITH DIABETIC NEUROPATHY, WITH LONG-TERM CURRENT USE OF INSULIN (HCC): Primary | ICD-10-CM

## 2019-05-23 DIAGNOSIS — L91.8 SKIN TAGS, MULTIPLE ACQUIRED: ICD-10-CM

## 2019-05-23 DIAGNOSIS — M79.2 NEURALGIA AND NEURITIS: ICD-10-CM

## 2019-05-23 DIAGNOSIS — M15.9 PRIMARY OSTEOARTHRITIS INVOLVING MULTIPLE JOINTS: ICD-10-CM

## 2019-05-23 DIAGNOSIS — Z79.4 TYPE 2 DIABETES MELLITUS WITH DIABETIC NEUROPATHY, WITH LONG-TERM CURRENT USE OF INSULIN (HCC): Primary | ICD-10-CM

## 2019-05-23 DIAGNOSIS — E66.01 MORBID OBESITY WITH BMI OF 50.0-59.9, ADULT (HCC): ICD-10-CM

## 2019-05-23 PROCEDURE — 11201 RMVL SKIN TAGS EA ADDL 10: CPT | Performed by: NURSE PRACTITIONER

## 2019-05-23 PROCEDURE — 99214 OFFICE O/P EST MOD 30 MIN: CPT | Performed by: NURSE PRACTITIONER

## 2019-05-23 PROCEDURE — 11200 RMVL SKIN TAGS UP TO&INC 15: CPT | Performed by: NURSE PRACTITIONER

## 2019-05-23 RX ORDER — HYDROCODONE BITARTRATE AND ACETAMINOPHEN 7.5; 325 MG/1; MG/1
1 TABLET ORAL EVERY 6 HOURS PRN
Qty: 45 TABLET | Refills: 0 | Status: SHIPPED | OUTPATIENT
Start: 2019-05-23 | End: 2019-06-24 | Stop reason: SDUPTHER

## 2019-05-23 NOTE — PROGRESS NOTES
Subjective   Johnathon Mclain is a 43 y.o. male.     Chief Complaint   Patient presents with   • Hypertension   • Hyperlipidemia       History of Present Illness:    Multiple skin tags located under both arms/axillary area that are catching on clothing and bleeding.    Type 2 diabetes- patient states he has not been watching his diet.  He does have some weight gain.  He does state that he is compliant with his current medication regimen.     Obesity - working on lifestyle changes and healthy diet.  Trying to be more active.        COPD-no recent exacerbation.     Gout-no recent exacerbation with current medication regimen.     Polycythemia vera-under the care of hematology.     Chronic back pain and joint pain - rates his pain a 6 on pain scale rating of 0-10. Radiology is on file.  He describes his pain as an aching, throbbing and sharp pain is always present in his low back, neck and at times hips and knees.  Activity makes his pain worse.  Rest does help decrease this pain.  He currently receives Neurontin which is helpful with the radiculopathy which radiates into his lower extremities from his low back.  He also receives Norco for his chronic pain.  He has no history of substance abuse or addiction.  He has been seen by Ortho in the past for consult.  He has attended physical therapy in the past without improvement.   2017 x-rays are on file.  No MRI on file.  Patient will require open MRI. He missed his last scheduled MRI due to a court custody date.       The following portions of the patient's history were reviewed and updated as appropriate:  Allergies, current medications, past family history, past medical history, past social history, past surgical history and problem list.    Review of Systems   Constitutional: Negative for appetite change, fatigue and unexpected weight change.   HENT: Negative for congestion, ear pain, nosebleeds, postnasal drip, rhinorrhea, sore throat, trouble swallowing and voice  "change.    Eyes: Negative for pain and visual disturbance.   Respiratory: Negative for cough, shortness of breath and wheezing.    Cardiovascular: Negative for chest pain, palpitations and leg swelling.   Gastrointestinal: Negative for abdominal pain, blood in stool, constipation and diarrhea.   Endocrine: Negative for cold intolerance and polydipsia.   Genitourinary: Negative for difficulty urinating, flank pain and hematuria.   Musculoskeletal: Positive for arthralgias and back pain. Negative for gait problem, joint swelling, myalgias, neck pain and neck stiffness.   Skin: Positive for color change. Negative for rash.   Allergic/Immunologic: Negative.    Neurological: Negative for syncope, numbness and headaches.   Hematological: Negative.    Psychiatric/Behavioral: Negative for dysphoric mood, self-injury, sleep disturbance and suicidal ideas. The patient is not nervous/anxious.    All other systems reviewed and are negative.      Objective     /90   Pulse 103   Temp 97.8 °F (36.6 °C) (Tympanic)   Ht 180.3 cm (70.98\")   Wt (!) 158 kg (349 lb)   SpO2 97%   BMI 48.70 kg/m²   Lab on 04/23/2019   Component Date Value Ref Range Status   • Glucose 04/23/2019 139* 65 - 99 mg/dL Final   • BUN 04/23/2019 21* 6 - 20 mg/dL Final   • Creatinine 04/23/2019 0.89  0.76 - 1.27 mg/dL Final   • Sodium 04/23/2019 138  136 - 145 mmol/L Final   • Potassium 04/23/2019 4.3  3.5 - 5.2 mmol/L Final   • Chloride 04/23/2019 98  98 - 107 mmol/L Final   • CO2 04/23/2019 24.2  22.0 - 29.0 mmol/L Final   • Calcium 04/23/2019 9.5  8.6 - 10.5 mg/dL Final   • Total Protein 04/23/2019 6.5  6.0 - 8.5 g/dL Final   • Albumin 04/23/2019 4.30  3.50 - 5.20 g/dL Final   • ALT (SGPT) 04/23/2019 41  1 - 41 U/L Final   • AST (SGOT) 04/23/2019 24  1 - 40 U/L Final   • Alkaline Phosphatase 04/23/2019 61  39 - 117 U/L Final   • Total Bilirubin 04/23/2019 0.8  0.2 - 1.2 mg/dL Final   • eGFR Non African Amer 04/23/2019 92  >60 mL/min/1.73 Final   • " Globulin 04/23/2019 2.2  gm/dL Final   • A/G Ratio 04/23/2019 2.0  g/dL Final   • BUN/Creatinine Ratio 04/23/2019 23.6  7.0 - 25.0 Final   • Anion Gap 04/23/2019 15.8  mmol/L Final   • TSH 04/23/2019 1.120  0.270 - 4.200 mIU/mL Final   • Hemoglobin A1C 04/23/2019 5.40  4.80 - 5.60 % Final   • 25 Hydroxy, Vitamin D 04/23/2019 30.8  30.0 - 100.0 ng/ml Final   • Vitamin B-12 04/23/2019 401  211 - 946 pg/mL Final   • Total Cholesterol 04/23/2019 179  0 - 200 mg/dL Final   • Triglycerides 04/23/2019 352* 0 - 150 mg/dL Final   • HDL Cholesterol 04/23/2019 25* 40 - 60 mg/dL Final   • LDL Cholesterol  04/23/2019 84  0 - 100 mg/dL Final   • VLDL Cholesterol 04/23/2019 70.4* 5 - 40 mg/dL Final   • LDL/HDL Ratio 04/23/2019 3.34   Final   • Microalbumin, Urine 04/23/2019 <1.2  mg/L Final   • Uric Acid 04/23/2019 2.1* 3.4 - 7.0 mg/dL Final   • WBC 04/23/2019 7.16  3.40 - 10.80 10*3/mm3 Final   • RBC 04/23/2019 5.68  4.14 - 5.80 10*6/mm3 Final   • Hemoglobin 04/23/2019 17.6  13.0 - 17.7 g/dL Final   • Hematocrit 04/23/2019 52.6* 37.5 - 51.0 % Final   • MCV 04/23/2019 92.6  79.0 - 97.0 fL Final   • MCH 04/23/2019 31.0  26.6 - 33.0 pg Final   • MCHC 04/23/2019 33.5  31.5 - 35.7 g/dL Final   • RDW 04/23/2019 12.9  12.3 - 15.4 % Final   • RDW-SD 04/23/2019 43.7  37.0 - 54.0 fl Final   • MPV 04/23/2019 10.4  6.0 - 12.0 fL Final   • Platelets 04/23/2019 204  140 - 450 10*3/mm3 Final   • Neutrophil % 04/23/2019 50.8  42.7 - 76.0 % Final   • Lymphocyte % 04/23/2019 29.7  19.6 - 45.3 % Final   • Monocyte % 04/23/2019 9.4  5.0 - 12.0 % Final   • Eosinophil % 04/23/2019 8.9* 0.3 - 6.2 % Final   • Basophil % 04/23/2019 0.8  0.0 - 1.5 % Final   • Immature Grans % 04/23/2019 0.4  0.0 - 0.5 % Final   • Neutrophils, Absolute 04/23/2019 3.63  1.70 - 7.00 10*3/mm3 Final   • Lymphocytes, Absolute 04/23/2019 2.13  0.70 - 3.10 10*3/mm3 Final   • Monocytes, Absolute 04/23/2019 0.67  0.10 - 0.90 10*3/mm3 Final   • Eosinophils, Absolute 04/23/2019 0.64*  0.00 - 0.40 10*3/mm3 Final   • Basophils, Absolute 04/23/2019 0.06  0.00 - 0.20 10*3/mm3 Final   • Immature Grans, Absolute 04/23/2019 0.03  0.00 - 0.05 10*3/mm3 Final   • nRBC 04/23/2019 0.0  0.0 - 0.2 /100 WBC Final       Physical Exam   Constitutional: He is oriented to person, place, and time. Vital signs are normal. He appears well-developed and well-nourished. No distress.   Visibly obese.  He is in a very talkative and friendly mood today.   HENT:   Head: Normocephalic and atraumatic.   Right Ear: External ear normal.   Left Ear: External ear normal.   Nose: Nose normal.   Mouth/Throat: Oropharynx is clear and moist. No oropharyngeal exudate.   Eyes: Conjunctivae and EOM are normal. Pupils are equal, round, and reactive to light. Right eye exhibits no discharge. Left eye exhibits no discharge.   Neck: Normal range of motion. Neck supple. No tracheal deviation present. No thyromegaly present.   Cardiovascular: Normal rate, regular rhythm, normal heart sounds and intact distal pulses.   No murmur heard.  Pulmonary/Chest: Effort normal and breath sounds normal. No respiratory distress. He has no wheezes. He has no rales. He exhibits no tenderness.   Abdominal: Soft. Bowel sounds are normal. He exhibits no distension and no mass. There is no tenderness. There is no rebound and no guarding.   Musculoskeletal:        Right shoulder: He exhibits decreased range of motion and crepitus.        Lumbar back: He exhibits decreased range of motion, tenderness, pain and spasm.   Decreased lumbar curvature, there is pain with forward flexion. DTR's +2. No edema.    Lymphadenopathy:     He has no cervical adenopathy.   Neurological: He is alert and oriented to person, place, and time. He has normal reflexes.   Skin: Skin is warm and dry. Capillary refill takes less than 2 seconds. Lesion noted. No rash noted. He is not diaphoretic. No erythema. No pallor.        There are multiple ( more than 100) skin tags in each axillary  region.  Can see dried blood around some of the skin tags where they are catching on clothing.   Psychiatric: He has a normal mood and affect. His speech is normal and behavior is normal. Judgment and thought content normal. His affect is not inappropriate. Cognition and memory are normal.   Nursing note and vitals reviewed.      Assessment/Plan     Problem List Items Addressed This Visit        Digestive    Morbid obesity with BMI of 50.0-59.9, adult (CMS/[facility])    Current Assessment & Plan     Obesity is worsening.  Discussed the patient's BMI.  The BMI is above average; BMI management plan is completed.  General weight loss/lifestyle modification strategies discussed (elicit support from others; identify saboteurs; non-food rewards, etc).  Informal exercise measures discussed, e.g. taking stairs instead of elevator.            Endocrine    Diabetes mellitus with diabetic neuropathy, with long-term current use of insulin (CMS/[facility]) - Primary    Current Assessment & Plan     Diabetes is stable .   Continue current treatment regimen.  Diabetes will be reassessed in 1 month.  Weight loss and routine exercise recommended.  Continue metformin.  Diabetic eye exam has been ordered.  Continue Toujeo injections.          Relevant Orders    Ambulatory Referral for Diabetic Eye Exam-Ophthalmology       Nervous and Auditory    Neuralgia and neuritis (Chronic)    Current Assessment & Plan     Continue Neurontin 3 times daily.         Relevant Medications    HYDROcodone-acetaminophen (NORCO) 7.5-325 MG per tablet       Musculoskeletal and Integument    Osteoarthrosis    Current Assessment & Plan     Proper body mechanics has been reviewed and discussed today.      As part of this patient's treatment plan they are being prescribed controlled substance/substances with potential for abuse. This patient has been made aware of the appropriate use of such medications, including potential risk of somnolence, limited ability to drive and  / or work safely, and potential for overdose. It has also been made clear that these medications are for use by this patient only, without concomitant use of alcohol or other substances unless prescribed/advised by medical provider. Patient has completed controlled substance agreement detailing terms of continued prescribing of controlled substances including monitoring John reports, drug screens and pill counts. The patient was asked and states they are not receiving narcotic medication from any there provider or any provider that this office has not been made aware of. History and physical exam exhibit continued safe and appropriate use of controlled substances.   Goal: Improved quality of life and reduction in pain as evidenced by pt report.   John #[id number] has been reviewed as consistent.  UDS is on file.     We will continue Norco 7.5 mg , 1 p.o. every 6 hours #45 with no refill.  This is a decrease in quantity from #60.  Patient advised that this provider will become more limited with the prescribing of chronic schedule II opioids after the end of this year.  Reviewed options including tapering off opioid therapy, changing to different treatment other than opiod, or referral to pain management.  Patient will consider and options will be discussed further at upcoming office visit(s).   Prescribing will always be at the discretion of the provider based on individual patient status and acute needs.      We will further consider pain clinic consult once MRI is obtained.    Patient has been instructed and counseled regarding opioid misuse and risk of addiction.  We have discussed proper storage and disposal of controlled medication.  Pt is at low risk for substance abuse or addiction. Pt will be monitored closely for compliance and the need to continue plan of care.          Relevant Medications    HYDROcodone-acetaminophen (NORCO) 7.5-325 MG per tablet    Skin tags, multiple acquired    Overview     Bleeding  and catching on clothing          Current Assessment & Plan     Reviewed treatment options and agreed on a trial of cryotherapy. See procedure note.               Staff have been notified to reschedule his open MRI as ordered in January.  Patient has been instructed that this must be performed within the next few weeks.  Staff have been notified to schedule him for a CT of the abdomen due to to complaint of hernia.             Patient's Body mass index is 48.7 kg/m². BMI is above normal parameters. Recommendations include: exercise counseling and nutrition counseling.      I have discussed diagnosis in detail today allowing time for questions and answers. Patient is aware of reasons to seek urgent or emergent medical care as well as reasons to return to the clinic for evaluation. Possible side effects, interactions and progression of symptoms discussed as well. Patient / family states understanding.   Emotional support and active listening provided.       Follow up in one month, sooner if needed. Routine labs every 3-6 months.     Dictated utilizing Dragon dictation          This document has been electronically signed by:  TYSON Vinson, NP-C

## 2019-05-23 NOTE — ASSESSMENT & PLAN NOTE
Obesity is worsening.  Discussed the patient's BMI.  The BMI is above average; BMI management plan is completed.  General weight loss/lifestyle modification strategies discussed (elicit support from others; identify saboteurs; non-food rewards, etc).  Informal exercise measures discussed, e.g. taking stairs instead of elevator.

## 2019-05-23 NOTE — ASSESSMENT & PLAN NOTE
Proper body mechanics has been reviewed and discussed today.      As part of this patient's treatment plan they are being prescribed controlled substance/substances with potential for abuse. This patient has been made aware of the appropriate use of such medications, including potential risk of somnolence, limited ability to drive and / or work safely, and potential for overdose. It has also been made clear that these medications are for use by this patient only, without concomitant use of alcohol or other substances unless prescribed/advised by medical provider. Patient has completed controlled substance agreement detailing terms of continued prescribing of controlled substances including monitoring John reports, drug screens and pill counts. The patient was asked and states they are not receiving narcotic medication from any there provider or any provider that this office has not been made aware of. History and physical exam exhibit continued safe and appropriate use of controlled substances.   Goal: Improved quality of life and reduction in pain as evidenced by pt report.   John #[id number] has been reviewed as consistent.  UDS is on file.     We will continue Norco 7.5 mg , 1 p.o. every 6 hours #45 with no refill.  This is a decrease in quantity from #60.  Patient advised that this provider will become more limited with the prescribing of chronic schedule II opioids after the end of this year.  Reviewed options including tapering off opioid therapy, changing to different treatment other than opiod, or referral to pain management.  Patient will consider and options will be discussed further at upcoming office visit(s).   Prescribing will always be at the discretion of the provider based on individual patient status and acute needs.      We will further consider pain clinic consult once MRI is obtained.    Patient has been instructed and counseled regarding opioid misuse and risk of addiction.  We have discussed  proper storage and disposal of controlled medication.  Pt is at low risk for substance abuse or addiction. Pt will be monitored closely for compliance and the need to continue plan of care.

## 2019-05-23 NOTE — ASSESSMENT & PLAN NOTE
Diabetes is stable .   Continue current treatment regimen.  Diabetes will be reassessed in 1 month.  Weight loss and routine exercise recommended.  Continue metformin.  Diabetic eye exam has been ordered.  Continue Toujeo injections.

## 2019-06-13 DIAGNOSIS — M54.50 CHRONIC BILATERAL LOW BACK PAIN WITHOUT SCIATICA: Primary | ICD-10-CM

## 2019-06-13 DIAGNOSIS — G89.29 CHRONIC BILATERAL LOW BACK PAIN WITHOUT SCIATICA: Primary | ICD-10-CM

## 2019-06-24 ENCOUNTER — OFFICE VISIT (OUTPATIENT)
Dept: FAMILY MEDICINE CLINIC | Facility: CLINIC | Age: 44
End: 2019-06-24

## 2019-06-24 VITALS
TEMPERATURE: 97.6 F | HEART RATE: 95 BPM | OXYGEN SATURATION: 95 % | DIASTOLIC BLOOD PRESSURE: 85 MMHG | WEIGHT: 315 LBS | SYSTOLIC BLOOD PRESSURE: 152 MMHG | HEIGHT: 71 IN | BODY MASS INDEX: 44.1 KG/M2

## 2019-06-24 DIAGNOSIS — M15.9 PRIMARY OSTEOARTHRITIS INVOLVING MULTIPLE JOINTS: ICD-10-CM

## 2019-06-24 DIAGNOSIS — E78.2 MIXED HYPERLIPIDEMIA: Primary | ICD-10-CM

## 2019-06-24 DIAGNOSIS — M79.2 NEURALGIA AND NEURITIS: ICD-10-CM

## 2019-06-24 DIAGNOSIS — I10 ESSENTIAL HYPERTENSION: ICD-10-CM

## 2019-06-24 PROCEDURE — 99214 OFFICE O/P EST MOD 30 MIN: CPT | Performed by: NURSE PRACTITIONER

## 2019-06-24 RX ORDER — LISINOPRIL 10 MG/1
10 TABLET ORAL DAILY
Qty: 30 TABLET | Refills: 5 | Status: SHIPPED | OUTPATIENT
Start: 2019-06-24 | End: 2019-08-26 | Stop reason: ALTCHOICE

## 2019-06-24 RX ORDER — HYDROCODONE BITARTRATE AND ACETAMINOPHEN 7.5; 325 MG/1; MG/1
1 TABLET ORAL EVERY 6 HOURS PRN
Qty: 30 TABLET | Refills: 0 | Status: SHIPPED | OUTPATIENT
Start: 2019-06-24 | End: 2019-07-24 | Stop reason: SDUPTHER

## 2019-06-24 NOTE — ASSESSMENT & PLAN NOTE
Hypertension is elevated bp, was on lisinopril in the past. recent weight gain and lack of exercise..  Dietary sodium restriction.  Weight loss.  Regular aerobic exercise.  start back on lisinopril  Blood pressure will be reassessed in 4 weeks.

## 2019-06-24 NOTE — PROGRESS NOTES
Subjective   Johnathon Mclain is a 43 y.o. male.     Chief Complaint   Patient presents with   • Med Refill       History of Present Illness:    Patient has recently had an MRI of the lumbar spine and is here to review results today.  MRI performed 6/10/2019 shows mild disc desiccation at L5-S1 and L4-L5. There is multilevel facet hypertrophy.  There is multilevel thoracic spondylosis.  Patient continues to rate his low back pain as 7 on pain scale rating of 0-10.  He describes the pain as catching and aching.  Patient reports that the pain is constant but worse with activity.  Patient states he is failed multiple over-the-counter medications.  He has attended physical therapy in the past.  Patient has failed many weight loss attempts.  His weight today is 353 pounds.  Patient states that Norco helps relieve his back pain and helps him be more active.  He denies any substance abuse or addiction.  Patient has been referred to the pain clinic.  We are awaiting pain clinic notification of acceptance or denial.    Hypertension-in the past patient took lisinopril.  He had been working on lifestyle changes, exercise and heart healthy diet.  His blood pressure had improved and he was taken off of blood pressure medications.  Patient has gained weight, quit walking and not been watching his diet as closely.  His blood pressure is mildly elevated today.    Type 2 diabetes with elevated triglycerides- patient states that his glucose has been better controlled.  He currently receives fenofibrate.  His insurance company is requesting that he be started on a statin due to risk associated with type 2 diabetes.  I have discussed the possible risk of side effects between  fenofibrate and statin medication.  Patient's a triglyceride level is over 300.  His total cholesterol is normal.  At this time does not wish to start on a statin.    Patient states his anxiety and depression are stable.  He has been seen by behavioral health in the  "past.      The following portions of the patient's history were reviewed and updated as appropriate:  Allergies, current medications, past family history, past medical history, past social history, past surgical history and problem list.    Review of Systems   Constitutional: Negative for appetite change, fatigue and unexpected weight change.   HENT: Positive for congestion. Negative for ear pain, nosebleeds, postnasal drip, rhinorrhea, sore throat, trouble swallowing and voice change.    Eyes: Negative for pain and visual disturbance.   Respiratory: Positive for cough. Negative for chest tightness, shortness of breath and wheezing.    Cardiovascular: Negative for chest pain and palpitations.   Gastrointestinal: Positive for abdominal pain (had CT for possible hernia at [facility], results not available, will send for results . described as pressure, not of new onset ). Negative for blood in stool, constipation and diarrhea.   Endocrine: Negative for cold intolerance and polydipsia.   Genitourinary: Negative for difficulty urinating, flank pain and hematuria.   Musculoskeletal: Positive for arthralgias and back pain. Negative for gait problem, joint swelling and myalgias.   Skin: Negative for color change and rash.   Allergic/Immunologic: Negative.    Neurological: Negative for syncope, numbness and headaches.   Hematological: Negative.    Psychiatric/Behavioral: Negative for dysphoric mood, self-injury, sleep disturbance and suicidal ideas.   All other systems reviewed and are negative.      Objective     /85   Pulse 95   Temp 97.6 °F (36.4 °C) (Temporal)   Ht 180.3 cm (70.98\")   Wt (!) 160 kg (353 lb)   SpO2 95%   BMI 49.26 kg/m²   Lab on 04/23/2019   Component Date Value Ref Range Status   • Glucose 04/23/2019 139* 65 - 99 mg/dL Final   • BUN 04/23/2019 21* 6 - 20 mg/dL Final   • Creatinine 04/23/2019 0.89  0.76 - 1.27 mg/dL Final   • Sodium 04/23/2019 138  136 - 145 mmol/L Final   • Potassium 04/23/2019 4.3 "  3.5 - 5.2 mmol/L Final   • Chloride 04/23/2019 98  98 - 107 mmol/L Final   • CO2 04/23/2019 24.2  22.0 - 29.0 mmol/L Final   • Calcium 04/23/2019 9.5  8.6 - 10.5 mg/dL Final   • Total Protein 04/23/2019 6.5  6.0 - 8.5 g/dL Final   • Albumin 04/23/2019 4.30  3.50 - 5.20 g/dL Final   • ALT (SGPT) 04/23/2019 41  1 - 41 U/L Final   • AST (SGOT) 04/23/2019 24  1 - 40 U/L Final   • Alkaline Phosphatase 04/23/2019 61  39 - 117 U/L Final   • Total Bilirubin 04/23/2019 0.8  0.2 - 1.2 mg/dL Final   • eGFR Non African Amer 04/23/2019 92  >60 mL/min/1.73 Final   • Globulin 04/23/2019 2.2  gm/dL Final   • A/G Ratio 04/23/2019 2.0  g/dL Final   • BUN/Creatinine Ratio 04/23/2019 23.6  7.0 - 25.0 Final   • Anion Gap 04/23/2019 15.8  mmol/L Final   • TSH 04/23/2019 1.120  0.270 - 4.200 mIU/mL Final   • Hemoglobin A1C 04/23/2019 5.40  4.80 - 5.60 % Final   • 25 Hydroxy, Vitamin D 04/23/2019 30.8  30.0 - 100.0 ng/ml Final   • Vitamin B-12 04/23/2019 401  211 - 946 pg/mL Final   • Total Cholesterol 04/23/2019 179  0 - 200 mg/dL Final   • Triglycerides 04/23/2019 352* 0 - 150 mg/dL Final   • HDL Cholesterol 04/23/2019 25* 40 - 60 mg/dL Final   • LDL Cholesterol  04/23/2019 84  0 - 100 mg/dL Final   • VLDL Cholesterol 04/23/2019 70.4* 5 - 40 mg/dL Final   • LDL/HDL Ratio 04/23/2019 3.34   Final   • Microalbumin, Urine 04/23/2019 <1.2  mg/L Final   • Uric Acid 04/23/2019 2.1* 3.4 - 7.0 mg/dL Final   • WBC 04/23/2019 7.16  3.40 - 10.80 10*3/mm3 Final   • RBC 04/23/2019 5.68  4.14 - 5.80 10*6/mm3 Final   • Hemoglobin 04/23/2019 17.6  13.0 - 17.7 g/dL Final   • Hematocrit 04/23/2019 52.6* 37.5 - 51.0 % Final   • MCV 04/23/2019 92.6  79.0 - 97.0 fL Final   • MCH 04/23/2019 31.0  26.6 - 33.0 pg Final   • MCHC 04/23/2019 33.5  31.5 - 35.7 g/dL Final   • RDW 04/23/2019 12.9  12.3 - 15.4 % Final   • RDW-SD 04/23/2019 43.7  37.0 - 54.0 fl Final   • MPV 04/23/2019 10.4  6.0 - 12.0 fL Final   • Platelets 04/23/2019 204  140 - 450 10*3/mm3 Final   •  Neutrophil % 04/23/2019 50.8  42.7 - 76.0 % Final   • Lymphocyte % 04/23/2019 29.7  19.6 - 45.3 % Final   • Monocyte % 04/23/2019 9.4  5.0 - 12.0 % Final   • Eosinophil % 04/23/2019 8.9* 0.3 - 6.2 % Final   • Basophil % 04/23/2019 0.8  0.0 - 1.5 % Final   • Immature Grans % 04/23/2019 0.4  0.0 - 0.5 % Final   • Neutrophils, Absolute 04/23/2019 3.63  1.70 - 7.00 10*3/mm3 Final   • Lymphocytes, Absolute 04/23/2019 2.13  0.70 - 3.10 10*3/mm3 Final   • Monocytes, Absolute 04/23/2019 0.67  0.10 - 0.90 10*3/mm3 Final   • Eosinophils, Absolute 04/23/2019 0.64* 0.00 - 0.40 10*3/mm3 Final   • Basophils, Absolute 04/23/2019 0.06  0.00 - 0.20 10*3/mm3 Final   • Immature Grans, Absolute 04/23/2019 0.03  0.00 - 0.05 10*3/mm3 Final   • nRBC 04/23/2019 0.0  0.0 - 0.2 /100 WBC Final       Physical Exam   Constitutional: He is oriented to person, place, and time. He appears well-developed and well-nourished. No distress.   Visibly obese.  Talkative and friendly 43-year-old male.  He does show weight gain this month.  His blood pressure is slightly elevated.   HENT:   Head: Normocephalic and atraumatic.   Right Ear: Tympanic membrane, external ear and ear canal normal.   Left Ear: Tympanic membrane, external ear and ear canal normal.   Nose: Nose normal.   Mouth/Throat: Oropharynx is clear and moist and mucous membranes are normal. No oropharyngeal exudate.   Eyes: Conjunctivae and EOM are normal. Pupils are equal, round, and reactive to light. Right eye exhibits no discharge. Left eye exhibits no discharge.   Neck: Normal range of motion. Neck supple. No tracheal deviation present. No thyromegaly present.   Cardiovascular: Normal rate, regular rhythm, normal heart sounds and intact distal pulses.   No murmur heard.  Pulmonary/Chest: Effort normal and breath sounds normal. No respiratory distress. He has no wheezes. He has no rales. He exhibits no tenderness.   Abdominal: Soft. Normal appearance and bowel sounds are normal. He  exhibits no distension and no mass. There is tenderness in the periumbilical area. There is no rebound and no guarding.       Truncal obesity   Musculoskeletal:        Lumbar back: He exhibits decreased range of motion, tenderness, pain and spasm.   Decreased lumbar curvature, there is pain with forward flexion. DTR's +2. No edema.    Lymphadenopathy:     He has no cervical adenopathy.   Neurological: He is alert and oriented to person, place, and time. He has normal reflexes.   Skin: Skin is warm and dry. Capillary refill takes less than 2 seconds. No rash noted. He is not diaphoretic. No erythema. No pallor.   Psychiatric: He has a normal mood and affect. His speech is normal and behavior is normal. Judgment and thought content normal. His affect is not inappropriate. Cognition and memory are normal. He is attentive.   Nursing note and vitals reviewed.      Assessment/Plan     Problem List Items Addressed This Visit        Cardiovascular and Mediastinum    Essential hypertension    Current Assessment & Plan     Hypertension is elevated bp, was on lisinopril in the past. recent weight gain and lack of exercise..  Dietary sodium restriction.  Weight loss.  Regular aerobic exercise.  start back on lisinopril  Blood pressure will be reassessed in 4 weeks.         Relevant Medications    lisinopril (PRINIVIL,ZESTRIL) 10 MG tablet    Mixed hyperlipidemia - Primary    Overview     mixed         Current Assessment & Plan     Lipid abnormalities are improving with treatment.  Nutritional counseling was provided., Pharmacotherapy as ordered. and No statin due to taking Tricor for elevated triglyceride level risk of side effects is great if statin is added. Total cholesterol is normal.   Lipids will be reassessed in 3 months.            Nervous and Auditory    Neuralgia and neuritis (Chronic)    Relevant Medications    HYDROcodone-acetaminophen (NORCO) 7.5-325 MG per tablet       Musculoskeletal and Integument     Osteoarthrosis    Relevant Medications    HYDROcodone-acetaminophen (NORCO) 7.5-325 MG per tablet        Proper body mechanics has been reviewed and discussed today.      As part of this patient's treatment plan they are being prescribed controlled substance/substances with potential for abuse. This patient has been made aware of the appropriate use of such medications, including potential risk of somnolence, limited ability to drive and / or work safely, and potential for overdose. It has also been made clear that these medications are for use by this patient only, without concomitant use of alcohol or other substances unless prescribed/advised by medical provider. Patient has completed controlled substance agreement detailing terms of continued prescribing of controlled substances including monitoring John reports, drug screens and pill counts. The patient was asked and states they are not receiving narcotic medication from any there provider or any provider that this office has not been made aware of. History and physical exam exhibit continued safe and appropriate use of controlled substances.   Goal: Improved quality of life and reduction in pain as evidenced by pt report.   John #[id number] has been reviewed as consistent.  UDS is on file.     MRI results have been reviewed.  Patient advised that this provider will become more limited with the prescribing of chronic schedule II opioids after the end of this year.  Reviewed options including tapering off opioid therapy, changing to different treatment other than opiod, or referral to pain management.  Patient will consider and options will be discussed further at upcoming office visit(s).   Prescribing will always be at the discretion of the provider based on individual patient status and acute needs.      We will decrease the quantity of Norco he receives this month to #30.  Patient has been instructed on taper instructions to start breaking in half and only  taking as needed 30 hydrocodone are expected to last him for a full 30 days.  Staff have been instructed to send recent MRI results to the pain clinic and await notification if he will be accepted.      Patient has been instructed and counseled regarding opioid misuse and risk of addiction.  We have discussed proper storage and disposal of controlled medication.    Pt is at low risk for substance abuse or addiction. Pt will be monitored closely for compliance and the need to continue plan of care.         Patient's Body mass index is 49.26 kg/m². BMI is above normal parameters. Recommendations include: exercise counseling and nutrition counseling.    I have discussed diagnosis in detail today allowing time for questions and answers. Patient is aware of reasons to seek urgent or emergent medical care as well as reasons to return to the clinic for evaluation. Possible side effects, interactions and progression of symptoms discussed as well. Patient / family states understanding.   Emotional support and active listening provided.     Recent MRI reviewed.  I have requested CT results from [facility] and staff have been notified to bring to me  once received      Follow up in one month, sooner if needed. Routine labs every 3-6 months.     Dictated utilizing Dragon dictation        This document has been electronically signed by:  TYSON Vinson, NP-C

## 2019-06-24 NOTE — ASSESSMENT & PLAN NOTE
Lipid abnormalities are improving with treatment.  Nutritional counseling was provided., Pharmacotherapy as ordered. and No statin due to taking Tricor for elevated triglyceride level risk of side effects is great if statin is added. Total cholesterol is normal.   Lipids will be reassessed in 3 months.

## 2019-06-25 ENCOUNTER — TELEPHONE (OUTPATIENT)
Dept: FAMILY MEDICINE CLINIC | Facility: CLINIC | Age: 44
End: 2019-06-25

## 2019-06-25 NOTE — TELEPHONE ENCOUNTER
----- Message from TYSON Sepulveda sent at 6/24/2019  8:28 AM EDT -----  Get CT of abdomen  results from [facility] please        I have called [facility] to get his results

## 2019-06-25 NOTE — TELEPHONE ENCOUNTER
----- Message from TYSON Sepulveda sent at 6/24/2019  8:28 AM EDT -----  Send mri to pain clinic and get him scheduled .     Faxed MRI to [facility]

## 2019-06-26 DIAGNOSIS — K46.9 ABDOMINAL HERNIA WITHOUT OBSTRUCTION AND WITHOUT GANGRENE, RECURRENCE NOT SPECIFIED, UNSPECIFIED HERNIA TYPE: Primary | ICD-10-CM

## 2019-07-01 ENCOUNTER — OFFICE VISIT (OUTPATIENT)
Dept: PSYCHIATRY | Facility: CLINIC | Age: 44
End: 2019-07-01

## 2019-07-01 DIAGNOSIS — F32.1 CURRENT MODERATE EPISODE OF MAJOR DEPRESSIVE DISORDER, UNSPECIFIED WHETHER RECURRENT (HCC): Primary | ICD-10-CM

## 2019-07-01 PROCEDURE — 90837 PSYTX W PT 60 MINUTES: CPT | Performed by: SOCIAL WORKER

## 2019-07-01 NOTE — PROGRESS NOTES
"Date of Service: July 1, 2019  Time In: 7:58 AM  Time Out: 9:00 AM      PROGRESS NOTE  Data:  Johnathon Mclain is a 43 y.o. male who met 1: 1 with the undersigned for a regularly scheduled individual outpatient therapy session following referral due to patient's previous clinician retiring.     HPI: Patient reports approximately 2 years ago he was informed abruptly by his wife that she would be seeking a divorce and states he began struggling with psychological issues for the first time in his life.  Patient reports he began to feel as though his estranged wife is attempting to turn his daughters against him and states he also in approximately 5 months where he was unable to see his daughter.  He also reports his ex-wife made false claims which resulted in an emergency protective order which was subsequently elevated into a domestic violence order which she continues to attempt to deal with through the courts.  Patient reports he has been ordered to have supervised visitation with his 14-year-old daughter and states although this is working out well as his daughter stays with his sister next door, he is very upset that he has been pain and in this light although he feels he is Marty been a great father and his daughter is a \"dadartie's girl\".  Patient reports she is psychiatric hospitalization in January 2019 was due to feeling as though he was not going to be able to have contact with his daughter and becoming hopeless.  Patient reports periodic suicidal ideation but states he never had any intent or plan other than to think he could \"shoot himself\".  Patient reports he feels he is doing relatively well with depression at this time but states he continues to have periods of sad mood, anhedonia, feeling hopeless, and periods of social isolation.  Patient rates current symptoms of depression at a 2 on a scale of 1-10 with 10 being most severe.  Patient also adamantly denies any suicidal ideation, intent, or plan.  Patient " is able to identify protective factors.  Patient continues to see TYSON Vinson and continues to take medication as prescribed including Zoloft and BuSpar.  Patient vehemently denies any substance use.      Clinical Maneuvering/Intervention:  Assisted patient in processing above session content; acknowledged and normalized patient’s thoughts, feelings, and concerns.  Discussed the therapist/patient relationship and explaining the parameters and limitations relative confidentiality.  Also discussed the importance of regular attendance, active participation, and honesty to the treatment process.  Utilized motivational interviewing techniques including complex reflections to assist the patient in recognizing he has navigated a significant life change at relatively well and encouraged him to give himself credit for his effort and progress.  Also strongly focused on healthy skills of daily living and behavioral activation.  Utilized cognitive behavioral therapy to assist the patient in developing appropriate coping mechanisms to decrease the severity frequency of symptoms and encouraged him to develop a plan to mitigate stress and frustration concerns with ongoing legal issues concerning his divorce.  Provided unconditional positive regard and a safe, supportive environment.    Allowed patient to freely discuss issues without interruption or judgment. Provided safe, confidential environment to facilitate the development of positive therapeutic relationship and encourage open, honest communication. Assisted patient in identifying risk factors which would indicate the need for higher level of care including thoughts to harm self or others and/or self-harming behavior and encouraged patient to contact this office, call 911, or present to the nearest emergency room should any of these events occur. Discussed crisis intervention services and means to access.  Patient adamantly and convincingly denies current  suicidal or homicidal ideation or perceptual disturbance.    Assessment    Patient's symptoms of depression appear to have been primarily situational beginning approximately 2 years ago due to his divorce and subsequent difficulty with [employer] rights.  Patient appears to be maintaining relative stability at this time compared to his baseline.  However, he continues to struggle with significant stress including ongoing legal issues which could exacerbate symptoms.  As a result, he is clinically appropriate to schedule follow-up appointment and make the patient aware of the availability of services if symptoms increase in the interim.    Diagnoses and all orders for this visit:    Current moderate episode of major depressive disorder, unspecified whether recurrent (CMS/[facility])               Mental Status Exam  Hygiene:  good  Dress:  casual  Attitude:  Cooperative  Motor Activity:  Appropriate  Speech:  Normal  Mood:  within normal limits  Affect:  calm and pleasant  Thought Processes:  Linear  Thought Content:  normal  Suicidal Thoughts:  denies  Homicidal Thoughts:  denies  Crisis Safety Plan: yes, to come to the emergency room.  Hallucinations:  denies    Patient's Support Network Includes:  children and extended family    Progress toward goal: Not at goal    Functional Status: Mild impairment     Prognosis: Fair with Ongoing Treatment       Plan         Patient will continue in individual outpatient therapy session in approximately 2 months and will continue in pharmacotherapy with TYSON Vinson as scheduled.  Patient will adhere to medication regimen as prescribed and report any side effects. Patient will contact this office, call 911 or present to the nearest emergency room should suicidal or homicidal ideations occur. Provide Cognitive Behavioral Therapy and Integrative Therapy to improve functioning, maintain stability, and avoid decompensation and the need for higher level of care.          Return  in about 2 months (around 9/1/2019) for Next scheduled follow up.      This document signed by Yrn Benitez LCSW, DAVID July 1, 2019 9:22 AM

## 2019-07-15 ENCOUNTER — OFFICE VISIT (OUTPATIENT)
Dept: SURGERY | Facility: CLINIC | Age: 44
End: 2019-07-15

## 2019-07-15 VITALS
SYSTOLIC BLOOD PRESSURE: 150 MMHG | WEIGHT: 315 LBS | HEART RATE: 74 BPM | DIASTOLIC BLOOD PRESSURE: 77 MMHG | HEIGHT: 71 IN | BODY MASS INDEX: 44.1 KG/M2

## 2019-07-15 DIAGNOSIS — K42.9 UMBILICAL HERNIA WITHOUT OBSTRUCTION AND WITHOUT GANGRENE: Primary | ICD-10-CM

## 2019-07-15 PROCEDURE — 99214 OFFICE O/P EST MOD 30 MIN: CPT | Performed by: SURGERY

## 2019-07-15 NOTE — PROGRESS NOTES
Subjective   Johnathon Mclain is a 43 y.o. male is being seen for consultation today at the request of Erica Michel APRN    Johnathon Mclain is a 43 y.o. male With reducible umbilical hernia containing omentum.  The hernia is easily reducible and the defect is approximately 2.5 cm on examination.  No previous abdominal surgery reported per patient.  His current weight is 353 pounds with a body mass index of 49.3.  No obstructive symptoms reported.  The patient has multiple medical comorbidities apart from obesity has sleep apnea type 2 diabetes, GERD, COPD, hypertension.  His biggest complaint is that of pain at the hernia site.        Past Medical History:   Diagnosis Date   • Asthma    • Back pain    • COPD (chronic obstructive pulmonary disease) (CMS/HCC)    • CPAP (continuous positive airway pressure) dependence    • Diabetes mellitus (CMS/HCC)    • Fibromyalgia    • GERD (gastroesophageal reflux disease)    • Gout    • Hyperlipidemia     mixed   • Hypertension    • Neuralgia and neuritis    • Osteoarthrosis    • Oxygen dependent     2L AT NIGHT    • Polycythemia    • Sleep apnea    • Type II diabetes mellitus, uncontrolled (CMS/HCC)     uncomplicated       Family History   Problem Relation Age of Onset   • Arthritis Mother    • COPD Mother    • Asthma Mother    • Cancer Mother    • Hyperlipidemia Mother    • Diabetes Father    • Heart disease Father    • Hyperlipidemia Father    • Hypertension Father    • Stroke Father        Social History     Socioeconomic History   • Marital status: Legally      Spouse name: donald   • Number of children: 2   • Years of education: Not on file   • Highest education level: Not on file   Occupational History   • Occupation: disabled   Social Needs   • Financial resource strain: Somewhat hard   • Food insecurity:     Worry: Never true     Inability: Never true   • Transportation needs:     Medical: No     Non-medical: No   Tobacco Use   • Smoking status: Never  "Smoker   • Smokeless tobacco: Current User     Types: Chew   • Tobacco comment: cessation discussed - sheet given to patient   Substance and Sexual Activity   • Alcohol use: No   • Drug use: No   • Sexual activity: Defer   Lifestyle   • Physical activity:     Days per week: 3 days     Minutes per session: 20 min   • Stress: Very much       Past Surgical History:   Procedure Laterality Date   • EXCISION MASS HEAD/NECK N/A 5/11/2018    Procedure: SKIN LESION EXCISION NECK & UPPER BACK;  Surgeon: New Camacho MD;  Location: [facility];  Service: General       Review of Systems   Constitutional: Negative for activity change, appetite change, chills and fever.   HENT: Negative for sore throat and trouble swallowing.    Eyes: Negative for visual disturbance.   Respiratory: Negative for cough and shortness of breath.    Cardiovascular: Negative for chest pain and palpitations.   Gastrointestinal: Negative for abdominal distention, blood in stool, constipation, diarrhea, nausea and vomiting.   Endocrine: Negative for cold intolerance and heat intolerance.   Genitourinary: Negative for dysuria.   Musculoskeletal: Negative for joint swelling.   Skin: Negative for color change, rash and wound.   Allergic/Immunologic: Negative for immunocompromised state.   Neurological: Negative for dizziness, seizures, weakness and headaches.   Hematological: Negative for adenopathy. Does not bruise/bleed easily.   Psychiatric/Behavioral: Negative for agitation and confusion.         /77   Pulse 74   Ht 180.3 cm (70.98\")   Wt (!) 160 kg (353 lb) Comment: diet plan discussed with patient as well as diet plan  BMI 49.26 kg/m²   Objective   Physical Exam   Constitutional: He is oriented to person, place, and time. He appears well-developed.   HENT:   Head: Normocephalic and atraumatic.   Mouth/Throat: Mucous membranes are normal.   Eyes: Conjunctivae are normal. Pupils are equal, round, and reactive to light.   Neck: Neck supple. No " JVD present. No tracheal deviation present. No thyromegaly present.   Cardiovascular: Normal rate and regular rhythm. Exam reveals no gallop and no friction rub.   No murmur heard.  Pulmonary/Chest: Effort normal and breath sounds normal.   Abdominal: Soft. He exhibits no distension. There is no splenomegaly or hepatomegaly. There is no tenderness. A hernia is present. Hernia confirmed positive in the ventral area.   Umbilical hernia with easily reducible omentum   Musculoskeletal: Normal range of motion. He exhibits no deformity.   Neurological: He is alert and oriented to person, place, and time.   Skin: Skin is warm and dry.   Psychiatric: He has a normal mood and affect.             Assessment   Johnathon was seen today for abdominal hernia evaluation.    Diagnoses and all orders for this visit:    Umbilical hernia without obstruction and without gangrene      Johnathon Mclain is a 43 y.o. male with painful but minimally symptomatic reducible umbilical hernia.  The patient has been counseled on weight loss and given his current body mass index repair would carry too high complication for elective repair.  The patient will give 3-month attempt at diet and exercise to see if he can bring his body mass index down to 40 or lower.  If unable to have success, due to his other comorbidities he may require referral to bariatric surgery prior to repair.

## 2019-07-23 RX ORDER — TRAZODONE HYDROCHLORIDE 50 MG/1
TABLET ORAL
Qty: 30 TABLET | Refills: 5 | Status: SHIPPED | OUTPATIENT
Start: 2019-07-23 | End: 2019-12-31 | Stop reason: SDUPTHER

## 2019-07-24 ENCOUNTER — OFFICE VISIT (OUTPATIENT)
Dept: FAMILY MEDICINE CLINIC | Facility: CLINIC | Age: 44
End: 2019-07-24

## 2019-07-24 VITALS
HEART RATE: 92 BPM | BODY MASS INDEX: 44.1 KG/M2 | DIASTOLIC BLOOD PRESSURE: 90 MMHG | OXYGEN SATURATION: 98 % | SYSTOLIC BLOOD PRESSURE: 144 MMHG | WEIGHT: 315 LBS | HEIGHT: 71 IN | TEMPERATURE: 98.3 F

## 2019-07-24 DIAGNOSIS — E66.01 CLASS 3 SEVERE OBESITY DUE TO EXCESS CALORIES WITH SERIOUS COMORBIDITY AND BODY MASS INDEX (BMI) OF 45.0 TO 49.9 IN ADULT (HCC): ICD-10-CM

## 2019-07-24 DIAGNOSIS — M79.2 NEURALGIA AND NEURITIS: ICD-10-CM

## 2019-07-24 DIAGNOSIS — M15.9 PRIMARY OSTEOARTHRITIS INVOLVING MULTIPLE JOINTS: ICD-10-CM

## 2019-07-24 DIAGNOSIS — J06.9 ACUTE URI: Primary | ICD-10-CM

## 2019-07-24 DIAGNOSIS — Z79.4 TYPE 2 DIABETES MELLITUS WITH DIABETIC NEUROPATHY, WITH LONG-TERM CURRENT USE OF INSULIN (HCC): ICD-10-CM

## 2019-07-24 DIAGNOSIS — E11.40 TYPE 2 DIABETES MELLITUS WITH DIABETIC NEUROPATHY, WITH LONG-TERM CURRENT USE OF INSULIN (HCC): ICD-10-CM

## 2019-07-24 PROCEDURE — 99214 OFFICE O/P EST MOD 30 MIN: CPT | Performed by: NURSE PRACTITIONER

## 2019-07-24 RX ORDER — AMOXICILLIN 875 MG/1
875 TABLET, COATED ORAL EVERY 12 HOURS SCHEDULED
Qty: 20 TABLET | Refills: 0 | Status: SHIPPED | OUTPATIENT
Start: 2019-07-24 | End: 2019-09-26

## 2019-07-24 RX ORDER — CIPROFLOXACIN AND DEXAMETHASONE 3; 1 MG/ML; MG/ML
SUSPENSION/ DROPS AURICULAR (OTIC)
Qty: 7.5 ML | Refills: 0 | Status: SHIPPED | OUTPATIENT
Start: 2019-07-24 | End: 2019-07-31

## 2019-07-24 RX ORDER — GABAPENTIN 600 MG/1
600 TABLET ORAL 3 TIMES DAILY
Qty: 90 TABLET | Refills: 2 | Status: SHIPPED | OUTPATIENT
Start: 2019-07-24 | End: 2019-09-26 | Stop reason: SDUPTHER

## 2019-07-24 RX ORDER — HYDROCODONE BITARTRATE AND ACETAMINOPHEN 7.5; 325 MG/1; MG/1
1 TABLET ORAL EVERY 6 HOURS PRN
Qty: 30 TABLET | Refills: 0 | Status: SHIPPED | OUTPATIENT
Start: 2019-07-24 | End: 2019-08-26

## 2019-07-24 NOTE — ASSESSMENT & PLAN NOTE
Obesity is worsening.  Discussed the patient's BMI.  The BMI is above average; BMI management plan is completed.  General weight loss/lifestyle modification strategies discussed (elicit support from others; identify saboteurs; non-food rewards, etc).  Behavioral treatment: commercial programs (Weight watchers) and Slim Fast.  Informal exercise measures discussed, e.g. taking stairs instead of elevator.  Regular aerobic exercise program discussed.   Discussed the benefits of weight loss surgery with patient declining consideration.

## 2019-07-24 NOTE — ASSESSMENT & PLAN NOTE
Diabetes is Stable.   Continue current treatment regimen.  Diabetes will be reassessed in 1 month.

## 2019-07-24 NOTE — ASSESSMENT & PLAN NOTE
Continue Neurontin 600 mg 3 times a day #90 with 2 refill.  John and UDS have been compliant.  Urine drug screen today.

## 2019-07-24 NOTE — PROGRESS NOTES
Subjective   Johnathon Mclain is a 43 y.o. male.     Chief Complaint   Patient presents with   • Hypertension       History of Present Illness:  Type 2 diabetes with diabetic neuropathy- diabetic neuropathy is improved with use of Neurontin.  Describes his symptoms as burning and stinging in his legs and feet.  Denies any substance abuse or addiction.  Most recent A1c was well controlled.  Patient does admit to noncompliance with his diet.  He receives Toujeo and metformin.    Patient has recently had an MRI of the lumbar spine and is here to review results today.  MRI performed 6/10/2019 shows mild disc desiccation at L5-S1 and L4-L5. There is multilevel facet hypertrophy.  There is multilevel thoracic spondylosis.  Patient continues to rate his low back pain as 7 on pain scale rating of 0-10.  He describes the pain as catching and aching.  Patient reports that the pain is constant but worse with activity.  Patient states he is failed multiple over-the-counter medications.  He has attended physical therapy in the past.  Patient has failed many weight loss attempts.  His weight today is 354 pounds.  Patient states that Norco helps relieve his back pain and helps him be more active.  He denies any substance abuse or addiction.  Patient has been referred to the pain clinic.  We are awaiting pain clinic appointment which is scheduled August 11, 2019.    Hypertension-stable.    Umbilical hernia-recent consult with Dr. west for possible surgical intervention.  Due to patient's severe morbid obesity surgery is not an option at this time.  Patient has been advised to lose weight and follow-up in 3 months for further evaluation.    Depression with anxiety-stable on current medication regimen.  Reports being compliant with BuSpar, Zoloft and trazodone.      The following portions of the patient's history were reviewed and updated as appropriate:  Allergies, current medications, past family history, past medical history, past  "social history, past surgical history and problem list.    Review of Systems   Constitutional: Negative.    HENT: Positive for ear pain and sinus pressure.    Eyes: Negative.    Respiratory: Positive for shortness of breath (With exertion.  Worsening with weight gain).    Cardiovascular: Negative.    Gastrointestinal: Negative.    Endocrine: Negative.    Musculoskeletal: Positive for arthralgias and back pain.   Skin: Negative.    Allergic/Immunologic: Negative.    Neurological: Negative.    Hematological: Negative.    Psychiatric/Behavioral: Negative for dysphoric mood, self-injury, sleep disturbance and suicidal ideas.       Objective     /90   Pulse 92   Temp 98.3 °F (36.8 °C) (Tympanic)   Ht 180.3 cm (70.98\")   Wt (!) 161 kg (354 lb)   SpO2 98%   BMI 49.40 kg/m²   Lab on 04/23/2019   Component Date Value Ref Range Status   • Glucose 04/23/2019 139* 65 - 99 mg/dL Final   • BUN 04/23/2019 21* 6 - 20 mg/dL Final   • Creatinine 04/23/2019 0.89  0.76 - 1.27 mg/dL Final   • Sodium 04/23/2019 138  136 - 145 mmol/L Final   • Potassium 04/23/2019 4.3  3.5 - 5.2 mmol/L Final   • Chloride 04/23/2019 98  98 - 107 mmol/L Final   • CO2 04/23/2019 24.2  22.0 - 29.0 mmol/L Final   • Calcium 04/23/2019 9.5  8.6 - 10.5 mg/dL Final   • Total Protein 04/23/2019 6.5  6.0 - 8.5 g/dL Final   • Albumin 04/23/2019 4.30  3.50 - 5.20 g/dL Final   • ALT (SGPT) 04/23/2019 41  1 - 41 U/L Final   • AST (SGOT) 04/23/2019 24  1 - 40 U/L Final   • Alkaline Phosphatase 04/23/2019 61  39 - 117 U/L Final   • Total Bilirubin 04/23/2019 0.8  0.2 - 1.2 mg/dL Final   • eGFR Non African Amer 04/23/2019 92  >60 mL/min/1.73 Final   • Globulin 04/23/2019 2.2  gm/dL Final   • A/G Ratio 04/23/2019 2.0  g/dL Final   • BUN/Creatinine Ratio 04/23/2019 23.6  7.0 - 25.0 Final   • Anion Gap 04/23/2019 15.8  mmol/L Final   • TSH 04/23/2019 1.120  0.270 - 4.200 mIU/mL Final   • Hemoglobin A1C 04/23/2019 5.40  4.80 - 5.60 % Final   • 25 Hydroxy, Vitamin D " 04/23/2019 30.8  30.0 - 100.0 ng/ml Final   • Vitamin B-12 04/23/2019 401  211 - 946 pg/mL Final   • Total Cholesterol 04/23/2019 179  0 - 200 mg/dL Final   • Triglycerides 04/23/2019 352* 0 - 150 mg/dL Final   • HDL Cholesterol 04/23/2019 25* 40 - 60 mg/dL Final   • LDL Cholesterol  04/23/2019 84  0 - 100 mg/dL Final   • VLDL Cholesterol 04/23/2019 70.4* 5 - 40 mg/dL Final   • LDL/HDL Ratio 04/23/2019 3.34   Final   • Microalbumin, Urine 04/23/2019 <1.2  mg/L Final   • Uric Acid 04/23/2019 2.1* 3.4 - 7.0 mg/dL Final   • WBC 04/23/2019 7.16  3.40 - 10.80 10*3/mm3 Final   • RBC 04/23/2019 5.68  4.14 - 5.80 10*6/mm3 Final   • Hemoglobin 04/23/2019 17.6  13.0 - 17.7 g/dL Final   • Hematocrit 04/23/2019 52.6* 37.5 - 51.0 % Final   • MCV 04/23/2019 92.6  79.0 - 97.0 fL Final   • MCH 04/23/2019 31.0  26.6 - 33.0 pg Final   • MCHC 04/23/2019 33.5  31.5 - 35.7 g/dL Final   • RDW 04/23/2019 12.9  12.3 - 15.4 % Final   • RDW-SD 04/23/2019 43.7  37.0 - 54.0 fl Final   • MPV 04/23/2019 10.4  6.0 - 12.0 fL Final   • Platelets 04/23/2019 204  140 - 450 10*3/mm3 Final   • Neutrophil % 04/23/2019 50.8  42.7 - 76.0 % Final   • Lymphocyte % 04/23/2019 29.7  19.6 - 45.3 % Final   • Monocyte % 04/23/2019 9.4  5.0 - 12.0 % Final   • Eosinophil % 04/23/2019 8.9* 0.3 - 6.2 % Final   • Basophil % 04/23/2019 0.8  0.0 - 1.5 % Final   • Immature Grans % 04/23/2019 0.4  0.0 - 0.5 % Final   • Neutrophils, Absolute 04/23/2019 3.63  1.70 - 7.00 10*3/mm3 Final   • Lymphocytes, Absolute 04/23/2019 2.13  0.70 - 3.10 10*3/mm3 Final   • Monocytes, Absolute 04/23/2019 0.67  0.10 - 0.90 10*3/mm3 Final   • Eosinophils, Absolute 04/23/2019 0.64* 0.00 - 0.40 10*3/mm3 Final   • Basophils, Absolute 04/23/2019 0.06  0.00 - 0.20 10*3/mm3 Final   • Immature Grans, Absolute 04/23/2019 0.03  0.00 - 0.05 10*3/mm3 Final   • nRBC 04/23/2019 0.0  0.0 - 0.2 /100 WBC Final       Physical Exam   Constitutional: He is oriented to person, place, and time. Vital signs are  normal. He appears well-developed and well-nourished. He does not have a sickly appearance. No distress.   Visibly obese.  Talkative and friendly 43-year-old male.  He does show weight gain again this month.     HENT:   Head: Normocephalic and atraumatic.   Right Ear: Hearing, external ear and ear canal normal. No lacerations. There is tenderness. No drainage or swelling. No foreign bodies. No mastoid tenderness. A middle ear effusion is present.   Left Ear: Hearing, external ear and ear canal normal. No lacerations. There is swelling. No drainage. No foreign bodies. No mastoid tenderness. A middle ear effusion is present.   Nose: Nose normal. No nose lacerations, sinus tenderness or nasal deformity. No epistaxis.  No foreign bodies.   Mouth/Throat: Uvula is midline, oropharynx is clear and moist and mucous membranes are normal. No oropharyngeal exudate or tonsillar abscesses.   TM's are cloudy bilateral   Eyes: Conjunctivae and EOM are normal. Pupils are equal, round, and reactive to light. Right eye exhibits no discharge. Left eye exhibits no discharge.   Neck: Normal range of motion. Neck supple. No tracheal deviation present. No thyromegaly present.   Cardiovascular: Normal rate, regular rhythm, normal heart sounds and intact distal pulses.   No murmur heard.  Pulmonary/Chest: Effort normal and breath sounds normal. No respiratory distress. He has no wheezes. He has no rales. He exhibits no tenderness.   No cough noted    Abdominal: Soft. Normal appearance and bowel sounds are normal. He exhibits no distension and no mass. There is tenderness in the periumbilical area. There is no rebound and no guarding.       Truncal obesity   Musculoskeletal:        Lumbar back: He exhibits decreased range of motion, tenderness, pain and spasm.   Decreased lumbar curvature, there is pain with forward flexion. DTR's +2. No edema.    Lymphadenopathy:     He has no cervical adenopathy.        Right cervical: No deep cervical  adenopathy present.       Left cervical: No deep cervical adenopathy present.   Neurological: He is alert and oriented to person, place, and time. He has normal reflexes.   Skin: Skin is warm and dry. Capillary refill takes less than 2 seconds. No rash noted. He is not diaphoretic. No erythema. No pallor.    skin tags on neck, axillary regions and trunk   Psychiatric: He has a normal mood and affect. His speech is normal and behavior is normal. Judgment and thought content normal. His affect is not inappropriate. Cognition and memory are normal. He is attentive.   Nursing note and vitals reviewed.      Assessment/Plan     Problem List Items Addressed This Visit        Digestive    Class 3 severe obesity with serious comorbidity in adult (CMS/[facility])    Current Assessment & Plan     Obesity is worsening.  Discussed the patient's BMI.  The BMI is above average; BMI management plan is completed.  General weight loss/lifestyle modification strategies discussed (elicit support from others; identify saboteurs; non-food rewards, etc).  Behavioral treatment: commercial programs (Weight watchers) and Slim Fast.  Informal exercise measures discussed, e.g. taking stairs instead of elevator.  Regular aerobic exercise program discussed.   Discussed the benefits of weight loss surgery with patient declining consideration.            Endocrine    Diabetes mellitus with diabetic neuropathy, with long-term current use of insulin (CMS/[facility])    Current Assessment & Plan     Diabetes is Stable.   Continue current treatment regimen.  Diabetes will be reassessed in 1 month.         Relevant Medications    gabapentin (NEURONTIN) 600 MG tablet    Other Relevant Orders    Ambulatory Referral for Diabetic Eye Exam-Ophthalmology       Nervous and Auditory    Neuralgia and neuritis (Chronic)    Current Assessment & Plan     Continue Neurontin 600 mg 3 times a day #90 with 2 refill.  John and JETT have been compliant.  Urine drug screen today.          Relevant Medications    HYDROcodone-acetaminophen (NORCO) 7.5-325 MG per tablet    gabapentin (NEURONTIN) 600 MG tablet    Other Relevant Orders    Urine Drug Screen - Urine, Clean Catch       Musculoskeletal and Integument    Osteoarthrosis    Current Assessment & Plan     Proper body mechanics has been reviewed and discussed today.      As part of this patient's treatment plan they are being prescribed controlled substance/substances with potential for abuse. This patient has been made aware of the appropriate use of such medications, including potential risk of somnolence, limited ability to drive and / or work safely, and potential for overdose. It has also been made clear that these medications are for use by this patient only, without concomitant use of alcohol or other substances unless prescribed/advised by medical provider. Patient has completed controlled substance agreement detailing terms of continued prescribing of controlled substances including monitoring John reports, drug screens and pill counts. The patient was asked and states they are not receiving narcotic medication from any there provider or any provider that this office has not been made aware of. History and physical exam exhibit continued safe and appropriate use of controlled substances.   Goal: Improved quality of life and reduction in pain as evidenced by pt report.   John #[id number] has been reviewed as consistent.  UDS is on file.     Hydrocodone 7.5 mg 1 p.o. every 6 hours as needed for pain #30 with no refill.    Patient has been instructed that if he fails to keep his pain clinic appointment coming up next month no further pain medication will be provided.    Patient has been instructed and counseled regarding opioid misuse and risk of addiction.  We have discussed proper storage and disposal of controlled medication.  Pt is at low risk for substance abuse or addiction. Pt will be monitored closely for compliance and the  need to continue plan of care.          Relevant Medications    HYDROcodone-acetaminophen (NORCO) 7.5-325 MG per tablet    Other Relevant Orders    Urine Drug Screen - Urine, Clean Catch      Other Visit Diagnoses     Acute URI    -  Primary    Symptomatic treatment, amoxicillin and Ciprodex.    Relevant Medications    amoxicillin (AMOXIL) 875 MG tablet    ciprofloxacin-dexamethasone (CIPRODEX) 0.3-0.1 % otic suspension              Viewed recent surgical consult note and discussed the need for weight loss prior to any hernia repair.     Patient's Body mass index is 49.4 kg/m². BMI is above normal parameters. Recommendations include: exercise counseling and nutrition counseling.      Proper body mechanics has been reviewed and discussed today.      Pt has been instructed today regarding low fat heart smart diet. Weight management and routine exercise has been recommended. Avoid high fat foods, starchy foods and processed foods. Increase lean meats, fresh vegetables and fresh fruits.         I have discussed diagnosis in detail today allowing time for questions and answers. Patient is aware of reasons to seek urgent or emergent medical care as well as reasons to return to the clinic for evaluation. Possible side effects, interactions and progression of symptoms discussed as well. Patient / family states understanding.   Emotional support and active listening provided.     Follow up in one month, sooner if needed. Routine labs every 3-6 months.     Dictated utilizing Dragon dictation        This document has been electronically signed by:  TYSON Vinson, NP-C

## 2019-07-24 NOTE — ASSESSMENT & PLAN NOTE
Proper body mechanics has been reviewed and discussed today.      As part of this patient's treatment plan they are being prescribed controlled substance/substances with potential for abuse. This patient has been made aware of the appropriate use of such medications, including potential risk of somnolence, limited ability to drive and / or work safely, and potential for overdose. It has also been made clear that these medications are for use by this patient only, without concomitant use of alcohol or other substances unless prescribed/advised by medical provider. Patient has completed controlled substance agreement detailing terms of continued prescribing of controlled substances including monitoring John reports, drug screens and pill counts. The patient was asked and states they are not receiving narcotic medication from any there provider or any provider that this office has not been made aware of. History and physical exam exhibit continued safe and appropriate use of controlled substances.   Goal: Improved quality of life and reduction in pain as evidenced by pt report.   John #[id number] has been reviewed as consistent.  UDS is on file.     Hydrocodone 7.5 mg 1 p.o. every 6 hours as needed for pain #30 with no refill.    Patient has been instructed that if he fails to keep his pain clinic appointment coming up next month no further pain medication will be provided.    Patient has been instructed and counseled regarding opioid misuse and risk of addiction.  We have discussed proper storage and disposal of controlled medication.  Pt is at low risk for substance abuse or addiction. Pt will be monitored closely for compliance and the need to continue plan of care.

## 2019-08-08 ENCOUNTER — OFFICE VISIT (OUTPATIENT)
Dept: PSYCHIATRY | Facility: CLINIC | Age: 44
End: 2019-08-08

## 2019-08-08 DIAGNOSIS — F32.5 MAJOR DEPRESSIVE DISORDER, SINGLE EPISODE, IN REMISSION (HCC): Primary | ICD-10-CM

## 2019-08-08 PROCEDURE — 90837 PSYTX W PT 60 MINUTES: CPT | Performed by: SOCIAL WORKER

## 2019-08-08 NOTE — PROGRESS NOTES
Date of Service: August 8, 2019  Time In: 9:58 AM  Time Out: 11:05 AM      PROGRESS NOTE  Data:  Johnathon Mclain is a 44 y.o. male who met 1: 1 with the undersigned for a regularly scheduled individual outpatient therapy session at [facility].     HPI: Is to be in the process of going through a custody porras with his ex-wife stressful.  However, he states he feels he is doing relatively well and denies any symptoms of depression patient also reports no feelings of anger history of cult is due to anger or legal issues.  Completed Walotn Depression Inventory which resulted in a composite score 3 which indicates no clinically significant depression.  The patient also completed the clinical anger scale which resulted in a score of 3 which also indicates minimal evidence for clinical anger.  Patient continues to adhere to medication regimen as prescribed.  She will also admits to struggling with fleeting thoughts of suicide in the beginning of this process and states that he simply due to the fact he was not able to see patient currently adamantly convincingly denies suicidal ideation and vehemently denies any substance use.    Clinician assisted the patient with completing the above documented assessments reading questions and answers due to the fact the patient is unable to read      Clinical Maneuvering/Intervention:  Assisted patient in processing above session content; acknowledged and normalized patient’s thoughts, feelings, and concerns.  Discussed and explained assessments to the patient in their findings.  Also explained the fact that there is no clinical reason for ongoing treatment and encouraged the patient to continue with TYSON Vinson as scheduled for medication management.  Provided unconditional positive regard and a safe, supportive environment.    Allowed patient to freely discuss issues without interruption or judgment. Provided safe, confidential environment to  facilitate the development of positive therapeutic relationship and encourage open, honest communication. Assisted patient in identifying risk factors which would indicate the need for higher level of care including thoughts to harm self or others and/or self-harming behavior and encouraged patient to contact this office, call 911, or present to the nearest emergency room should any of these events occur. Discussed crisis intervention services and means to access.  Patient adamantly and convincingly denies current suicidal or homicidal ideation or perceptual disturbance.    Assessment    Symptoms appear to have been primarily situational and are currently being managed with medication.  Patient appears to be asymptomatic.  Diagnoses and all orders for this visit:    Major depressive disorder, single episode, in remission (CMS/[facility])               Mental Status Exam  Hygiene:  good  Dress:  casual  Attitude:  Cooperative  Motor Activity:  Appropriate  Speech:  Normal  Mood:  within normal limits  Affect:  calm and pleasant  Thought Processes:  Linear  Thought Content:  normal  Suicidal Thoughts:  denies  Homicidal Thoughts:  denies  Crisis Safety Plan: yes, to come to the emergency room.  Hallucinations:  denies    Patient's Support Network Includes:  children and extended family    Progress toward goal: Not at goal    Functional Status: Mild impairment     Prognosis: Fair with Ongoing Treatment       Plan         Patient will not continue in outpatient psychotherapy as there is no clinical indication for ongoing treatment.  Patient will continue pharmacotherapy as scheduled with TYSON Vinson and will adhere to medication regimen as prescribed.  Patient will contact this office, call 911 or present to the nearest emergency room should suicidal or homicidal ideations occur. Provide Cognitive Behavioral Therapy and Integrative Therapy to improve functioning, maintain stability, and avoid decompensation and  the need for higher level of care.          No Follow-up on file.      This document signed by Yrn Benitez LCSW, DAVID August 8, 2019 11:22 AM

## 2019-08-19 DIAGNOSIS — F41.9 ANXIETY: ICD-10-CM

## 2019-08-19 RX ORDER — BUSPIRONE HYDROCHLORIDE 10 MG/1
TABLET ORAL
Qty: 60 TABLET | Refills: 3 | Status: SHIPPED | OUTPATIENT
Start: 2019-08-19 | End: 2019-12-10 | Stop reason: SDUPTHER

## 2019-08-26 ENCOUNTER — PROCEDURE VISIT (OUTPATIENT)
Dept: FAMILY MEDICINE CLINIC | Facility: CLINIC | Age: 44
End: 2019-08-26

## 2019-08-26 VITALS
WEIGHT: 315 LBS | SYSTOLIC BLOOD PRESSURE: 148 MMHG | DIASTOLIC BLOOD PRESSURE: 80 MMHG | HEART RATE: 88 BPM | OXYGEN SATURATION: 93 % | BODY MASS INDEX: 44.1 KG/M2 | HEIGHT: 71 IN | TEMPERATURE: 99 F

## 2019-08-26 DIAGNOSIS — L91.8 SKIN TAGS, MULTIPLE ACQUIRED: ICD-10-CM

## 2019-08-26 DIAGNOSIS — Z79.4 TYPE 2 DIABETES MELLITUS WITH DIABETIC NEUROPATHY, WITH LONG-TERM CURRENT USE OF INSULIN (HCC): Primary | ICD-10-CM

## 2019-08-26 DIAGNOSIS — E11.40 TYPE 2 DIABETES MELLITUS WITH DIABETIC NEUROPATHY, WITH LONG-TERM CURRENT USE OF INSULIN (HCC): Primary | ICD-10-CM

## 2019-08-26 DIAGNOSIS — E66.01 CLASS 3 SEVERE OBESITY DUE TO EXCESS CALORIES WITH SERIOUS COMORBIDITY AND BODY MASS INDEX (BMI) OF 50.0 TO 59.9 IN ADULT (HCC): ICD-10-CM

## 2019-08-26 DIAGNOSIS — M79.2 NEURALGIA AND NEURITIS: Chronic | ICD-10-CM

## 2019-08-26 DIAGNOSIS — M15.9 PRIMARY OSTEOARTHRITIS INVOLVING MULTIPLE JOINTS: ICD-10-CM

## 2019-08-26 PROBLEM — H66.92 OTITIS OF LEFT EAR: Status: RESOLVED | Noted: 2019-01-10 | Resolved: 2019-08-26

## 2019-08-26 PROBLEM — J11.1 INFLUENZA: Status: RESOLVED | Noted: 2019-03-11 | Resolved: 2019-08-26

## 2019-08-26 PROCEDURE — 99214 OFFICE O/P EST MOD 30 MIN: CPT | Performed by: NURSE PRACTITIONER

## 2019-08-26 PROCEDURE — 17111 DESTRUCTION B9 LESIONS 15/>: CPT | Performed by: NURSE PRACTITIONER

## 2019-08-26 RX ORDER — ACETAMINOPHEN AND CODEINE PHOSPHATE 300; 30 MG/1; MG/1
1 TABLET ORAL EVERY 4 HOURS PRN
Qty: 30 TABLET | Refills: 0 | Status: SHIPPED | OUTPATIENT
Start: 2019-08-26 | End: 2019-09-26 | Stop reason: SDUPTHER

## 2019-08-26 RX ORDER — CARVEDILOL 6.25 MG/1
6.25 TABLET ORAL 2 TIMES DAILY WITH MEALS
Refills: 5 | COMMUNITY
Start: 2019-08-19 | End: 2019-12-31 | Stop reason: SDUPTHER

## 2019-08-26 RX ORDER — LISINOPRIL 20 MG/1
20 TABLET ORAL 2 TIMES DAILY
Refills: 5 | COMMUNITY
Start: 2019-08-19 | End: 2019-12-31

## 2019-08-26 NOTE — PROGRESS NOTES
Subjective   Johnathon Mclain is a 44 y.o. male.     Chief Complaint   Patient presents with   • skin tags     bleeding and catching on clotes    • Pain   • Diabetes     Foot pain exacerbation   Chronic pain     History of Present Illness:    Chronic pain- patient has been seen at the pain clinic since his last visit.  He was recommended to lose weight, see a dietitian and to go to physical therapy.  Patient declines physical therapy or dietitian.  He states he does not wish to go on a diet at this time.    Type 2 diabetes-patient reports his blood sugars have remained under 200 after meals and less than 120 fasting.  He denies any symptomatic hyperglycemia or hypoglycemia.  He reports noncompliance with his diabetic diet.  He does not exercise.    Skin tags- axillary region and neck.  Catching on clothing and bleeding.    Complaint of foot pain exacerbation-patient reports that he has been having burning and stinging in both feet which has became worse over the past couple weeks.  He had a nerve conduction study several years ago.  He is a diabetic.  Does currently receive Neurontin for his diabetic pain.  Pain is described as mild-moderate, worsening and does interfere with ability to sleep.        The following portions of the patient's history were reviewed and updated as appropriate:  Allergies, current medications, past family history, past medical history, past social history, past surgical history and problem list.    Review of Systems   Constitutional: Negative for activity change, appetite change, fatigue, fever and unexpected weight change.   HENT: Negative.    Eyes: Negative.    Respiratory: Negative.    Cardiovascular: Negative.    Gastrointestinal: Negative.    Endocrine: Negative.    Genitourinary: Negative.    Musculoskeletal: Positive for arthralgias and back pain.   Skin: Positive for color change.   Allergic/Immunologic: Negative.    Neurological: Positive for numbness (feet). Negative for dizziness,  "facial asymmetry, light-headedness and headaches.   Psychiatric/Behavioral: Positive for sleep disturbance. Negative for dysphoric mood, self-injury and suicidal ideas. The patient is not nervous/anxious.    All other systems reviewed and are negative.      Objective     /80 (BP Location: Left arm, Patient Position: Sitting, Cuff Size: Adult)   Pulse 88   Temp 99 °F (37.2 °C) (Temporal)   Ht 180.3 cm (70.98\")   Wt (!) 163 kg (360 lb)   SpO2 93%   BMI 50.23 kg/m²   Lab on 04/23/2019   Component Date Value Ref Range Status   • Glucose 04/23/2019 139* 65 - 99 mg/dL Final   • BUN 04/23/2019 21* 6 - 20 mg/dL Final   • Creatinine 04/23/2019 0.89  0.76 - 1.27 mg/dL Final   • Sodium 04/23/2019 138  136 - 145 mmol/L Final   • Potassium 04/23/2019 4.3  3.5 - 5.2 mmol/L Final   • Chloride 04/23/2019 98  98 - 107 mmol/L Final   • CO2 04/23/2019 24.2  22.0 - 29.0 mmol/L Final   • Calcium 04/23/2019 9.5  8.6 - 10.5 mg/dL Final   • Total Protein 04/23/2019 6.5  6.0 - 8.5 g/dL Final   • Albumin 04/23/2019 4.30  3.50 - 5.20 g/dL Final   • ALT (SGPT) 04/23/2019 41  1 - 41 U/L Final   • AST (SGOT) 04/23/2019 24  1 - 40 U/L Final   • Alkaline Phosphatase 04/23/2019 61  39 - 117 U/L Final   • Total Bilirubin 04/23/2019 0.8  0.2 - 1.2 mg/dL Final   • eGFR Non African Amer 04/23/2019 92  >60 mL/min/1.73 Final   • Globulin 04/23/2019 2.2  gm/dL Final   • A/G Ratio 04/23/2019 2.0  g/dL Final   • BUN/Creatinine Ratio 04/23/2019 23.6  7.0 - 25.0 Final   • Anion Gap 04/23/2019 15.8  mmol/L Final   • TSH 04/23/2019 1.120  0.270 - 4.200 mIU/mL Final   • Hemoglobin A1C 04/23/2019 5.40  4.80 - 5.60 % Final   • 25 Hydroxy, Vitamin D 04/23/2019 30.8  30.0 - 100.0 ng/ml Final   • Vitamin B-12 04/23/2019 401  211 - 946 pg/mL Final   • Total Cholesterol 04/23/2019 179  0 - 200 mg/dL Final   • Triglycerides 04/23/2019 352* 0 - 150 mg/dL Final   • HDL Cholesterol 04/23/2019 25* 40 - 60 mg/dL Final   • LDL Cholesterol  04/23/2019 84  0 - 100 " mg/dL Final   • VLDL Cholesterol 04/23/2019 70.4* 5 - 40 mg/dL Final   • LDL/HDL Ratio 04/23/2019 3.34   Final   • Microalbumin, Urine 04/23/2019 <1.2  mg/L Final   • Uric Acid 04/23/2019 2.1* 3.4 - 7.0 mg/dL Final   • WBC 04/23/2019 7.16  3.40 - 10.80 10*3/mm3 Final   • RBC 04/23/2019 5.68  4.14 - 5.80 10*6/mm3 Final   • Hemoglobin 04/23/2019 17.6  13.0 - 17.7 g/dL Final   • Hematocrit 04/23/2019 52.6* 37.5 - 51.0 % Final   • MCV 04/23/2019 92.6  79.0 - 97.0 fL Final   • MCH 04/23/2019 31.0  26.6 - 33.0 pg Final   • MCHC 04/23/2019 33.5  31.5 - 35.7 g/dL Final   • RDW 04/23/2019 12.9  12.3 - 15.4 % Final   • RDW-SD 04/23/2019 43.7  37.0 - 54.0 fl Final   • MPV 04/23/2019 10.4  6.0 - 12.0 fL Final   • Platelets 04/23/2019 204  140 - 450 10*3/mm3 Final   • Neutrophil % 04/23/2019 50.8  42.7 - 76.0 % Final   • Lymphocyte % 04/23/2019 29.7  19.6 - 45.3 % Final   • Monocyte % 04/23/2019 9.4  5.0 - 12.0 % Final   • Eosinophil % 04/23/2019 8.9* 0.3 - 6.2 % Final   • Basophil % 04/23/2019 0.8  0.0 - 1.5 % Final   • Immature Grans % 04/23/2019 0.4  0.0 - 0.5 % Final   • Neutrophils, Absolute 04/23/2019 3.63  1.70 - 7.00 10*3/mm3 Final   • Lymphocytes, Absolute 04/23/2019 2.13  0.70 - 3.10 10*3/mm3 Final   • Monocytes, Absolute 04/23/2019 0.67  0.10 - 0.90 10*3/mm3 Final   • Eosinophils, Absolute 04/23/2019 0.64* 0.00 - 0.40 10*3/mm3 Final   • Basophils, Absolute 04/23/2019 0.06  0.00 - 0.20 10*3/mm3 Final   • Immature Grans, Absolute 04/23/2019 0.03  0.00 - 0.05 10*3/mm3 Final   • nRBC 04/23/2019 0.0  0.0 - 0.2 /100 WBC Final       Physical Exam   Constitutional: He is oriented to person, place, and time. Vital signs are normal. He appears well-developed and well-nourished. No distress.    Obese 44-year old male in no acute distress.    HENT:   Head: Normocephalic and atraumatic.   Right Ear: Tympanic membrane, external ear and ear canal normal.   Left Ear: Tympanic membrane, external ear and ear canal normal.   Nose: Nose  normal.   Mouth/Throat: Oropharynx is clear and moist and mucous membranes are normal.   Eyes: Conjunctivae and EOM are normal. Pupils are equal, round, and reactive to light. Right eye exhibits no discharge. Left eye exhibits no discharge.   Neck: Normal range of motion. Neck supple. No tracheal deviation present. No thyromegaly present.   Cardiovascular: Normal rate, regular rhythm, normal heart sounds and intact distal pulses.   No murmur heard.  Pulmonary/Chest: Effort normal and breath sounds normal. No respiratory distress. He has no wheezes. He has no rales. He exhibits no tenderness.   Abdominal: Soft. Normal appearance and bowel sounds are normal. He exhibits no distension and no mass. There is no tenderness. There is no rebound and no guarding.   Musculoskeletal:        Lumbar back: He exhibits decreased range of motion, tenderness, pain and spasm.   Decreased lumbar curvature, there is pain with forward flexion. DTR's +2. No edema.    Lymphadenopathy:     He has no cervical adenopathy.   Neurological: He is alert and oriented to person, place, and time. He has normal reflexes.   Skin: Skin is warm and dry. Capillary refill takes less than 2 seconds. Lesion (21 skin tags treated with cryotherapy, tolerated well.  100s of more skin tags remaining) noted. No rash noted. He is not diaphoretic. No erythema. No pallor.   Cryotherapy to 21 lesions under the right arm.   Psychiatric: He has a normal mood and affect. His speech is normal and behavior is normal. Judgment and thought content normal. His affect is not inappropriate. Cognition and memory are normal.   Nursing note and vitals reviewed.      Assessment/Plan     Problem List Items Addressed This Visit        Digestive    Class 3 severe obesity with serious comorbidity in adult (CMS/[facility])    Current Assessment & Plan     Obesity is unchanged.  Discussed the patient's BMI.  The BMI is above average; BMI management plan is completed.  General weight  loss/lifestyle modification strategies discussed (elicit support from others; identify saboteurs; non-food rewards, etc).  Informal exercise measures discussed, e.g. taking stairs instead of elevator.  Regular aerobic exercise program discussed.   Patient declines diet changes/nutritional consult/recommendation for bariatric surgery.            Endocrine    Diabetes mellitus with diabetic neuropathy, with long-term current use of insulin (CMS/[facility]) - Primary    Relevant Orders    EMG & Nerve Conduction Test       Nervous and Auditory    Neuralgia and neuritis (Chronic)    Current Assessment & Plan     Continue current dose of Neurontin.  Will provide patient with prescription for Tylenol 3 which he may take 1 every 4 hours as needed for acute foot pain.  He does understand this would not be a long-term medication.  Schedule for nerve conduction study.           Relevant Medications    acetaminophen-codeine (TYLENOL #3) 300-30 MG per tablet       Musculoskeletal and Integument    Osteoarthrosis    Current Assessment & Plan     Joint pain of multiple sites.  Has been referred to pain management which patient declines to return.  Patient declines to attend physical therapy.  He declines nutritional consult/weight loss.  He declines referral to weight loss surgery.         Relevant Medications    acetaminophen-codeine (TYLENOL #3) 300-30 MG per tablet    Skin tags, multiple acquired    Overview     Bleeding and catching on clothing          Current Assessment & Plan     Reviewed treatment options and agreed on a trial of cryotherapy. See procedure note.               Reviewed recent pain clinic consult.    Pt has been educated/instructed on diabetic care and protocols.  Diabetic diet instructions provided.  Medication regimen reviewed.  Discussed possible side effects and interactions of current medications.  Sick day rules reviewed. Continue to monitor blood sugar and report abnormal readings as discussed today.  Understanding stated by patient.   Pt has been instructed today regarding low fat heart smart diet. Weight management and routine exercise has been recommended. Avoid high fat foods, starchy foods and processed foods. Increase lean meats, fresh vegetables and fresh fruits.   As part of this patient's treatment plan they are being prescribed controlled substance/substances with potential for abuse. This patient has been made aware of the appropriate use of such medications, including potential risk of somnolence, limited ability to drive and / or work safely, and potential for overdose. It has also been made clear that these medications are for use by this patient only, without concomitant use of alcohol or other substances unless prescribed/advised by medical provider. Patient has completed controlled substance agreement detailing terms of continued prescribing of controlled substances including monitoring John reports, drug screens and pill counts. The patient was asked and states they are not receiving narcotic medication from any there provider or any provider that this office has not been made aware of. History and physical exam exhibit continued safe and appropriate use of controlled substances.   John #[id number] has been reviewed as consistent.  UDS is on file.          Patient's Body mass index is 50.23 kg/m². BMI is above normal parameters. Recommendations include: exercise counseling and nutrition counseling.      I have discussed diagnosis in detail today allowing time for questions and answers. Patient is aware of reasons to seek urgent or emergent medical care as well as reasons to return to the clinic for evaluation. Possible side effects, interactions and progression of symptoms discussed as well. Patient / family states understanding.   Emotional support and active listening provided.       Follow up in one month, sooner if needed. Routine labs every 3-6 months.     Dictated utilizing Dragon  dictation. Errors in dictation may reflect use of voice recognition software and not all errors in transcription may have been detected prior to signing.           This document has been electronically signed by:  TYSON Vinson, NP-C

## 2019-08-26 NOTE — ASSESSMENT & PLAN NOTE
Joint pain of multiple sites.  Has been referred to pain management which patient declines to return.  Patient declines to attend physical therapy.  He declines nutritional consult/weight loss.  He declines referral to weight loss surgery.

## 2019-08-26 NOTE — ASSESSMENT & PLAN NOTE
Continue current dose of Neurontin.  Will provide patient with prescription for Tylenol 3 which he may take 1 every 4 hours as needed for acute foot pain.  He does understand this would not be a long-term medication.  Schedule for nerve conduction study.

## 2019-09-26 ENCOUNTER — OFFICE VISIT (OUTPATIENT)
Dept: FAMILY MEDICINE CLINIC | Facility: CLINIC | Age: 44
End: 2019-09-26

## 2019-09-26 VITALS
WEIGHT: 315 LBS | SYSTOLIC BLOOD PRESSURE: 140 MMHG | OXYGEN SATURATION: 98 % | TEMPERATURE: 98.7 F | BODY MASS INDEX: 44.1 KG/M2 | DIASTOLIC BLOOD PRESSURE: 80 MMHG | HEIGHT: 71 IN | HEART RATE: 87 BPM

## 2019-09-26 DIAGNOSIS — E55.9 VITAMIN D DEFICIENCY: Primary | ICD-10-CM

## 2019-09-26 DIAGNOSIS — E11.40 TYPE 2 DIABETES MELLITUS WITH DIABETIC NEUROPATHY, WITH LONG-TERM CURRENT USE OF INSULIN (HCC): ICD-10-CM

## 2019-09-26 DIAGNOSIS — IMO0002 UNCONTROLLED TYPE 2 DIABETES MELLITUS WITH DIABETIC PERIPHERAL ANGIOPATHY WITHOUT GANGRENE, WITHOUT LONG-TERM CURRENT USE OF INSULIN: ICD-10-CM

## 2019-09-26 DIAGNOSIS — M15.9 PRIMARY OSTEOARTHRITIS INVOLVING MULTIPLE JOINTS: ICD-10-CM

## 2019-09-26 DIAGNOSIS — M79.2 NEURALGIA AND NEURITIS: Chronic | ICD-10-CM

## 2019-09-26 DIAGNOSIS — E66.01 CLASS 3 SEVERE OBESITY DUE TO EXCESS CALORIES WITH SERIOUS COMORBIDITY AND BODY MASS INDEX (BMI) OF 50.0 TO 59.9 IN ADULT (HCC): ICD-10-CM

## 2019-09-26 DIAGNOSIS — M79.2 NEURALGIA AND NEURITIS: ICD-10-CM

## 2019-09-26 DIAGNOSIS — Z79.4 TYPE 2 DIABETES MELLITUS WITH DIABETIC NEUROPATHY, WITH LONG-TERM CURRENT USE OF INSULIN (HCC): ICD-10-CM

## 2019-09-26 PROCEDURE — 99214 OFFICE O/P EST MOD 30 MIN: CPT | Performed by: NURSE PRACTITIONER

## 2019-09-26 RX ORDER — ACETAMINOPHEN AND CODEINE PHOSPHATE 300; 30 MG/1; MG/1
1 TABLET ORAL EVERY 4 HOURS PRN
Qty: 30 TABLET | Refills: 0 | Status: SHIPPED | OUTPATIENT
Start: 2019-09-26 | End: 2019-10-28

## 2019-09-26 RX ORDER — GABAPENTIN 600 MG/1
600 TABLET ORAL 3 TIMES DAILY
Qty: 90 TABLET | Refills: 2 | Status: SHIPPED | OUTPATIENT
Start: 2019-09-26 | End: 2019-11-27 | Stop reason: SDUPTHER

## 2019-09-26 RX ORDER — PEN NEEDLE, DIABETIC 30 GX3/16"
1 NEEDLE, DISPOSABLE MISCELLANEOUS DAILY
Qty: 100 EACH | Refills: 3 | Status: SHIPPED | OUTPATIENT
Start: 2019-09-26 | End: 2020-12-01 | Stop reason: SDUPTHER

## 2019-09-26 NOTE — ASSESSMENT & PLAN NOTE
Proper body mechanics has been reviewed and discussed today.      As part of this patient's treatment plan they are being prescribed controlled substance/substances with potential for abuse. This patient has been made aware of the appropriate use of such medications, including potential risk of somnolence, limited ability to drive and / or work safely, and potential for overdose. It has also been made clear that these medications are for use by this patient only, without concomitant use of alcohol or other substances unless prescribed/advised by medical provider. Patient has completed controlled substance agreement detailing terms of continued prescribing of controlled substances including monitoring John reports, drug screens and pill counts. The patient was asked and states they are not receiving narcotic medication from any there provider or any provider that this office has not been made aware of. History and physical exam exhibit continued safe and appropriate use of controlled substances.   Goal: Improved quality of life and reduction in pain as evidenced by pt report.   John #[id number] has been reviewed as consistent.  UDS is on file.     Tylenol No. 3 1 every 4 hours on an as-needed basis #30 no refill.  Patient has been instructed and counseled regarding opioid misuse and risk of addiction.  We have discussed proper storage and disposal of controlled medication.  Pt is at low risk for substance abuse or addiction. Pt will be monitored closely for compliance and the need to continue plan of care.

## 2019-09-26 NOTE — PROGRESS NOTES
Subjective   Johnathon Mclain is a 44 y.o. male.     Chief Complaint   Patient presents with   • Hypertension       History of Present Illness:    Hypertension-stable with current medication regimen.  Patient is working on heart healthy diet.  He does check his blood pressure randomly.  Reports readings within normal ranges.    Type 2 diabetes-patient reports he is compliant with his daily insulin.  He is trying to do a better job of watching what he eats.  Denies any glucose readings over 200.  Patient does have diabetic leg and foot pain.  He receives Neurontin which is helpful with his diabetic foot pain.    Chronic low back pain.  MRI on 6/10/2019 shows mild disc desiccation at L5-S1 and L4- L5.  There is multilevel thoracic spondylosis.  Patient rates his low back pain as 6 on pain scale rating of 0-10.  Pain is described as catching and aching.  Pain is worse with activity or prolonged immobility.  Johnathon has failed multiple over-the-counter medications, physical therapy and weight loss attempts.  Patient is morbidly obese.  He declines weight loss surgery.  Patient states that Norco gave him better relief than the Tylenol 3 however Tylenol 3 does offer some pain reduction allowing him around 50% relief in pain.  Patient does not wish to be seen by pain clinic.        The following portions of the patient's history were reviewed and updated as appropriate:  Allergies, current medications, past family history, past medical history, past social history, past surgical history and problem list.    Review of Systems   Constitutional: Negative for activity change, appetite change, fatigue and unexpected weight change.   HENT: Negative for congestion, ear pain, nosebleeds, postnasal drip, rhinorrhea, sore throat, trouble swallowing and voice change.    Eyes: Negative for pain and visual disturbance.   Respiratory: Negative for cough, shortness of breath and wheezing.    Cardiovascular: Negative for chest pain and  "palpitations.   Gastrointestinal: Negative for abdominal pain, blood in stool, constipation and diarrhea.   Endocrine: Negative for cold intolerance and polydipsia.   Genitourinary: Negative for difficulty urinating, flank pain and hematuria.   Musculoskeletal: Positive for arthralgias and back pain. Negative for gait problem, joint swelling and myalgias.   Skin: Negative for color change and rash.   Allergic/Immunologic: Negative.    Neurological: Negative for syncope, numbness and headaches.   Hematological: Negative.    Psychiatric/Behavioral: Negative for decreased concentration, self-injury, sleep disturbance and suicidal ideas.   All other systems reviewed and are negative.      Objective     /80   Pulse 87   Temp 98.7 °F (37.1 °C) (Tympanic)   Ht 180.3 cm (70.98\")   Wt (!) 160 kg (352 lb)   SpO2 98%   BMI 49.12 kg/m²   Lab on 04/23/2019   Component Date Value Ref Range Status   • Glucose 04/23/2019 139* 65 - 99 mg/dL Final   • BUN 04/23/2019 21* 6 - 20 mg/dL Final   • Creatinine 04/23/2019 0.89  0.76 - 1.27 mg/dL Final   • Sodium 04/23/2019 138  136 - 145 mmol/L Final   • Potassium 04/23/2019 4.3  3.5 - 5.2 mmol/L Final   • Chloride 04/23/2019 98  98 - 107 mmol/L Final   • CO2 04/23/2019 24.2  22.0 - 29.0 mmol/L Final   • Calcium 04/23/2019 9.5  8.6 - 10.5 mg/dL Final   • Total Protein 04/23/2019 6.5  6.0 - 8.5 g/dL Final   • Albumin 04/23/2019 4.30  3.50 - 5.20 g/dL Final   • ALT (SGPT) 04/23/2019 41  1 - 41 U/L Final   • AST (SGOT) 04/23/2019 24  1 - 40 U/L Final   • Alkaline Phosphatase 04/23/2019 61  39 - 117 U/L Final   • Total Bilirubin 04/23/2019 0.8  0.2 - 1.2 mg/dL Final   • eGFR Non African Amer 04/23/2019 92  >60 mL/min/1.73 Final   • Globulin 04/23/2019 2.2  gm/dL Final   • A/G Ratio 04/23/2019 2.0  g/dL Final   • BUN/Creatinine Ratio 04/23/2019 23.6  7.0 - 25.0 Final   • Anion Gap 04/23/2019 15.8  mmol/L Final   • TSH 04/23/2019 1.120  0.270 - 4.200 mIU/mL Final   • Hemoglobin A1C " 04/23/2019 5.40  4.80 - 5.60 % Final   • 25 Hydroxy, Vitamin D 04/23/2019 30.8  30.0 - 100.0 ng/ml Final   • Vitamin B-12 04/23/2019 401  211 - 946 pg/mL Final   • Total Cholesterol 04/23/2019 179  0 - 200 mg/dL Final   • Triglycerides 04/23/2019 352* 0 - 150 mg/dL Final   • HDL Cholesterol 04/23/2019 25* 40 - 60 mg/dL Final   • LDL Cholesterol  04/23/2019 84  0 - 100 mg/dL Final   • VLDL Cholesterol 04/23/2019 70.4* 5 - 40 mg/dL Final   • LDL/HDL Ratio 04/23/2019 3.34   Final   • Microalbumin, Urine 04/23/2019 <1.2  mg/L Final   • Uric Acid 04/23/2019 2.1* 3.4 - 7.0 mg/dL Final   • WBC 04/23/2019 7.16  3.40 - 10.80 10*3/mm3 Final   • RBC 04/23/2019 5.68  4.14 - 5.80 10*6/mm3 Final   • Hemoglobin 04/23/2019 17.6  13.0 - 17.7 g/dL Final   • Hematocrit 04/23/2019 52.6* 37.5 - 51.0 % Final   • MCV 04/23/2019 92.6  79.0 - 97.0 fL Final   • MCH 04/23/2019 31.0  26.6 - 33.0 pg Final   • MCHC 04/23/2019 33.5  31.5 - 35.7 g/dL Final   • RDW 04/23/2019 12.9  12.3 - 15.4 % Final   • RDW-SD 04/23/2019 43.7  37.0 - 54.0 fl Final   • MPV 04/23/2019 10.4  6.0 - 12.0 fL Final   • Platelets 04/23/2019 204  140 - 450 10*3/mm3 Final   • Neutrophil % 04/23/2019 50.8  42.7 - 76.0 % Final   • Lymphocyte % 04/23/2019 29.7  19.6 - 45.3 % Final   • Monocyte % 04/23/2019 9.4  5.0 - 12.0 % Final   • Eosinophil % 04/23/2019 8.9* 0.3 - 6.2 % Final   • Basophil % 04/23/2019 0.8  0.0 - 1.5 % Final   • Immature Grans % 04/23/2019 0.4  0.0 - 0.5 % Final   • Neutrophils, Absolute 04/23/2019 3.63  1.70 - 7.00 10*3/mm3 Final   • Lymphocytes, Absolute 04/23/2019 2.13  0.70 - 3.10 10*3/mm3 Final   • Monocytes, Absolute 04/23/2019 0.67  0.10 - 0.90 10*3/mm3 Final   • Eosinophils, Absolute 04/23/2019 0.64* 0.00 - 0.40 10*3/mm3 Final   • Basophils, Absolute 04/23/2019 0.06  0.00 - 0.20 10*3/mm3 Final   • Immature Grans, Absolute 04/23/2019 0.03  0.00 - 0.05 10*3/mm3 Final   • nRBC 04/23/2019 0.0  0.0 - 0.2 /100 WBC Final       Physical Exam    Constitutional: He is oriented to person, place, and time. Vital signs are normal. He appears well-developed and well-nourished. No distress.   Visibly obese.    HENT:   Head: Normocephalic and atraumatic.   Right Ear: Tympanic membrane, external ear and ear canal normal.   Left Ear: Tympanic membrane, external ear and ear canal normal.   Nose: Nose normal.   Mouth/Throat: Oropharynx is clear and moist and mucous membranes are normal.   Eyes: Conjunctivae and EOM are normal. Pupils are equal, round, and reactive to light. Right eye exhibits no discharge. Left eye exhibits no discharge.   Neck: Normal range of motion. Neck supple. No tracheal deviation present. No thyromegaly present.   Cardiovascular: Normal rate, regular rhythm, normal heart sounds and intact distal pulses.   No murmur heard.  Pulmonary/Chest: Effort normal and breath sounds normal. No respiratory distress. He has no wheezes. He has no rales. He exhibits no tenderness.   Abdominal: Soft. Normal appearance and bowel sounds are normal. He exhibits no distension and no mass. There is no tenderness. There is no rebound and no guarding.   Musculoskeletal:        Lumbar back: He exhibits decreased range of motion, tenderness, pain and spasm.   Decreased lumbar curvature, there is pain with forward flexion. DTR's +2. No edema.    Lymphadenopathy:     He has no cervical adenopathy.   Neurological: He is alert and oriented to person, place, and time. He has normal reflexes.   Skin: Skin is warm and dry. Capillary refill takes less than 2 seconds. No rash noted. He is not diaphoretic. No erythema. No pallor.   Psychiatric: He has a normal mood and affect. His speech is normal and behavior is normal. Judgment and thought content normal. His affect is not inappropriate. Cognition and memory are normal.   Nursing note and vitals reviewed.      Assessment/Plan     Problem List Items Addressed This Visit        Digestive    Class 3 severe obesity with serious  comorbidity in adult (CMS/[facility])    Current Assessment & Plan     Obesity is unchanged.  Discussed the patient's BMI.  The BMI is above average; BMI management plan is completed.  General weight loss/lifestyle modification strategies discussed (elicit support from others; identify saboteurs; non-food rewards, etc).  Informal exercise measures discussed, e.g. taking stairs instead of elevator.  Regular aerobic exercise program discussed.            Endocrine    Diabetes mellitus with diabetic neuropathy, with long-term current use of insulin (CMS/[facility])    Current Assessment & Plan     Diabetes is improving with treatment.   Continue current treatment regimen.  Diabetes will be reassessed in 1 month.         Relevant Medications    gabapentin (NEURONTIN) 600 MG tablet    Insulin Glargine (TOUJEO SOLOSTAR) 300 UNIT/ML solution pen-injector injection    Other Relevant Orders    CBC & Differential    Comprehensive Metabolic Panel    TSH    Hemoglobin A1c    Vitamin D 25 Hydroxy    Vitamin B12    Lipid Panel    Microalbumin / Creatinine Urine Ratio - Urine, Clean Catch       Nervous and Auditory    Neuralgia and neuritis (Chronic)    Relevant Medications    gabapentin (NEURONTIN) 600 MG tablet    acetaminophen-codeine (TYLENOL #3) 300-30 MG per tablet    Other Relevant Orders    CBC & Differential    Comprehensive Metabolic Panel    TSH    Hemoglobin A1c    Vitamin D 25 Hydroxy    Vitamin B12    Lipid Panel    Microalbumin / Creatinine Urine Ratio - Urine, Clean Catch       Musculoskeletal and Integument    Osteoarthrosis    Current Assessment & Plan     Proper body mechanics has been reviewed and discussed today.      As part of this patient's treatment plan they are being prescribed controlled substance/substances with potential for abuse. This patient has been made aware of the appropriate use of such medications, including potential risk of somnolence, limited ability to drive and / or work safely, and potential for  overdose. It has also been made clear that these medications are for use by this patient only, without concomitant use of alcohol or other substances unless prescribed/advised by medical provider. Patient has completed controlled substance agreement detailing terms of continued prescribing of controlled substances including monitoring John reports, drug screens and pill counts. The patient was asked and states they are not receiving narcotic medication from any there provider or any provider that this office has not been made aware of. History and physical exam exhibit continued safe and appropriate use of controlled substances.   Goal: Improved quality of life and reduction in pain as evidenced by pt report.   John #[id number] has been reviewed as consistent.  UDS is on file.     Tylenol No. 3 1 every 4 hours on an as-needed basis #30 no refill.  Patient has been instructed and counseled regarding opioid misuse and risk of addiction.  We have discussed proper storage and disposal of controlled medication.  Pt is at low risk for substance abuse or addiction. Pt will be monitored closely for compliance and the need to continue plan of care.          Relevant Medications    acetaminophen-codeine (TYLENOL #3) 300-30 MG per tablet    Other Relevant Orders    CBC & Differential    Comprehensive Metabolic Panel    TSH    Hemoglobin A1c    Vitamin D 25 Hydroxy    Vitamin B12    Lipid Panel    Microalbumin / Creatinine Urine Ratio - Urine, Clean Catch      Other Visit Diagnoses     Vitamin D deficiency    -  Primary    Relevant Orders    Vitamin D 25 Hydroxy    Uncontrolled type 2 diabetes mellitus with diabetic peripheral angiopathy without gangrene, without long-term current use of insulin (CMS/[facility])        Relevant Medications    Insulin Glargine (TOUJEO SOLOSTAR) 300 UNIT/ML solution pen-injector injection    Other Relevant Orders    CBC & Differential    Comprehensive Metabolic Panel    TSH    Hemoglobin A1c     Vitamin D 25 Hydroxy    Vitamin B12    Lipid Panel    Microalbumin / Creatinine Urine Ratio - Urine, Clean Catch             Patient's Body mass index is 49.12 kg/m². BMI is above normal parameters. Recommendations include: exercise counseling and nutrition counseling.        I have discussed diagnosis in detail today allowing time for questions and answers. Patient is aware of reasons to seek urgent or emergent medical care as well as reasons to return to the clinic for evaluation. Possible side effects, interactions and progression of symptoms discussed as well. Patient / family states understanding.   Emotional support and active listening provided.     Follow up in one month, sooner if needed. Routine labs every 3-6 months.     Dictated utilizing Dragon dictation. Errors in dictation may reflect use of voice recognition software and not all errors in transcription may have been detected prior to signing.               This document has been electronically signed by:  TYSON Vinson, NP-C

## 2019-09-26 NOTE — ASSESSMENT & PLAN NOTE
Diabetes is improving with treatment.   Continue current treatment regimen.  Diabetes will be reassessed in 1 month.

## 2019-10-14 ENCOUNTER — OFFICE VISIT (OUTPATIENT)
Dept: FAMILY MEDICINE CLINIC | Facility: CLINIC | Age: 44
End: 2019-10-14

## 2019-10-14 VITALS
TEMPERATURE: 98.4 F | DIASTOLIC BLOOD PRESSURE: 87 MMHG | OXYGEN SATURATION: 96 % | HEART RATE: 104 BPM | WEIGHT: 315 LBS | HEIGHT: 71 IN | BODY MASS INDEX: 44.1 KG/M2 | SYSTOLIC BLOOD PRESSURE: 150 MMHG

## 2019-10-14 DIAGNOSIS — D45 POLYCYTHEMIA VERA (HCC): ICD-10-CM

## 2019-10-14 DIAGNOSIS — E11.40 TYPE 2 DIABETES MELLITUS WITH DIABETIC NEUROPATHY, WITH LONG-TERM CURRENT USE OF INSULIN (HCC): ICD-10-CM

## 2019-10-14 DIAGNOSIS — L02.11 ABSCESS OF NECK: Primary | ICD-10-CM

## 2019-10-14 DIAGNOSIS — E66.01 CLASS 3 SEVERE OBESITY DUE TO EXCESS CALORIES WITH SERIOUS COMORBIDITY AND BODY MASS INDEX (BMI) OF 45.0 TO 49.9 IN ADULT (HCC): ICD-10-CM

## 2019-10-14 DIAGNOSIS — Z79.4 TYPE 2 DIABETES MELLITUS WITH DIABETIC NEUROPATHY, WITH LONG-TERM CURRENT USE OF INSULIN (HCC): ICD-10-CM

## 2019-10-14 PROCEDURE — 99214 OFFICE O/P EST MOD 30 MIN: CPT | Performed by: NURSE PRACTITIONER

## 2019-10-14 RX ORDER — SULFAMETHOXAZOLE AND TRIMETHOPRIM 800; 160 MG/1; MG/1
1 TABLET ORAL 2 TIMES DAILY
Qty: 20 TABLET | Refills: 0 | Status: SHIPPED | OUTPATIENT
Start: 2019-10-14 | End: 2019-10-24

## 2019-10-14 NOTE — ASSESSMENT & PLAN NOTE
Diabetes is improving with treatment.   Continue current treatment regimen.  Reminded to bring in blood sugar diary at next visit.  Dietary recommendations for ADA diet.  Regular aerobic exercise.  Discussed ways to avoid symptomatic hypoglycemia.  Discussed sick day management.  Discussed foot care.  Reminded to get yearly retinal exam.  Diabetes will be reassessed Next scheduled.

## 2019-10-14 NOTE — ASSESSMENT & PLAN NOTE
On Bactrim DS 1 p.o. twice daily, encourage patient to drink extra fluid.  Refer to general surgeon for removal.

## 2019-10-14 NOTE — PROGRESS NOTES
"Subjective   Johnathon Mclain is a 44 y.o. male.     Chief Complaint   Patient presents with   • Knot on right side of neck       History of Present Illness:    Patient presents today with a new abscess on the back of his neck.  He had one surgically removed in the past on the left side and this 1 is located on the right side of his neck.  Patient reports he noticed it a couple weeks ago but is progressively becoming worse.  Area is larger than a golf ball at this time, red and tender.  No fever.  No chills.    Patient has significant other diagnoses including polycythemia vera      The following portions of the patient's history were reviewed and updated as appropriate:  Allergies, current medications, past family history, past medical history, past social history, past surgical history and problem list.    Review of Systems   Constitutional: Negative.    HENT: Negative.    Eyes: Negative.    Respiratory: Negative.    Cardiovascular: Negative.    Gastrointestinal: Negative.    Endocrine: Negative.    Genitourinary: Negative.    Musculoskeletal: Positive for arthralgias, back pain, neck pain and neck stiffness.   Skin: Negative.    Allergic/Immunologic: Negative.    Neurological: Negative.    Hematological: Negative.    Psychiatric/Behavioral: Negative for dysphoric mood, self-injury and suicidal ideas. The patient is not nervous/anxious.    All other systems reviewed and are negative.      Objective     /87 (BP Location: Right arm, Patient Position: Sitting, Cuff Size: Adult)   Pulse 104   Temp 98.4 °F (36.9 °C) (Temporal)   Ht 180.3 cm (70.98\")   Wt (!) 159 kg (351 lb)   SpO2 96%   BMI 48.98 kg/m²   Lab on 04/23/2019   Component Date Value Ref Range Status   • Glucose 04/23/2019 139* 65 - 99 mg/dL Final   • BUN 04/23/2019 21* 6 - 20 mg/dL Final   • Creatinine 04/23/2019 0.89  0.76 - 1.27 mg/dL Final   • Sodium 04/23/2019 138  136 - 145 mmol/L Final   • Potassium 04/23/2019 4.3  3.5 - 5.2 mmol/L Final   • " Chloride 04/23/2019 98  98 - 107 mmol/L Final   • CO2 04/23/2019 24.2  22.0 - 29.0 mmol/L Final   • Calcium 04/23/2019 9.5  8.6 - 10.5 mg/dL Final   • Total Protein 04/23/2019 6.5  6.0 - 8.5 g/dL Final   • Albumin 04/23/2019 4.30  3.50 - 5.20 g/dL Final   • ALT (SGPT) 04/23/2019 41  1 - 41 U/L Final   • AST (SGOT) 04/23/2019 24  1 - 40 U/L Final   • Alkaline Phosphatase 04/23/2019 61  39 - 117 U/L Final   • Total Bilirubin 04/23/2019 0.8  0.2 - 1.2 mg/dL Final   • eGFR Non African Amer 04/23/2019 92  >60 mL/min/1.73 Final   • Globulin 04/23/2019 2.2  gm/dL Final   • A/G Ratio 04/23/2019 2.0  g/dL Final   • BUN/Creatinine Ratio 04/23/2019 23.6  7.0 - 25.0 Final   • Anion Gap 04/23/2019 15.8  mmol/L Final   • TSH 04/23/2019 1.120  0.270 - 4.200 mIU/mL Final   • Hemoglobin A1C 04/23/2019 5.40  4.80 - 5.60 % Final   • 25 Hydroxy, Vitamin D 04/23/2019 30.8  30.0 - 100.0 ng/ml Final   • Vitamin B-12 04/23/2019 401  211 - 946 pg/mL Final   • Total Cholesterol 04/23/2019 179  0 - 200 mg/dL Final   • Triglycerides 04/23/2019 352* 0 - 150 mg/dL Final   • HDL Cholesterol 04/23/2019 25* 40 - 60 mg/dL Final   • LDL Cholesterol  04/23/2019 84  0 - 100 mg/dL Final   • VLDL Cholesterol 04/23/2019 70.4* 5 - 40 mg/dL Final   • LDL/HDL Ratio 04/23/2019 3.34   Final   • Microalbumin, Urine 04/23/2019 <1.2  mg/L Final   • Uric Acid 04/23/2019 2.1* 3.4 - 7.0 mg/dL Final   • WBC 04/23/2019 7.16  3.40 - 10.80 10*3/mm3 Final   • RBC 04/23/2019 5.68  4.14 - 5.80 10*6/mm3 Final   • Hemoglobin 04/23/2019 17.6  13.0 - 17.7 g/dL Final   • Hematocrit 04/23/2019 52.6* 37.5 - 51.0 % Final   • MCV 04/23/2019 92.6  79.0 - 97.0 fL Final   • MCH 04/23/2019 31.0  26.6 - 33.0 pg Final   • MCHC 04/23/2019 33.5  31.5 - 35.7 g/dL Final   • RDW 04/23/2019 12.9  12.3 - 15.4 % Final   • RDW-SD 04/23/2019 43.7  37.0 - 54.0 fl Final   • MPV 04/23/2019 10.4  6.0 - 12.0 fL Final   • Platelets 04/23/2019 204  140 - 450 10*3/mm3 Final   • Neutrophil % 04/23/2019  50.8  42.7 - 76.0 % Final   • Lymphocyte % 04/23/2019 29.7  19.6 - 45.3 % Final   • Monocyte % 04/23/2019 9.4  5.0 - 12.0 % Final   • Eosinophil % 04/23/2019 8.9* 0.3 - 6.2 % Final   • Basophil % 04/23/2019 0.8  0.0 - 1.5 % Final   • Immature Grans % 04/23/2019 0.4  0.0 - 0.5 % Final   • Neutrophils, Absolute 04/23/2019 3.63  1.70 - 7.00 10*3/mm3 Final   • Lymphocytes, Absolute 04/23/2019 2.13  0.70 - 3.10 10*3/mm3 Final   • Monocytes, Absolute 04/23/2019 0.67  0.10 - 0.90 10*3/mm3 Final   • Eosinophils, Absolute 04/23/2019 0.64* 0.00 - 0.40 10*3/mm3 Final   • Basophils, Absolute 04/23/2019 0.06  0.00 - 0.20 10*3/mm3 Final   • Immature Grans, Absolute 04/23/2019 0.03  0.00 - 0.05 10*3/mm3 Final   • nRBC 04/23/2019 0.0  0.0 - 0.2 /100 WBC Final       Physical Exam   Constitutional: He appears well-developed and well-nourished.   Talkative and friendly, no acute distress.   HENT:   Head: Normocephalic and atraumatic.   Eyes: Right eye exhibits no discharge. Left eye exhibits no discharge.   Neck: Normal range of motion. Neck supple. No thyromegaly present.   Cardiovascular: Regular rhythm and normal heart sounds.   No murmur heard.  Pulmonary/Chest: Effort normal and breath sounds normal. No respiratory distress.   Abdominal: Soft. Bowel sounds are normal. He exhibits no distension.   Lymphadenopathy:     He has no cervical adenopathy.   Skin: Skin is warm and dry. Capillary refill takes less than 2 seconds. No rash noted. He is not diaphoretic. There is erythema. No pallor.        Psychiatric: He has a normal mood and affect. His behavior is normal. Judgment and thought content normal.   Vitals reviewed.      Assessment/Plan     Problem List Items Addressed This Visit        Digestive    Class 3 severe obesity with serious comorbidity in adult (CMS/HCC)       Endocrine    Diabetes mellitus with diabetic neuropathy, with long-term current use of insulin (CMS/[facility])    Current Assessment & Plan     Diabetes is improving  with treatment.   Continue current treatment regimen.  Reminded to bring in blood sugar diary at next visit.  Dietary recommendations for ADA diet.  Regular aerobic exercise.  Discussed ways to avoid symptomatic hypoglycemia.  Discussed sick day management.  Discussed foot care.  Reminded to get yearly retinal exam.  Diabetes will be reassessed Next scheduled.            Hematopoietic and Hemostatic    Polycythemia vera (CMS/HCC)    Overview     Under the care of hematology             Other    Abscess of neck - Primary    Overview     Recurrent         Current Assessment & Plan     On Bactrim DS 1 p.o. twice daily, encourage patient to drink extra fluid.  Refer to general surgeon for removal.         Relevant Orders    Ambulatory Referral to General Surgery        New problem addressed today.      Start Bactrim DS.  Apply warm compresses several times a day.  Avoid picking it abscess.  Good handwashing technique.  Refer to general surgeon for evaluation.         Patient's Body mass index is 48.98 kg/m². BMI is above normal parameters. Recommendations include: exercise counseling and nutrition counseling.  .      I have discussed diagnosis in detail today allowing time for questions and answers. Patient is aware of reasons to seek urgent or emergent medical care as well as reasons to return to the clinic for evaluation. Possible side effects, interactions and progression of symptoms discussed as well. Patient / family states understanding.   Emotional support and active listening provided.     Keep follow up in 2 weeks, sooner if needed.           This document has been electronically signed by:  TYSON Vinson, NP-C

## 2019-10-15 ENCOUNTER — OFFICE VISIT (OUTPATIENT)
Dept: SURGERY | Facility: CLINIC | Age: 44
End: 2019-10-15

## 2019-10-15 VITALS
TEMPERATURE: 98.4 F | BODY MASS INDEX: 45.1 KG/M2 | SYSTOLIC BLOOD PRESSURE: 150 MMHG | WEIGHT: 315 LBS | HEART RATE: 102 BPM | HEIGHT: 70 IN | DIASTOLIC BLOOD PRESSURE: 85 MMHG

## 2019-10-15 DIAGNOSIS — L02.91 ABSCESS: Primary | ICD-10-CM

## 2019-10-15 DIAGNOSIS — E66.01 CLASS 3 SEVERE OBESITY DUE TO EXCESS CALORIES WITH SERIOUS COMORBIDITY AND BODY MASS INDEX (BMI) OF 45.0 TO 49.9 IN ADULT (HCC): ICD-10-CM

## 2019-10-15 DIAGNOSIS — J44.9 CHRONIC OBSTRUCTIVE PULMONARY DISEASE, UNSPECIFIED COPD TYPE (HCC): ICD-10-CM

## 2019-10-15 DIAGNOSIS — K42.9 UMBILICAL HERNIA WITHOUT OBSTRUCTION AND WITHOUT GANGRENE: ICD-10-CM

## 2019-10-15 PROCEDURE — 99214 OFFICE O/P EST MOD 30 MIN: CPT | Performed by: SURGERY

## 2019-10-15 NOTE — H&P (VIEW-ONLY)
10/15/2019    Patient Information  Johnathon Jade [facility]  [place] [address]  1975  [phone] (home)     Chief Complaint  Chief Complaint   Patient presents with   • Abscess     Neck Abscess       HPI  Patient is a 44-year-old white male presents with a large abscess on his posterior neck.  He has had a large abscess on the posterior neck in the past.  Abscess is been there few days and getting worse.    Past Medical History  Past Medical History:   Diagnosis Date   • Asthma    • Back pain    • COPD (chronic obstructive pulmonary disease) (CMS/HCC)    • CPAP (continuous positive airway pressure) dependence    • Diabetes mellitus (CMS/HCC)    • Fibromyalgia    • GERD (gastroesophageal reflux disease)    • Gout    • Hyperlipidemia     mixed   • Hypertension    • Neuralgia and neuritis    • Osteoarthrosis    • Oxygen dependent     2L AT NIGHT    • Polycythemia    • Sleep apnea    • Sleep apnea    • Type II diabetes mellitus, uncontrolled (CMS/HCC)     uncomplicated       Past Surgical History  Past Surgical History:   Procedure Laterality Date   • EXCISION MASS HEAD/NECK N/A 5/11/2018    Procedure: SKIN LESION EXCISION NECK & UPPER BACK;  Surgeon: New Camacho MD;  Location: [facility];  Service: General       Current Medications  Current Outpatient Medications   Medication Sig Dispense Refill   • acetaminophen-codeine (TYLENOL #3) 300-30 MG per tablet Take 1 tablet by mouth Every 4 (Four) Hours As Needed for Moderate Pain . 30 tablet 0   • aspirin 81 MG EC tablet Take 1 tablet by mouth Daily. 90 tablet 1   • busPIRone (BUSPAR) 10 MG tablet TAKE 1 Tablet BY MOUTH TWICE DAILY AS NEEDED FOR ANXIETY 60 tablet 3   • carvedilol (COREG) 6.25 MG tablet Take 6.25 mg by mouth 2 (Two) Times a Day With Meals.  5   • cetirizine (zyrTEC) 10 MG tablet Take 1 tablet by mouth Daily As Needed for Allergies. 30 tablet 5   • chlorthalidone (HYGROTON) 25 MG tablet Take 1 tablet by mouth Daily. 30 tablet 5   •  Cholecalciferol (VITAMIN D) 1000 units tablet Take 1 tablet by mouth Daily. 30 tablet 5   • colchicine 0.6 MG tablet Take 1 tablet by mouth Daily. 30 tablet 5   • febuxostat (ULORIC) 80 MG tablet tablet Take 1 tablet by mouth Daily. 30 tablet 5   • fenofibrate (FENOGLIDE) 120 MG tablet Take 1 tablet by mouth Every Night. 30 tablet 5   • Fluticasone Furoate-Vilanterol (BREO ELLIPTA) 100-25 MCG/INH inhaler 1 puff every morning 1 each 5   • gabapentin (NEURONTIN) 600 MG tablet Take 1 tablet by mouth 3 (Three) Times a Day. 90 tablet 2   • glucose blood test strip Use as instructed 50 each 12   • ibuprofen (ADVIL,MOTRIN) 600 MG tablet Take 1 tablet by mouth 3 (Three) Times a Day As Needed for Mild Pain  or Moderate Pain . 30 tablet 0   • icosapent ethyl (VASCEPA) 1 g capsule capsule Take 1 g by mouth 2 (Two) Times a Day With Meals. 120 capsule 5   • Insulin Glargine (TOUJEO SOLOSTAR) 300 UNIT/ML solution pen-injector injection Inject 21 Units under the skin into the appropriate area as directed Daily. 5 pen 5   • Insulin Pen Needle (PEN NEEDLES) 32G X 4 MM misc 1 Device Daily. 100 each 3   • ketoconazole (NIZORAL) 2 % shampoo Apply  topically to the appropriate area as directed 2 (Two) Times a Week. (Patient taking differently: Apply 1 application topically to the appropriate area as directed 2 (Two) Times a Week. Prior to [facility] [place] Admission, Patient was on: takes on mondays and thursdays) 120 mL 5   • lisinopril (PRINIVIL,ZESTRIL) 20 MG tablet Take 20 mg by mouth 2 (Two) Times a Day.  5   • metFORMIN ER (GLUCOPHAGE-XR) 500 MG 24 hr tablet Take 1 tablet by mouth Daily With Breakfast. 30 tablet 5   • omeprazole (priLOSEC) 20 MG capsule Take 1 capsule by mouth Daily. 30 capsule 5   • ONE TOUCH LANCETS misc 1 Device 2 (Two) Times a Day. 200 each 3   • PROAIR  (90 Base) MCG/ACT inhaler INHALE 1 PUFF BY MOUTH EVERY 4 TO 6 HOURS AS NEEDED FRO FOR WHEEZING 8.5 g 5   • sertraline (ZOLOFT) 50 MG tablet Take 50 mg by mouth  Daily.  5   • sulfamethoxazole-trimethoprim (BACTRIM DS,SEPTRA DS) 800-160 MG per tablet Take 1 tablet by mouth 2 (Two) Times a Day for 10 days. 20 tablet 0   • traZODone (DESYREL) 50 MG tablet TAKE 1/2 TO 1 TABLET BY MOUTH EVERY NIGHT AT BEDTIME AS NEEDED FOR SLEEP 30 tablet 5     No current facility-administered medications for this visit.        Allergies  Patient has no known allergies.      Review of Systems    The Past medical history, family history, social history, medication list, allergies, and Review of Systems has been reviewed and confirmed.    General: weight gain 20lbs, chills  Integumentary: abscess   Eyes: negative  ENT: negative  Respiratory: shortness of breath  Gastrointestinal: negative  Cardiovascular: negative  Neurological: negative  Psychiatric: negative  Hematologic/Lymphatic: negative  Genitourinary: negative  Musculoskeletal: back pain  Endocrine: negative  Breasts: negative      Vitals:  Vitals:    10/15/19 1359   BP: 150/85   Pulse: 102   Temp: 98.4 °F (36.9 °C)       Body mass index is 50.3 kg/m².    Physical Exam  Physical Exam   Constitutional: He is oriented to person, place, and time. He appears well-developed and well-nourished. No distress.   Morbidly obese   HENT:   Head: Normocephalic.   Right Ear: External ear normal.   Left Ear: External ear normal.   Nose: Nose normal.   Mouth/Throat: Oropharynx is clear and moist.   Eyes: Conjunctivae and EOM are normal. Right eye exhibits no discharge. Left eye exhibits no discharge.   Neck: Normal range of motion. No JVD present. No tracheal deviation present. No thyromegaly present.   Cardiovascular: Normal rate, regular rhythm, normal heart sounds and intact distal pulses. Exam reveals no gallop and no friction rub.   No murmur heard.  Pulmonary/Chest: Effort normal and breath sounds normal. No stridor. No respiratory distress. He has no wheezes. He has no rales. He exhibits no tenderness.   Abdominal: Soft. Bowel sounds are normal. He  exhibits no distension and no mass. There is no tenderness. There is no rebound and no guarding. No hernia.   Ventral hernia present   Genitourinary: Rectal exam shows guaiac negative stool.   Musculoskeletal: Normal range of motion. He exhibits no edema, tenderness or deformity.   Lymphadenopathy:     He has no cervical adenopathy.   Neurological: He is alert and oriented to person, place, and time. He has normal reflexes. He displays normal reflexes. No cranial nerve deficit. He exhibits normal muscle tone. Coordination normal.   Skin: Skin is warm and dry. No rash noted. He is not diaphoretic. No erythema. No pallor.   Posterior neck there is a 3 cm abscess   Psychiatric: He has a normal mood and affect. His behavior is normal. Judgment and thought content normal.       Assessment   Abscess posterior neck    Plan  Incision and drainage in the operating room.        This document signed by Singh Schuster MD October 15, 2019 2:07 PM

## 2019-10-15 NOTE — PROGRESS NOTES
10/15/2019    Patient Information  Johnathon Jade [facility]  [place] [address]  1975  [phone] (home)     Chief Complaint  Chief Complaint   Patient presents with   • Abscess     Neck Abscess       HPI  Patient is a 44-year-old white male presents with a large abscess on his posterior neck.  He has had a large abscess on the posterior neck in the past.  Abscess is been there few days and getting worse.    Past Medical History  Past Medical History:   Diagnosis Date   • Asthma    • Back pain    • COPD (chronic obstructive pulmonary disease) (CMS/HCC)    • CPAP (continuous positive airway pressure) dependence    • Diabetes mellitus (CMS/HCC)    • Fibromyalgia    • GERD (gastroesophageal reflux disease)    • Gout    • Hyperlipidemia     mixed   • Hypertension    • Neuralgia and neuritis    • Osteoarthrosis    • Oxygen dependent     2L AT NIGHT    • Polycythemia    • Sleep apnea    • Sleep apnea    • Type II diabetes mellitus, uncontrolled (CMS/HCC)     uncomplicated       Past Surgical History  Past Surgical History:   Procedure Laterality Date   • EXCISION MASS HEAD/NECK N/A 5/11/2018    Procedure: SKIN LESION EXCISION NECK & UPPER BACK;  Surgeon: New Camacho MD;  Location: [facility];  Service: General       Current Medications  Current Outpatient Medications   Medication Sig Dispense Refill   • acetaminophen-codeine (TYLENOL #3) 300-30 MG per tablet Take 1 tablet by mouth Every 4 (Four) Hours As Needed for Moderate Pain . 30 tablet 0   • aspirin 81 MG EC tablet Take 1 tablet by mouth Daily. 90 tablet 1   • busPIRone (BUSPAR) 10 MG tablet TAKE 1 Tablet BY MOUTH TWICE DAILY AS NEEDED FOR ANXIETY 60 tablet 3   • carvedilol (COREG) 6.25 MG tablet Take 6.25 mg by mouth 2 (Two) Times a Day With Meals.  5   • cetirizine (zyrTEC) 10 MG tablet Take 1 tablet by mouth Daily As Needed for Allergies. 30 tablet 5   • chlorthalidone (HYGROTON) 25 MG tablet Take 1 tablet by mouth Daily. 30 tablet 5   •  Cholecalciferol (VITAMIN D) 1000 units tablet Take 1 tablet by mouth Daily. 30 tablet 5   • colchicine 0.6 MG tablet Take 1 tablet by mouth Daily. 30 tablet 5   • febuxostat (ULORIC) 80 MG tablet tablet Take 1 tablet by mouth Daily. 30 tablet 5   • fenofibrate (FENOGLIDE) 120 MG tablet Take 1 tablet by mouth Every Night. 30 tablet 5   • Fluticasone Furoate-Vilanterol (BREO ELLIPTA) 100-25 MCG/INH inhaler 1 puff every morning 1 each 5   • gabapentin (NEURONTIN) 600 MG tablet Take 1 tablet by mouth 3 (Three) Times a Day. 90 tablet 2   • glucose blood test strip Use as instructed 50 each 12   • ibuprofen (ADVIL,MOTRIN) 600 MG tablet Take 1 tablet by mouth 3 (Three) Times a Day As Needed for Mild Pain  or Moderate Pain . 30 tablet 0   • icosapent ethyl (VASCEPA) 1 g capsule capsule Take 1 g by mouth 2 (Two) Times a Day With Meals. 120 capsule 5   • Insulin Glargine (TOUJEO SOLOSTAR) 300 UNIT/ML solution pen-injector injection Inject 21 Units under the skin into the appropriate area as directed Daily. 5 pen 5   • Insulin Pen Needle (PEN NEEDLES) 32G X 4 MM misc 1 Device Daily. 100 each 3   • ketoconazole (NIZORAL) 2 % shampoo Apply  topically to the appropriate area as directed 2 (Two) Times a Week. (Patient taking differently: Apply 1 application topically to the appropriate area as directed 2 (Two) Times a Week. Prior to [facility] Admission, Patient was on: takes on mondays and thursdays) 120 mL 5   • lisinopril (PRINIVIL,ZESTRIL) 20 MG tablet Take 20 mg by mouth 2 (Two) Times a Day.  5   • metFORMIN ER (GLUCOPHAGE-XR) 500 MG 24 hr tablet Take 1 tablet by mouth Daily With Breakfast. 30 tablet 5   • omeprazole (priLOSEC) 20 MG capsule Take 1 capsule by mouth Daily. 30 capsule 5   • ONE TOUCH LANCETS misc 1 Device 2 (Two) Times a Day. 200 each 3   • PROAIR  (90 Base) MCG/ACT inhaler INHALE 1 PUFF BY MOUTH EVERY 4 TO 6 HOURS AS NEEDED FRO FOR WHEEZING 8.5 g 5   • sertraline (ZOLOFT) 50 MG tablet Take 50 mg by mouth  Daily.  5   • sulfamethoxazole-trimethoprim (BACTRIM DS,SEPTRA DS) 800-160 MG per tablet Take 1 tablet by mouth 2 (Two) Times a Day for 10 days. 20 tablet 0   • traZODone (DESYREL) 50 MG tablet TAKE 1/2 TO 1 TABLET BY MOUTH EVERY NIGHT AT BEDTIME AS NEEDED FOR SLEEP 30 tablet 5     No current facility-administered medications for this visit.        Allergies  Patient has no known allergies.      Review of Systems    The Past medical history, family history, social history, medication list, allergies, and Review of Systems has been reviewed and confirmed.    General: weight gain 20lbs, chills  Integumentary: abscess   Eyes: negative  ENT: negative  Respiratory: shortness of breath  Gastrointestinal: negative  Cardiovascular: negative  Neurological: negative  Psychiatric: negative  Hematologic/Lymphatic: negative  Genitourinary: negative  Musculoskeletal: back pain  Endocrine: negative  Breasts: negative      Vitals:  Vitals:    10/15/19 1359   BP: 150/85   Pulse: 102   Temp: 98.4 °F (36.9 °C)       Body mass index is 50.3 kg/m².    Physical Exam  Physical Exam   Constitutional: He is oriented to person, place, and time. He appears well-developed and well-nourished. No distress.   Morbidly obese   HENT:   Head: Normocephalic.   Right Ear: External ear normal.   Left Ear: External ear normal.   Nose: Nose normal.   Mouth/Throat: Oropharynx is clear and moist.   Eyes: Conjunctivae and EOM are normal. Right eye exhibits no discharge. Left eye exhibits no discharge.   Neck: Normal range of motion. No JVD present. No tracheal deviation present. No thyromegaly present.   Cardiovascular: Normal rate, regular rhythm, normal heart sounds and intact distal pulses. Exam reveals no gallop and no friction rub.   No murmur heard.  Pulmonary/Chest: Effort normal and breath sounds normal. No stridor. No respiratory distress. He has no wheezes. He has no rales. He exhibits no tenderness.   Abdominal: Soft. Bowel sounds are normal. He  exhibits no distension and no mass. There is no tenderness. There is no rebound and no guarding. No hernia.   Ventral hernia present   Genitourinary: Rectal exam shows guaiac negative stool.   Musculoskeletal: Normal range of motion. He exhibits no edema, tenderness or deformity.   Lymphadenopathy:     He has no cervical adenopathy.   Neurological: He is alert and oriented to person, place, and time. He has normal reflexes. He displays normal reflexes. No cranial nerve deficit. He exhibits normal muscle tone. Coordination normal.   Skin: Skin is warm and dry. No rash noted. He is not diaphoretic. No erythema. No pallor.   Posterior neck there is a 3 cm abscess   Psychiatric: He has a normal mood and affect. His behavior is normal. Judgment and thought content normal.       Assessment   Abscess posterior neck    Plan  Incision and drainage in the operating room.        This document signed by Singh Schuster MD October 15, 2019 2:07 PM

## 2019-10-16 ENCOUNTER — ANESTHESIA (OUTPATIENT)
Dept: PERIOP | Facility: HOSPITAL | Age: 44
End: 2019-10-16

## 2019-10-16 ENCOUNTER — HOSPITAL ENCOUNTER (OUTPATIENT)
Facility: HOSPITAL | Age: 44
Setting detail: HOSPITAL OUTPATIENT SURGERY
Discharge: HOME | End: 2019-10-16
Attending: SURGERY | Admitting: SURGERY

## 2019-10-16 ENCOUNTER — APPOINTMENT (OUTPATIENT)
Dept: PREADMISSION TESTING | Facility: HOSPITAL | Age: 44
End: 2019-10-16

## 2019-10-16 ENCOUNTER — ANESTHESIA EVENT (OUTPATIENT)
Dept: PERIOP | Facility: HOSPITAL | Age: 44
End: 2019-10-16

## 2019-10-16 VITALS
HEART RATE: 80 BPM | SYSTOLIC BLOOD PRESSURE: 128 MMHG | DIASTOLIC BLOOD PRESSURE: 75 MMHG | RESPIRATION RATE: 16 BRPM | BODY MASS INDEX: 45.1 KG/M2 | HEIGHT: 70 IN | OXYGEN SATURATION: 96 % | WEIGHT: 315 LBS | TEMPERATURE: 98 F

## 2019-10-16 DIAGNOSIS — L02.91 ABSCESS: ICD-10-CM

## 2019-10-16 LAB
ALBUMIN SERPL-MCNC: 4.43 G/DL (ref 3.5–5.2)
ALBUMIN/GLOB SERPL: 1.6 G/DL
ALP SERPL-CCNC: 91 U/L (ref 39–117)
ALT SERPL W P-5'-P-CCNC: 39 U/L (ref 1–41)
ANION GAP SERPL CALCULATED.3IONS-SCNC: 13.1 MMOL/L (ref 5–15)
AST SERPL-CCNC: 25 U/L (ref 1–40)
BILIRUB SERPL-MCNC: 1 MG/DL (ref 0.2–1.2)
BUN BLD-MCNC: 16 MG/DL (ref 6–20)
BUN/CREAT SERPL: 16.5 (ref 7–25)
CALCIUM SPEC-SCNC: 9.9 MG/DL (ref 8.6–10.5)
CHLORIDE SERPL-SCNC: 99 MMOL/L (ref 98–107)
CO2 SERPL-SCNC: 22.9 MMOL/L (ref 22–29)
CREAT BLD-MCNC: 0.97 MG/DL (ref 0.76–1.27)
DEPRECATED RDW RBC AUTO: 43.2 FL (ref 37–54)
ERYTHROCYTE [DISTWIDTH] IN BLOOD BY AUTOMATED COUNT: 13.6 % (ref 12.3–15.4)
GFR SERPL CREATININE-BSD FRML MDRD: 83 ML/MIN/1.73
GLOBULIN UR ELPH-MCNC: 2.8 GM/DL
GLUCOSE BLD-MCNC: 188 MG/DL (ref 65–99)
GLUCOSE BLDC GLUCOMTR-MCNC: 142 MG/DL (ref 70–130)
GLUCOSE BLDC GLUCOMTR-MCNC: 180 MG/DL (ref 70–130)
HCT VFR BLD AUTO: 51.3 % (ref 37.5–51)
HGB BLD-MCNC: 17.6 G/DL (ref 13–17.7)
MCH RBC QN AUTO: 30 PG (ref 26.6–33)
MCHC RBC AUTO-ENTMCNC: 34.3 G/DL (ref 31.5–35.7)
MCV RBC AUTO: 87.4 FL (ref 79–97)
PLATELET # BLD AUTO: 207 10*3/MM3 (ref 140–450)
PMV BLD AUTO: 10 FL (ref 6–12)
POTASSIUM BLD-SCNC: 4.3 MMOL/L (ref 3.5–5.2)
PROT SERPL-MCNC: 7.2 G/DL (ref 6–8.5)
RBC # BLD AUTO: 5.87 10*6/MM3 (ref 4.14–5.8)
SODIUM BLD-SCNC: 135 MMOL/L (ref 136–145)
WBC NRBC COR # BLD: 9.94 10*3/MM3 (ref 3.4–10.8)

## 2019-10-16 PROCEDURE — 87205 SMEAR GRAM STAIN: CPT | Performed by: SURGERY

## 2019-10-16 PROCEDURE — 25010000002 VANCOMYCIN 1 G RECONSTITUTED SOLUTION: Performed by: NURSE ANESTHETIST, CERTIFIED REGISTERED

## 2019-10-16 PROCEDURE — 25010000002 MIDAZOLAM PER 1 MG: Performed by: NURSE ANESTHETIST, CERTIFIED REGISTERED

## 2019-10-16 PROCEDURE — 25010000002 PROPOFOL 10 MG/ML EMULSION: Performed by: NURSE ANESTHETIST, CERTIFIED REGISTERED

## 2019-10-16 PROCEDURE — 10061 I&D ABSCESS COMP/MULTIPLE: CPT | Performed by: SURGERY

## 2019-10-16 PROCEDURE — 85027 COMPLETE CBC AUTOMATED: CPT | Performed by: SURGERY

## 2019-10-16 PROCEDURE — 25010000002 FENTANYL CITRATE (PF) 100 MCG/2ML SOLUTION: Performed by: NURSE ANESTHETIST, CERTIFIED REGISTERED

## 2019-10-16 PROCEDURE — 80053 COMPREHEN METABOLIC PANEL: CPT | Performed by: SURGERY

## 2019-10-16 PROCEDURE — 87070 CULTURE OTHR SPECIMN AEROBIC: CPT | Performed by: SURGERY

## 2019-10-16 PROCEDURE — 82962 GLUCOSE BLOOD TEST: CPT

## 2019-10-16 RX ORDER — SODIUM CHLORIDE, SODIUM LACTATE, POTASSIUM CHLORIDE, CALCIUM CHLORIDE 600; 310; 30; 20 MG/100ML; MG/100ML; MG/100ML; MG/100ML
125 INJECTION, SOLUTION INTRAVENOUS CONTINUOUS
Status: DISCONTINUED | OUTPATIENT
Start: 2019-10-16 | End: 2019-10-16 | Stop reason: HOSPADM

## 2019-10-16 RX ORDER — BUPIVACAINE HYDROCHLORIDE AND EPINEPHRINE 5; 5 MG/ML; UG/ML
INJECTION, SOLUTION EPIDURAL; INTRACAUDAL; PERINEURAL AS NEEDED
Status: DISCONTINUED | OUTPATIENT
Start: 2019-10-16 | End: 2019-10-16 | Stop reason: HOSPADM

## 2019-10-16 RX ORDER — FENTANYL CITRATE 50 UG/ML
INJECTION, SOLUTION INTRAMUSCULAR; INTRAVENOUS AS NEEDED
Status: DISCONTINUED | OUTPATIENT
Start: 2019-10-16 | End: 2019-10-16 | Stop reason: SURG

## 2019-10-16 RX ORDER — ONDANSETRON 2 MG/ML
4 INJECTION INTRAMUSCULAR; INTRAVENOUS AS NEEDED
Status: CANCELLED | OUTPATIENT
Start: 2019-10-16

## 2019-10-16 RX ORDER — FENTANYL CITRATE 50 UG/ML
50 INJECTION, SOLUTION INTRAMUSCULAR; INTRAVENOUS
Status: CANCELLED | OUTPATIENT
Start: 2019-10-16

## 2019-10-16 RX ORDER — VANCOMYCIN HYDROCHLORIDE 1 G/20ML
INJECTION, POWDER, LYOPHILIZED, FOR SOLUTION INTRAVENOUS AS NEEDED
Status: DISCONTINUED | OUTPATIENT
Start: 2019-10-16 | End: 2019-10-16 | Stop reason: SURG

## 2019-10-16 RX ORDER — OXYCODONE HYDROCHLORIDE AND ACETAMINOPHEN 5; 325 MG/1; MG/1
1-2 TABLET ORAL EVERY 4 HOURS PRN
Qty: 5 TABLET | Refills: 0 | Status: SHIPPED | OUTPATIENT
Start: 2019-10-16 | End: 2019-10-28

## 2019-10-16 RX ORDER — MAGNESIUM HYDROXIDE 1200 MG/15ML
LIQUID ORAL AS NEEDED
Status: DISCONTINUED | OUTPATIENT
Start: 2019-10-16 | End: 2019-10-16 | Stop reason: HOSPADM

## 2019-10-16 RX ORDER — PROPOFOL 10 MG/ML
VIAL (ML) INTRAVENOUS AS NEEDED
Status: DISCONTINUED | OUTPATIENT
Start: 2019-10-16 | End: 2019-10-16 | Stop reason: SURG

## 2019-10-16 RX ORDER — MEPERIDINE HYDROCHLORIDE 25 MG/ML
12.5 INJECTION INTRAMUSCULAR; INTRAVENOUS; SUBCUTANEOUS
Status: CANCELLED | OUTPATIENT
Start: 2019-10-16 | End: 2019-10-17

## 2019-10-16 RX ORDER — OXYCODONE HYDROCHLORIDE AND ACETAMINOPHEN 5; 325 MG/1; MG/1
1 TABLET ORAL ONCE AS NEEDED
Status: CANCELLED | OUTPATIENT
Start: 2019-10-16

## 2019-10-16 RX ORDER — IPRATROPIUM BROMIDE AND ALBUTEROL SULFATE 2.5; .5 MG/3ML; MG/3ML
3 SOLUTION RESPIRATORY (INHALATION) ONCE AS NEEDED
Status: CANCELLED | OUTPATIENT
Start: 2019-10-16

## 2019-10-16 RX ORDER — LIDOCAINE HYDROCHLORIDE 20 MG/ML
INJECTION, SOLUTION EPIDURAL; INFILTRATION; INTRACAUDAL; PERINEURAL AS NEEDED
Status: DISCONTINUED | OUTPATIENT
Start: 2019-10-16 | End: 2019-10-16 | Stop reason: SURG

## 2019-10-16 RX ORDER — MIDAZOLAM HYDROCHLORIDE 1 MG/ML
INJECTION INTRAMUSCULAR; INTRAVENOUS AS NEEDED
Status: DISCONTINUED | OUTPATIENT
Start: 2019-10-16 | End: 2019-10-16 | Stop reason: SURG

## 2019-10-16 RX ORDER — SODIUM CHLORIDE 0.9 % (FLUSH) 0.9 %
3 SYRINGE (ML) INJECTION EVERY 12 HOURS SCHEDULED
Status: DISCONTINUED | OUTPATIENT
Start: 2019-10-16 | End: 2019-10-16 | Stop reason: HOSPADM

## 2019-10-16 RX ORDER — SODIUM CHLORIDE 0.9 % (FLUSH) 0.9 %
3-10 SYRINGE (ML) INJECTION AS NEEDED
Status: DISCONTINUED | OUTPATIENT
Start: 2019-10-16 | End: 2019-10-16 | Stop reason: HOSPADM

## 2019-10-16 RX ADMIN — PROPOFOL 30 MG: 10 INJECTION, EMULSION INTRAVENOUS at 15:08

## 2019-10-16 RX ADMIN — PROPOFOL 30 MG: 10 INJECTION, EMULSION INTRAVENOUS at 15:07

## 2019-10-16 RX ADMIN — FENTANYL CITRATE 100 MCG: 50 INJECTION INTRAMUSCULAR; INTRAVENOUS at 14:59

## 2019-10-16 RX ADMIN — PROPOFOL 30 MG: 10 INJECTION, EMULSION INTRAVENOUS at 15:12

## 2019-10-16 RX ADMIN — PROPOFOL 50 MG: 10 INJECTION, EMULSION INTRAVENOUS at 15:05

## 2019-10-16 RX ADMIN — MIDAZOLAM HYDROCHLORIDE 2 MG: 1 INJECTION, SOLUTION INTRAMUSCULAR; INTRAVENOUS at 14:59

## 2019-10-16 RX ADMIN — SODIUM CHLORIDE, POTASSIUM CHLORIDE, SODIUM LACTATE AND CALCIUM CHLORIDE 125 ML/HR: 600; 310; 30; 20 INJECTION, SOLUTION INTRAVENOUS at 13:53

## 2019-10-16 RX ADMIN — VANCOMYCIN HYDROCHLORIDE 2 G: 1 INJECTION, POWDER, LYOPHILIZED, FOR SOLUTION INTRAVENOUS at 14:59

## 2019-10-16 RX ADMIN — LIDOCAINE HYDROCHLORIDE 3 ML: 20 INJECTION, SOLUTION EPIDURAL; INFILTRATION; INTRACAUDAL; PERINEURAL at 15:02

## 2019-10-16 NOTE — ANESTHESIA POSTPROCEDURE EVALUATION
Patient: Johnathon Mclain    Procedure Summary     Date:  10/16/19 Room / Location:  [facility] OR 01 /  [facility] OR    Anesthesia Start:  1459 Anesthesia Stop:  1518    Procedure:  INCISION AND DRAINAGE ABSCESS TO POSTERIOR NECK (N/A Abdomen) Diagnosis:       Abscess      (Abscess [L02.91])    Surgeon:  Singh Schuster MD Provider:  Fady Rebolledo DO    Anesthesia Type:  general ASA Status:  4          Anesthesia Type: general  Last vitals  BP   128/75 (10/16/19 1612)   Temp   98 °F (36.7 °C) (10/16/19 1549)   Pulse   80 (10/16/19 1612)   Resp   16 (10/16/19 1612)     SpO2   96 % (10/16/19 1612)     Post Anesthesia Care and Evaluation    Patient location during evaluation: PHASE II  Patient participation: complete - patient participated  Level of consciousness: awake and alert  Pain score: 1  Pain management: adequate  Airway patency: patent  Anesthetic complications: No anesthetic complications  PONV Status: controlled  Cardiovascular status: acceptable  Respiratory status: acceptable  Hydration status: acceptable

## 2019-10-16 NOTE — OP NOTE
Johnathon Gomezs  10/16/2019      Operative Progress Note:    Surgeon and Assistant: Dr. Schuster    Pre-Operative Diagnosis: Abscess posterior neck    Post-Operative Diagnosis: Abscess posterior neck    Procedure(s): Incision and drainage abscess posterior neck, 4 cm subcutaneous mass    Type of Anesthesia Administered: IV general with Marcaine 0.5% with epinephrine    Estimated Blood Loss: Minimal    Blood Products: None    Specimen Obtained/Removed: Culture    Complication(s):  None    Graft/Implant/Prosthetics/Implanted Device/Transplants:  None    Indication: Patient's 44-year-old white male who is super morbidly obese    Findings: 4 cm abscess subcutaneous tissue posterior neck    Operative Report:  Patient was taken operating room laid in a left lateral decubitus position on stretcher with the head in the up position.  IV sedation was given.  Posterior neck was prepped and draped you sterile fashion.  Marcaine 0.5% with epinephrine infiltrated.  An incision was made into a big abscess cavity in the subcutaneous tissue which measured 4 cm in diameter.  Cultures were taken.  The wound was irrigated.  The wound was packed.  The procedure terminated.  Patient taught procedure very well was returned recovery room satisfactory edition.    Discharge Summary:    Patient will be discharged home after recovery.  Patient will be seen back in the office in 7 days.  Percocet 5/325 1 tablet every 4 hours when necessary for pain.  Patient is to call the office or hospital for any difficulties.      Electronically Signed by: Singh Schuster MD        Dictated Utilizing Dragon Dictation

## 2019-10-16 NOTE — ANESTHESIA PREPROCEDURE EVALUATION
Anesthesia Evaluation     Patient summary reviewed and Nursing notes reviewed   no history of anesthetic complications:  NPO Solid Status: > 8 hours  NPO Liquid Status: > 8 hours           Airway   Mallampati: III  TM distance: >3 FB  Neck ROM: full  Possible difficult intubation  Dental - normal exam   (+) poor dentition    Pulmonary    (+) COPD moderate, asthma, sleep apnea, decreased breath sounds,   Cardiovascular - normal exam  Exercise tolerance: poor (<4 METS)    NYHA Classification: II  Rhythm: regular  Rate: normal    (+) hypertension, hyperlipidemia,       Neuro/Psych  (+) psychiatric history,     GI/Hepatic/Renal/Endo    (+) morbid obesity, GERD,  diabetes mellitus,     Musculoskeletal     (+) back pain, chronic pain,   Abdominal  - normal exam    Bowel sounds: normal.   Substance History - negative use     OB/GYN negative ob/gyn ROS         Other   (+) blood dyscrasia, arthritis   history of cancer                      Anesthesia Plan    ASA 4     general     intravenous induction   Anesthetic plan, all risks, benefits, and alternatives have been provided, discussed and informed consent has been obtained with: patient.  Use of blood products discussed with patient .   Plan discussed with CRNA.

## 2019-10-19 LAB
BACTERIA SPEC AEROBE CULT: NORMAL
GRAM STN SPEC: NORMAL
GRAM STN SPEC: NORMAL

## 2019-10-22 ENCOUNTER — OFFICE VISIT (OUTPATIENT)
Dept: SURGERY | Facility: CLINIC | Age: 44
End: 2019-10-22

## 2019-10-22 VITALS
HEART RATE: 98 BPM | SYSTOLIC BLOOD PRESSURE: 145 MMHG | WEIGHT: 315 LBS | DIASTOLIC BLOOD PRESSURE: 84 MMHG | HEIGHT: 70 IN | TEMPERATURE: 98.5 F | BODY MASS INDEX: 45.1 KG/M2

## 2019-10-22 DIAGNOSIS — L02.91 ABSCESS: Primary | ICD-10-CM

## 2019-10-22 PROCEDURE — 99024 POSTOP FOLLOW-UP VISIT: CPT | Performed by: SURGERY

## 2019-10-22 NOTE — PROGRESS NOTES
10/22/2019    Patient Information  Johnathon Jade [facility]  [place] [address]  1975  [phone] (home)     Chief Complaint  Chief Complaint   Patient presents with   • Post-op Follow-up     Post I&D Neck Abscess       HPI  Patient is a 44-year-old white male 1 week status post I&D of neck abscess.  He is doing well    Past Medical History  Past Medical History:   Diagnosis Date   • Asthma    • Back pain    • COPD (chronic obstructive pulmonary disease) (CMS/[facility])    • CPAP (continuous positive airway pressure) dependence    • Diabetes mellitus (CMS/HCC)    • Fibromyalgia    • GERD (gastroesophageal reflux disease)    • Gout    • Hyperlipidemia     mixed   • Hypertension    • Neuralgia and neuritis    • Osteoarthrosis    • Oxygen dependent     2L AT NIGHT    • Polycythemia    • Sleep apnea    • Sleep apnea    • Type II diabetes mellitus, uncontrolled (CMS/[facility])     uncomplicated       Past Surgical History  Past Surgical History:   Procedure Laterality Date   • EXCISION MASS HEAD/NECK N/A 5/11/2018    Procedure: SKIN LESION EXCISION NECK & UPPER BACK;  Surgeon: New Camacho MD;  Location:  [facility] OR;  Service: General   • INCISION AND DRAINAGE ABSCESS N/A 10/16/2019    Procedure: INCISION AND DRAINAGE ABSCESS TO POSTERIOR NECK;  Surgeon: Singh Schuster MD;  Location:  [facility] OR;  Service: General       Current Medications  Current Outpatient Medications   Medication Sig Dispense Refill   • acetaminophen-codeine (TYLENOL #3) 300-30 MG per tablet Take 1 tablet by mouth Every 4 (Four) Hours As Needed for Moderate Pain . 30 tablet 0   • aspirin 81 MG EC tablet Take 1 tablet by mouth Daily. 90 tablet 1   • busPIRone (BUSPAR) 10 MG tablet TAKE 1 Tablet BY MOUTH TWICE DAILY AS NEEDED FOR ANXIETY 60 tablet 3   • carvedilol (COREG) 6.25 MG tablet Take 6.25 mg by mouth 2 (Two) Times a Day With Meals.  5   • cetirizine (zyrTEC) 10 MG tablet Take 1 tablet by mouth Daily As Needed for Allergies. 30  tablet 5   • Cholecalciferol (VITAMIN D) 1000 units tablet Take 1 tablet by mouth Daily. 30 tablet 5   • colchicine 0.6 MG tablet Take 1 tablet by mouth Daily. 30 tablet 5   • febuxostat (ULORIC) 80 MG tablet tablet Take 1 tablet by mouth Daily. 30 tablet 5   • fenofibrate (FENOGLIDE) 120 MG tablet Take 1 tablet by mouth Every Night. 30 tablet 5   • Fluticasone Furoate-Vilanterol (BREO ELLIPTA) 100-25 MCG/INH inhaler 1 puff every morning 1 each 5   • gabapentin (NEURONTIN) 600 MG tablet Take 1 tablet by mouth 3 (Three) Times a Day. 90 tablet 2   • glucose blood test strip Use as instructed 50 each 12   • ibuprofen (ADVIL,MOTRIN) 600 MG tablet Take 1 tablet by mouth 3 (Three) Times a Day As Needed for Mild Pain  or Moderate Pain . 30 tablet 0   • icosapent ethyl (VASCEPA) 1 g capsule capsule Take 1 g by mouth 2 (Two) Times a Day With Meals. 120 capsule 5   • Insulin Glargine (TOUJEO SOLOSTAR) 300 UNIT/ML solution pen-injector injection Inject 21 Units under the skin into the appropriate area as directed Daily. 5 pen 5   • Insulin Pen Needle (PEN NEEDLES) 32G X 4 MM misc 1 Device Daily. 100 each 3   • ketoconazole (NIZORAL) 2 % shampoo Apply  topically to the appropriate area as directed 2 (Two) Times a Week. (Patient taking differently: Apply 1 application topically to the appropriate area as directed 2 (Two) Times a Week. Prior to [facility] Admission, Patient was on: takes on mondays and thursdays) 120 mL 5   • lisinopril (PRINIVIL,ZESTRIL) 20 MG tablet Take 20 mg by mouth 2 (Two) Times a Day.  5   • metFORMIN ER (GLUCOPHAGE-XR) 500 MG 24 hr tablet Take 1 tablet by mouth Daily With Breakfast. 30 tablet 5   • omeprazole (priLOSEC) 20 MG capsule Take 1 capsule by mouth Daily. 30 capsule 5   • ONE TOUCH LANCETS misc 1 Device 2 (Two) Times a Day. 200 each 3   • oxyCODONE-acetaminophen (PERCOCET) 5-325 MG per tablet Take 1-2 tablets by mouth Every 4 (Four) Hours As Needed (Pain). 5 tablet 0   • PROAIR  (90 Base)  MCG/ACT inhaler INHALE 1 PUFF BY MOUTH EVERY 4 TO 6 HOURS AS NEEDED FRO FOR WHEEZING 8.5 g 5   • sertraline (ZOLOFT) 50 MG tablet Take 50 mg by mouth Daily.  5   • sulfamethoxazole-trimethoprim (BACTRIM DS,SEPTRA DS) 800-160 MG per tablet Take 1 tablet by mouth 2 (Two) Times a Day for 10 days. 20 tablet 0   • traZODone (DESYREL) 50 MG tablet TAKE 1/2 TO 1 TABLET BY MOUTH EVERY NIGHT AT BEDTIME AS NEEDED FOR SLEEP 30 tablet 5     No current facility-administered medications for this visit.        Allergies  Patient has no known allergies.            Vitals:  Vitals:    10/22/19 1454   BP: 145/84   Pulse: 98   Temp: 98.5 °F (36.9 °C)       Body mass index is 50.43 kg/m².    Physical Exam  Physical Exam  44-year-old white male no distress  Neck wound looks good  Assessment   As post I&D neck abscess    Plan  Packing.  Return as needed        This document signed by Singh Schuster MD October 22, 2019 3:17 PM

## 2019-10-28 ENCOUNTER — OFFICE VISIT (OUTPATIENT)
Dept: FAMILY MEDICINE CLINIC | Facility: CLINIC | Age: 44
End: 2019-10-28

## 2019-10-28 VITALS
BODY MASS INDEX: 45.1 KG/M2 | SYSTOLIC BLOOD PRESSURE: 130 MMHG | HEIGHT: 70 IN | WEIGHT: 315 LBS | TEMPERATURE: 97.8 F | OXYGEN SATURATION: 97 % | DIASTOLIC BLOOD PRESSURE: 80 MMHG | HEART RATE: 87 BPM

## 2019-10-28 DIAGNOSIS — IMO0002 UNCONTROLLED TYPE 2 DIABETES MELLITUS WITH DIABETIC PERIPHERAL ANGIOPATHY WITHOUT GANGRENE, WITHOUT LONG-TERM CURRENT USE OF INSULIN: ICD-10-CM

## 2019-10-28 DIAGNOSIS — E11.40 DIABETIC NEUROPATHY, PAINFUL (HCC): Primary | ICD-10-CM

## 2019-10-28 DIAGNOSIS — E11.40 TYPE 2 DIABETES MELLITUS WITH DIABETIC NEUROPATHY, WITH LONG-TERM CURRENT USE OF INSULIN (HCC): ICD-10-CM

## 2019-10-28 DIAGNOSIS — M79.2 NEURALGIA AND NEURITIS: Chronic | ICD-10-CM

## 2019-10-28 DIAGNOSIS — E66.01 CLASS 3 SEVERE OBESITY DUE TO EXCESS CALORIES WITH SERIOUS COMORBIDITY AND BODY MASS INDEX (BMI) OF 45.0 TO 49.9 IN ADULT (HCC): ICD-10-CM

## 2019-10-28 DIAGNOSIS — J44.9 CHRONIC OBSTRUCTIVE PULMONARY DISEASE, UNSPECIFIED COPD TYPE (HCC): ICD-10-CM

## 2019-10-28 DIAGNOSIS — L02.11 ABSCESS OF NECK: ICD-10-CM

## 2019-10-28 DIAGNOSIS — M15.9 PRIMARY OSTEOARTHRITIS INVOLVING MULTIPLE JOINTS: ICD-10-CM

## 2019-10-28 DIAGNOSIS — Z79.4 TYPE 2 DIABETES MELLITUS WITH DIABETIC NEUROPATHY, WITH LONG-TERM CURRENT USE OF INSULIN (HCC): ICD-10-CM

## 2019-10-28 DIAGNOSIS — M79.2 NEURALGIA AND NEURITIS: ICD-10-CM

## 2019-10-28 DIAGNOSIS — E55.9 VITAMIN D DEFICIENCY: ICD-10-CM

## 2019-10-28 LAB
25(OH)D3 SERPL-MCNC: 36.2 NG/ML (ref 30–100)
ALBUMIN SERPL-MCNC: 4.6 G/DL (ref 3.5–5.2)
ALBUMIN UR-MCNC: <1.2 MG/DL
ALBUMIN/GLOB SERPL: 1.5 G/DL
ALP SERPL-CCNC: 80 U/L (ref 39–117)
ALT SERPL W P-5'-P-CCNC: 42 U/L (ref 1–41)
ANION GAP SERPL CALCULATED.3IONS-SCNC: 9.8 MMOL/L (ref 5–15)
AST SERPL-CCNC: 25 U/L (ref 1–40)
BASOPHILS # BLD AUTO: 0.08 10*3/MM3 (ref 0–0.2)
BASOPHILS NFR BLD AUTO: 1.1 % (ref 0–1.5)
BILIRUB SERPL-MCNC: 0.7 MG/DL (ref 0.2–1.2)
BUN BLD-MCNC: 29 MG/DL (ref 6–20)
BUN/CREAT SERPL: 22 (ref 7–25)
CALCIUM SPEC-SCNC: 10.4 MG/DL (ref 8.6–10.5)
CHLORIDE SERPL-SCNC: 94 MMOL/L (ref 98–107)
CHOLEST SERPL-MCNC: 164 MG/DL (ref 0–200)
CO2 SERPL-SCNC: 29.2 MMOL/L (ref 22–29)
CREAT BLD-MCNC: 1.32 MG/DL (ref 0.76–1.27)
CREAT UR-MCNC: 165.5 MG/DL
DEPRECATED RDW RBC AUTO: 41 FL (ref 37–54)
EOSINOPHIL # BLD AUTO: 0.75 10*3/MM3 (ref 0–0.4)
EOSINOPHIL NFR BLD AUTO: 10 % (ref 0.3–6.2)
ERYTHROCYTE [DISTWIDTH] IN BLOOD BY AUTOMATED COUNT: 12.7 % (ref 12.3–15.4)
GFR SERPL CREATININE-BSD FRML MDRD: 58 ML/MIN/1.73
GLOBULIN UR ELPH-MCNC: 3 GM/DL
GLUCOSE BLD-MCNC: 162 MG/DL (ref 65–99)
HBA1C MFR BLD: 6.7 % (ref 4.8–5.6)
HCT VFR BLD AUTO: 52.3 % (ref 37.5–51)
HDLC SERPL-MCNC: 24 MG/DL (ref 40–60)
HGB BLD-MCNC: 18.4 G/DL (ref 13–17.7)
IMM GRANULOCYTES # BLD AUTO: 0.03 10*3/MM3 (ref 0–0.05)
IMM GRANULOCYTES NFR BLD AUTO: 0.4 % (ref 0–0.5)
LDLC SERPL CALC-MCNC: 104 MG/DL (ref 0–100)
LDLC/HDLC SERPL: 4.32 {RATIO}
LYMPHOCYTES # BLD AUTO: 2.59 10*3/MM3 (ref 0.7–3.1)
LYMPHOCYTES NFR BLD AUTO: 34.5 % (ref 19.6–45.3)
MCH RBC QN AUTO: 31.1 PG (ref 26.6–33)
MCHC RBC AUTO-ENTMCNC: 35.2 G/DL (ref 31.5–35.7)
MCV RBC AUTO: 88.3 FL (ref 79–97)
MICROALBUMIN/CREAT UR: NORMAL MG/G{CREAT}
MONOCYTES # BLD AUTO: 0.81 10*3/MM3 (ref 0.1–0.9)
MONOCYTES NFR BLD AUTO: 10.8 % (ref 5–12)
NEUTROPHILS # BLD AUTO: 3.25 10*3/MM3 (ref 1.7–7)
NEUTROPHILS NFR BLD AUTO: 43.2 % (ref 42.7–76)
NRBC BLD AUTO-RTO: 0 /100 WBC (ref 0–0.2)
PLATELET # BLD AUTO: 227 10*3/MM3 (ref 140–450)
PMV BLD AUTO: 10.6 FL (ref 6–12)
POTASSIUM BLD-SCNC: 5.2 MMOL/L (ref 3.5–5.2)
PROT SERPL-MCNC: 7.6 G/DL (ref 6–8.5)
RBC # BLD AUTO: 5.92 10*6/MM3 (ref 4.14–5.8)
SODIUM BLD-SCNC: 133 MMOL/L (ref 136–145)
TRIGL SERPL-MCNC: 182 MG/DL (ref 0–150)
TSH SERPL DL<=0.05 MIU/L-ACNC: 1.65 UIU/ML (ref 0.27–4.2)
VIT B12 BLD-MCNC: 556 PG/ML (ref 211–946)
VLDLC SERPL-MCNC: 36.4 MG/DL (ref 5–40)
WBC NRBC COR # BLD: 7.51 10*3/MM3 (ref 3.4–10.8)

## 2019-10-28 PROCEDURE — 82306 VITAMIN D 25 HYDROXY: CPT | Performed by: NURSE PRACTITIONER

## 2019-10-28 PROCEDURE — 99214 OFFICE O/P EST MOD 30 MIN: CPT | Performed by: NURSE PRACTITIONER

## 2019-10-28 PROCEDURE — 80061 LIPID PANEL: CPT | Performed by: NURSE PRACTITIONER

## 2019-10-28 PROCEDURE — 80053 COMPREHEN METABOLIC PANEL: CPT | Performed by: NURSE PRACTITIONER

## 2019-10-28 PROCEDURE — 82570 ASSAY OF URINE CREATININE: CPT | Performed by: NURSE PRACTITIONER

## 2019-10-28 PROCEDURE — 82607 VITAMIN B-12: CPT | Performed by: NURSE PRACTITIONER

## 2019-10-28 PROCEDURE — 82043 UR ALBUMIN QUANTITATIVE: CPT | Performed by: NURSE PRACTITIONER

## 2019-10-28 PROCEDURE — 83036 HEMOGLOBIN GLYCOSYLATED A1C: CPT | Performed by: NURSE PRACTITIONER

## 2019-10-28 PROCEDURE — 85025 COMPLETE CBC W/AUTO DIFF WBC: CPT | Performed by: NURSE PRACTITIONER

## 2019-10-28 PROCEDURE — 84443 ASSAY THYROID STIM HORMONE: CPT | Performed by: NURSE PRACTITIONER

## 2019-10-28 RX ORDER — GLIPIZIDE 5 MG/1
5 TABLET ORAL DAILY
Refills: 5 | COMMUNITY
Start: 2019-09-26 | End: 2019-12-31 | Stop reason: SDUPTHER

## 2019-10-28 RX ORDER — COLCHICINE 0.6 MG/1
1 CAPSULE ORAL DAILY
Refills: 5 | COMMUNITY
Start: 2019-10-15 | End: 2019-10-28

## 2019-10-28 RX ORDER — NEOMYCIN SULFATE, POLYMYXIN B SULFATE AND HYDROCORTISONE 10; 3.5; 1 MG/ML; MG/ML; [USP'U]/ML
SUSPENSION/ DROPS AURICULAR (OTIC)
Qty: 10 ML | Refills: 0 | Status: SHIPPED | OUTPATIENT
Start: 2019-10-28 | End: 2019-11-04

## 2019-10-28 RX ORDER — ACETAMINOPHEN AND CODEINE PHOSPHATE 300; 60 MG/1; MG/1
1 TABLET ORAL EVERY 4 HOURS PRN
Qty: 60 TABLET | Refills: 0 | Status: SHIPPED | OUTPATIENT
Start: 2019-10-28 | End: 2019-11-27 | Stop reason: SDUPTHER

## 2019-10-28 RX ORDER — FENOFIBRATE 48 MG/1
48 TABLET, COATED ORAL DAILY
Refills: 5 | COMMUNITY
Start: 2019-10-15 | End: 2019-10-28

## 2019-10-28 NOTE — ASSESSMENT & PLAN NOTE
Proper body mechanics has been reviewed and discussed today.      As part of this patient's treatment plan they are being prescribed controlled substance/substances with potential for abuse. This patient has been made aware of the appropriate use of such medications, including potential risk of somnolence, limited ability to drive and / or work safely, and potential for overdose. It has also been made clear that these medications are for use by this patient only, without concomitant use of alcohol or other substances unless prescribed/advised by medical provider. Patient has completed controlled substance agreement detailing terms of continued prescribing of controlled substances including monitoring John reports, drug screens and pill counts. The patient was asked and states they are not receiving narcotic medication from any there provider or any provider that this office has not been made aware of. History and physical exam exhibit continued safe and appropriate use of controlled substances.   Goal: Improved quality of life and reduction in pain as evidenced by pt report.   John  has been reviewed as consistent.  UDS is on file.   Stop Tylenol 3.  Start Tylenol No. 4 1 every 6 hours as needed #60 with no refill.    Pain Clinic referral will be placed today. Pt states understanding.   Patient has been instructed and counseled regarding opioid misuse and risk of addiction.  We have discussed proper storage and disposal of controlled medication.

## 2019-10-28 NOTE — ASSESSMENT & PLAN NOTE
COPD-under the care of black lung pulmonologist.  Will refer to pulmonologist for regular COPD management

## 2019-10-28 NOTE — PROGRESS NOTES
Subjective   Johnathon Mclain is a 44 y.o. male.     Chief Complaint   Patient presents with   • URI     Foot pain   Joint pain       History of Present Illness:    Diabetes with diabetic neuropathy-patient is having exacerbation of bilateral foot pain.  He reports that his feet feel like he is walking on needles.  Pain is worse at night when he tries to sleep.  He does currently receive a gabapentin 600 mg 3 times a day.  He has been seen by podiatry in the past.  Patient did recently have a nerve conduction study that was abnormal.  Patient does report that he is compliant with his medication he is watching his glucose with fair control as long as he is noncompliant.  He does have periods of hyperglycemia.  Statin not indicated as patient is taking a fenofibrate.    Recent surgical removal of neck abscess- Home health has been coming out and doing packing.  He would like me to check the site as he thinks is completely closed.    COPD-history of working at home on.  Patient does have black lung and is seeing a black lung physician.  He is not seeing a regular pulmonologist at this time though he has been seen in the past.  [employer] guidelines are recommending COPD pathway.  Will refer to pulmonologist.     arthritis of multiple sites-patient has pain in his low back, hips and bilateral lower extremities.  Rates his pain as 7 on pain scale rating 0-10.  He does have radiology on file.  Patient was taking Norco in the past.  He states that Tylenol 3 does not help his symptoms at all.  Patient is unable to rest at night or be active due to pain.  Describes his pain as aching, catching and stiffness.  Worse with activity.  Patient is agreeable at this time to be referred to pain management.    The following portions of the patient's history and ROS were reviewed and updated as appropriate per provider:  Allergies, current medications, past family history, past medical history, past social history, past surgical history and  "problem list.    ROS and History reviewed as accurate per provider    Review of Systems   Constitutional: Positive for activity change. Negative for appetite change, chills, fatigue and fever.   HENT: Positive for ear pain. Negative for facial swelling, hearing loss, sinus pressure, sore throat, trouble swallowing and voice change.    Eyes: Negative.  Negative for pain, discharge and visual disturbance.   Respiratory: Positive for shortness of breath (at times ). Negative for apnea, cough, chest tightness and wheezing.    Cardiovascular: Negative for chest pain, palpitations and leg swelling.   Gastrointestinal: Negative.  Negative for abdominal pain, blood in stool, constipation, diarrhea, nausea and vomiting.   Endocrine: Negative.    Genitourinary: Negative.  Negative for dysuria.   Musculoskeletal: Positive for arthralgias, back pain and myalgias. Negative for neck stiffness.   Skin: Negative for color change.   Allergic/Immunologic: Negative.    Neurological: Positive for numbness (feet ). Negative for headaches.   Hematological: Negative.    Psychiatric/Behavioral: Negative.  Negative for agitation, confusion, sleep disturbance and suicidal ideas. The patient is not nervous/anxious.    All other systems reviewed and are negative.      Objective     /80   Pulse 87   Temp 97.8 °F (36.6 °C) (Tympanic)   Ht 177 cm (69.69\")   Wt (!) 159 kg (351 lb)   SpO2 97%   BMI 50.81 kg/m²   Admission on 10/16/2019, Discharged on 10/16/2019   Component Date Value Ref Range Status   • Glucose 10/16/2019 180* 70 - 130 mg/dL Final   • WBC 10/16/2019 9.94  3.40 - 10.80 10*3/mm3 Final   • RBC 10/16/2019 5.87* 4.14 - 5.80 10*6/mm3 Final   • Hemoglobin 10/16/2019 17.6  13.0 - 17.7 g/dL Final   • Hematocrit 10/16/2019 51.3* 37.5 - 51.0 % Final   • MCV 10/16/2019 87.4  79.0 - 97.0 fL Final   • MCH 10/16/2019 30.0  26.6 - 33.0 pg Final   • MCHC 10/16/2019 34.3  31.5 - 35.7 g/dL Final   • RDW 10/16/2019 13.6  12.3 - 15.4 % " Final   • RDW-SD 10/16/2019 43.2  37.0 - 54.0 fl Final   • MPV 10/16/2019 10.0  6.0 - 12.0 fL Final   • Platelets 10/16/2019 207  140 - 450 10*3/mm3 Final   • Glucose 10/16/2019 188* 65 - 99 mg/dL Final   • BUN 10/16/2019 16  6 - 20 mg/dL Final   • Creatinine 10/16/2019 0.97  0.76 - 1.27 mg/dL Final   • Sodium 10/16/2019 135* 136 - 145 mmol/L Final   • Potassium 10/16/2019 4.3  3.5 - 5.2 mmol/L Final   • Chloride 10/16/2019 99  98 - 107 mmol/L Final   • CO2 10/16/2019 22.9  22.0 - 29.0 mmol/L Final   • Calcium 10/16/2019 9.9  8.6 - 10.5 mg/dL Final   • Total Protein 10/16/2019 7.2  6.0 - 8.5 g/dL Final   • Albumin 10/16/2019 4.43  3.50 - 5.20 g/dL Final   • ALT (SGPT) 10/16/2019 39  1 - 41 U/L Final   • AST (SGOT) 10/16/2019 25  1 - 40 U/L Final   • Alkaline Phosphatase 10/16/2019 91  39 - 117 U/L Final   • Total Bilirubin 10/16/2019 1.0  0.2 - 1.2 mg/dL Final   • eGFR Non African Amer 10/16/2019 83  >60 mL/min/1.73 Final   • Globulin 10/16/2019 2.8  gm/dL Final   • A/G Ratio 10/16/2019 1.6  g/dL Final   • BUN/Creatinine Ratio 10/16/2019 16.5  7.0 - 25.0 Final   • Anion Gap 10/16/2019 13.1  5.0 - 15.0 mmol/L Final   • Wound Culture 10/16/2019 No growth at 3 days   Final   • Gram Stain 10/16/2019 Moderate (3+) WBCs seen   Final   • Gram Stain 10/16/2019 Rare (1+) Gram positive cocci in clusters   Final   • Glucose 10/16/2019 142* 70 - 130 mg/dL Final       Physical Exam   Constitutional: He is oriented to person, place, and time. Vital signs are normal. He appears well-developed and well-nourished. No distress.   Visibly obese.  Pleasant and friendly with no acute distress.   HENT:   Head: Normocephalic and atraumatic.   Right Ear: External ear and ear canal normal. There is swelling. Tympanic membrane is injected.   Left Ear: External ear and ear canal normal. There is swelling. Tympanic membrane is injected.   Nose: Nose normal.   Mouth/Throat: Oropharynx is clear and moist and mucous membranes are normal. No  oropharyngeal exudate.   Eyes: Conjunctivae and EOM are normal. Pupils are equal, round, and reactive to light. Right eye exhibits no discharge. Left eye exhibits no discharge.   Neck: Normal range of motion. Neck supple. No tracheal deviation present. No thyromegaly present.       Cardiovascular: Normal rate, regular rhythm, normal heart sounds and intact distal pulses.   No murmur heard.  Pulmonary/Chest: Effort normal and breath sounds normal. No respiratory distress. He has no wheezes. He has no rales. He exhibits no tenderness.   Abdominal: Soft. Normal appearance and bowel sounds are normal. He exhibits no distension and no mass. There is no tenderness. There is no rebound and no guarding.   Musculoskeletal:        Lumbar back: He exhibits decreased range of motion, tenderness, pain and spasm.   Decreased lumbar curvature, there is pain with forward flexion. DTR's +2. No edema.      Vascular Status -  His right foot exhibits normal foot vasculature  and no edema. His left foot exhibits normal foot vasculature  and no edema.  Skin Integrity  -  His right foot skin is intact.His left foot skin is intact..  Lymphadenopathy:     He has no cervical adenopathy.   Neurological: He is alert and oriented to person, place, and time. He has normal reflexes.   Skin: Skin is warm and dry. Capillary refill takes less than 2 seconds. No rash noted. He is not diaphoretic. No erythema. No pallor.   Psychiatric: He has a normal mood and affect. His speech is normal and behavior is normal. Judgment and thought content normal. His affect is not inappropriate. Cognition and memory are normal.   Nursing note and vitals reviewed.      Assessment/Plan     Problem List Items Addressed This Visit        Respiratory    COPD (chronic obstructive pulmonary disease) (CMS/[facility])    Current Assessment & Plan     COPD-under the care of black lung pulmonologist.  Will refer to pulmonologist for regular COPD management             Relevant Orders     Ambulatory Referral to Pulmonology       Digestive    Class 3 severe obesity with serious comorbidity in adult (CMS/[facility])    Current Assessment & Plan     Obesity is unchanged.  Discussed the patient's BMI.  The BMI is above average; BMI management plan is completed.  General weight loss/lifestyle modification strategies discussed (elicit support from others; identify saboteurs; non-food rewards, etc).  Informal exercise measures discussed, e.g. taking stairs instead of elevator.  Regular aerobic exercise program discussed.            Endocrine    Diabetes mellitus with diabetic neuropathy, with long-term current use of insulin (CMS/[facility])    Overview     Abnormal nerve conduction study on file         Current Assessment & Plan     Pt has been educated/instructed on diabetic care and protocols.  Diabetic diet instructions provided.  Medication regimen reviewed.  Discussed possible side effects and interactions of current medications.  Sick day rules reviewed. Continue to monitor blood sugar and report abnormal readings as discussed today. Understanding stated by patient.     No statin ordered as patient is on fenofibrate and the risk of interaction is too great.  Refer to podiatry.  Continue gabapentin.  Patient has one refill remaining.         Relevant Medications    glipizide (GLUCOTROL) 5 MG tablet    Other Relevant Orders    CBC Auto Differential       Nervous and Auditory    Neuralgia and neuritis (Chronic)    Current Assessment & Plan     Continue gabapentin.  Nerve conduction study reviewed.  Refer to podiatry.         Relevant Orders    Ambulatory Referral to Pain Management    CBC Auto Differential       Musculoskeletal and Integument    Osteoarthrosis    Current Assessment & Plan     Proper body mechanics has been reviewed and discussed today.      As part of this patient's treatment plan they are being prescribed controlled substance/substances with potential for abuse. This patient has been made aware  of the appropriate use of such medications, including potential risk of somnolence, limited ability to drive and / or work safely, and potential for overdose. It has also been made clear that these medications are for use by this patient only, without concomitant use of alcohol or other substances unless prescribed/advised by medical provider. Patient has completed controlled substance agreement detailing terms of continued prescribing of controlled substances including monitoring John reports, drug screens and pill counts. The patient was asked and states they are not receiving narcotic medication from any there provider or any provider that this office has not been made aware of. History and physical exam exhibit continued safe and appropriate use of controlled substances.   Goal: Improved quality of life and reduction in pain as evidenced by pt report.   John  has been reviewed as consistent.  UDS is on file.   Stop Tylenol 3.  Start Tylenol No. 4 1 every 6 hours as needed #60 with no refill.    Pain Clinic referral will be placed today. Pt states understanding.   Patient has been instructed and counseled regarding opioid misuse and risk of addiction.  We have discussed proper storage and disposal of controlled medication.           Relevant Orders    Ambulatory Referral to Pain Management    Urine Drug Screen - Urine, Clean Catch    CBC Auto Differential       Other    Abscess of neck    Overview     Recurrent         Current Assessment & Plan     Improved at this time            Other Visit Diagnoses     Diabetic neuropathy, painful (CMS/HCC)    -  Primary    Relevant Medications    glipizide (GLUCOTROL) 5 MG tablet    Other Relevant Orders    Ambulatory Referral to Podiatry    Ambulatory Referral to Pain Management    Uncontrolled type 2 diabetes mellitus with diabetic peripheral angiopathy without gangrene, without long-term current use of insulin (CMS/HCC)        Relevant Medications    glipizide  (GLUCOTROL) 5 MG tablet    Other Relevant Orders    CBC Auto Differential    Vitamin D deficiency              Recent labs reviewed and discussed.  Recent surgical consult reviewed discussed.  Nerve conduction study reviewed and discussed           Patient's Body mass index is 50.81 kg/m². BMI is above normal parameters. Recommendations include: exercise counseling and nutrition counseling.          I have discussed diagnosis in detail today allowing time for questions and answers. Patient is aware of reasons to seek urgent or emergent medical care as well as reasons to return to the clinic for evaluation. Possible side effects, interactions and progression of symptoms discussed as well. Patient / family states understanding.   Emotional support and active listening provided.      UDS labs today.    Follow up in one month, sooner if needed. Routine labs every 3-6 months.     Dictated utilizing Dragon dictation. Errors in dictation may reflect use of voice recognition software and not all errors in transcription may have been detected prior to signing.           This document has been electronically signed by:  TYSON Vinson, NP-C

## 2019-10-28 NOTE — ASSESSMENT & PLAN NOTE
Pt has been educated/instructed on diabetic care and protocols.  Diabetic diet instructions provided.  Medication regimen reviewed.  Discussed possible side effects and interactions of current medications.  Sick day rules reviewed. Continue to monitor blood sugar and report abnormal readings as discussed today. Understanding stated by patient.     No statin ordered as patient is on fenofibrate and the risk of interaction is too great.  Refer to podiatry.  Continue gabapentin.  Patient has one refill remaining.

## 2019-10-31 ENCOUNTER — TELEPHONE (OUTPATIENT)
Dept: FAMILY MEDICINE CLINIC | Facility: CLINIC | Age: 44
End: 2019-10-31

## 2019-10-31 NOTE — TELEPHONE ENCOUNTER
----- Message from TYSON Sepulveda sent at 10/30/2019  3:15 PM EDT -----  Let patient know that his glucose levels are getting higher and he has some abnormal labs that we need to discuss.  Have him come in and see me within the next 2 weeks to review these labs.  In the meantime I want him following strict diabetic diet and being compliant with his medication.      I have spoke to sydni and she will tell him a appointment to go over labs nov 13 at 2:30

## 2019-11-27 ENCOUNTER — OFFICE VISIT (OUTPATIENT)
Dept: FAMILY MEDICINE CLINIC | Facility: CLINIC | Age: 44
End: 2019-11-27

## 2019-11-27 VITALS
HEIGHT: 70 IN | HEART RATE: 81 BPM | TEMPERATURE: 98.1 F | BODY MASS INDEX: 45.1 KG/M2 | WEIGHT: 315 LBS | SYSTOLIC BLOOD PRESSURE: 136 MMHG | RESPIRATION RATE: 16 BRPM | DIASTOLIC BLOOD PRESSURE: 80 MMHG | OXYGEN SATURATION: 98 %

## 2019-11-27 DIAGNOSIS — E66.01 CLASS 3 SEVERE OBESITY DUE TO EXCESS CALORIES WITH SERIOUS COMORBIDITY AND BODY MASS INDEX (BMI) OF 45.0 TO 49.9 IN ADULT (HCC): Primary | ICD-10-CM

## 2019-11-27 DIAGNOSIS — E55.9 VITAMIN D DEFICIENCY: ICD-10-CM

## 2019-11-27 DIAGNOSIS — E11.40 TYPE 2 DIABETES MELLITUS WITH DIABETIC NEUROPATHY, WITH LONG-TERM CURRENT USE OF INSULIN (HCC): ICD-10-CM

## 2019-11-27 DIAGNOSIS — Z79.4 TYPE 2 DIABETES MELLITUS WITH DIABETIC NEUROPATHY, WITH LONG-TERM CURRENT USE OF INSULIN (HCC): ICD-10-CM

## 2019-11-27 DIAGNOSIS — M15.9 PRIMARY OSTEOARTHRITIS INVOLVING MULTIPLE JOINTS: ICD-10-CM

## 2019-11-27 DIAGNOSIS — M79.2 NEURALGIA AND NEURITIS: ICD-10-CM

## 2019-11-27 PROCEDURE — 99214 OFFICE O/P EST MOD 30 MIN: CPT | Performed by: NURSE PRACTITIONER

## 2019-11-27 RX ORDER — ACETAMINOPHEN AND CODEINE PHOSPHATE 300; 60 MG/1; MG/1
1 TABLET ORAL EVERY 4 HOURS PRN
Qty: 60 TABLET | Refills: 0 | Status: SHIPPED | OUTPATIENT
Start: 2019-11-27 | End: 2019-12-31

## 2019-11-27 RX ORDER — GABAPENTIN 600 MG/1
600 TABLET ORAL 3 TIMES DAILY
Qty: 90 TABLET | Refills: 0 | Status: SHIPPED | OUTPATIENT
Start: 2019-11-27 | End: 2020-02-12

## 2019-11-27 NOTE — ASSESSMENT & PLAN NOTE
Diabetes is improving with treatment.   Continue current treatment regimen.  Reminded to bring in blood sugar diary at next visit.  Dietary recommendations for ADA diet.  Regular aerobic exercise.  Discussed ways to avoid symptomatic hypoglycemia.  Discussed sick day management.  Discussed foot care.  Reviewed last months diabetic foot exam with podiatry as well as diabetic eye exam with Dr. Chema Kumar on November 11, 2019  Diabetes will be reassessed in 3 months.

## 2019-11-27 NOTE — PROGRESS NOTES
Subjective   Johnathon Mclain is a 44 y.o. male.     Chief Complaint   Patient presents with   • Diabetes   • Osteoarthritis       History of Present Illness:    Type 2 diabetes with hyperglycemia and diabetic neuropathy-patient has been to see podiatry and had a diabetic eye exam since his last visit.  Patient currently receives gabapentin for diabetic neuropathy, Toujeo 21 units at bedtime, glipizide 5 mg daily, metformin 500 mg extended release daily.  He does receive an ACE inhibitor.  He does receive aspirin.  Not currently taking a statin due to the risk of interaction with fenofibrate.    Neuralgia and neuritis-has been seen by podiatry.  Patient receives gabapentin for his diabetic foot pain.  He is awaiting new diabetic shoes.    Morbid obesity-weight fluctuates up and down.  Patient is not very active.  He has had a weight problem all of his life.  He does report noncompliance with his diet.    Chronic joint pain of multiple sites.  Pain is located in his neck, shoulders, low back, hips and knees.  Patient worked as a  in his youth and has multiple areas of stiffness and pain.  Pain is worse with activity.  Weight gain has not helped his chronic osteoarthritis.  Radiology is on file.  Patient has been to physical therapy in the past without improvement.  He has failed over-the-counter medication.  Patient currently receives Tylenol No. 4 for his chronic pain.  Pain is rated as moderate-severe.  Patient denies any history of substance abuse or addiction.  Patient does report an improvement in mobility and quality of life with pain reduction received with current medication.      ROS and History reviewed as accurate per provider    The following portions of the patient's history and ROS were reviewed and updated as appropriate per provider:  Allergies, current medications, past family history, past medical history, past social history, past surgical history and problem list.    Review of Systems  "  Constitutional: Negative.  Negative for activity change, appetite change, fatigue, fever and unexpected weight change.   HENT: Negative.  Negative for ear pain, sinus pressure, sinus pain, sore throat and trouble swallowing.    Eyes: Negative.  Negative for discharge and itching.   Respiratory: Negative.  Negative for cough, chest tightness, shortness of breath and wheezing.    Cardiovascular: Negative.  Negative for chest pain, palpitations and leg swelling.   Gastrointestinal: Negative.  Negative for abdominal pain, blood in stool, constipation, diarrhea, nausea and vomiting.   Endocrine: Negative.  Negative for cold intolerance, heat intolerance and polydipsia.   Genitourinary: Negative.  Negative for dysuria, flank pain, frequency and urgency.   Musculoskeletal: Positive for arthralgias, back pain and myalgias.   Skin: Negative.  Negative for color change, pallor, rash and wound.   Allergic/Immunologic: Negative.  Negative for environmental allergies, food allergies and immunocompromised state.   Neurological: Positive for numbness. Negative for dizziness and headaches.   Hematological: Negative.  Negative for adenopathy. Does not bruise/bleed easily.   Psychiatric/Behavioral: Negative.  Negative for confusion, dysphoric mood, self-injury, sleep disturbance and suicidal ideas.       Objective     /80   Pulse 81   Temp 98.1 °F (36.7 °C) (Temporal)   Resp 16   Ht 177 cm (69.69\")   Wt (!) 159 kg (350 lb)   SpO2 98%   BMI 50.67 kg/m²   Office Visit on 10/28/2019   Component Date Value Ref Range Status   • Glucose 10/28/2019 162* 65 - 99 mg/dL Final   • BUN 10/28/2019 29* 6 - 20 mg/dL Final   • Creatinine 10/28/2019 1.32* 0.76 - 1.27 mg/dL Final   • Sodium 10/28/2019 133* 136 - 145 mmol/L Final   • Potassium 10/28/2019 5.2  3.5 - 5.2 mmol/L Final   • Chloride 10/28/2019 94* 98 - 107 mmol/L Final   • CO2 10/28/2019 29.2* 22.0 - 29.0 mmol/L Final   • Calcium 10/28/2019 10.4  8.6 - 10.5 mg/dL Final   • " Total Protein 10/28/2019 7.6  6.0 - 8.5 g/dL Final   • Albumin 10/28/2019 4.60  3.50 - 5.20 g/dL Final   • ALT (SGPT) 10/28/2019 42* 1 - 41 U/L Final   • AST (SGOT) 10/28/2019 25  1 - 40 U/L Final   • Alkaline Phosphatase 10/28/2019 80  39 - 117 U/L Final   • Total Bilirubin 10/28/2019 0.7  0.2 - 1.2 mg/dL Final   • eGFR Non African Amer 10/28/2019 58* >60 mL/min/1.73 Final   • Globulin 10/28/2019 3.0  gm/dL Final   • A/G Ratio 10/28/2019 1.5  g/dL Final   • BUN/Creatinine Ratio 10/28/2019 22.0  7.0 - 25.0 Final   • Anion Gap 10/28/2019 9.8  5.0 - 15.0 mmol/L Final   • TSH 10/28/2019 1.650  0.270 - 4.200 uIU/mL Final   • Hemoglobin A1C 10/28/2019 6.70* 4.80 - 5.60 % Final   • 25 Hydroxy, Vitamin D 10/28/2019 36.2  30.0 - 100.0 ng/ml Final   • Vitamin B-12 10/28/2019 556  211 - 946 pg/mL Final   • Total Cholesterol 10/28/2019 164  0 - 200 mg/dL Final   • Triglycerides 10/28/2019 182* 0 - 150 mg/dL Final   • HDL Cholesterol 10/28/2019 24* 40 - 60 mg/dL Final   • LDL Cholesterol  10/28/2019 104* 0 - 100 mg/dL Final   • VLDL Cholesterol 10/28/2019 36.4  5 - 40 mg/dL Final   • LDL/HDL Ratio 10/28/2019 4.32   Final   • Microalbumin/Creatinine Ratio 10/28/2019    Final   • Creatinine, Urine 10/28/2019 165.5  mg/dL Final   • Microalbumin, Urine 10/28/2019 <1.2  mg/dL Final   • WBC 10/28/2019 7.51  3.40 - 10.80 10*3/mm3 Final   • RBC 10/28/2019 5.92* 4.14 - 5.80 10*6/mm3 Final   • Hemoglobin 10/28/2019 18.4* 13.0 - 17.7 g/dL Final   • Hematocrit 10/28/2019 52.3* 37.5 - 51.0 % Final   • MCV 10/28/2019 88.3  79.0 - 97.0 fL Final   • MCH 10/28/2019 31.1  26.6 - 33.0 pg Final   • MCHC 10/28/2019 35.2  31.5 - 35.7 g/dL Final   • RDW 10/28/2019 12.7  12.3 - 15.4 % Final   • RDW-SD 10/28/2019 41.0  37.0 - 54.0 fl Final   • MPV 10/28/2019 10.6  6.0 - 12.0 fL Final   • Platelets 10/28/2019 227  140 - 450 10*3/mm3 Final   • Neutrophil % 10/28/2019 43.2  42.7 - 76.0 % Final   • Lymphocyte % 10/28/2019 34.5  19.6 - 45.3 % Final   •  Monocyte % 10/28/2019 10.8  5.0 - 12.0 % Final   • Eosinophil % 10/28/2019 10.0* 0.3 - 6.2 % Final   • Basophil % 10/28/2019 1.1  0.0 - 1.5 % Final   • Immature Grans % 10/28/2019 0.4  0.0 - 0.5 % Final   • Neutrophils, Absolute 10/28/2019 3.25  1.70 - 7.00 10*3/mm3 Final   • Lymphocytes, Absolute 10/28/2019 2.59  0.70 - 3.10 10*3/mm3 Final   • Monocytes, Absolute 10/28/2019 0.81  0.10 - 0.90 10*3/mm3 Final   • Eosinophils, Absolute 10/28/2019 0.75* 0.00 - 0.40 10*3/mm3 Final   • Basophils, Absolute 10/28/2019 0.08  0.00 - 0.20 10*3/mm3 Final   • Immature Grans, Absolute 10/28/2019 0.03  0.00 - 0.05 10*3/mm3 Final   • nRBC 10/28/2019 0.0  0.0 - 0.2 /100 WBC Final       Physical Exam   Constitutional: He is oriented to person, place, and time. Vital signs are normal. He appears well-developed and well-nourished. No distress.    friendly 44-year-old male appearing older than stated age.   HENT:   Head: Normocephalic and atraumatic.   Right Ear: Tympanic membrane, external ear and ear canal normal.   Left Ear: Tympanic membrane, external ear and ear canal normal.   Nose: Nose normal.   Mouth/Throat: Oropharynx is clear and moist and mucous membranes are normal.   Eyes: Conjunctivae and EOM are normal. Pupils are equal, round, and reactive to light. Right eye exhibits no discharge. Left eye exhibits no discharge.   Neck: Normal range of motion. Neck supple.   Cardiovascular: Normal rate, regular rhythm, normal heart sounds and intact distal pulses.   No murmur heard.  Pulmonary/Chest: Effort normal and breath sounds normal. No respiratory distress. He has no wheezes. He has no rales. He exhibits no tenderness.   Abdominal: Soft. Normal appearance and bowel sounds are normal. He exhibits no distension and no mass. There is no tenderness. There is no rebound and no guarding.   Musculoskeletal:        Lumbar back: He exhibits decreased range of motion, tenderness, pain and spasm.   Decreased lumbar curvature, there is pain  with forward flexion. DTR's +2. No edema.    Lymphadenopathy:     He has no cervical adenopathy.   Neurological: He is alert and oriented to person, place, and time. He has normal reflexes.   Skin: Skin is warm and dry. Capillary refill takes less than 2 seconds. No rash noted. He is not diaphoretic. No erythema. No pallor.   Tags in the axillary regions and along his neckline dried blood and erythema noted around the sites.   Psychiatric: He has a normal mood and affect. His speech is normal and behavior is normal. Judgment and thought content normal. His affect is not inappropriate. Cognition and memory are normal.   Nursing note and vitals reviewed.      Assessment/Plan     Problem List Items Addressed This Visit        Digestive    Class 3 severe obesity with serious comorbidity in adult (CMS/HCC) - Primary    Current Assessment & Plan     Obesity is unchanged.  Discussed the patient's BMI.  The BMI is above average; BMI management plan is completed.  General weight loss/lifestyle modification strategies discussed (elicit support from others; identify saboteurs; non-food rewards, etc).  Informal exercise measures discussed, e.g. taking stairs instead of elevator.  Regular aerobic exercise program discussed.         Relevant Orders    CBC & Differential    Comprehensive Metabolic Panel    TSH    Hemoglobin A1c    Vitamin D 25 Hydroxy    Vitamin B12    Lipid Panel       Endocrine    Diabetes mellitus with diabetic neuropathy, with long-term current use of insulin (CMS/[facility])    Overview     Abnormal nerve conduction study on file         Current Assessment & Plan     Diabetes is improving with treatment.   Continue current treatment regimen.  Reminded to bring in blood sugar diary at next visit.  Dietary recommendations for ADA diet.  Regular aerobic exercise.  Discussed ways to avoid symptomatic hypoglycemia.  Discussed sick day management.  Discussed foot care.  Reviewed last months diabetic foot exam with  podiatry as well as diabetic eye exam with Dr. Cheam Kumar on November 11, 2019  Diabetes will be reassessed in 3 months.         Relevant Medications    gabapentin (NEURONTIN) 600 MG tablet    Other Relevant Orders    CBC & Differential    Comprehensive Metabolic Panel    TSH    Hemoglobin A1c    Vitamin D 25 Hydroxy    Vitamin B12    Lipid Panel       Nervous and Auditory    Neuralgia and neuritis (Chronic)    Relevant Medications    gabapentin (NEURONTIN) 600 MG tablet    Other Relevant Orders    CBC & Differential    Comprehensive Metabolic Panel    TSH    Hemoglobin A1c    Vitamin D 25 Hydroxy    Vitamin B12    Lipid Panel       Musculoskeletal and Integument    Osteoarthrosis    Current Assessment & Plan     Proper body mechanics has been reviewed and discussed today.      As part of this patient's treatment plan they are being prescribed controlled substance/substances with potential for abuse. This patient has been made aware of the appropriate use of such medications, including potential risk of somnolence, limited ability to drive and / or work safely, and potential for overdose. It has also been made clear that these medications are for use by this patient only, without concomitant use of alcohol or other substances unless prescribed/advised by medical provider. Patient has completed controlled substance agreement detailing terms of continued prescribing of controlled substances including monitoring John reports, drug screens and pill counts. The patient was asked and states they are not receiving narcotic medication from any there provider or any provider that this office has not been made aware of. History and physical exam exhibit continued safe and appropriate use of controlled substances.   Goal: Improved quality of life and reduction in pain as evidenced by pt report.   John #[id number] has been reviewed as consistent.  UDS is on file.     Patient has been instructed and counseled regarding  opioid misuse and risk of addiction.  We have discussed proper storage and disposal of controlled medication.  Patient is at low-moderate risk for substance abuse due to receiving more than one controlled medication.     1 Month supply of Tylenol No. 4 has been provided for as needed use.  Patient is scheduled next month for pain management  consult.         Relevant Orders    CBC & Differential    Comprehensive Metabolic Panel    TSH    Hemoglobin A1c    Vitamin D 25 Hydroxy    Vitamin B12    Lipid Panel      Other Visit Diagnoses     Vitamin D deficiency        Relevant Orders    Vitamin D 25 Hydroxy          Patient can follow-up next month for skin tag removal.         Patient's Body mass index is 50.67 kg/m². BMI is above normal parameters. Recommendations include: exercise counseling and nutrition counseling.        Pt has been educated/instructed on diabetic care and protocols.  Diabetic diet instructions provided.  Medication regimen reviewed.  Discussed possible side effects and interactions of current medications.  Sick day rules reviewed. Continue to monitor blood sugar and report abnormal readings as discussed today. Understanding stated by patient.           I have discussed diagnosis in detail today allowing time for questions and answers. Patient is aware of reasons to seek urgent or emergent medical care as well as reasons to return to the clinic for evaluation. Possible side effects, interactions and progression of symptoms discussed as well. Patient / family states understanding.   Emotional support and active listening provided.       Follow up in 3 months. Routine labs fasting one week prior to next office visit. Return sooner if needed.     Dictated utilizing Dragon dictation. Errors in dictation may reflect use of voice recognition software and not all errors in transcription may have been detected prior to signing.           This document has been electronically signed by:  Erica Michel  APRN, NP-C

## 2019-11-27 NOTE — ASSESSMENT & PLAN NOTE
Proper body mechanics has been reviewed and discussed today.      As part of this patient's treatment plan they are being prescribed controlled substance/substances with potential for abuse. This patient has been made aware of the appropriate use of such medications, including potential risk of somnolence, limited ability to drive and / or work safely, and potential for overdose. It has also been made clear that these medications are for use by this patient only, without concomitant use of alcohol or other substances unless prescribed/advised by medical provider. Patient has completed controlled substance agreement detailing terms of continued prescribing of controlled substances including monitoring John reports, drug screens and pill counts. The patient was asked and states they are not receiving narcotic medication from any there provider or any provider that this office has not been made aware of. History and physical exam exhibit continued safe and appropriate use of controlled substances.   Goal: Improved quality of life and reduction in pain as evidenced by pt report.   John  has been reviewed as consistent.  UDS is on file.     Patient has been instructed and counseled regarding opioid misuse and risk of addiction.  We have discussed proper storage and disposal of controlled medication.  Patient is at low-moderate risk for substance abuse due to receiving more than one controlled medication.     1 Month supply of Tylenol No. 4 has been provided for as needed use.  Patient is scheduled next month for pain management  consult.

## 2019-12-10 DIAGNOSIS — L40.9 SCALP PSORIASIS: ICD-10-CM

## 2019-12-10 DIAGNOSIS — F41.9 ANXIETY: ICD-10-CM

## 2019-12-11 RX ORDER — BUSPIRONE HYDROCHLORIDE 10 MG/1
TABLET ORAL
Qty: 60 TABLET | Refills: 3 | Status: SHIPPED | OUTPATIENT
Start: 2019-12-11 | End: 2020-06-04

## 2019-12-11 RX ORDER — METFORMIN HYDROCHLORIDE EXTENDED-RELEASE TABLETS 500 MG/1
TABLET, FILM COATED, EXTENDED RELEASE ORAL
Qty: 60 TABLET | Refills: 5 | Status: SHIPPED | OUTPATIENT
Start: 2019-12-11 | End: 2019-12-31

## 2019-12-11 RX ORDER — KETOCONAZOLE 20 MG/ML
SHAMPOO TOPICAL
Qty: 120 ML | Refills: 5 | Status: SHIPPED | OUTPATIENT
Start: 2019-12-11

## 2019-12-11 RX ORDER — LISINOPRIL 20 MG/1
TABLET ORAL
Qty: 60 TABLET | Refills: 5 | Status: SHIPPED | OUTPATIENT
Start: 2019-12-11 | End: 2020-08-10

## 2019-12-11 RX ORDER — FENOFIBRATE 48 MG/1
TABLET, COATED ORAL
Qty: 30 TABLET | Refills: 5 | Status: SHIPPED | OUTPATIENT
Start: 2019-12-11 | End: 2020-02-05 | Stop reason: SDUPTHER

## 2019-12-11 RX ORDER — COLCHICINE 0.6 MG/1
CAPSULE ORAL
Qty: 30 CAPSULE | Refills: 5 | Status: SHIPPED | OUTPATIENT
Start: 2019-12-11 | End: 2020-06-04 | Stop reason: SDUPTHER

## 2019-12-31 ENCOUNTER — PROCEDURE VISIT (OUTPATIENT)
Dept: FAMILY MEDICINE CLINIC | Facility: CLINIC | Age: 44
End: 2019-12-31

## 2019-12-31 VITALS
BODY MASS INDEX: 45.1 KG/M2 | TEMPERATURE: 98.4 F | SYSTOLIC BLOOD PRESSURE: 150 MMHG | HEIGHT: 70 IN | WEIGHT: 315 LBS | HEART RATE: 80 BPM | OXYGEN SATURATION: 98 % | DIASTOLIC BLOOD PRESSURE: 90 MMHG

## 2019-12-31 DIAGNOSIS — Z79.4 TYPE 2 DIABETES MELLITUS WITH DIABETIC NEUROPATHY, WITH LONG-TERM CURRENT USE OF INSULIN (HCC): ICD-10-CM

## 2019-12-31 DIAGNOSIS — L91.8 SKIN TAGS, MULTIPLE ACQUIRED: ICD-10-CM

## 2019-12-31 DIAGNOSIS — F51.01 PRIMARY INSOMNIA: ICD-10-CM

## 2019-12-31 DIAGNOSIS — E66.01 CLASS 3 SEVERE OBESITY DUE TO EXCESS CALORIES WITH SERIOUS COMORBIDITY AND BODY MASS INDEX (BMI) OF 45.0 TO 49.9 IN ADULT (HCC): ICD-10-CM

## 2019-12-31 DIAGNOSIS — I10 ESSENTIAL HYPERTENSION: ICD-10-CM

## 2019-12-31 DIAGNOSIS — E11.40 TYPE 2 DIABETES MELLITUS WITH DIABETIC NEUROPATHY, WITH LONG-TERM CURRENT USE OF INSULIN (HCC): ICD-10-CM

## 2019-12-31 DIAGNOSIS — H65.03 NON-RECURRENT ACUTE SEROUS OTITIS MEDIA OF BOTH EARS: Primary | ICD-10-CM

## 2019-12-31 DIAGNOSIS — M1A.09X0 IDIOPATHIC CHRONIC GOUT OF MULTIPLE SITES WITHOUT TOPHUS: ICD-10-CM

## 2019-12-31 DIAGNOSIS — J44.9 CHRONIC OBSTRUCTIVE PULMONARY DISEASE, UNSPECIFIED COPD TYPE (HCC): ICD-10-CM

## 2019-12-31 PROCEDURE — 17111 DESTRUCTION B9 LESIONS 15/>: CPT | Performed by: NURSE PRACTITIONER

## 2019-12-31 PROCEDURE — 11201 RMVL SKIN TAGS EA ADDL 10: CPT | Performed by: NURSE PRACTITIONER

## 2019-12-31 PROCEDURE — 99214 OFFICE O/P EST MOD 30 MIN: CPT | Performed by: NURSE PRACTITIONER

## 2019-12-31 PROCEDURE — 11200 RMVL SKIN TAGS UP TO&INC 15: CPT | Performed by: NURSE PRACTITIONER

## 2019-12-31 RX ORDER — CARVEDILOL 6.25 MG/1
6.25 TABLET ORAL 2 TIMES DAILY WITH MEALS
Qty: 60 TABLET | Refills: 5 | Status: SHIPPED | OUTPATIENT
Start: 2019-12-31 | End: 2020-06-04 | Stop reason: SDUPTHER

## 2019-12-31 RX ORDER — FEBUXOSTAT 80 MG/1
80 TABLET, FILM COATED ORAL DAILY
Qty: 30 TABLET | Refills: 5 | Status: SHIPPED | OUTPATIENT
Start: 2019-12-31 | End: 2020-06-04 | Stop reason: SDUPTHER

## 2019-12-31 RX ORDER — TRAZODONE HYDROCHLORIDE 50 MG/1
25-50 TABLET ORAL NIGHTLY
Qty: 30 TABLET | Refills: 5 | Status: SHIPPED | OUTPATIENT
Start: 2019-12-31 | End: 2020-06-04 | Stop reason: SDUPTHER

## 2019-12-31 RX ORDER — OXYCODONE HYDROCHLORIDE AND ACETAMINOPHEN 5; 325 MG/1; MG/1
TABLET ORAL
COMMUNITY
Start: 2019-10-16

## 2019-12-31 RX ORDER — METFORMIN HYDROCHLORIDE 500 MG/1
500 TABLET, EXTENDED RELEASE ORAL
Qty: 30 TABLET | Refills: 5 | Status: SHIPPED | OUTPATIENT
Start: 2019-12-31 | End: 2020-06-04

## 2019-12-31 RX ORDER — FENOFIBRATE 120 MG/1
120 TABLET ORAL NIGHTLY
Qty: 30 TABLET | Refills: 5 | Status: SHIPPED | OUTPATIENT
Start: 2019-12-31 | End: 2020-02-05 | Stop reason: RX

## 2019-12-31 RX ORDER — ASPIRIN 81 MG/1
81 TABLET ORAL DAILY
Qty: 90 TABLET | Refills: 1 | Status: SHIPPED | OUTPATIENT
Start: 2019-12-31 | End: 2020-02-18 | Stop reason: SDUPTHER

## 2019-12-31 RX ORDER — ICOSAPENT ETHYL 1000 MG/1
1 CAPSULE ORAL 2 TIMES DAILY WITH MEALS
Qty: 120 CAPSULE | Refills: 5 | Status: SHIPPED | OUTPATIENT
Start: 2019-12-31 | End: 2020-06-04 | Stop reason: SDUPTHER

## 2019-12-31 RX ORDER — CHLORTHALIDONE 25 MG/1
25 TABLET ORAL DAILY
Qty: 30 TABLET | Refills: 5 | Status: SHIPPED | OUTPATIENT
Start: 2019-12-31 | End: 2020-06-04 | Stop reason: SDUPTHER

## 2019-12-31 RX ORDER — OMEPRAZOLE 20 MG/1
20 CAPSULE, DELAYED RELEASE ORAL DAILY
Qty: 30 CAPSULE | Refills: 5 | Status: SHIPPED | OUTPATIENT
Start: 2019-12-31 | End: 2020-06-04 | Stop reason: SDUPTHER

## 2019-12-31 RX ORDER — ALBUTEROL SULFATE 90 UG/1
2 AEROSOL, METERED RESPIRATORY (INHALATION) EVERY 6 HOURS PRN
Qty: 1 INHALER | Refills: 5 | Status: SHIPPED | OUTPATIENT
Start: 2019-12-31 | End: 2020-06-04 | Stop reason: SDUPTHER

## 2019-12-31 RX ORDER — NEOMYCIN SULFATE, POLYMYXIN B SULFATE AND HYDROCORTISONE 10; 3.5; 1 MG/ML; MG/ML; [USP'U]/ML
SUSPENSION/ DROPS AURICULAR (OTIC)
Qty: 10 ML | Refills: 0 | Status: SHIPPED | OUTPATIENT
Start: 2019-12-31 | End: 2020-01-07

## 2019-12-31 RX ORDER — CETIRIZINE HYDROCHLORIDE 10 MG/1
10 TABLET ORAL DAILY PRN
Qty: 30 TABLET | Refills: 5 | Status: SHIPPED | OUTPATIENT
Start: 2019-12-31 | End: 2020-06-04 | Stop reason: SDUPTHER

## 2019-12-31 RX ORDER — GLIPIZIDE 5 MG/1
5 TABLET ORAL DAILY
Qty: 30 TABLET | Refills: 5 | Status: SHIPPED | OUTPATIENT
Start: 2019-12-31 | End: 2020-06-04 | Stop reason: SDUPTHER

## 2019-12-31 RX ORDER — AMOXICILLIN 875 MG/1
875 TABLET, COATED ORAL EVERY 12 HOURS SCHEDULED
Qty: 20 TABLET | Refills: 0 | Status: SHIPPED | OUTPATIENT
Start: 2019-12-31 | End: 2020-02-12

## 2019-12-31 NOTE — ASSESSMENT & PLAN NOTE
Obesity is Fluctuates up and down.  Discussed the patient's BMI.  The BMI is above average; BMI management plan is completed.  General weight loss/lifestyle modification strategies discussed (elicit support from others; identify saboteurs; non-food rewards, etc).  3.  Patient is considering bariatric surgery.

## 2019-12-31 NOTE — ASSESSMENT & PLAN NOTE
Start amoxicillin and eardrops.  Avoid Q-tips and getting water in ears.  Report failure to improve or changes in symptoms.

## 2019-12-31 NOTE — ASSESSMENT & PLAN NOTE
Diabetes is improving with lifestyle modifications and treatment.   Reminded to bring in blood sugar diary at next visit.  Dietary recommendations for ADA diet.  Regular aerobic exercise.  Discussed ways to avoid symptomatic hypoglycemia.  Discussed sick day management.  Discussed foot care.  Diabetes will be reassessed in 1 month.

## 2019-12-31 NOTE — PROGRESS NOTES
Subjective   Johnathon Mclain is a 44 y.o. male.     Chief Complaint   Patient presents with   • Hypertension     I need you  to take off some skin tags catching on clothes and bleeding  Went to pain clinic  Need some refills  History of Present Illness:    Pt has started a pain clinic for his chronic pain.      Chronic osteoarthritis and joint pain.  Patient has started pain management.  Continues to have chronic daily pain.    Hypertension-mildly elevated today.  States he has not had any of his morning medications yet today.  Monitors his blood pressure randomly and reports readings less than 140/90.  He does need refills today.    Diabetes- patient currently receives Toujeo.  Needs refill on his insulin.  Does have some episodes of hyperglycemia when he is noncompliant with his diet.    Multiple skin tags in the axillary, neck and abdominal region.  Skin tags catch on his clothing, become irritated and bleed.  Patient is requesting removal.    Acute complaint of bilateral ear pain x3 days.  Moderate intensity.    The following portions of the patient's history, chief complaint and ROS were reviewed and updated as appropriate per provider:  Allergies, current medications, past family history, past medical history, past social history, past surgical history and problem list.    Review of Systems   Constitutional: Positive for activity change (To be more active). Negative for appetite change, fatigue and unexpected weight change.   HENT: Positive for ear pain, sinus pressure and sinus pain. Negative for congestion, hearing loss, mouth sores, nosebleeds, postnasal drip, rhinorrhea, sore throat, trouble swallowing and voice change.    Eyes: Negative for pain and visual disturbance.   Respiratory: Negative for apnea, cough, choking, chest tightness, shortness of breath and wheezing.    Cardiovascular: Negative for chest pain, palpitations and leg swelling.   Gastrointestinal: Negative for abdominal pain, anal bleeding,  "blood in stool, constipation, diarrhea, nausea and vomiting.   Endocrine: Negative for cold intolerance and polydipsia.   Genitourinary: Negative for difficulty urinating, flank pain and hematuria.   Musculoskeletal: Positive for arthralgias and back pain. Negative for gait problem, joint swelling, myalgias and neck stiffness.   Skin: Positive for color change. Negative for rash and wound.   Allergic/Immunologic: Negative.    Neurological: Negative for syncope, numbness and headaches.   Hematological: Negative.    Psychiatric/Behavioral: Negative for behavioral problems, dysphoric mood, self-injury, sleep disturbance and suicidal ideas. The patient is not nervous/anxious.    All other systems reviewed and are negative.      Objective     /90   Pulse 80   Temp 98.4 °F (36.9 °C) (Temporal)   Ht 177 cm (69.69\")   Wt (!) 160 kg (352 lb)   SpO2 98%   BMI 50.96 kg/m²   Office Visit on 10/28/2019   Component Date Value Ref Range Status   • Glucose 10/28/2019 162* 65 - 99 mg/dL Final   • BUN 10/28/2019 29* 6 - 20 mg/dL Final   • Creatinine 10/28/2019 1.32* 0.76 - 1.27 mg/dL Final   • Sodium 10/28/2019 133* 136 - 145 mmol/L Final   • Potassium 10/28/2019 5.2  3.5 - 5.2 mmol/L Final   • Chloride 10/28/2019 94* 98 - 107 mmol/L Final   • CO2 10/28/2019 29.2* 22.0 - 29.0 mmol/L Final   • Calcium 10/28/2019 10.4  8.6 - 10.5 mg/dL Final   • Total Protein 10/28/2019 7.6  6.0 - 8.5 g/dL Final   • Albumin 10/28/2019 4.60  3.50 - 5.20 g/dL Final   • ALT (SGPT) 10/28/2019 42* 1 - 41 U/L Final   • AST (SGOT) 10/28/2019 25  1 - 40 U/L Final   • Alkaline Phosphatase 10/28/2019 80  39 - 117 U/L Final   • Total Bilirubin 10/28/2019 0.7  0.2 - 1.2 mg/dL Final   • eGFR Non African Amer 10/28/2019 58* >60 mL/min/1.73 Final   • Globulin 10/28/2019 3.0  gm/dL Final   • A/G Ratio 10/28/2019 1.5  g/dL Final   • BUN/Creatinine Ratio 10/28/2019 22.0  7.0 - 25.0 Final   • Anion Gap 10/28/2019 9.8  5.0 - 15.0 mmol/L Final   • TSH " 10/28/2019 1.650  0.270 - 4.200 uIU/mL Final   • Hemoglobin A1C 10/28/2019 6.70* 4.80 - 5.60 % Final   • 25 Hydroxy, Vitamin D 10/28/2019 36.2  30.0 - 100.0 ng/ml Final   • Vitamin B-12 10/28/2019 556  211 - 946 pg/mL Final   • Total Cholesterol 10/28/2019 164  0 - 200 mg/dL Final   • Triglycerides 10/28/2019 182* 0 - 150 mg/dL Final   • HDL Cholesterol 10/28/2019 24* 40 - 60 mg/dL Final   • LDL Cholesterol  10/28/2019 104* 0 - 100 mg/dL Final   • VLDL Cholesterol 10/28/2019 36.4  5 - 40 mg/dL Final   • LDL/HDL Ratio 10/28/2019 4.32   Final   • Microalbumin/Creatinine Ratio 10/28/2019    Final   • Creatinine, Urine 10/28/2019 165.5  mg/dL Final   • Microalbumin, Urine 10/28/2019 <1.2  mg/dL Final   • WBC 10/28/2019 7.51  3.40 - 10.80 10*3/mm3 Final   • RBC 10/28/2019 5.92* 4.14 - 5.80 10*6/mm3 Final   • Hemoglobin 10/28/2019 18.4* 13.0 - 17.7 g/dL Final   • Hematocrit 10/28/2019 52.3* 37.5 - 51.0 % Final   • MCV 10/28/2019 88.3  79.0 - 97.0 fL Final   • MCH 10/28/2019 31.1  26.6 - 33.0 pg Final   • MCHC 10/28/2019 35.2  31.5 - 35.7 g/dL Final   • RDW 10/28/2019 12.7  12.3 - 15.4 % Final   • RDW-SD 10/28/2019 41.0  37.0 - 54.0 fl Final   • MPV 10/28/2019 10.6  6.0 - 12.0 fL Final   • Platelets 10/28/2019 227  140 - 450 10*3/mm3 Final   • Neutrophil % 10/28/2019 43.2  42.7 - 76.0 % Final   • Lymphocyte % 10/28/2019 34.5  19.6 - 45.3 % Final   • Monocyte % 10/28/2019 10.8  5.0 - 12.0 % Final   • Eosinophil % 10/28/2019 10.0* 0.3 - 6.2 % Final   • Basophil % 10/28/2019 1.1  0.0 - 1.5 % Final   • Immature Grans % 10/28/2019 0.4  0.0 - 0.5 % Final   • Neutrophils, Absolute 10/28/2019 3.25  1.70 - 7.00 10*3/mm3 Final   • Lymphocytes, Absolute 10/28/2019 2.59  0.70 - 3.10 10*3/mm3 Final   • Monocytes, Absolute 10/28/2019 0.81  0.10 - 0.90 10*3/mm3 Final   • Eosinophils, Absolute 10/28/2019 0.75* 0.00 - 0.40 10*3/mm3 Final   • Basophils, Absolute 10/28/2019 0.08  0.00 - 0.20 10*3/mm3 Final   • Immature Grans, Absolute  10/28/2019 0.03  0.00 - 0.05 10*3/mm3 Final   • nRBC 10/28/2019 0.0  0.0 - 0.2 /100 WBC Final       Physical Exam   Constitutional: He is oriented to person, place, and time. Vital signs are normal. He appears well-developed and well-nourished. No distress.   -Friendly male in no acute distress.  Blood pressure is 150/90 which is mildly elevated without having taken his blood pressure medication this morning.   HENT:   Head: Normocephalic and atraumatic.   Right Ear: Hearing and external ear normal. There is swelling and tenderness. A middle ear effusion is present.   Left Ear: Hearing and external ear normal. There is swelling and tenderness. A middle ear effusion is present.   Nose: Nose normal. Right sinus exhibits no maxillary sinus tenderness and no frontal sinus tenderness. Left sinus exhibits no maxillary sinus tenderness and no frontal sinus tenderness.   Mouth/Throat: Uvula is midline, oropharynx is clear and moist and mucous membranes are normal. No oropharyngeal exudate or posterior oropharyngeal erythema.   Eyes: Pupils are equal, round, and reactive to light. Conjunctivae, EOM and lids are normal. Right eye exhibits no discharge. Left eye exhibits no discharge.   Neck: Normal range of motion. Neck supple. No tracheal deviation present. No thyromegaly present.   Cardiovascular: Normal rate, regular rhythm and normal heart sounds. Exam reveals no gallop and no friction rub.   No murmur heard.  Pulmonary/Chest: Effort normal and breath sounds normal. No respiratory distress. He has no wheezes. He has no rales. He exhibits no tenderness.   Abdominal: Soft. Normal appearance and bowel sounds are normal. He exhibits no distension and no mass. There is no tenderness. There is no rebound and no guarding.   Musculoskeletal:        Lumbar back: He exhibits decreased range of motion and tenderness.   Lymphadenopathy:     He has no cervical adenopathy.   Neurological: He is alert and oriented to person, place, and  time. He has normal reflexes.   CN 2-12 grossly intact    Skin: Skin is warm and dry. Capillary refill takes less than 2 seconds. Lesion (100s of inflamed skin tags in the axillary, abdomen and neck regions) noted. No rash noted. He is not diaphoretic. No erythema.        Psychiatric: He has a normal mood and affect. His speech is normal and behavior is normal. Judgment and thought content normal. Cognition and memory are normal.   Vitals reviewed.      Assessment/Plan     Problem List Items Addressed This Visit        Cardiovascular and Mediastinum    Essential hypertension    Relevant Medications    carvedilol (COREG) 6.25 MG tablet       Respiratory    COPD (chronic obstructive pulmonary disease) (CMS/[facility])    Relevant Medications    Fluticasone Furoate-Vilanterol (BREO ELLIPTA) 100-25 MCG/INH inhaler    albuterol sulfate HFA (PROAIR HFA) 108 (90 Base) MCG/ACT inhaler    cetirizine (zyrTEC) 10 MG tablet       Digestive    Class 3 severe obesity with serious comorbidity in adult (CMS/[facility])    Current Assessment & Plan     Obesity is Fluctuates up and down.  Discussed the patient's BMI.  The BMI is above average; BMI management plan is completed.  General weight loss/lifestyle modification strategies discussed (elicit support from others; identify saboteurs; non-food rewards, etc).  3.  Patient is considering bariatric surgery.         Relevant Medications    omeprazole (priLOSEC) 20 MG capsule       Endocrine    Diabetes mellitus with diabetic neuropathy, with long-term current use of insulin (CMS/[facility])    Overview     Abnormal nerve conduction study on file         Current Assessment & Plan     Diabetes is improving with lifestyle modifications and treatment.   Reminded to bring in blood sugar diary at next visit.  Dietary recommendations for ADA diet.  Regular aerobic exercise.  Discussed ways to avoid symptomatic hypoglycemia.  Discussed sick day management.  Discussed foot care.  Diabetes will be reassessed in 1  month.         Relevant Medications    icosapent ethyl (VASCEPA) 1 g capsule capsule    fenofibrate (FENOGLIDE) 120 MG tablet    Insulin Glargine (LANTUS SOLOSTAR) 100 UNIT/ML injection pen    glipizide (GLUCOTROL) 5 MG tablet    metFORMIN ER (GLUCOPHAGE-XR) 500 MG 24 hr tablet    aspirin 81 MG EC tablet       Nervous and Auditory    Non-recurrent acute serous otitis media of both ears - Primary    Current Assessment & Plan     Start amoxicillin and eardrops.  Avoid Q-tips and getting water in ears.  Report failure to improve or changes in symptoms.         Relevant Medications    neomycin-polymyxin-hydrocortisone (CORTISPORIN) 3.5-34206-8 otic suspension    amoxicillin (AMOXIL) 875 MG tablet       Musculoskeletal and Integument    Chronic idiopathic gout of multiple sites    Relevant Medications    febuxostat (ULORIC) 80 MG tablet tablet    Skin tags, multiple acquired    Overview     Bleeding and catching on clothing          Current Assessment & Plan     Tolerated cryotherapy well            Other    Primary insomnia    Relevant Medications    traZODone (DESYREL) 50 MG tablet          Current Outpatient Medications:   •  albuterol sulfate HFA (PROAIR HFA) 108 (90 Base) MCG/ACT inhaler, Inhale 2 puffs Every 6 (Six) Hours As Needed for Wheezing., Disp: 1 inhaler, Rfl: 5  •  aspirin 81 MG EC tablet, Take 1 tablet by mouth Daily. Indications: Stable Angina Pectoris, Disp: 90 tablet, Rfl: 1  •  busPIRone (BUSPAR) 10 MG tablet, TAKE 1 Tablet BY MOUTH TWICE DAILY AS NEEDED FOR ANXIETY, Disp: 60 tablet, Rfl: 3  •  carvedilol (COREG) 6.25 MG tablet, Take 1 tablet by mouth 2 (Two) Times a Day With Meals., Disp: 60 tablet, Rfl: 5  •  cetirizine (zyrTEC) 10 MG tablet, Take 1 tablet by mouth Daily As Needed for Allergies., Disp: 30 tablet, Rfl: 5  •  Cholecalciferol (VITAMIN D) 1000 units tablet, Take 1 tablet by mouth Daily., Disp: 30 tablet, Rfl: 5  •  colchicine 0.6 MG capsule capsule, TAKE 1 Capsule BY MOUTH EVERY DAY,  Disp: 30 capsule, Rfl: 5  •  febuxostat (ULORIC) 80 MG tablet tablet, Take 1 tablet by mouth Daily. Indications: Disorder of Excessive Uric Acid in the Blood, Disp: 30 tablet, Rfl: 5  •  fenofibrate (FENOGLIDE) 120 MG tablet, Take 1 tablet by mouth Every Night. Indications: High Amount of Triglycerides in the Blood, Disp: 30 tablet, Rfl: 5  •  fenofibrate (TRICOR) 48 MG tablet, TAKE 1 Tablet BY MOUTH EVERY DAY, Disp: 30 tablet, Rfl: 5  •  Fluticasone Furoate-Vilanterol (BREO ELLIPTA) 100-25 MCG/INH inhaler, 1 puff every morning, Disp: 1 each, Rfl: 5  •  gabapentin (NEURONTIN) 600 MG tablet, Take 1 tablet by mouth 3 (Three) Times a Day., Disp: 90 tablet, Rfl: 0  •  glipizide (GLUCOTROL) 5 MG tablet, Take 1 tablet by mouth Daily., Disp: 30 tablet, Rfl: 5  •  glucose blood test strip, Use as instructed, Disp: 50 each, Rfl: 12  •  icosapent ethyl (VASCEPA) 1 g capsule capsule, Take 1 g by mouth 2 (Two) Times a Day With Meals. Indications: High Amount of Triglycerides in the Blood, Disp: 120 capsule, Rfl: 5  •  Insulin Pen Needle (PEN NEEDLES) 32G X 4 MM misc, 1 Device Daily., Disp: 100 each, Rfl: 3  •  ketoconazole (NIZORAL) 2 % shampoo, apply TO THE appropriate AREA TWICE WEEKLY AS directed, Disp: 120 mL, Rfl: 5  •  lisinopril (PRINIVIL,ZESTRIL) 20 MG tablet, TAKE 1 Tablet BY MOUTH TWICE DAILY, Disp: 60 tablet, Rfl: 5  •  metFORMIN ER (GLUCOPHAGE-XR) 500 MG 24 hr tablet, Take 1 tablet by mouth Daily With Breakfast. Indications: Type 2 Diabetes, Disp: 30 tablet, Rfl: 5  •  omeprazole (priLOSEC) 20 MG capsule, Take 1 capsule by mouth Daily. Indications: Gastroesophageal Reflux Disease, Disp: 30 capsule, Rfl: 5  •  ONE TOUCH LANCETS misc, 1 Device 2 (Two) Times a Day., Disp: 200 each, Rfl: 3  •  oxyCODONE-acetaminophen (PERCOCET) 5-325 MG per tablet, , Disp: , Rfl:   •  sertraline (ZOLOFT) 50 MG tablet, Take 50 mg by mouth Daily., Disp: , Rfl: 5  •  traZODone (DESYREL) 50 MG tablet, Take 0.5-1 tablets by mouth Every  Night., Disp: 30 tablet, Rfl: 5  •  amoxicillin (AMOXIL) 875 MG tablet, Take 1 tablet by mouth Every 12 (Twelve) Hours., Disp: 20 tablet, Rfl: 0  •  Insulin Glargine (LANTUS SOLOSTAR) 100 UNIT/ML injection pen, Inject 21 Units under the skin into the appropriate area as directed Daily. Indications: Type 2 Diabetes, Disp: 2 pen, Rfl: 5  •  neomycin-polymyxin-hydrocortisone (CORTISPORIN) 3.5-90740-1 otic suspension, 2-3 drops in affected ear (s) four times daily, Disp: 10 mL, Rfl: 0    Controlled medication will now be written by pain management.    Pt has been educated/instructed on diabetic care and protocols.  Diabetic diet instructions provided.  Medication regimen reviewed.  Discussed possible side effects and interactions of current medications.  Sick day rules reviewed. Continue to monitor blood sugar and report abnormal readings as discussed today. Understanding stated by patient.     Pt has been instructed today regarding low fat heart smart diet. Weight management and routine exercise has been recommended. Avoid high fat foods, starchy foods and processed foods. Increase lean meats, fresh vegetables and fresh fruits.     Reviewed treatment options and agreed on a trial of cryotherapy. See procedure note.       Patient's Body mass index is 50.96 kg/m². BMI is above normal parameters. Recommendations include: exercise counseling and nutrition counseling.      Fluids, rest, symptomatic treatment advised. Steam therapy. Dispose of tooth bush after 24 hours of starting antibiotics if ordered. Complete antibiotics as ordered.  Discussed possible side effects/interactions of medication. Discussed symptoms to report as well as reasons to seek urgent or emergent medical attention. Understanding stated.   Recommend follow up in 2-3 days if not improved, sooner if needed.            I have discussed diagnosis in detail today allowing time for questions and answers. Patient is aware of reasons to seek urgent or emergent  medical care as well as reasons to return to the clinic for evaluation. Possible side effects, interactions and progression of symptoms discussed as well. Patient / family states understanding.   Emotional support and active listening provided.     Discussed compliance issues with patient.  Recommend weight loss and heart healthy diet.  Discussed benefits versus risk of bariatric surgery.  Recommend patient have fasting labs in the next few weeks and follow-up with me in 1-2 months, sooner if needed.    Dictated utilizing Dragon dictation. Errors in dictation may reflect use of voice recognition software and not all errors in transcription may have been detected prior to signing.             This document has been electronically signed by:  TYSON Vinson, NP-C

## 2020-01-14 ENCOUNTER — OFFICE VISIT (OUTPATIENT)
Dept: PULMONOLOGY | Facility: CLINIC | Age: 45
End: 2020-01-14

## 2020-01-14 ENCOUNTER — HOSPITAL ENCOUNTER (OUTPATIENT)
Dept: GENERAL RADIOLOGY | Facility: HOSPITAL | Age: 45
Discharge: HOME | End: 2020-01-14
Admitting: INTERNAL MEDICINE

## 2020-01-14 VITALS
WEIGHT: 315 LBS | BODY MASS INDEX: 45.1 KG/M2 | HEART RATE: 94 BPM | SYSTOLIC BLOOD PRESSURE: 115 MMHG | OXYGEN SATURATION: 94 % | TEMPERATURE: 98 F | HEIGHT: 70 IN | DIASTOLIC BLOOD PRESSURE: 74 MMHG

## 2020-01-14 DIAGNOSIS — J60 COAL WORKERS PNEUMOCONIOSIS (HCC): Primary | ICD-10-CM

## 2020-01-14 DIAGNOSIS — J44.9 CHRONIC OBSTRUCTIVE PULMONARY DISEASE, UNSPECIFIED COPD TYPE (HCC): ICD-10-CM

## 2020-01-14 DIAGNOSIS — G47.33 OBSTRUCTIVE SLEEP APNEA: ICD-10-CM

## 2020-01-14 PROCEDURE — 71046 X-RAY EXAM CHEST 2 VIEWS: CPT

## 2020-01-14 PROCEDURE — 71046 X-RAY EXAM CHEST 2 VIEWS: CPT | Performed by: RADIOLOGY

## 2020-01-14 PROCEDURE — 99212 OFFICE O/P EST SF 10 MIN: CPT | Performed by: INTERNAL MEDICINE

## 2020-01-14 PROCEDURE — 94010 BREATHING CAPACITY TEST: CPT | Performed by: INTERNAL MEDICINE

## 2020-01-14 RX ORDER — HYDROCODONE BITARTRATE AND ACETAMINOPHEN 7.5; 325 MG/1; MG/1
1 TABLET ORAL EVERY 6 HOURS PRN
COMMUNITY
End: 2020-02-12

## 2020-01-14 NOTE — PROGRESS NOTES
Subjective   Chief Complaint   Patient presents with   • COPD       Johnathon Mclain is a 44 y.o. male     History of Present Illness 44-year-old male referred for evaluation of black long and question of COPD.  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 he never smoked and has been on CPAP for the last few years and oxygen he also has diabetes    Review of Systems some back pain and shortness of breath on exertion using Breo and occasional albuterol inhaler denies having much cough or expectoration no chest pain palpitation or edema    Family History   Problem Relation Age of Onset   • Arthritis Mother    • COPD Mother    • Asthma Mother    • Cancer Mother    • Hyperlipidemia Mother    • Diabetes Father    • Heart disease Father    • Hyperlipidemia Father    • Hypertension Father    • Stroke Father        Past Medical History:   Diagnosis Date   • Asthma    • Back pain    • COPD (chronic obstructive pulmonary disease) (CMS/[facility])    • CPAP (continuous positive airway pressure) dependence    • Diabetes mellitus (CMS/[facility])    • Fibromyalgia    • GERD (gastroesophageal reflux disease)    • Gout    • Hyperlipidemia     mixed   • Hypertension    • Neuralgia and neuritis    • Osteoarthrosis    • Oxygen dependent     2L AT NIGHT    • Polycythemia    • Sleep apnea    • Sleep apnea    • Type II diabetes mellitus, uncontrolled (CMS/[facility])     uncomplicated       Past Surgical History:   Procedure Laterality Date   • EXCISION MASS HEAD/NECK N/A 5/11/2018    Procedure: SKIN LESION EXCISION NECK & UPPER BACK;  Surgeon: New Camacho MD;  Location:  [facility] OR;  Service: General   • INCISION AND DRAINAGE ABSCESS N/A 10/16/2019    Procedure: INCISION AND DRAINAGE ABSCESS TO POSTERIOR NECK;  Surgeon: Singh Schuster MD;  Location:  [facility] OR;  Service: General       Social History     Socioeconomic History   • Marital status: Legally      Spouse  name: donald   • Number of children: 2   • Years of education: Not on file   • Highest education level: Not on file   Occupational History   • Occupation: disabled   Social Needs   • Financial resource strain: Somewhat hard   • Food insecurity:     Worry: Never true     Inability: Never true   • Transportation needs:     Medical: No     Non-medical: No   Tobacco Use   • Smoking status: Never Smoker   • Smokeless tobacco: Current User     Types: Chew   • Tobacco comment: cessation discussed - sheet given to patient   Substance and Sexual Activity   • Alcohol use: No   • Drug use: No   • Sexual activity: Defer   Lifestyle   • Physical activity:     Days per week: 3 days     Minutes per session: 20 min   • Stress: Very much        Physical Exam: No acute distress overweight weighing 50 pounds for a height of 78 inches vital signs are stable O2 sat 94% on room air lungs clear heart regular abdomen protuberant no clubbing cyanosis or edema    PFT: FVC 71 FEV1 81% mild restrictive impairment due to abdominal obesity    Imaging: Chest x-ray shows a small densities of black lung stage I  Assessment coal workers pneumoconiosis stage I per x-ray with borderline normal PFT  Other Lab  Recomm strongly urged him to lose weight continue current regimen    Follow up: May need annual chest x-ray suggested calling can ask January to see me or my chart replacement or earlier as needed

## 2020-01-15 DIAGNOSIS — J60 COAL WORKERS PNEUMOCONIOSIS (HCC): ICD-10-CM

## 2020-02-05 RX ORDER — FENOFIBRATE 145 MG/1
145 TABLET, COATED ORAL DAILY
Qty: 30 TABLET | Refills: 5 | Status: SHIPPED | OUTPATIENT
Start: 2020-02-05 | End: 2020-06-04 | Stop reason: SDUPTHER

## 2020-02-05 NOTE — TELEPHONE ENCOUNTER
----- Message from TYSON Sepulveda sent at 2/5/2020  2:08 PM EST -----  Sent in new prescription to pharmacy  ----- Message -----  From: Lucretia Escobar MA  Sent: 2/3/2020   4:19 PM EST  To: TYSON Sepulveda    The pharmacy is unable to order the fenofibrate 120 mg. Could you change the strength to something different? Or change the medicine all together?

## 2020-02-12 ENCOUNTER — OFFICE VISIT (OUTPATIENT)
Dept: FAMILY MEDICINE CLINIC | Facility: CLINIC | Age: 45
End: 2020-02-12

## 2020-02-12 VITALS
OXYGEN SATURATION: 93 % | BODY MASS INDEX: 45.1 KG/M2 | TEMPERATURE: 99.2 F | HEIGHT: 70 IN | DIASTOLIC BLOOD PRESSURE: 90 MMHG | SYSTOLIC BLOOD PRESSURE: 130 MMHG | WEIGHT: 315 LBS | HEART RATE: 81 BPM

## 2020-02-12 DIAGNOSIS — E55.9 VITAMIN D DEFICIENCY: ICD-10-CM

## 2020-02-12 DIAGNOSIS — L97.512 DIABETIC ULCER OF TOE OF RIGHT FOOT ASSOCIATED WITH TYPE 2 DIABETES MELLITUS, WITH FAT LAYER EXPOSED (HCC): Primary | ICD-10-CM

## 2020-02-12 DIAGNOSIS — M15.9 PRIMARY OSTEOARTHRITIS INVOLVING MULTIPLE JOINTS: ICD-10-CM

## 2020-02-12 DIAGNOSIS — J44.0 CHRONIC OBSTRUCTIVE PULMONARY DISEASE WITH ACUTE LOWER RESPIRATORY INFECTION (HCC): ICD-10-CM

## 2020-02-12 DIAGNOSIS — E11.40 TYPE 2 DIABETES MELLITUS WITH DIABETIC NEUROPATHY, WITH LONG-TERM CURRENT USE OF INSULIN (HCC): ICD-10-CM

## 2020-02-12 DIAGNOSIS — M79.2 NEURALGIA AND NEURITIS: ICD-10-CM

## 2020-02-12 DIAGNOSIS — Z79.4 TYPE 2 DIABETES MELLITUS WITH DIABETIC NEUROPATHY, WITH LONG-TERM CURRENT USE OF INSULIN (HCC): ICD-10-CM

## 2020-02-12 DIAGNOSIS — E66.01 CLASS 3 SEVERE OBESITY DUE TO EXCESS CALORIES WITH SERIOUS COMORBIDITY AND BODY MASS INDEX (BMI) OF 45.0 TO 49.9 IN ADULT (HCC): ICD-10-CM

## 2020-02-12 DIAGNOSIS — M1A.09X0 IDIOPATHIC CHRONIC GOUT OF MULTIPLE SITES WITHOUT TOPHUS: ICD-10-CM

## 2020-02-12 DIAGNOSIS — E11.621 DIABETIC ULCER OF TOE OF RIGHT FOOT ASSOCIATED WITH TYPE 2 DIABETES MELLITUS, WITH FAT LAYER EXPOSED (HCC): Primary | ICD-10-CM

## 2020-02-12 DIAGNOSIS — L02.11 ABSCESS OF NECK: ICD-10-CM

## 2020-02-12 DIAGNOSIS — F41.9 ANXIETY: ICD-10-CM

## 2020-02-12 PROBLEM — M89.9 DISORDER OF BONE: Status: RESOLVED | Noted: 2018-04-25 | Resolved: 2020-02-12

## 2020-02-12 LAB
25(OH)D3 SERPL-MCNC: 29.3 NG/ML (ref 30–100)
ALBUMIN SERPL-MCNC: 4.5 G/DL (ref 3.5–5.2)
ALBUMIN/GLOB SERPL: 2.1 G/DL
ALP SERPL-CCNC: 59 U/L (ref 39–117)
ALT SERPL W P-5'-P-CCNC: 35 U/L (ref 1–41)
ANION GAP SERPL CALCULATED.3IONS-SCNC: 12.8 MMOL/L (ref 5–15)
AST SERPL-CCNC: 23 U/L (ref 1–40)
BASOPHILS # BLD AUTO: 0.05 10*3/MM3 (ref 0–0.2)
BASOPHILS NFR BLD AUTO: 0.7 % (ref 0–1.5)
BILIRUB SERPL-MCNC: 0.7 MG/DL (ref 0.2–1.2)
BUN BLD-MCNC: 22 MG/DL (ref 6–20)
BUN/CREAT SERPL: 23.7 (ref 7–25)
CALCIUM SPEC-SCNC: 10.2 MG/DL (ref 8.6–10.5)
CHLORIDE SERPL-SCNC: 99 MMOL/L (ref 98–107)
CHOLEST SERPL-MCNC: 140 MG/DL (ref 0–200)
CO2 SERPL-SCNC: 23.2 MMOL/L (ref 22–29)
CREAT BLD-MCNC: 0.93 MG/DL (ref 0.76–1.27)
DEPRECATED RDW RBC AUTO: 40.4 FL (ref 37–54)
EOSINOPHIL # BLD AUTO: 0.87 10*3/MM3 (ref 0–0.4)
EOSINOPHIL NFR BLD AUTO: 12.4 % (ref 0.3–6.2)
ERYTHROCYTE [DISTWIDTH] IN BLOOD BY AUTOMATED COUNT: 12.2 % (ref 12.3–15.4)
GFR SERPL CREATININE-BSD FRML MDRD: 87 ML/MIN/1.73
GLOBULIN UR ELPH-MCNC: 2.1 GM/DL
GLUCOSE BLD-MCNC: 175 MG/DL (ref 65–99)
HBA1C MFR BLD: 6.83 % (ref 4.8–5.6)
HCT VFR BLD AUTO: 47.3 % (ref 37.5–51)
HDLC SERPL-MCNC: 25 MG/DL (ref 40–60)
HGB BLD-MCNC: 17.3 G/DL (ref 13–17.7)
IMM GRANULOCYTES # BLD AUTO: 0.03 10*3/MM3 (ref 0–0.05)
IMM GRANULOCYTES NFR BLD AUTO: 0.4 % (ref 0–0.5)
LDLC SERPL CALC-MCNC: 75 MG/DL (ref 0–100)
LDLC/HDLC SERPL: 2.98 {RATIO}
LYMPHOCYTES # BLD AUTO: 2.07 10*3/MM3 (ref 0.7–3.1)
LYMPHOCYTES NFR BLD AUTO: 29.5 % (ref 19.6–45.3)
MCH RBC QN AUTO: 33.4 PG (ref 26.6–33)
MCHC RBC AUTO-ENTMCNC: 36.6 G/DL (ref 31.5–35.7)
MCV RBC AUTO: 91.3 FL (ref 79–97)
MONOCYTES # BLD AUTO: 0.86 10*3/MM3 (ref 0.1–0.9)
MONOCYTES NFR BLD AUTO: 12.3 % (ref 5–12)
NEUTROPHILS # BLD AUTO: 3.14 10*3/MM3 (ref 1.7–7)
NEUTROPHILS NFR BLD AUTO: 44.7 % (ref 42.7–76)
NRBC BLD AUTO-RTO: 0 /100 WBC (ref 0–0.2)
PLATELET # BLD AUTO: 182 10*3/MM3 (ref 140–450)
PMV BLD AUTO: 10.9 FL (ref 6–12)
POTASSIUM BLD-SCNC: 4.3 MMOL/L (ref 3.5–5.2)
PROT SERPL-MCNC: 6.6 G/DL (ref 6–8.5)
RBC # BLD AUTO: 5.18 10*6/MM3 (ref 4.14–5.8)
SODIUM BLD-SCNC: 135 MMOL/L (ref 136–145)
TRIGL SERPL-MCNC: 202 MG/DL (ref 0–150)
TSH SERPL DL<=0.05 MIU/L-ACNC: 0.78 UIU/ML (ref 0.27–4.2)
VIT B12 BLD-MCNC: 479 PG/ML (ref 211–946)
VLDLC SERPL-MCNC: 40.4 MG/DL (ref 5–40)
WBC NRBC COR # BLD: 7.02 10*3/MM3 (ref 3.4–10.8)

## 2020-02-12 PROCEDURE — 84443 ASSAY THYROID STIM HORMONE: CPT | Performed by: NURSE PRACTITIONER

## 2020-02-12 PROCEDURE — 82306 VITAMIN D 25 HYDROXY: CPT | Performed by: NURSE PRACTITIONER

## 2020-02-12 PROCEDURE — 80053 COMPREHEN METABOLIC PANEL: CPT | Performed by: NURSE PRACTITIONER

## 2020-02-12 PROCEDURE — 36415 COLL VENOUS BLD VENIPUNCTURE: CPT | Performed by: NURSE PRACTITIONER

## 2020-02-12 PROCEDURE — 99214 OFFICE O/P EST MOD 30 MIN: CPT | Performed by: NURSE PRACTITIONER

## 2020-02-12 PROCEDURE — 85025 COMPLETE CBC W/AUTO DIFF WBC: CPT | Performed by: NURSE PRACTITIONER

## 2020-02-12 PROCEDURE — 80061 LIPID PANEL: CPT | Performed by: NURSE PRACTITIONER

## 2020-02-12 PROCEDURE — 83036 HEMOGLOBIN GLYCOSYLATED A1C: CPT | Performed by: NURSE PRACTITIONER

## 2020-02-12 PROCEDURE — 82607 VITAMIN B-12: CPT | Performed by: NURSE PRACTITIONER

## 2020-02-12 RX ORDER — GABAPENTIN 800 MG/1
800 TABLET ORAL 3 TIMES DAILY
COMMUNITY
Start: 2020-02-10

## 2020-02-12 RX ORDER — METFORMIN HYDROCHLORIDE EXTENDED-RELEASE TABLETS 500 MG/1
TABLET, FILM COATED, EXTENDED RELEASE ORAL
COMMUNITY
Start: 2020-01-07 | End: 2020-06-04

## 2020-02-12 RX ORDER — HYDROCODONE BITARTRATE AND ACETAMINOPHEN 10; 325 MG/1; MG/1
TABLET ORAL
COMMUNITY
Start: 2020-02-10

## 2020-02-12 RX ORDER — SULFAMETHOXAZOLE AND TRIMETHOPRIM 800; 160 MG/1; MG/1
1 TABLET ORAL 2 TIMES DAILY
Qty: 20 TABLET | Refills: 0 | Status: SHIPPED | OUTPATIENT
Start: 2020-02-12 | End: 2020-02-18

## 2020-02-12 NOTE — ASSESSMENT & PLAN NOTE
Diabetes is newly identified.   Reminded to bring in blood sugar diary at next visit.  Dietary recommendations for ADA diet.  Regular aerobic exercise.  Discussed ways to avoid symptomatic hypoglycemia.  Discussed sick day management.  Discussed foot care.  Clean site daily with antibacterial soap and water, rinse well, pat dry with a clean cloth.  Apply Bactroban ointment 3 times a day.  Start Bactrim DS twice daily.  Avoid tight shoes or footwear.  Rest at home with site open to air.  Discussed signs and symptoms that would require immediate medical attention such as redness, drainage, fever, warmth at the site or any streaking.  Patient states understanding.  I would like to see him back in one week/sooner if needed.     Diabetes will be reassessed one week.

## 2020-02-12 NOTE — PROGRESS NOTES
Subjective   Johnathon Mclain is a 44 y.o. male.     Chief Complaint   Patient presents with   • sore toe     Toe injury  Diabetes    History of Present Illness:    Patient reports that about 4 days ago he felt a pinch on his right great toe.  Was wearing flip-flops.  Looked down and he had a large sore on the bottom of his right great toe.  He is not sure if he noticed this at the occurrence or if it was just the shoes he was wearing that made him notice it.  Patient has diabetes and a delayed healing response.  Reports that his blood glucose has been under better control.  He is fasting and able to have labs today.    Patient does report having decreased feeling in his feet.  He has been seen by podiatry sometime ago.  Does wear diabetic shoes at times.  Likes to go barefoot at home or wear flip-flops.  Currently receiving gabapentin for diabetic neuropathy which is written by his pain management provider.      Gout- no recent exacerbation    Severe obesity-weight fluctuates up and down.  Patient is not currently following any structured diet.    COPD-no recent exacerbation.    Anxiety-controlled with trazodone.          The following portions of the patient's history, chief complaint and ROS were reviewed and updated as appropriate per provider:  Allergies, current medications, past family history, past medical history, past social history, past surgical history and problem list.    Review of Systems   Constitutional: Negative for activity change, appetite change, fatigue, fever and unexpected weight change.   HENT: Negative for congestion, rhinorrhea, sinus pressure, sinus pain, sneezing, sore throat and trouble swallowing.    Eyes: Negative.    Respiratory: Negative for cough, chest tightness, shortness of breath and wheezing.    Cardiovascular: Negative.    Gastrointestinal: Negative.    Endocrine: Negative.    Genitourinary: Negative for dysuria, flank pain, frequency and hematuria.   Musculoskeletal: Positive  "for arthralgias, back pain and neck pain. Negative for joint swelling and neck stiffness.   Skin: Positive for color change and wound. Negative for pallor and rash.   Allergic/Immunologic: Negative.    Neurological: Positive for numbness. Negative for dizziness, tremors, facial asymmetry, speech difficulty, weakness and headaches.   Hematological: Negative.    Psychiatric/Behavioral: Negative for dysphoric mood, self-injury and suicidal ideas. The patient is not nervous/anxious.        Objective     /90   Pulse 81   Temp 99.2 °F (37.3 °C) (Temporal)   Ht 177.8 cm (70\")   Wt (!) 165 kg (363 lb)   SpO2 93%   BMI 52.09 kg/m²   Office Visit on 10/28/2019   Component Date Value Ref Range Status   • Glucose 10/28/2019 162* 65 - 99 mg/dL Final   • BUN 10/28/2019 29* 6 - 20 mg/dL Final   • Creatinine 10/28/2019 1.32* 0.76 - 1.27 mg/dL Final   • Sodium 10/28/2019 133* 136 - 145 mmol/L Final   • Potassium 10/28/2019 5.2  3.5 - 5.2 mmol/L Final   • Chloride 10/28/2019 94* 98 - 107 mmol/L Final   • CO2 10/28/2019 29.2* 22.0 - 29.0 mmol/L Final   • Calcium 10/28/2019 10.4  8.6 - 10.5 mg/dL Final   • Total Protein 10/28/2019 7.6  6.0 - 8.5 g/dL Final   • Albumin 10/28/2019 4.60  3.50 - 5.20 g/dL Final   • ALT (SGPT) 10/28/2019 42* 1 - 41 U/L Final   • AST (SGOT) 10/28/2019 25  1 - 40 U/L Final   • Alkaline Phosphatase 10/28/2019 80  39 - 117 U/L Final   • Total Bilirubin 10/28/2019 0.7  0.2 - 1.2 mg/dL Final   • eGFR Non African Amer 10/28/2019 58* >60 mL/min/1.73 Final   • Globulin 10/28/2019 3.0  gm/dL Final   • A/G Ratio 10/28/2019 1.5  g/dL Final   • BUN/Creatinine Ratio 10/28/2019 22.0  7.0 - 25.0 Final   • Anion Gap 10/28/2019 9.8  5.0 - 15.0 mmol/L Final   • TSH 10/28/2019 1.650  0.270 - 4.200 uIU/mL Final   • Hemoglobin A1C 10/28/2019 6.70* 4.80 - 5.60 % Final   • 25 Hydroxy, Vitamin D 10/28/2019 36.2  30.0 - 100.0 ng/ml Final   • Vitamin B-12 10/28/2019 556  211 - 946 pg/mL Final   • Total Cholesterol " 10/28/2019 164  0 - 200 mg/dL Final   • Triglycerides 10/28/2019 182* 0 - 150 mg/dL Final   • HDL Cholesterol 10/28/2019 24* 40 - 60 mg/dL Final   • LDL Cholesterol  10/28/2019 104* 0 - 100 mg/dL Final   • VLDL Cholesterol 10/28/2019 36.4  5 - 40 mg/dL Final   • LDL/HDL Ratio 10/28/2019 4.32   Final   • Microalbumin/Creatinine Ratio 10/28/2019    Final   • Creatinine, Urine 10/28/2019 165.5  mg/dL Final   • Microalbumin, Urine 10/28/2019 <1.2  mg/dL Final   • WBC 10/28/2019 7.51  3.40 - 10.80 10*3/mm3 Final   • RBC 10/28/2019 5.92* 4.14 - 5.80 10*6/mm3 Final   • Hemoglobin 10/28/2019 18.4* 13.0 - 17.7 g/dL Final   • Hematocrit 10/28/2019 52.3* 37.5 - 51.0 % Final   • MCV 10/28/2019 88.3  79.0 - 97.0 fL Final   • MCH 10/28/2019 31.1  26.6 - 33.0 pg Final   • MCHC 10/28/2019 35.2  31.5 - 35.7 g/dL Final   • RDW 10/28/2019 12.7  12.3 - 15.4 % Final   • RDW-SD 10/28/2019 41.0  37.0 - 54.0 fl Final   • MPV 10/28/2019 10.6  6.0 - 12.0 fL Final   • Platelets 10/28/2019 227  140 - 450 10*3/mm3 Final   • Neutrophil % 10/28/2019 43.2  42.7 - 76.0 % Final   • Lymphocyte % 10/28/2019 34.5  19.6 - 45.3 % Final   • Monocyte % 10/28/2019 10.8  5.0 - 12.0 % Final   • Eosinophil % 10/28/2019 10.0* 0.3 - 6.2 % Final   • Basophil % 10/28/2019 1.1  0.0 - 1.5 % Final   • Immature Grans % 10/28/2019 0.4  0.0 - 0.5 % Final   • Neutrophils, Absolute 10/28/2019 3.25  1.70 - 7.00 10*3/mm3 Final   • Lymphocytes, Absolute 10/28/2019 2.59  0.70 - 3.10 10*3/mm3 Final   • Monocytes, Absolute 10/28/2019 0.81  0.10 - 0.90 10*3/mm3 Final   • Eosinophils, Absolute 10/28/2019 0.75* 0.00 - 0.40 10*3/mm3 Final   • Basophils, Absolute 10/28/2019 0.08  0.00 - 0.20 10*3/mm3 Final   • Immature Grans, Absolute 10/28/2019 0.03  0.00 - 0.05 10*3/mm3 Final   • nRBC 10/28/2019 0.0  0.0 - 0.2 /100 WBC Final       Physical Exam   Constitutional: He is oriented to person, place, and time. He appears well-developed and well-nourished. No distress.   HENT:   Head:  Normocephalic and atraumatic.   Right Ear: Tympanic membrane, external ear and ear canal normal.   Left Ear: Tympanic membrane, external ear and ear canal normal.   Nose: Nose normal.   Mouth/Throat: Oropharynx is clear and moist and mucous membranes are normal.   Eyes: Pupils are equal, round, and reactive to light. Conjunctivae and EOM are normal. Right eye exhibits no discharge. Left eye exhibits no discharge.   Neck: Normal range of motion. Neck supple.   Cardiovascular: Normal rate, regular rhythm, normal heart sounds and intact distal pulses.   No murmur heard.  Pulmonary/Chest: Effort normal and breath sounds normal. No respiratory distress. He has no wheezes. He has no rales. He exhibits no tenderness.   Abdominal: Soft. Normal appearance and bowel sounds are normal. He exhibits no distension and no mass. There is no tenderness. There is no rebound and no guarding.   Musculoskeletal:        Lumbar back: He exhibits decreased range of motion, tenderness, pain and spasm.   Decreased lumbar curvature, there is pain with forward flexion. DTR's +2. No edema.     Johnathon had a diabetic foot exam performed today.    Neurological Sensory Findings -  Altered sharp/dull right ankle/foot discrimination and altered sharp/dull left ankle/foot discrimination. No right ankle/foot altered proprioception and no left ankle/foot altered proprioception  Vascular Status -  His right foot exhibits normal foot vasculature  and no edema. His left foot exhibits normal foot vasculature  and no edema.  Skin Integrity  -  His right foot skin is not intact.His left foot skin is intact..     Lymphadenopathy:     He has no cervical adenopathy.   Neurological: He is alert and oriented to person, place, and time. He has normal reflexes.   Skin: Skin is warm and dry. Capillary refill takes less than 2 seconds. No rash noted. He is not diaphoretic. No erythema. No pallor.   Psychiatric: He has a normal mood and affect. His speech is normal  and behavior is normal. Judgment and thought content normal. His affect is not inappropriate. Cognition and memory are normal.   Nursing note and vitals reviewed.      Assessment/Plan     Problem List Items Addressed This Visit        Respiratory    COPD (chronic obstructive pulmonary disease) (CMS/[facility])       Digestive    Class 3 severe obesity with serious comorbidity in adult (CMS/[facility])       Endocrine    Diabetes mellitus with diabetic neuropathy, with long-term current use of insulin (CMS/[facility])    Overview     Abnormal nerve conduction study on file         Relevant Medications    metFORMIN (FORTAMET) 500 MG (OSM) 24 hr tablet    Diabetic ulcer of toe of right foot associated with type 2 diabetes mellitus, with fat layer exposed (CMS/[facility]) - Primary    Current Assessment & Plan     Diabetes is newly identified.   Reminded to bring in blood sugar diary at next visit.  Dietary recommendations for ADA diet.  Regular aerobic exercise.  Discussed ways to avoid symptomatic hypoglycemia.  Discussed sick day management.  Discussed foot care.  Clean site daily with antibacterial soap and water, rinse well, pat dry with a clean cloth.  Apply Bactroban ointment 3 times a day.  Start Bactrim DS twice daily.  Avoid tight shoes or footwear.  Rest at home with site open to air.  Discussed signs and symptoms that would require immediate medical attention such as redness, drainage, fever, warmth at the site or any streaking.  Patient states understanding.  I would like to see him back in one week/sooner if needed.     Diabetes will be reassessed one week.         Relevant Medications    metFORMIN (FORTAMET) 500 MG (OSM) 24 hr tablet    sulfamethoxazole-trimethoprim (BACTRIM DS,SEPTRA DS) 800-160 MG per tablet    mupirocin (BACTROBAN) 2 % ointment       Musculoskeletal and Integument    Chronic idiopathic gout of multiple sites       Other    Abscess of neck    Overview     Recurrent         Anxiety                   Patient's Body  mass index is 52.09 kg/m². BMI is above normal parameters. Recommendations include: educational material and none (medical contraindication).    Johnathon Mclain  reports that he has never smoked. His smokeless tobacco use includes chew.. I have educated him on the risk of diseases from using tobacco products such as cancer, COPD and heart diease.                I have discussed diagnosis in detail today allowing time for questions and answers. Patient is aware of reasons to seek urgent or emergent medical care as well as reasons to return to the clinic for evaluation. Possible side effects, interactions and progression of symptoms discussed as well. Patient / family states understanding.   Emotional support and active listening provided.        1 week for a Medicare wellness exam.  Fasting labs today.    Follow-up sooner with changes, seek emergency medical care with any fever, redness, drainage or heat at wound site.          This document has been electronically signed by:  TYSON Vinson, NP-C

## 2020-02-18 ENCOUNTER — OFFICE VISIT (OUTPATIENT)
Dept: FAMILY MEDICINE CLINIC | Facility: CLINIC | Age: 45
End: 2020-02-18

## 2020-02-18 VITALS
OXYGEN SATURATION: 95 % | SYSTOLIC BLOOD PRESSURE: 120 MMHG | TEMPERATURE: 98.3 F | HEIGHT: 70 IN | BODY MASS INDEX: 45.1 KG/M2 | DIASTOLIC BLOOD PRESSURE: 80 MMHG | WEIGHT: 315 LBS | HEART RATE: 88 BPM

## 2020-02-18 DIAGNOSIS — M15.9 PRIMARY OSTEOARTHRITIS INVOLVING MULTIPLE JOINTS: ICD-10-CM

## 2020-02-18 DIAGNOSIS — L97.512 DIABETIC ULCER OF TOE OF RIGHT FOOT ASSOCIATED WITH TYPE 2 DIABETES MELLITUS, WITH FAT LAYER EXPOSED (HCC): Primary | ICD-10-CM

## 2020-02-18 DIAGNOSIS — G47.33 OBSTRUCTIVE SLEEP APNEA SYNDROME: ICD-10-CM

## 2020-02-18 DIAGNOSIS — E11.40 TYPE 2 DIABETES MELLITUS WITH DIABETIC NEUROPATHY, WITH LONG-TERM CURRENT USE OF INSULIN (HCC): ICD-10-CM

## 2020-02-18 DIAGNOSIS — E78.2 MIXED HYPERLIPIDEMIA: ICD-10-CM

## 2020-02-18 DIAGNOSIS — M1A.09X0 IDIOPATHIC CHRONIC GOUT OF MULTIPLE SITES WITHOUT TOPHUS: ICD-10-CM

## 2020-02-18 DIAGNOSIS — K21.9 GASTROESOPHAGEAL REFLUX DISEASE WITHOUT ESOPHAGITIS: ICD-10-CM

## 2020-02-18 DIAGNOSIS — Z79.4 TYPE 2 DIABETES MELLITUS WITH DIABETIC NEUROPATHY, WITH LONG-TERM CURRENT USE OF INSULIN (HCC): ICD-10-CM

## 2020-02-18 DIAGNOSIS — E11.621 DIABETIC ULCER OF TOE OF RIGHT FOOT ASSOCIATED WITH TYPE 2 DIABETES MELLITUS, WITH FAT LAYER EXPOSED (HCC): Primary | ICD-10-CM

## 2020-02-18 DIAGNOSIS — Z72.0 TOBACCO DIPPER: ICD-10-CM

## 2020-02-18 DIAGNOSIS — I10 ESSENTIAL HYPERTENSION: ICD-10-CM

## 2020-02-18 DIAGNOSIS — F41.9 ANXIETY: ICD-10-CM

## 2020-02-18 DIAGNOSIS — E66.01 CLASS 3 SEVERE OBESITY DUE TO EXCESS CALORIES WITH SERIOUS COMORBIDITY AND BODY MASS INDEX (BMI) OF 45.0 TO 49.9 IN ADULT (HCC): ICD-10-CM

## 2020-02-18 DIAGNOSIS — F51.01 PRIMARY INSOMNIA: ICD-10-CM

## 2020-02-18 DIAGNOSIS — D45 POLYCYTHEMIA VERA (HCC): ICD-10-CM

## 2020-02-18 DIAGNOSIS — J44.0 CHRONIC OBSTRUCTIVE PULMONARY DISEASE WITH ACUTE LOWER RESPIRATORY INFECTION (HCC): ICD-10-CM

## 2020-02-18 PROBLEM — Z79.899 HIGH RISK MEDICATION USE: Status: RESOLVED | Noted: 2017-10-10 | Resolved: 2020-02-18

## 2020-02-18 PROBLEM — Z23 ENCOUNTER FOR VACCINATION: Status: RESOLVED | Noted: 2018-04-25 | Resolved: 2020-02-18

## 2020-02-18 PROBLEM — L02.11 ABSCESS OF NECK: Status: RESOLVED | Noted: 2017-04-12 | Resolved: 2020-02-18

## 2020-02-18 PROBLEM — L72.3 SEBACEOUS CYST: Status: RESOLVED | Noted: 2018-05-10 | Resolved: 2020-02-18

## 2020-02-18 PROBLEM — F32.9 REACTIVE DEPRESSION: Status: RESOLVED | Noted: 2018-02-19 | Resolved: 2020-02-18

## 2020-02-18 PROBLEM — N52.9 IMPOTENCE DUE TO ERECTILE DYSFUNCTION: Status: RESOLVED | Noted: 2017-03-06 | Resolved: 2020-02-18

## 2020-02-18 PROBLEM — IMO0001 CLASS 3 OBESITY WITH SERIOUS COMORBIDITY AND BODY MASS INDEX (BMI) OF 50.0 TO 59.9 IN ADULT: Status: RESOLVED | Noted: 2018-05-10 | Resolved: 2020-02-18

## 2020-02-18 PROBLEM — H65.03 NON-RECURRENT ACUTE SEROUS OTITIS MEDIA OF BOTH EARS: Status: RESOLVED | Noted: 2019-12-31 | Resolved: 2020-02-18

## 2020-02-18 PROCEDURE — G0439 PPPS, SUBSEQ VISIT: HCPCS | Performed by: NURSE PRACTITIONER

## 2020-02-18 PROCEDURE — 99213 OFFICE O/P EST LOW 20 MIN: CPT | Performed by: NURSE PRACTITIONER

## 2020-02-18 RX ORDER — ASPIRIN 81 MG/1
81 TABLET ORAL DAILY
Qty: 90 TABLET | Refills: 1 | Status: SHIPPED | OUTPATIENT
Start: 2020-02-18 | End: 2020-06-04 | Stop reason: SDUPTHER

## 2020-02-18 RX ORDER — PHENTERMINE HYDROCHLORIDE 37.5 MG/1
37.5 TABLET ORAL
Qty: 30 TABLET | Refills: 0 | Status: SHIPPED | OUTPATIENT
Start: 2020-02-18 | End: 2020-03-23 | Stop reason: SDUPTHER

## 2020-02-18 NOTE — ASSESSMENT & PLAN NOTE
Diabetes-diabetic ulcer is improving with treatment.   Continue current treatment regimen.  Patient declines a referral to podiatry/general surgery for evaluation.  Site is improving and will be monitored every 2 weeks until healed.  Diabetes will be reassessed 2 weeks.

## 2020-02-18 NOTE — ASSESSMENT & PLAN NOTE
Lipid abnormalities are unchanged.  Nutritional counseling was provided. and Pharmacotherapy as ordered.  Diet changes and weight loss recommended.  Lipids will be reassessed in 3 months.

## 2020-02-18 NOTE — ASSESSMENT & PLAN NOTE
Continue PPI.  Risk versus benefits of PPI/H2 blockers reviewed and discussed.  Patient states understanding.

## 2020-02-18 NOTE — ASSESSMENT & PLAN NOTE
Diabetes is worsening.   Reminded to bring in blood sugar diary at next visit.  Dietary recommendations for ADA diet.  Regular aerobic exercise.  Discussed ways to avoid symptomatic hypoglycemia.  Discussed sick day management.  Discussed foot care.  Diabetes will be reassessed in 3 months.  Weight loss and better diet compliance discussed.  A1c results reviewed

## 2020-02-18 NOTE — ASSESSMENT & PLAN NOTE
Hypertension is improving with treatment.  Continue current treatment regimen.  Dietary sodium restriction.  Weight loss.  Continue current medications.  Routine/regular exercise recommended once ulcer heals.  Blood pressure will be reassessed in 3 months.

## 2020-02-18 NOTE — ASSESSMENT & PLAN NOTE
COPD is Stable.  COPD information handout given.  Patient to keep COPD diary.  Discussed monitoring symptoms and use of quick-relief medications and contacting us early in the course of exacerbations.  Warning signs of respiratory distress were reviewed with the patient.   Counseled to avoid exposure to cigarette smoke.  Continue current medications.

## 2020-02-18 NOTE — PROGRESS NOTES
The ABCs of the Annual Wellness Visit  Initial Medicare Wellness Visit    Chief Complaint   Patient presents with   • Subsequent medicare wellness     Check my diabetic toe ulcer and do a wellness exam      Subjective      Medicare wellness exam  Check my diabetic toe ulcer    History of Present Illness:  Johnathon Mclain is a 44 y.o. male who presents for an Initial Medicare Wellness Visit.    Diabetes with hyperglycemia- lab results reviewed today.  A1c under 7.  Not always compliant with diet.    Obesity- patient continues to have weight gain.  Would like to talk about Adipex.  He has had successful weight loss in the past with Adipex.  Denies any side effects from previous Adipex use.    Gout-no recent exacerbation, currently takes Uloric    Hyperlipidemia- currently receives TriCor.  Noncompliant with diet.    GERD-stable with Prilosec    Chronic joint pain/osteoarthritis-under the care of pain management    Polycythemia vera-under the care of hematology with frequent blood draw and disposal.    Essential hypertension-stable with Coreg lisinopril    Sleep apnea/COPD-wears CPAP    Anxiety/insomnia-patient reports symptoms are controlled with current medication.  Denies thoughts of hurting self or others.  Divorce is final and he feels his anxiety is improved.        HEALTH RISK ASSESSMENT    Recent Hospitalizations:  No hospitalization(s) within the last year.    Current Medical Providers:  Patient Care Team:  Erica Michel APRN as PCP - General  Shy Lowe MD as Consulting Physician (Hematology and Oncology)    Smoking Status:  Social History     Tobacco Use   Smoking Status Never Smoker   Smokeless Tobacco Current User   • Types: Chew   Tobacco Comment    cessation discussed - sheet given to patient       Alcohol Consumption:  Social History     Substance and Sexual Activity   Alcohol Use No       Depression Screen:   PHQ-2/PHQ-9 Depression Screening 2/18/2020   Little interest or pleasure  in doing things 0   Feeling down, depressed, or hopeless 0   Trouble falling or staying asleep, or sleeping too much -   Feeling tired or having little energy -   Poor appetite or overeating -   Feeling bad about yourself - or that you are a failure or have let yourself or your family down -   Trouble concentrating on things, such as reading the newspaper or watching television -   Moving or speaking so slowly that other people could have noticed. Or the opposite - being so fidgety or restless that you have been moving around a lot more than usual -   Thoughts that you would be better off dead, or of hurting yourself in some way -   Total Score 0   If you checked off any problems, how difficult have these problems made it for you to do your work, take care of things at home, or get along with other people? -       Fall Risk Screen:  NAKUL Fall Risk Assessment was completed, and patient is at LOW risk for falls.Assessment completed on:2/18/2020    Health Habits and Functional and Cognitive Screening:  Functional & Cognitive Status 2/18/2020   Do you have difficulty preparing food and eating? No   Do you have difficulty bathing yourself, getting dressed or grooming yourself? No   Do you have difficulty using the toilet? No   Do you have difficulty moving around from place to place? No   Do you have trouble with steps or getting out of a bed or a chair? Yes   Current Diet Unhealthy Diet   Dental Exam Up to date   Eye Exam Up to date   Exercise (times per week) 0 times per week   Current Exercise Activities Include None   Do you need help using the phone?  No   Are you deaf or do you have serious difficulty hearing?  No   Do you need help with transportation? Yes   Do you need help shopping? No   Do you need help preparing meals?  No   Do you need help with housework?  No   Do you need help with laundry? No   Do you need help taking your medications? No   Do you need help managing money? No   Do you ever drive or ride  in a car without wearing a seat belt? No   Have you felt unusual stress, anger or loneliness in the last month? No   Who do you live with? Alone   If you need help, do you have trouble finding someone available to you? No   Have you been bothered in the last four weeks by sexual problems? No   Do you have difficulty concentrating, remembering or making decisions? No         Does the patient have evidence of cognitive impairment? No    Asprin use counseling:Taking ASA appropriately as indicated    Age-appropriate Screening Schedule:  Refer to the list below for future screening recommendations based on patient's age, sex and/or medical conditions. Orders for these recommended tests are listed in the plan section. The patient has been provided with a written plan.    Health Maintenance   Topic Date Due   • HEMOGLOBIN A1C  08/12/2020   • URINE MICROALBUMIN  10/28/2020   • DIABETIC EYE EXAM  11/11/2020   • DIABETIC FOOT EXAM  02/12/2021   • LIPID PANEL  02/12/2021   • TDAP/TD VACCINES (2 - Td) 03/06/2027   • PNEUMOCOCCAL VACCINE (19-64 MEDIUM RISK)  Addressed   • INFLUENZA VACCINE  Addressed          The following portions of the patient's history were reviewed and updated as appropriate: allergies, current medications, past family history, past medical history, past social history, past surgical history and problem list.    Outpatient Medications Prior to Visit   Medication Sig Dispense Refill   • albuterol sulfate HFA (PROAIR HFA) 108 (90 Base) MCG/ACT inhaler Inhale 2 puffs Every 6 (Six) Hours As Needed for Wheezing. 1 inhaler 5   • busPIRone (BUSPAR) 10 MG tablet TAKE 1 Tablet BY MOUTH TWICE DAILY AS NEEDED FOR ANXIETY 60 tablet 3   • carvedilol (COREG) 6.25 MG tablet Take 1 tablet by mouth 2 (Two) Times a Day With Meals. 60 tablet 5   • cetirizine (zyrTEC) 10 MG tablet Take 1 tablet by mouth Daily As Needed for Allergies. 30 tablet 5   • chlorthalidone (HYGROTON) 25 MG tablet Take 1 tablet by mouth Daily. 30 tablet  5   • Cholecalciferol (VITAMIN D) 1000 units tablet Take 1 tablet by mouth Daily. 30 tablet 5   • colchicine 0.6 MG capsule capsule TAKE 1 Capsule BY MOUTH EVERY DAY 30 capsule 5   • febuxostat (ULORIC) 80 MG tablet tablet Take 1 tablet by mouth Daily. Indications: Disorder of Excessive Uric Acid in the Blood 30 tablet 5   • fenofibrate (TRICOR) 145 MG tablet Take 1 tablet by mouth Daily. 30 tablet 5   • Fluticasone Furoate-Vilanterol (BREO ELLIPTA) 100-25 MCG/INH inhaler 1 puff every morning 1 each 5   • gabapentin (NEURONTIN) 800 MG tablet Take 800 mg by mouth 3 (Three) Times a Day.     • glipizide (GLUCOTROL) 5 MG tablet Take 1 tablet by mouth Daily. 30 tablet 5   • glucose blood test strip Use as instructed 50 each 12   • HYDROcodone-acetaminophen (NORCO)  MG per tablet TAKE 1/2 TABLET BY MOUTH TWICE DAILY AS NEEDED     • icosapent ethyl (VASCEPA) 1 g capsule capsule Take 1 g by mouth 2 (Two) Times a Day With Meals. Indications: High Amount of Triglycerides in the Blood 120 capsule 5   • Insulin Glargine (LANTUS SOLOSTAR) 100 UNIT/ML injection pen Inject 21 Units under the skin into the appropriate area as directed Daily. Indications: Type 2 Diabetes 2 pen 5   • Insulin Pen Needle (PEN NEEDLES) 32G X 4 MM misc 1 Device Daily. 100 each 3   • ketoconazole (NIZORAL) 2 % shampoo apply TO THE appropriate AREA TWICE WEEKLY AS directed 120 mL 5   • lisinopril (PRINIVIL,ZESTRIL) 20 MG tablet TAKE 1 Tablet BY MOUTH TWICE DAILY 60 tablet 5   • metFORMIN (FORTAMET) 500 MG (OSM) 24 hr tablet      • metFORMIN ER (GLUCOPHAGE-XR) 500 MG 24 hr tablet Take 1 tablet by mouth Daily With Breakfast. Indications: Type 2 Diabetes 30 tablet 5   • mupirocin (BACTROBAN) 2 % ointment Apply  topically to the appropriate area as directed 3 (Three) Times a Day. 1 each 1   • omeprazole (priLOSEC) 20 MG capsule Take 1 capsule by mouth Daily. Indications: Gastroesophageal Reflux Disease 30 capsule 5   • ONE TOUCH LANCETS misc 1 Device 2  (Two) Times a Day. 200 each 3   • oxyCODONE-acetaminophen (PERCOCET) 5-325 MG per tablet      • sertraline (ZOLOFT) 50 MG tablet TAKE 1 Tablet BY MOUTH EVERY DAY 30 tablet 5   • traZODone (DESYREL) 50 MG tablet Take 0.5-1 tablets by mouth Every Night. 30 tablet 5   • aspirin 81 MG EC tablet Take 1 tablet by mouth Daily. Indications: Stable Angina Pectoris 90 tablet 1   • sulfamethoxazole-trimethoprim (BACTRIM DS,SEPTRA DS) 800-160 MG per tablet Take 1 tablet by mouth 2 (Two) Times a Day for 10 days. 20 tablet 0     No facility-administered medications prior to visit.        Patient Active Problem List   Diagnosis   • Neuralgia and neuritis   • Essential hypertension   • Polycythemia vera (CMS/[facility])   • Osteoarthrosis   • Diabetes mellitus with diabetic neuropathy, with long-term current use of insulin (CMS/[facility])   • COPD (chronic obstructive pulmonary disease) (CMS/[facility])   • Gastroesophageal reflux disease without esophagitis   • Mixed hyperlipidemia   • Chronic idiopathic gout of multiple sites   • Obstructive sleep apnea syndrome   • Primary insomnia   • Skin tags, multiple acquired   • Class 3 severe obesity with serious comorbidity in adult (CMS/[facility])   •    • Anxiety   • Umbilical hernia without obstruction and without gangrene   • Diabetic ulcer of toe of right foot associated with type 2 diabetes mellitus, with fat layer exposed (CMS/[facility])       Advanced Care Planning:  ACP discussion was held with the patient during this visit.    Review of Systems   Constitutional: Positive for unexpected weight change. Negative for activity change, appetite change, chills, diaphoresis and fever.   HENT: Negative for congestion, ear pain, postnasal drip, sinus pressure, sinus pain, trouble swallowing and voice change.    Eyes: Negative.    Respiratory: Negative for chest tightness, shortness of breath and wheezing.    Cardiovascular: Negative for chest pain, palpitations and leg swelling.   Gastrointestinal:  "Negative for abdominal pain, blood in stool, constipation, diarrhea, nausea and vomiting.   Endocrine: Negative.    Genitourinary: Negative for decreased urine volume, dysuria, frequency, hematuria and urgency.   Musculoskeletal: Positive for arthralgias and back pain. Negative for gait problem and myalgias.   Skin: Positive for wound.   Allergic/Immunologic: Negative for environmental allergies, food allergies and immunocompromised state.   Neurological: Negative for tremors, speech difficulty, light-headedness and headaches.   Hematological: Negative.    Psychiatric/Behavioral: Negative for dysphoric mood, self-injury and suicidal ideas.       Compared to one year ago, the patient feels his physical health is better.  Compared to one year ago, the patient feels his mental health is better.    Reviewed chart for potential of high risk medication in the elderly: not applicable  Reviewed chart for potential of harmful drug interactions in the elderly:not applicable    Objective         Vitals:    02/18/20 1124   BP: 120/80   BP Location: Left arm   Patient Position: Sitting   Cuff Size: Adult   Pulse: 88   Temp: 98.3 °F (36.8 °C)   TempSrc: Temporal   SpO2: 95%   Weight: (!) 165 kg (364 lb 12.8 oz)   Height: 177.8 cm (70\")       Body mass index is 52.34 kg/m².  Discussed the patient's BMI with him. The BMI is above average; BMI management plan is completed.    Physical Exam   Constitutional: He is oriented to person, place, and time. Vital signs are normal. He appears well-developed and well-nourished. No distress.   HENT:   Head: Normocephalic and atraumatic.   Right Ear: Tympanic membrane, external ear and ear canal normal.   Left Ear: Tympanic membrane, external ear and ear canal normal.   Nose: Nose normal.   Mouth/Throat: Oropharynx is clear and moist and mucous membranes are normal. No oropharyngeal exudate.   Eyes: Pupils are equal, round, and reactive to light. Conjunctivae, EOM and lids are normal. Right eye " exhibits no discharge. Left eye exhibits no discharge.   Neck: Normal range of motion. Neck supple. No tracheal deviation present. No thyromegaly present.   Cardiovascular: Normal rate, regular rhythm, normal heart sounds and intact distal pulses. Exam reveals no gallop and no friction rub.   No murmur heard.  Pulmonary/Chest: Effort normal and breath sounds normal. No respiratory distress. He has no wheezes. He has no rales. He exhibits no tenderness.   Abdominal: Soft. Normal appearance and bowel sounds are normal. He exhibits no distension and no mass. There is no tenderness. There is no rebound and no guarding.   Musculoskeletal: Normal range of motion.        Lumbar back: He exhibits tenderness, pain and spasm.   Decreased lumbar curvature, there is pain with forward flexion. DTR's +2. No edema.     Johnathon had a diabetic foot exam performed today.    Neurological Sensory Findings -  No right ankle/foot altered proprioception and no left ankle/foot altered proprioception  Vascular Status -  His right foot exhibits no edema. His left foot exhibits no edema.  Skin Integrity  -  His right foot skin is not intact.His left foot skin is intact..     Lymphadenopathy:     He has no cervical adenopathy.   Neurological: He is alert and oriented to person, place, and time. He has normal reflexes.   CN 2-12 grossly intact    Skin: Skin is warm and dry. Capillary refill takes less than 2 seconds. No rash noted. He is not diaphoretic. No erythema. No pallor.   Psychiatric: He has a normal mood and affect. His speech is normal and behavior is normal. Judgment and thought content normal. His affect is not inappropriate. Cognition and memory are normal.   Nursing note and vitals reviewed.      Lab Results   Component Value Date    TRIG 202 (H) 02/12/2020    HDL 25 (L) 02/12/2020    LDL 75 02/12/2020    VLDL 40.4 (H) 02/12/2020    HGBA1C 6.83 (H) 02/12/2020        Assessment/Plan   Medicare Risks and Personalized Health  Plan  CMS Preventative Services Quick Reference  Advance Directive Discussion  Cardiovascular risk  Chronic Pain   Depression/Dysphoria  Diabetic Lab Screening   Fall Risk  Immunizations Discussed/Encouraged (specific immunizations; Influenza, Pneumococcal 23 and Shingrix )  Obesity/Overweight   Polypharmacy  Tobacco Use/Dependance (use dotphrase .tobaccocessation for documentation)    The above risks/problems have been discussed with the patient.  Pertinent information has been shared with the patient in the After Visit Summary.  Follow up plans and orders are seen below in the Assessment/Plan Section.    Diagnoses and all orders for this visit:    1. Diabetic ulcer of toe of right foot associated with type 2 diabetes mellitus, with fat layer exposed (CMS/[facility]) (Primary)  Assessment & Plan:  Diabetes-diabetic ulcer is improving with treatment.   Continue current treatment regimen.  Patient declines a referral to podiatry/general surgery for evaluation.  Site is improving and will be monitored every 2 weeks until healed.  Diabetes will be reassessed 2 weeks.      2. Type 2 diabetes mellitus with diabetic neuropathy, with long-term current use of insulin (CMS/[facility])  Assessment & Plan:  Diabetes is worsening.   Reminded to bring in blood sugar diary at next visit.  Dietary recommendations for ADA diet.  Regular aerobic exercise.  Discussed ways to avoid symptomatic hypoglycemia.  Discussed sick day management.  Discussed foot care.  Diabetes will be reassessed in 3 months.  Weight loss and better diet compliance discussed.  A1c results reviewed    Orders:  -     aspirin 81 MG EC tablet; Take 1 tablet by mouth Daily. Indications: Stable Angina Pectoris  Dispense: 90 tablet; Refill: 1  -     phentermine (ADIPEX-P) 37.5 MG tablet; Take 1 tablet by mouth Every Morning Before Breakfast.  Dispense: 30 tablet; Refill: 0    3.   Assessment & Plan:  Encourage patient stop all use of tobacco      4. Class 3 severe  obesity due to excess calories with serious comorbidity and body mass index (BMI) of 45.0 to 49.9 in adult (CMS/[facility])  Assessment & Plan:  Obesity is Fluctuates up and down.  Discussed the patient's BMI.  The BMI is above average; BMI management plan is completed.  General weight loss/lifestyle modification strategies discussed (elicit support from others; identify saboteurs; non-food rewards, etc).  Informal exercise measures discussed, e.g. taking stairs instead of elevator.  Pharmacotherapy as ordered.      5. Obstructive sleep apnea syndrome  Assessment & Plan:  CPAP precautions discussed, recommend weight loss.  Will start Adipex today.      6. Primary insomnia  Assessment & Plan:  Continue trazodone      7. Essential hypertension  Assessment & Plan:  Hypertension is improving with treatment.  Continue current treatment regimen.  Dietary sodium restriction.  Weight loss.  Continue current medications.  Routine/regular exercise recommended once ulcer heals.  Blood pressure will be reassessed in 3 months.      8. Polycythemia vera (CMS/[facility])    9. Primary osteoarthritis involving multiple joints  Assessment & Plan:  Remain under care of pain management      10. Chronic obstructive pulmonary disease with acute lower respiratory infection (CMS/[facility])  Assessment & Plan:  COPD is Stable.  COPD information handout given.  Patient to keep COPD diary.  Discussed monitoring symptoms and use of quick-relief medications and contacting us early in the course of exacerbations.  Warning signs of respiratory distress were reviewed with the patient.   Counseled to avoid exposure to cigarette smoke.  Continue current medications.          11. Mixed hyperlipidemia  Assessment & Plan:  Lipid abnormalities are unchanged.  Nutritional counseling was provided. and Pharmacotherapy as ordered.  Diet changes and weight loss recommended.  Lipids will be reassessed in 3 months.      12. Idiopathic chronic gout of multiple sites without  sea    13. Gastroesophageal reflux disease without esophagitis  Assessment & Plan:  Continue PPI.  Risk versus benefits of PPI/H2 blockers reviewed and discussed.  Patient states understanding.      14. Anxiety    Other orders  -     cyanocobalamin (VITAMIN B-12) 250 MCG tablet; Take 1 tablet by mouth Daily.  Dispense: 30 tablet; Refill: 0  Medicare wellness exam has been performed today.  Medication list has been reviewed and discussed with patient.  Recommended preventative and screenings has been discussed with patient.    I have discussed diagnosis in detail today allowing time for questions and answers. Pt is aware of reasons to seek urgent or emergent medical care as well as reasons to return to the clinic for evaluation. Possible side effects, interactions and progression of symptoms discussed as well. Pt / family states understanding.   Emotional support and active listening provided.   Reviewed disease process, possible complications, reasons for urgent care and possible side effects of medications. Pt/family state understanding.   Age appropriate education and safety instruction has been provided. Preventive education/recommendation > 15 minutes. Safety, accident prevention, vaccines, health maintenance concerns, nutrition, diet, exercise and social activity.     Instructed on 2000 calorie daily diet with low fat and low carb choices. Heart healthy diet. Goal is weight loss of five pounds each month and three pounds each additional month with no side effects. Will evaluate monthly. Instructed on possible side effects and reasons to stop medication as well as reasons for emergent care.     Diabetic ulcer is healing.  He will continue to keep it clean, dry and apply Bactroban at least twice daily.  We reviewed signs and symptoms that require immediate medical attention such as but not limited to redness, drainage or worsening of wound as well as fever or chills.  I like to see him back in 2 weeks for  further evaluation.  Patient continues to decline a surgical/podiatry consult at this time.    Reviewed medication list.  Patient has refills on his routine medication.  We have reviewed and discussed his recent labs.  He is going to work harder on diet and weight loss.  We are going to go ahead and start Adipex 37.5 mg once daily today with hopes that weight loss will help improve his cholesterol and diabetes.  Patient agrees that as soon as his ulcer is healed he will start walking again which has been very helpful with controlling his diabetes and weight in the past.    As part of this patient's treatment plan they are being prescribed controlled substance/substances with potential for abuse. This patient has been made aware of the appropriate use of such medications, including potential risk of somnolence, limited ability to drive and / or work safely, and potential for overdose. It has also been made clear that these medications are for use by this patient only, without concomitant use of alcohol or other substances unless prescribed/advised by medical provider. Patient has completed controlled substance agreement detailing terms of continued prescribing of controlled substances including monitoring Rebekah reports, drug screens and pill counts. The patient was asked and states they are not receiving narcotic medication from any there provider or any provider that this office has not been made aware of. History and physical exam exhibit continued safe and appropriate use of controlled substances.   Request rebekah today.       Follow-up in 2 weeks, sooner if needed.  Follow Up:  Return in about 2 weeks (around 3/3/2020) for Recheck.     An After Visit Summary and PPPS were given to the patient.

## 2020-02-18 NOTE — ASSESSMENT & PLAN NOTE
Obesity is Fluctuates up and down.  Discussed the patient's BMI.  The BMI is above average; BMI management plan is completed.  General weight loss/lifestyle modification strategies discussed (elicit support from others; identify saboteurs; non-food rewards, etc).  Informal exercise measures discussed, e.g. taking stairs instead of elevator.  Pharmacotherapy as ordered.

## 2020-03-02 ENCOUNTER — OFFICE VISIT (OUTPATIENT)
Dept: FAMILY MEDICINE CLINIC | Facility: CLINIC | Age: 45
End: 2020-03-02

## 2020-03-02 VITALS
BODY MASS INDEX: 45.1 KG/M2 | WEIGHT: 315 LBS | DIASTOLIC BLOOD PRESSURE: 80 MMHG | HEIGHT: 70 IN | SYSTOLIC BLOOD PRESSURE: 122 MMHG | TEMPERATURE: 98.4 F | HEART RATE: 74 BPM | OXYGEN SATURATION: 92 %

## 2020-03-02 DIAGNOSIS — L97.512 DIABETIC ULCER OF TOE OF RIGHT FOOT ASSOCIATED WITH TYPE 2 DIABETES MELLITUS, WITH FAT LAYER EXPOSED (HCC): ICD-10-CM

## 2020-03-02 DIAGNOSIS — L91.8 SKIN TAGS, MULTIPLE ACQUIRED: ICD-10-CM

## 2020-03-02 DIAGNOSIS — E66.01 CLASS 3 SEVERE OBESITY DUE TO EXCESS CALORIES WITH SERIOUS COMORBIDITY AND BODY MASS INDEX (BMI) OF 50.0 TO 59.9 IN ADULT (HCC): ICD-10-CM

## 2020-03-02 DIAGNOSIS — E11.40 TYPE 2 DIABETES MELLITUS WITH DIABETIC NEUROPATHY, WITH LONG-TERM CURRENT USE OF INSULIN (HCC): Primary | ICD-10-CM

## 2020-03-02 DIAGNOSIS — E11.621 DIABETIC ULCER OF TOE OF RIGHT FOOT ASSOCIATED WITH TYPE 2 DIABETES MELLITUS, WITH FAT LAYER EXPOSED (HCC): ICD-10-CM

## 2020-03-02 DIAGNOSIS — Z79.4 TYPE 2 DIABETES MELLITUS WITH DIABETIC NEUROPATHY, WITH LONG-TERM CURRENT USE OF INSULIN (HCC): Primary | ICD-10-CM

## 2020-03-02 DIAGNOSIS — G47.33 OBSTRUCTIVE SLEEP APNEA SYNDROME: ICD-10-CM

## 2020-03-02 PROCEDURE — 99214 OFFICE O/P EST MOD 30 MIN: CPT | Performed by: NURSE PRACTITIONER

## 2020-03-02 PROCEDURE — 17111 DESTRUCTION B9 LESIONS 15/>: CPT | Performed by: NURSE PRACTITIONER

## 2020-03-02 RX ORDER — BENZONATATE 100 MG/1
100 CAPSULE ORAL 3 TIMES DAILY PRN
Qty: 20 CAPSULE | Refills: 1 | Status: SHIPPED | OUTPATIENT
Start: 2020-03-02 | End: 2020-06-04

## 2020-03-03 PROBLEM — Q82.8 ACCESSORY SKIN TAGS: Status: ACTIVE | Noted: 2020-03-03

## 2020-03-03 PROBLEM — Q82.8 ACCESSORY SKIN TAGS: Status: RESOLVED | Noted: 2020-03-03 | Resolved: 2020-03-03

## 2020-03-03 NOTE — ASSESSMENT & PLAN NOTE
Ulcer improving/almost completely healed.  Patient may start walking gradually for exercise.  He has been encouraged to wear diabetic shoes and do daily foot checks.  I have instructed the patient that when he sees me monthly he is to have his shoes and socks off and be waiting for me on the exam table.  Patient agrees.

## 2020-03-03 NOTE — PROGRESS NOTES
Subjective   Johnathon Mclain is a 44 y.o. male.     Chief Complaint   Patient presents with   • look at toe       History of Present Illness:    Type 2 diabetes with hyperglycemia and diabetic ulcer on right great toe. Healing and doing better. Wants to know if he can start walking for exercise.    Skin tags catching on his clothing and bleeding.  Patient is requesting that be removed.    Obesity- patient's weight has remained stable.  He has started Adipex.  He is a compulsive eater and states that Adipex is helping him not eat compulsively.  He denies any side effects.  Once the rainy weather stops patient is wanting to start walking.  He is wanting cleared to start walking for exercise today if his diabetic ulcer is healed.    Sleep apnea-wears CPAP.  Weight loss is recommended.    The following portions of the patient's history and ROS were reviewed and updated as appropriate per provider:  Allergies, current medications, past family history, past medical history, past social history, past surgical history and problem list.    Review of Systems   Constitutional: Positive for appetite change. Negative for activity change, diaphoresis and fatigue.   HENT: Negative for congestion, ear discharge, sinus pressure, sinus pain, sneezing, sore throat and trouble swallowing.    Eyes: Negative.    Respiratory: Positive for apnea. Negative for cough, choking, chest tightness, shortness of breath and wheezing.    Cardiovascular: Negative for chest pain, palpitations and leg swelling.   Gastrointestinal: Negative for abdominal pain, blood in stool, constipation, diarrhea, nausea and vomiting.   Endocrine: Negative.    Genitourinary: Negative for difficulty urinating, dysuria, frequency and hematuria.   Musculoskeletal: Positive for arthralgias and back pain. Negative for neck stiffness.        Under the care of a pain management   Skin: Positive for color change. Negative for pallor and rash.   Allergic/Immunologic: Negative.   "  Neurological: Negative for syncope, facial asymmetry, weakness, light-headedness and headaches.   Psychiatric/Behavioral: Negative for confusion, dysphoric mood, self-injury and suicidal ideas.       Objective     /80   Pulse 74   Temp 98.4 °F (36.9 °C) (Temporal)   Ht 177.8 cm (70\")   Wt (!) 165 kg (364 lb)   SpO2 92%   BMI 52.23 kg/m²   Orders Only on 02/12/2020   Component Date Value Ref Range Status   • Glucose 02/12/2020 175* 65 - 99 mg/dL Final   • BUN 02/12/2020 22* 6 - 20 mg/dL Final   • Creatinine 02/12/2020 0.93  0.76 - 1.27 mg/dL Final   • Sodium 02/12/2020 135* 136 - 145 mmol/L Final   • Potassium 02/12/2020 4.3  3.5 - 5.2 mmol/L Final   • Chloride 02/12/2020 99  98 - 107 mmol/L Final   • CO2 02/12/2020 23.2  22.0 - 29.0 mmol/L Final   • Calcium 02/12/2020 10.2  8.6 - 10.5 mg/dL Final   • Total Protein 02/12/2020 6.6  6.0 - 8.5 g/dL Final   • Albumin 02/12/2020 4.50  3.50 - 5.20 g/dL Final   • ALT (SGPT) 02/12/2020 35  1 - 41 U/L Final   • AST (SGOT) 02/12/2020 23  1 - 40 U/L Final   • Alkaline Phosphatase 02/12/2020 59  39 - 117 U/L Final   • Total Bilirubin 02/12/2020 0.7  0.2 - 1.2 mg/dL Final   • eGFR Non African Amer 02/12/2020 87  >60 mL/min/1.73 Final   • Globulin 02/12/2020 2.1  gm/dL Final   • A/G Ratio 02/12/2020 2.1  g/dL Final   • BUN/Creatinine Ratio 02/12/2020 23.7  7.0 - 25.0 Final   • Anion Gap 02/12/2020 12.8  5.0 - 15.0 mmol/L Final   • TSH 02/12/2020 0.777  0.270 - 4.200 uIU/mL Final   • Hemoglobin A1C 02/12/2020 6.83* 4.80 - 5.60 % Final   • 25 Hydroxy, Vitamin D 02/12/2020 29.3* 30.0 - 100.0 ng/ml Final   • Vitamin B-12 02/12/2020 479  211 - 946 pg/mL Final   • Total Cholesterol 02/12/2020 140  0 - 200 mg/dL Final   • Triglycerides 02/12/2020 202* 0 - 150 mg/dL Final   • HDL Cholesterol 02/12/2020 25* 40 - 60 mg/dL Final   • LDL Cholesterol  02/12/2020 75  0 - 100 mg/dL Final   • VLDL Cholesterol 02/12/2020 40.4* 5 - 40 mg/dL Final   • LDL/HDL Ratio 02/12/2020 2.98   " Final   • WBC 02/12/2020 7.02  3.40 - 10.80 10*3/mm3 Final   • RBC 02/12/2020 5.18  4.14 - 5.80 10*6/mm3 Final   • Hemoglobin 02/12/2020 17.3  13.0 - 17.7 g/dL Final   • Hematocrit 02/12/2020 47.3  37.5 - 51.0 % Final   • MCV 02/12/2020 91.3  79.0 - 97.0 fL Final   • MCH 02/12/2020 33.4* 26.6 - 33.0 pg Final   • MCHC 02/12/2020 36.6* 31.5 - 35.7 g/dL Final   • RDW 02/12/2020 12.2* 12.3 - 15.4 % Final   • RDW-SD 02/12/2020 40.4  37.0 - 54.0 fl Final   • MPV 02/12/2020 10.9  6.0 - 12.0 fL Final   • Platelets 02/12/2020 182  140 - 450 10*3/mm3 Final   • Neutrophil % 02/12/2020 44.7  42.7 - 76.0 % Final   • Lymphocyte % 02/12/2020 29.5  19.6 - 45.3 % Final   • Monocyte % 02/12/2020 12.3* 5.0 - 12.0 % Final   • Eosinophil % 02/12/2020 12.4* 0.3 - 6.2 % Final   • Basophil % 02/12/2020 0.7  0.0 - 1.5 % Final   • Immature Grans % 02/12/2020 0.4  0.0 - 0.5 % Final   • Neutrophils, Absolute 02/12/2020 3.14  1.70 - 7.00 10*3/mm3 Final   • Lymphocytes, Absolute 02/12/2020 2.07  0.70 - 3.10 10*3/mm3 Final   • Monocytes, Absolute 02/12/2020 0.86  0.10 - 0.90 10*3/mm3 Final   • Eosinophils, Absolute 02/12/2020 0.87* 0.00 - 0.40 10*3/mm3 Final   • Basophils, Absolute 02/12/2020 0.05  0.00 - 0.20 10*3/mm3 Final   • Immature Grans, Absolute 02/12/2020 0.03  0.00 - 0.05 10*3/mm3 Final   • nRBC 02/12/2020 0.0  0.0 - 0.2 /100 WBC Final       Physical Exam   Constitutional: He is oriented to person, place, and time. Vital signs are normal. He appears well-developed and well-nourished. No distress.   HENT:   Head: Normocephalic and atraumatic.   Right Ear: Tympanic membrane, external ear and ear canal normal.   Left Ear: Tympanic membrane, external ear and ear canal normal.   Nose: Nose normal.   Mouth/Throat: Oropharynx is clear and moist and mucous membranes are normal. No oropharyngeal exudate.   Eyes: Pupils are equal, round, and reactive to light. Conjunctivae, EOM and lids are normal. Right eye exhibits no discharge. Left eye  exhibits no discharge.   Neck: Normal range of motion. Neck supple. No tracheal deviation present. No thyromegaly present.   Cardiovascular: Normal rate, regular rhythm, normal heart sounds and intact distal pulses. Exam reveals no gallop and no friction rub.   No murmur heard.  Pulmonary/Chest: Effort normal and breath sounds normal. No respiratory distress. He has no wheezes. He has no rales. He exhibits no tenderness.   Abdominal: Soft. Normal appearance and bowel sounds are normal. He exhibits no distension and no mass. There is no tenderness. There is no rebound and no guarding.   Musculoskeletal: Normal range of motion.        Lumbar back: He exhibits tenderness, pain and spasm.   Decreased lumbar curvature, there is pain with forward flexion. DTR's +2. No edema.         Lymphadenopathy:     He has no cervical adenopathy.   Neurological: He is alert and oriented to person, place, and time. He has normal reflexes.   CN 2-12 grossly intact    Skin: Skin is warm and dry. Capillary refill takes less than 2 seconds. No rash noted. He is not diaphoretic. No erythema. No pallor.        Psychiatric: He has a normal mood and affect. His speech is normal and behavior is normal. Judgment and thought content normal. His affect is not inappropriate. Cognition and memory are normal.   Nursing note and vitals reviewed.      Assessment/Plan     Problem List Items Addressed This Visit        Respiratory    Obstructive sleep apnea syndrome    Overview     Wears cpap          Current Assessment & Plan     Weight loss recommended            Digestive    Class 3 severe obesity with serious comorbidity in adult (CMS/[facility])    Current Assessment & Plan     Obesity is unchanged.  Discussed the patient's BMI.  The BMI is above average; BMI management plan is completed.  General weight loss/lifestyle modification strategies discussed (elicit support from others; identify saboteurs; non-food rewards, etc).  Informal exercise measures  discussed, e.g. taking stairs instead of elevator.  Regular aerobic exercise program discussed.  Pharmacotherapy as ordered.   Goal is 5 pound weight loss this month.  Have discussed with patient that he may continue to take Adipex.  If Adipex is not generating a weight loss then we will discontinue the medication.  I have encouraged him to start walking or exercising and some method to help burn calories.  Patient states understanding.            Endocrine    Diabetes mellitus with diabetic neuropathy, with long-term current use of insulin (CMS/[facility]) - Primary    Overview     Abnormal nerve conduction study on file         Current Assessment & Plan     Diabetic precautions reviewed and discussed.  Continue current medication.  Monitor glucose at least twice daily and report abnormals.  Diabetic diet compliance discussed.         Diabetic ulcer of toe of right foot associated with type 2 diabetes mellitus, with fat layer exposed (CMS/[facility])    Current Assessment & Plan     Ulcer improving/almost completely healed.  Patient may start walking gradually for exercise.  He has been encouraged to wear diabetic shoes and do daily foot checks.  I have instructed the patient that when he sees me monthly he is to have his shoes and socks off and be waiting for me on the exam table.  Patient agrees.            Musculoskeletal and Integument    Skin tags, multiple acquired    Overview     Bleeding and catching on clothing          Current Assessment & Plan     Tolerated cryotherapy well.  Reviewed treatment options and agreed on a trial of cryotherapy. See procedure note.                          Patient's Body mass index is 52.23 kg/m². BMI is above normal parameters. Recommendations include: exercise counseling, nutrition counseling and pharmacological intervention.  I have recommended weight loss surgery.  Patient states that he will consider.    Johnathon Gomezs  reports that he has never smoked. His smokeless tobacco use  includes chew.. I have educated him on the risk of diseases from using tobacco products such as cancer, COPD and heart diease.     I advised him to quit and he is not willing to quit.    I spent 3  minutes counseling the patient.           I have discussed diagnosis in detail today allowing time for questions and answers. Patient is aware of reasons to seek urgent or emergent medical care as well as reasons to return to the clinic for evaluation. Possible side effects, interactions and progression of symptoms discussed as well. Patient / family states understanding.   Emotional support and active listening provided.     Pt has been educated/instructed on diabetic care and protocols.  Diabetic diet instructions provided.  Medication regimen reviewed.  Discussed possible side effects and interactions of current medications.  Sick day rules reviewed. Continue to monitor blood sugar and report abnormal readings as discussed today. Understanding stated by patient.     Pt has been instructed today regarding low fat heart smart diet. Weight management and routine exercise has been recommended. Avoid high fat foods, starchy foods and processed foods. Increase lean meats, fresh vegetables and fresh fruits.     Follow up in 2 weeks, sooner if needed. Routine labs every 3-6 months.         This document has been electronically signed by:  TYSON Vinson, NP-C

## 2020-03-03 NOTE — ASSESSMENT & PLAN NOTE
Diabetic precautions reviewed and discussed.  Continue current medication.  Monitor glucose at least twice daily and report abnormals.  Diabetic diet compliance discussed.

## 2020-03-12 ENCOUNTER — PREP FOR SURGERY (OUTPATIENT)
Dept: OTHER | Facility: HOSPITAL | Age: 45
End: 2020-03-12

## 2020-03-12 DIAGNOSIS — M47.16 SPONDYLOSIS WITH MYELOPATHY, LUMBAR REGION: Primary | ICD-10-CM

## 2020-03-12 NOTE — H&P
[id number]  Johnathon Mclain  male  1975  Tarik AVILA DO [place]  3/12/2020  PATIENT NAME: Johnathon Mclain           Past Medical History:   Diagnosis Date   • Asthma    • Back pain    • COPD (chronic obstructive pulmonary disease) (CMS/[facility])    • CPAP (continuous positive airway pressure) dependence    • Diabetes mellitus (CMS/[facility])    • Fibromyalgia    • GERD (gastroesophageal reflux disease)    • Gout    • Hyperlipidemia     mixed   • Hypertension    • Neuralgia and neuritis    • Osteoarthrosis    • Oxygen dependent     2L AT NIGHT    • Polycythemia    • Sleep apnea    • Sleep apnea    • Type II diabetes mellitus, uncontrolled (CMS/[facility])     uncomplicated       Past Surgical History:   Procedure Laterality Date   • EXCISION MASS HEAD/NECK N/A 5/11/2018    Procedure: SKIN LESION EXCISION NECK & UPPER BACK;  Surgeon: New Camacho MD;  Location: [facility];  Service: General   • INCISION AND DRAINAGE ABSCESS N/A 10/16/2019    Procedure: INCISION AND DRAINAGE ABSCESS TO POSTERIOR NECK;  Surgeon: Singh Schuster MD;  Location: [facility];  Service: General       Social History     Socioeconomic History   • Marital status: Legally      Spouse name: donald   • Number of children: 2   • Years of education: Not on file   • Highest education level: Not on file   Occupational History   • Occupation: disabled   Social Needs   • Financial resource strain: Somewhat hard   • Food insecurity:     Worry: Never true     Inability: Never true   • Transportation needs:     Medical: No     Non-medical: No   Tobacco Use   • Smoking status: Never Smoker   • Smokeless tobacco: Current User     Types: Chew   • Tobacco comment: cessation discussed - sheet given to patient   Substance and Sexual Activity   • Alcohol use: No   • Drug use: No   • Sexual activity: Defer   Lifestyle   • Physical activity:     Days per week: 3 days     Minutes per session: 20 min   • Stress: Very much       Family History   Problem Relation Age of Onset    • Arthritis Mother    • COPD Mother    • Asthma Mother    • Cancer Mother    • Hyperlipidemia Mother    • Diabetes Father    • Heart disease Father    • Hyperlipidemia Father    • Hypertension Father    • Stroke Father          (Not in a hospital admission)      Review of Systems/Physical Exam:   Within Normal Limits Abnormal   Mental Status [x]    []     Head, Neck, and Throat [x]   []     Chest/Lungs [x]   []     Cardiovascular [x]   []     Abdomen [x]   []     Extremities [x]   []     Neurologic [x]   []     Other         There were no vitals filed for this visit.    Review of Systems - as previously noted      Diagnosis: M47.16    Plan: * Surgery not found *       STATEMENT AS TO REASON OF PLANNED OPERATION:  [x]   H&P revealed no contraindication to planned surgery. (Check if correct).    [x]   Labs (if ordered) have been reviewed and revealed no contraindications to planned surgery. (Check if correct).      Tarik Mishra,   3/12/2020

## 2020-03-12 NOTE — H&P
[id number]  Johnathon Mclain  male  1975  Tarik AVILA DO [place]  3/12/2020  PATIENT NAME: Johnathon Mclain           Past Medical History:   Diagnosis Date   • Asthma    • Back pain    • COPD (chronic obstructive pulmonary disease) (CMS/[facility])    • CPAP (continuous positive airway pressure) dependence    • Diabetes mellitus (CMS/[facility])    • Fibromyalgia    • GERD (gastroesophageal reflux disease)    • Gout    • Hyperlipidemia     mixed   • Hypertension    • Neuralgia and neuritis    • Osteoarthrosis    • Oxygen dependent     2L AT NIGHT    • Polycythemia    • Sleep apnea    • Sleep apnea    • Type II diabetes mellitus, uncontrolled (CMS/[facility])     uncomplicated       Past Surgical History:   Procedure Laterality Date   • EXCISION MASS HEAD/NECK N/A 5/11/2018    Procedure: SKIN LESION EXCISION NECK & UPPER BACK;  Surgeon: New Camacho MD;  Location: [facility];  Service: General   • INCISION AND DRAINAGE ABSCESS N/A 10/16/2019    Procedure: INCISION AND DRAINAGE ABSCESS TO POSTERIOR NECK;  Surgeon: Singh Schuster MD;  Location: [facility];  Service: General       Social History     Socioeconomic History   • Marital status: Legally      Spouse name: donald   • Number of children: 2   • Years of education: Not on file   • Highest education level: Not on file   Occupational History   • Occupation: disabled   Social Needs   • Financial resource strain: Somewhat hard   • Food insecurity:     Worry: Never true     Inability: Never true   • Transportation needs:     Medical: No     Non-medical: No   Tobacco Use   • Smoking status: Never Smoker   • Smokeless tobacco: Current User     Types: Chew   • Tobacco comment: cessation discussed - sheet given to patient   Substance and Sexual Activity   • Alcohol use: No   • Drug use: No   • Sexual activity: Defer   Lifestyle   • Physical activity:     Days per week: 3 days     Minutes per session: 20 min   • Stress: Very much       Family History   Problem Relation Age of Onset    • Arthritis Mother    • COPD Mother    • Asthma Mother    • Cancer Mother    • Hyperlipidemia Mother    • Diabetes Father    • Heart disease Father    • Hyperlipidemia Father    • Hypertension Father    • Stroke Father          (Not in a hospital admission)      Review of Systems/Physical Exam:   Within Normal Limits Abnormal   Mental Status [x]    []     Head, Neck, and Throat [x]   []     Chest/Lungs [x]   []     Cardiovascular [x]   []     Abdomen [x]   []     Extremities [x]   []     Neurologic [x]   []     Other         There were no vitals filed for this visit.    Review of Systems - as previously noted      Diagnosis: M47.16    Plan: LUMBAR MEDIAL BRANCH BLOCK Bilateral (Bilateral)       STATEMENT AS TO REASON OF PLANNED OPERATION:  [x]   H&P revealed no contraindication to planned surgery. (Check if correct).    [x]   Labs (if ordered) have been reviewed and revealed no contraindications to planned surgery. (Check if correct).      Tarik Mishra, DO  3/12/2020

## 2020-03-12 NOTE — H&P (VIEW-ONLY)
[id number]  Johnathon Mclain  male  1975  Tarik AVILA DO [place]  3/12/2020  PATIENT NAME: Johnathon Mclain           Past Medical History:   Diagnosis Date   • Asthma    • Back pain    • COPD (chronic obstructive pulmonary disease) (CMS/[facility])    • CPAP (continuous positive airway pressure) dependence    • Diabetes mellitus (CMS/[facility])    • Fibromyalgia    • GERD (gastroesophageal reflux disease)    • Gout    • Hyperlipidemia     mixed   • Hypertension    • Neuralgia and neuritis    • Osteoarthrosis    • Oxygen dependent     2L AT NIGHT    • Polycythemia    • Sleep apnea    • Sleep apnea    • Type II diabetes mellitus, uncontrolled (CMS/[facility])     uncomplicated       Past Surgical History:   Procedure Laterality Date   • EXCISION MASS HEAD/NECK N/A 5/11/2018    Procedure: SKIN LESION EXCISION NECK & UPPER BACK;  Surgeon: New Camacho MD;  Location: [facility];  Service: General   • INCISION AND DRAINAGE ABSCESS N/A 10/16/2019    Procedure: INCISION AND DRAINAGE ABSCESS TO POSTERIOR NECK;  Surgeon: Singh Schuster MD;  Location: [facility];  Service: General       Social History     Socioeconomic History   • Marital status: Legally      Spouse name: donald   • Number of children: 2   • Years of education: Not on file   • Highest education level: Not on file   Occupational History   • Occupation: disabled   Social Needs   • Financial resource strain: Somewhat hard   • Food insecurity:     Worry: Never true     Inability: Never true   • Transportation needs:     Medical: No     Non-medical: No   Tobacco Use   • Smoking status: Never Smoker   • Smokeless tobacco: Current User     Types: Chew   • Tobacco comment: cessation discussed - sheet given to patient   Substance and Sexual Activity   • Alcohol use: No   • Drug use: No   • Sexual activity: Defer   Lifestyle   • Physical activity:     Days per week: 3 days     Minutes per session: 20 min   • Stress: Very much       Family History   Problem Relation Age of Onset    • Arthritis Mother    • COPD Mother    • Asthma Mother    • Cancer Mother    • Hyperlipidemia Mother    • Diabetes Father    • Heart disease Father    • Hyperlipidemia Father    • Hypertension Father    • Stroke Father          (Not in a hospital admission)      Review of Systems/Physical Exam:   Within Normal Limits Abnormal   Mental Status [x]    []     Head, Neck, and Throat [x]   []     Chest/Lungs [x]   []     Cardiovascular [x]   []     Abdomen [x]   []     Extremities [x]   []     Neurologic [x]   []     Other         There were no vitals filed for this visit.    Review of Systems - as previously noted      Diagnosis: M47.16    Plan: * Surgery not found *       STATEMENT AS TO REASON OF PLANNED OPERATION:  [x]   H&P revealed no contraindication to planned surgery. (Check if correct).    [x]   Labs (if ordered) have been reviewed and revealed no contraindications to planned surgery. (Check if correct).      Tarik Mishra,   3/12/2020

## 2020-03-18 ENCOUNTER — HOSPITAL ENCOUNTER (OUTPATIENT)
Facility: HOSPITAL | Age: 45
Setting detail: HOSPITAL OUTPATIENT SURGERY
Discharge: HOME | End: 2020-03-18
Attending: ANESTHESIOLOGY | Admitting: ANESTHESIOLOGY

## 2020-03-18 ENCOUNTER — APPOINTMENT (OUTPATIENT)
Dept: GENERAL RADIOLOGY | Facility: HOSPITAL | Age: 45
End: 2020-03-18

## 2020-03-18 VITALS
BODY MASS INDEX: 42.66 KG/M2 | SYSTOLIC BLOOD PRESSURE: 123 MMHG | RESPIRATION RATE: 20 BRPM | OXYGEN SATURATION: 98 % | HEART RATE: 92 BPM | DIASTOLIC BLOOD PRESSURE: 80 MMHG | TEMPERATURE: 97.4 F | WEIGHT: 315 LBS | HEIGHT: 72 IN

## 2020-03-18 PROCEDURE — 76000 FLUOROSCOPY <1 HR PHYS/QHP: CPT

## 2020-03-18 PROCEDURE — 76000 FLUOROSCOPY <1 HR PHYS/QHP: CPT | Performed by: RADIOLOGY

## 2020-03-18 PROCEDURE — 25010000002 METHYLPREDNISOLONE PER 80 MG: Performed by: ANESTHESIOLOGY

## 2020-03-18 RX ORDER — BUPIVACAINE HYDROCHLORIDE 5 MG/ML
INJECTION, SOLUTION PERINEURAL AS NEEDED
Status: DISCONTINUED | OUTPATIENT
Start: 2020-03-18 | End: 2020-03-18 | Stop reason: HOSPADM

## 2020-03-18 RX ORDER — METHYLPREDNISOLONE ACETATE 80 MG/ML
INJECTION, SUSPENSION INTRA-ARTICULAR; INTRALESIONAL; INTRAMUSCULAR; SOFT TISSUE AS NEEDED
Status: DISCONTINUED | OUTPATIENT
Start: 2020-03-18 | End: 2020-03-18 | Stop reason: HOSPADM

## 2020-03-18 NOTE — OP NOTE
Informed consent was obtained after a discussion of risks, benefits and alternatives to the procedure. Patient agreed to proceed by signing the procedure consent. The patient was placed in a prone position on the fluoroscopy table. Blood pressure, heart rate, and pulse oximetry were monitored throughout the procedure.     Skin overlying the lumbar region was cleansed with ethyl alcohol and chlorahexidine solution.  Next using A/P fluoroscopic views a 25-gauge by 3 1/2 inch spinal needle was advanced to make contact with notch of the sacral ala.  Following this, under fluoroscopic views needles were advanced into the junction of the superior articular process and transverse process at the L4 and L5 levels on left sides and positioned with the needle tips just posterior to a line connecting the neuroforaminal space.  Each level was then injected with a mixture of Methylprednisolone and 1cc of 0.5% Bupivicaine.  The needles were then removed intact.  The procedure was then repeated on the right in a similar manner with the same injectate.  Procedure was repeated on the contralateral side in the same manner. 80mg total of methylprednisolone were used.       The patient was monitored for adverse allergic, hemodynamic, or neurologic symptoms after the procedure. The patient tolerated the procedure well and there were no apparent complications. Vital signs remained stable throughout the procedure. The patient was taken to the recovery area where written discharge instructions for the procedure were given. The patient was discharged home.    NOTE: Universal Sterile Protocol was observed throughout the entire procedure and this patient has been educated prior to the performance of this procedure.    2 level LMBB L4/5, L5/S1      CPT   84785,50  05313,50        Tarik Mishra DO  03/18/20

## 2020-03-23 ENCOUNTER — PROCEDURE VISIT (OUTPATIENT)
Dept: FAMILY MEDICINE CLINIC | Facility: CLINIC | Age: 45
End: 2020-03-23

## 2020-03-23 VITALS
OXYGEN SATURATION: 93 % | HEIGHT: 72 IN | DIASTOLIC BLOOD PRESSURE: 90 MMHG | SYSTOLIC BLOOD PRESSURE: 140 MMHG | BODY MASS INDEX: 42.66 KG/M2 | WEIGHT: 315 LBS | TEMPERATURE: 98 F | HEART RATE: 88 BPM

## 2020-03-23 DIAGNOSIS — E11.40 TYPE 2 DIABETES MELLITUS WITH DIABETIC NEUROPATHY, WITH LONG-TERM CURRENT USE OF INSULIN (HCC): ICD-10-CM

## 2020-03-23 DIAGNOSIS — Z79.4 TYPE 2 DIABETES MELLITUS WITH DIABETIC NEUROPATHY, WITH LONG-TERM CURRENT USE OF INSULIN (HCC): ICD-10-CM

## 2020-03-23 DIAGNOSIS — J06.9 ACUTE URI: ICD-10-CM

## 2020-03-23 DIAGNOSIS — L91.8 SKIN TAGS, MULTIPLE ACQUIRED: ICD-10-CM

## 2020-03-23 DIAGNOSIS — E66.01 CLASS 3 SEVERE OBESITY DUE TO EXCESS CALORIES WITH SERIOUS COMORBIDITY AND BODY MASS INDEX (BMI) OF 45.0 TO 49.9 IN ADULT (HCC): Primary | ICD-10-CM

## 2020-03-23 PROCEDURE — 99214 OFFICE O/P EST MOD 30 MIN: CPT | Performed by: NURSE PRACTITIONER

## 2020-03-23 PROCEDURE — 11200 RMVL SKIN TAGS UP TO&INC 15: CPT | Performed by: NURSE PRACTITIONER

## 2020-03-23 PROCEDURE — 11201 RMVL SKIN TAGS EA ADDL 10: CPT | Performed by: NURSE PRACTITIONER

## 2020-03-23 RX ORDER — CIPROFLOXACIN AND DEXAMETHASONE 3; 1 MG/ML; MG/ML
SUSPENSION/ DROPS AURICULAR (OTIC)
Qty: 7.5 ML | Refills: 0 | Status: SHIPPED | OUTPATIENT
Start: 2020-03-23 | End: 2020-03-30

## 2020-03-23 RX ORDER — PHENTERMINE HYDROCHLORIDE 37.5 MG/1
37.5 TABLET ORAL
Qty: 30 TABLET | Refills: 0 | Status: SHIPPED | OUTPATIENT
Start: 2020-03-23 | End: 2020-04-28 | Stop reason: SDUPTHER

## 2020-03-23 RX ORDER — AZITHROMYCIN 250 MG/1
TABLET, FILM COATED ORAL
Qty: 6 TABLET | Refills: 0 | Status: SHIPPED | OUTPATIENT
Start: 2020-03-23 | End: 2020-04-28

## 2020-03-23 NOTE — PROGRESS NOTES
Subjective   Johnathon Mclain is a 44 y.o. male.     No chief complaint on file.    Chief complaint    Wound check on great toe    Have some skin tags that are catching on my pants    Obesity    History of Present Illness:    Patient has multiple skin tags catching on his pants, becoming irritated and bleeding.  He is requesting these be removed.    Great toe-diabetic ulcer on right great toe.  Patient reports that he is feeling better and looking better.  He is here today for wound check.    Obesity- patient does show a 10 pound weight loss since he was last seen in this office on March 2, 2020.  He does have a different weight listed when he was seen by pain management between our most recent visits, patient was not weighed that visit and instead an old weight was generated into the system.          The following portions of the patient's history and ROS were reviewed and updated as appropriate per provider:  Allergies, current medications, past family history, past medical history, past social history, past surgical history and problem list.    Review of Systems   Constitutional: Negative for activity change, appetite change, fatigue and unexpected weight change.   HENT: Positive for ear pain, sinus pressure and sinus pain. Negative for dental problem and sore throat.    Eyes: Negative.    Respiratory: Negative for cough, chest tightness, shortness of breath and wheezing.    Cardiovascular: Negative.    Gastrointestinal: Negative for abdominal pain, blood in stool, constipation, diarrhea and rectal pain.   Endocrine: Negative.    Genitourinary: Negative for dysuria, frequency and hematuria.   Musculoskeletal: Positive for arthralgias, back pain and neck pain. Negative for neck stiffness.   Skin: Positive for color change. Negative for pallor, rash and wound.   Allergic/Immunologic: Negative.    Neurological: Negative for dizziness, seizures, syncope and headaches.   Hematological: Negative.   "  Psychiatric/Behavioral: Negative for behavioral problems, dysphoric mood, self-injury and suicidal ideas.       Objective     /90   Pulse 88   Temp 98 °F (36.7 °C) (Temporal)   Ht 182.9 cm (72\")   Wt (!) 161 kg (354 lb)   SpO2 93%   BMI 48.01 kg/m²   Orders Only on 02/12/2020   Component Date Value Ref Range Status   • Glucose 02/12/2020 175* 65 - 99 mg/dL Final   • BUN 02/12/2020 22* 6 - 20 mg/dL Final   • Creatinine 02/12/2020 0.93  0.76 - 1.27 mg/dL Final   • Sodium 02/12/2020 135* 136 - 145 mmol/L Final   • Potassium 02/12/2020 4.3  3.5 - 5.2 mmol/L Final   • Chloride 02/12/2020 99  98 - 107 mmol/L Final   • CO2 02/12/2020 23.2  22.0 - 29.0 mmol/L Final   • Calcium 02/12/2020 10.2  8.6 - 10.5 mg/dL Final   • Total Protein 02/12/2020 6.6  6.0 - 8.5 g/dL Final   • Albumin 02/12/2020 4.50  3.50 - 5.20 g/dL Final   • ALT (SGPT) 02/12/2020 35  1 - 41 U/L Final   • AST (SGOT) 02/12/2020 23  1 - 40 U/L Final   • Alkaline Phosphatase 02/12/2020 59  39 - 117 U/L Final   • Total Bilirubin 02/12/2020 0.7  0.2 - 1.2 mg/dL Final   • eGFR Non African Amer 02/12/2020 87  >60 mL/min/1.73 Final   • Globulin 02/12/2020 2.1  gm/dL Final   • A/G Ratio 02/12/2020 2.1  g/dL Final   • BUN/Creatinine Ratio 02/12/2020 23.7  7.0 - 25.0 Final   • Anion Gap 02/12/2020 12.8  5.0 - 15.0 mmol/L Final   • TSH 02/12/2020 0.777  0.270 - 4.200 uIU/mL Final   • Hemoglobin A1C 02/12/2020 6.83* 4.80 - 5.60 % Final   • 25 Hydroxy, Vitamin D 02/12/2020 29.3* 30.0 - 100.0 ng/ml Final   • Vitamin B-12 02/12/2020 479  211 - 946 pg/mL Final   • Total Cholesterol 02/12/2020 140  0 - 200 mg/dL Final   • Triglycerides 02/12/2020 202* 0 - 150 mg/dL Final   • HDL Cholesterol 02/12/2020 25* 40 - 60 mg/dL Final   • LDL Cholesterol  02/12/2020 75  0 - 100 mg/dL Final   • VLDL Cholesterol 02/12/2020 40.4* 5 - 40 mg/dL Final   • LDL/HDL Ratio 02/12/2020 2.98   Final   • WBC 02/12/2020 7.02  3.40 - 10.80 10*3/mm3 Final   • RBC 02/12/2020 5.18  4.14 - " 5.80 10*6/mm3 Final   • Hemoglobin 02/12/2020 17.3  13.0 - 17.7 g/dL Final   • Hematocrit 02/12/2020 47.3  37.5 - 51.0 % Final   • MCV 02/12/2020 91.3  79.0 - 97.0 fL Final   • MCH 02/12/2020 33.4* 26.6 - 33.0 pg Final   • MCHC 02/12/2020 36.6* 31.5 - 35.7 g/dL Final   • RDW 02/12/2020 12.2* 12.3 - 15.4 % Final   • RDW-SD 02/12/2020 40.4  37.0 - 54.0 fl Final   • MPV 02/12/2020 10.9  6.0 - 12.0 fL Final   • Platelets 02/12/2020 182  140 - 450 10*3/mm3 Final   • Neutrophil % 02/12/2020 44.7  42.7 - 76.0 % Final   • Lymphocyte % 02/12/2020 29.5  19.6 - 45.3 % Final   • Monocyte % 02/12/2020 12.3* 5.0 - 12.0 % Final   • Eosinophil % 02/12/2020 12.4* 0.3 - 6.2 % Final   • Basophil % 02/12/2020 0.7  0.0 - 1.5 % Final   • Immature Grans % 02/12/2020 0.4  0.0 - 0.5 % Final   • Neutrophils, Absolute 02/12/2020 3.14  1.70 - 7.00 10*3/mm3 Final   • Lymphocytes, Absolute 02/12/2020 2.07  0.70 - 3.10 10*3/mm3 Final   • Monocytes, Absolute 02/12/2020 0.86  0.10 - 0.90 10*3/mm3 Final   • Eosinophils, Absolute 02/12/2020 0.87* 0.00 - 0.40 10*3/mm3 Final   • Basophils, Absolute 02/12/2020 0.05  0.00 - 0.20 10*3/mm3 Final   • Immature Grans, Absolute 02/12/2020 0.03  0.00 - 0.05 10*3/mm3 Final   • nRBC 02/12/2020 0.0  0.0 - 0.2 /100 WBC Final       Physical Exam   Constitutional: He is oriented to person, place, and time. Vital signs are normal. He appears well-developed and well-nourished. No distress.   HENT:   Head: Normocephalic and atraumatic.   Right Ear: External ear and ear canal normal. A middle ear effusion is present.   Left Ear: External ear and ear canal normal. A middle ear effusion is present.   Nose: No mucosal edema. Right sinus exhibits maxillary sinus tenderness. Left sinus exhibits maxillary sinus tenderness.   Mouth/Throat: Oropharynx is clear and moist and mucous membranes are normal. No oropharyngeal exudate.   Eyes: Pupils are equal, round, and reactive to light. Conjunctivae, EOM and lids are normal. Right  eye exhibits no discharge. Left eye exhibits no discharge.   Neck: Normal range of motion. Neck supple. No tracheal deviation present. No thyromegaly present.   Cardiovascular: Normal rate, regular rhythm, normal heart sounds and intact distal pulses. Exam reveals no gallop and no friction rub.   No murmur heard.  Pulmonary/Chest: Effort normal and breath sounds normal. No respiratory distress. He has no wheezes. He has no rales. He exhibits no tenderness.   Abdominal: Soft. Normal appearance and bowel sounds are normal. He exhibits no distension and no mass. There is no tenderness. There is no rebound and no guarding.   Musculoskeletal: Normal range of motion.        Lumbar back: He exhibits tenderness, pain and spasm.   Decreased lumbar curvature, there is pain with forward flexion. DTR's +2. No edema.         Lymphadenopathy:     He has no cervical adenopathy.   Neurological: He is alert and oriented to person, place, and time. He has normal reflexes.   CN 2-12 grossly intact    Skin: Skin is warm and dry. Capillary refill takes less than 2 seconds. No rash noted. He is not diaphoretic. No erythema. No pallor.        Psychiatric: He has a normal mood and affect. His speech is normal and behavior is normal. Judgment and thought content normal. His affect is not inappropriate. Cognition and memory are normal.   Nursing note and vitals reviewed.      Assessment/Plan     Problem List Items Addressed This Visit        Digestive    Class 3 severe obesity with serious comorbidity in adult (CMS/[facility]) - Primary    Current Assessment & Plan     Obesity is improving with treatment.  Discussed the patient's BMI.  The BMI is above average; BMI management plan is completed.  General weight loss/lifestyle modification strategies discussed (elicit support from others; identify saboteurs; non-food rewards, etc).  Informal exercise measures discussed, e.g. taking stairs instead of elevator.   Continue adipex .  Instructed on  6722-6662 calorie daily diet with low fat and low carb choices. Heart healthy diet. Goal is weight loss of five pounds each month and three pounds each additional month with no side effects. Will evaluate monthly. Instructed on possible side effects and reasons to stop medication as well as reasons for emergent care.                 Endocrine    Diabetes mellitus with diabetic neuropathy, with long-term current use of insulin (CMS/[facility])    Overview     Abnormal nerve conduction study on file         Relevant Medications    phentermine (Adipex-P) 37.5 MG tablet       Musculoskeletal and Integument    Skin tags, multiple acquired    Overview     Bleeding and catching on clothing            Other Visit Diagnoses     Acute URI        Relevant Medications    azithromycin (Zithromax Z-Nathan) 250 MG tablet    ciprofloxacin-dexamethasone (CIPRODEX) 0.3-0.1 % otic suspension      Reviewed treatment options and agreed on a trial of cryotherapy. See procedure note.         Patient's Body mass index is 48.01 kg/m². BMI is above normal parameters. Recommendations include: exercise counseling and nutrition counseling.      Pt has been educated/instructed on diabetic care and protocols.  Diabetic diet instructions provided.  Medication regimen reviewed.  Discussed possible side effects and interactions of current medications.  Sick day rules reviewed. Continue to monitor blood sugar and report abnormal readings as discussed today. Understanding stated by patient.       I have discussed diagnosis in detail today allowing time for questions and answers. Patient is aware of reasons to seek urgent or emergent medical care as well as reasons to return to the clinic for evaluation. Possible side effects, interactions and progression of symptoms discussed as well. Patient / family states understanding.   Emotional support and active listening provided.     Monitor dm ulcer and report any changes or worsening of symptoms.    Fluids, rest,  symptomatic treatment advised. Steam therapy. Dispose of tooth bush after 24 hours of starting antibiotics if ordered. Complete antibiotics as ordered.  Discussed possible side effects/interactions of medication. Discussed symptoms to report as well as reasons to seek urgent or emergent medical attention. Understanding stated.   Recommend follow up in 2-3 days if not improved, sooner if needed.     Follow up in one month, sooner if needed. Routine labs every 3-6 months.           This document has been electronically signed by:  TYSON Vinson, NP-C

## 2020-03-23 NOTE — ASSESSMENT & PLAN NOTE
Obesity is improving with treatment.  Discussed the patient's BMI.  The BMI is above average; BMI management plan is completed.  General weight loss/lifestyle modification strategies discussed (elicit support from others; identify saboteurs; non-food rewards, etc).  Informal exercise measures discussed, e.g. taking stairs instead of elevator.   Continue adipex .  Instructed on 3149-1806 calorie daily diet with low fat and low carb choices. Heart healthy diet. Goal is weight loss of five pounds each month and three pounds each additional month with no side effects. Will evaluate monthly. Instructed on possible side effects and reasons to stop medication as well as reasons for emergent care.

## 2020-04-28 ENCOUNTER — OFFICE VISIT (OUTPATIENT)
Dept: FAMILY MEDICINE CLINIC | Facility: CLINIC | Age: 45
End: 2020-04-28

## 2020-04-28 DIAGNOSIS — Z79.4 TYPE 2 DIABETES MELLITUS WITH DIABETIC NEUROPATHY, WITH LONG-TERM CURRENT USE OF INSULIN (HCC): ICD-10-CM

## 2020-04-28 DIAGNOSIS — E66.01 CLASS 3 SEVERE OBESITY DUE TO EXCESS CALORIES WITH SERIOUS COMORBIDITY AND BODY MASS INDEX (BMI) OF 45.0 TO 49.9 IN ADULT (HCC): Primary | ICD-10-CM

## 2020-04-28 DIAGNOSIS — E11.40 TYPE 2 DIABETES MELLITUS WITH DIABETIC NEUROPATHY, WITH LONG-TERM CURRENT USE OF INSULIN (HCC): ICD-10-CM

## 2020-04-28 PROCEDURE — G2025 DIS SITE TELE SVCS RHC/FQHC: HCPCS | Performed by: NURSE PRACTITIONER

## 2020-04-28 RX ORDER — PHENTERMINE HYDROCHLORIDE 37.5 MG/1
37.5 TABLET ORAL
Qty: 30 TABLET | Refills: 0 | Status: SHIPPED | OUTPATIENT
Start: 2020-04-28 | End: 2020-06-04

## 2020-04-28 NOTE — PROGRESS NOTES
Establish patient called office of APRN today to discuss:   CC  Diabetes  Obesity      You have chosen to receive care through a telephone visit today. Do you consent to use a telephone visit for your medical care today? Yes     Brief HPI/ROS obtained as follows:      Diabetes - good control. Compliant with mediations. No side effects or hypogylcemia/hyperglycemia.     Obesity - needs refill on Adipex. He is losing weight per how his clothes fitting. He does not have a scale.  Denies side effects.  Is walking daily as weather permits.  Trying to be more active and make heart healthy food choices.  Denies any side effects.      Review of Systems   Constitutional: Negative for activity change, appetite change, fatigue and unexpected weight change.   HENT: Negative for congestion and sore throat.    Eyes: Negative.    Respiratory: Negative for cough, shortness of breath and wheezing.    Cardiovascular: Negative.    Gastrointestinal: Negative.    Endocrine: Negative.    Genitourinary: Negative for dysuria and frequency.   Musculoskeletal: Positive for arthralgias and back pain.   Skin: Negative.    Allergic/Immunologic: Negative.    Neurological: Negative.    Hematological: Negative.    Psychiatric/Behavioral: Negative for dysphoric mood, self-injury and suicidal ideas.       The current allergy list and medication list was reviewed with patient for accuracy.     Assessment   Alert and oriented x3, talkative and friendly.    Diagnoses and all orders for this visit:    Class 3 severe obesity due to excess calories with serious comorbidity and body mass index (BMI) of 45.0 to 49.9 in adult (CMS/[facility])  -     phentermine (Adipex-P) 37.5 MG tablet; Take 1 tablet by mouth Every Morning Before Breakfast.    Type 2 diabetes mellitus with diabetic neuropathy, with long-term current use of insulin (CMS/[facility])  -     phentermine (Adipex-P) 37.5 MG tablet; Take 1 tablet by mouth Every Morning Before Breakfast.      Continue Adipex  37.5 mg daily.  Encouraged heart healthy diet and daily exercise.  Discussed possible side effects.  Diabetic education performed.  Discussed diabetic diet and sick day rules.  Continue current medication regimen.     John #[id number] has been reviewed as consistent  Trolled substance agreement is on file and reviewed today.    Patient instructed and advised to call if symptoms are increasing or new symptoms occur.    Understands reasons for urgent and emergent care.  Patient (& family) verbalized agreement for treatment plan.     Coronavirus precautions have been reviewed and discussed.  I have discussed the CDC recommendations  of social distancing, hand washing and disinfecting commonly touched items. Reviewed need to notify PCP and self quarantine with mild symptoms.  Discussed procedure to obtain Covid-19 testing and notification of PCP/health dept/ED/Urgent Center if symptoms begin. Understanding verbalized.    I would like to see him back in 1 month.  We will schedule him at that time for additional cryotherapy for multiple skin tags which are inflamed.    Reviewed disease process, possible complications, reasons for urgent care and possible side effects of medications. Pt/family state understanding.       This visit has been rescheduled as a phone visit to comply with patient safety concerns in accordance with CDC recommendations. Total time of discussion was 9 minutes.

## 2020-06-04 ENCOUNTER — PROCEDURE VISIT (OUTPATIENT)
Dept: FAMILY MEDICINE CLINIC | Facility: CLINIC | Age: 45
End: 2020-06-04

## 2020-06-04 VITALS
TEMPERATURE: 97.5 F | OXYGEN SATURATION: 95 % | HEIGHT: 72 IN | BODY MASS INDEX: 42.66 KG/M2 | WEIGHT: 315 LBS | DIASTOLIC BLOOD PRESSURE: 80 MMHG | SYSTOLIC BLOOD PRESSURE: 130 MMHG | HEART RATE: 93 BPM

## 2020-06-04 DIAGNOSIS — F41.9 ANXIETY: ICD-10-CM

## 2020-06-04 DIAGNOSIS — I10 ESSENTIAL HYPERTENSION: ICD-10-CM

## 2020-06-04 DIAGNOSIS — M1A.09X0 IDIOPATHIC CHRONIC GOUT OF MULTIPLE SITES WITHOUT TOPHUS: ICD-10-CM

## 2020-06-04 DIAGNOSIS — E11.40 TYPE 2 DIABETES MELLITUS WITH DIABETIC NEUROPATHY, WITH LONG-TERM CURRENT USE OF INSULIN (HCC): ICD-10-CM

## 2020-06-04 DIAGNOSIS — E55.9 VITAMIN D DEFICIENCY: ICD-10-CM

## 2020-06-04 DIAGNOSIS — L91.8 SKIN TAGS, MULTIPLE ACQUIRED: Primary | ICD-10-CM

## 2020-06-04 DIAGNOSIS — J44.9 CHRONIC OBSTRUCTIVE PULMONARY DISEASE, UNSPECIFIED COPD TYPE (HCC): ICD-10-CM

## 2020-06-04 DIAGNOSIS — N52.8 OTHER MALE ERECTILE DYSFUNCTION: ICD-10-CM

## 2020-06-04 DIAGNOSIS — F51.01 PRIMARY INSOMNIA: ICD-10-CM

## 2020-06-04 DIAGNOSIS — E66.01 CLASS 3 SEVERE OBESITY DUE TO EXCESS CALORIES WITH SERIOUS COMORBIDITY AND BODY MASS INDEX (BMI) OF 45.0 TO 49.9 IN ADULT (HCC): ICD-10-CM

## 2020-06-04 DIAGNOSIS — Z12.5 SCREENING FOR PROSTATE CANCER: ICD-10-CM

## 2020-06-04 DIAGNOSIS — Z79.4 TYPE 2 DIABETES MELLITUS WITH DIABETIC NEUROPATHY, WITH LONG-TERM CURRENT USE OF INSULIN (HCC): ICD-10-CM

## 2020-06-04 PROCEDURE — 17111 DESTRUCTION B9 LESIONS 15/>: CPT | Performed by: NURSE PRACTITIONER

## 2020-06-04 PROCEDURE — 99214 OFFICE O/P EST MOD 30 MIN: CPT | Performed by: NURSE PRACTITIONER

## 2020-06-04 RX ORDER — OMEPRAZOLE 20 MG/1
20 CAPSULE, DELAYED RELEASE ORAL DAILY
Qty: 30 CAPSULE | Refills: 5 | Status: SHIPPED | OUTPATIENT
Start: 2020-06-04 | End: 2020-12-01 | Stop reason: SDUPTHER

## 2020-06-04 RX ORDER — FEBUXOSTAT 80 MG/1
80 TABLET, FILM COATED ORAL DAILY
Qty: 30 TABLET | Refills: 5 | Status: SHIPPED | OUTPATIENT
Start: 2020-06-04 | End: 2020-12-01 | Stop reason: SDUPTHER

## 2020-06-04 RX ORDER — METFORMIN HYDROCHLORIDE EXTENDED-RELEASE TABLETS 500 MG/1
500 TABLET, FILM COATED, EXTENDED RELEASE ORAL 2 TIMES DAILY WITH MEALS
Qty: 60 TABLET | Refills: 5 | Status: SHIPPED | OUTPATIENT
Start: 2020-06-04 | End: 2020-12-01 | Stop reason: SDUPTHER

## 2020-06-04 RX ORDER — CETIRIZINE HYDROCHLORIDE 10 MG/1
10 TABLET ORAL DAILY PRN
Qty: 30 TABLET | Refills: 5 | Status: SHIPPED | OUTPATIENT
Start: 2020-06-04 | End: 2020-11-25

## 2020-06-04 RX ORDER — ASPIRIN 81 MG/1
81 TABLET ORAL DAILY
Qty: 90 TABLET | Refills: 1 | Status: SHIPPED | OUTPATIENT
Start: 2020-06-04 | End: 2020-12-01 | Stop reason: SDUPTHER

## 2020-06-04 RX ORDER — SILDENAFIL 100 MG/1
100 TABLET, FILM COATED ORAL DAILY PRN
Qty: 10 TABLET | Refills: 5 | Status: SHIPPED | OUTPATIENT
Start: 2020-06-04 | End: 2020-12-01 | Stop reason: SDUPTHER

## 2020-06-04 RX ORDER — GLIPIZIDE 5 MG/1
5 TABLET ORAL DAILY
Qty: 30 TABLET | Refills: 5 | Status: SHIPPED | OUTPATIENT
Start: 2020-06-04 | End: 2020-12-01 | Stop reason: SDUPTHER

## 2020-06-04 RX ORDER — FENOFIBRATE 145 MG/1
145 TABLET, COATED ORAL DAILY
Qty: 30 TABLET | Refills: 5 | Status: SHIPPED | OUTPATIENT
Start: 2020-06-04 | End: 2020-12-01 | Stop reason: SDUPTHER

## 2020-06-04 RX ORDER — CHLORTHALIDONE 25 MG/1
25 TABLET ORAL DAILY
Qty: 30 TABLET | Refills: 5 | Status: SHIPPED | OUTPATIENT
Start: 2020-06-04 | End: 2020-12-01 | Stop reason: SDUPTHER

## 2020-06-04 RX ORDER — COLCHICINE 0.6 MG/1
1 CAPSULE ORAL DAILY
Qty: 30 CAPSULE | Refills: 5 | Status: SHIPPED | OUTPATIENT
Start: 2020-06-04 | End: 2020-11-25

## 2020-06-04 RX ORDER — CIPROFLOXACIN AND DEXAMETHASONE 3; 1 MG/ML; MG/ML
SUSPENSION/ DROPS AURICULAR (OTIC)
Qty: 7.5 ML | Refills: 0 | Status: SHIPPED | OUTPATIENT
Start: 2020-06-04 | End: 2020-06-11

## 2020-06-04 RX ORDER — TRAZODONE HYDROCHLORIDE 50 MG/1
25-50 TABLET ORAL NIGHTLY
Qty: 30 TABLET | Refills: 5 | Status: SHIPPED | OUTPATIENT
Start: 2020-06-04 | End: 2020-12-01 | Stop reason: SDUPTHER

## 2020-06-04 RX ORDER — ICOSAPENT ETHYL 1000 MG/1
1 CAPSULE ORAL 2 TIMES DAILY WITH MEALS
Qty: 120 CAPSULE | Refills: 5 | Status: SHIPPED | OUTPATIENT
Start: 2020-06-04 | End: 2020-09-30 | Stop reason: SDUPTHER

## 2020-06-04 RX ORDER — CARVEDILOL 6.25 MG/1
6.25 TABLET ORAL 2 TIMES DAILY WITH MEALS
Qty: 60 TABLET | Refills: 5 | Status: SHIPPED | OUTPATIENT
Start: 2020-06-04 | End: 2020-12-01 | Stop reason: SDUPTHER

## 2020-06-04 RX ORDER — ALBUTEROL SULFATE 90 UG/1
2 AEROSOL, METERED RESPIRATORY (INHALATION) EVERY 6 HOURS PRN
Qty: 1 INHALER | Refills: 5 | Status: SHIPPED | OUTPATIENT
Start: 2020-06-04 | End: 2020-12-01 | Stop reason: SDUPTHER

## 2020-06-04 RX ORDER — METFORMIN HYDROCHLORIDE 500 MG/1
500 TABLET, EXTENDED RELEASE ORAL
Qty: 30 TABLET | Refills: 5 | Status: SHIPPED | OUTPATIENT
Start: 2020-06-04 | End: 2020-12-01

## 2020-06-04 NOTE — PROGRESS NOTES
"Subjective   Johnathon Mclain is a 44 y.o. male.     Chief complaint  Skin tags catching on clothing  Need refills on meds  Want to  talk about some some personal    History of Present Illness:    Type 2 diabetes- patient reports his blood sugars have been well controlled.  He currently receives Lantus and metformin.    Patient like to discuss his erectile dysfunction.  Reports that he does have a new girlfriend.  He has taken Viagra in the past.  He is requesting a Viagra prescription.  He is aware of possible side effects.    Multiple skin tags which catch on clothing and bleed.  We have been doing some cryotherapy with visits because of the quantity which is unlikely well over 1000 that need removed.  He reports that his right axillary area is where he wants to focus today.    Obesity- patient has not been taking Adipex.  States he has been busy working on yard work and doing chores.  Does show weight gain this month.    Anxiety with depression-stable with current medications.  Receives Zoloft and trazodone.  Understands possible side effects and interactions.    The following portions of the patient's history and ROS were reviewed and updated as appropriate per provider:  Allergies, current medications, past family history, past medical history, past social history, past surgical history and problem list.    Review of Systems    Objective     /80   Pulse 93   Temp 97.5 °F (36.4 °C) (Temporal)   Ht 182.9 cm (72\")   Wt (!) 162 kg (358 lb)   SpO2 95%   BMI 48.55 kg/m²   Orders Only on 02/12/2020   Component Date Value Ref Range Status   • Glucose 02/12/2020 175* 65 - 99 mg/dL Final   • BUN 02/12/2020 22* 6 - 20 mg/dL Final   • Creatinine 02/12/2020 0.93  0.76 - 1.27 mg/dL Final   • Sodium 02/12/2020 135* 136 - 145 mmol/L Final   • Potassium 02/12/2020 4.3  3.5 - 5.2 mmol/L Final   • Chloride 02/12/2020 99  98 - 107 mmol/L Final   • CO2 02/12/2020 23.2  22.0 - 29.0 mmol/L Final   • Calcium 02/12/2020 10.2  " 8.6 - 10.5 mg/dL Final   • Total Protein 02/12/2020 6.6  6.0 - 8.5 g/dL Final   • Albumin 02/12/2020 4.50  3.50 - 5.20 g/dL Final   • ALT (SGPT) 02/12/2020 35  1 - 41 U/L Final   • AST (SGOT) 02/12/2020 23  1 - 40 U/L Final   • Alkaline Phosphatase 02/12/2020 59  39 - 117 U/L Final   • Total Bilirubin 02/12/2020 0.7  0.2 - 1.2 mg/dL Final   • eGFR Non African Amer 02/12/2020 87  >60 mL/min/1.73 Final   • Globulin 02/12/2020 2.1  gm/dL Final   • A/G Ratio 02/12/2020 2.1  g/dL Final   • BUN/Creatinine Ratio 02/12/2020 23.7  7.0 - 25.0 Final   • Anion Gap 02/12/2020 12.8  5.0 - 15.0 mmol/L Final   • TSH 02/12/2020 0.777  0.270 - 4.200 uIU/mL Final   • Hemoglobin A1C 02/12/2020 6.83* 4.80 - 5.60 % Final   • 25 Hydroxy, Vitamin D 02/12/2020 29.3* 30.0 - 100.0 ng/ml Final   • Vitamin B-12 02/12/2020 479  211 - 946 pg/mL Final   • Total Cholesterol 02/12/2020 140  0 - 200 mg/dL Final   • Triglycerides 02/12/2020 202* 0 - 150 mg/dL Final   • HDL Cholesterol 02/12/2020 25* 40 - 60 mg/dL Final   • LDL Cholesterol  02/12/2020 75  0 - 100 mg/dL Final   • VLDL Cholesterol 02/12/2020 40.4* 5 - 40 mg/dL Final   • LDL/HDL Ratio 02/12/2020 2.98   Final   • WBC 02/12/2020 7.02  3.40 - 10.80 10*3/mm3 Final   • RBC 02/12/2020 5.18  4.14 - 5.80 10*6/mm3 Final   • Hemoglobin 02/12/2020 17.3  13.0 - 17.7 g/dL Final   • Hematocrit 02/12/2020 47.3  37.5 - 51.0 % Final   • MCV 02/12/2020 91.3  79.0 - 97.0 fL Final   • MCH 02/12/2020 33.4* 26.6 - 33.0 pg Final   • MCHC 02/12/2020 36.6* 31.5 - 35.7 g/dL Final   • RDW 02/12/2020 12.2* 12.3 - 15.4 % Final   • RDW-SD 02/12/2020 40.4  37.0 - 54.0 fl Final   • MPV 02/12/2020 10.9  6.0 - 12.0 fL Final   • Platelets 02/12/2020 182  140 - 450 10*3/mm3 Final   • Neutrophil % 02/12/2020 44.7  42.7 - 76.0 % Final   • Lymphocyte % 02/12/2020 29.5  19.6 - 45.3 % Final   • Monocyte % 02/12/2020 12.3* 5.0 - 12.0 % Final   • Eosinophil % 02/12/2020 12.4* 0.3 - 6.2 % Final   • Basophil % 02/12/2020 0.7  0.0 -  1.5 % Final   • Immature Grans % 02/12/2020 0.4  0.0 - 0.5 % Final   • Neutrophils, Absolute 02/12/2020 3.14  1.70 - 7.00 10*3/mm3 Final   • Lymphocytes, Absolute 02/12/2020 2.07  0.70 - 3.10 10*3/mm3 Final   • Monocytes, Absolute 02/12/2020 0.86  0.10 - 0.90 10*3/mm3 Final   • Eosinophils, Absolute 02/12/2020 0.87* 0.00 - 0.40 10*3/mm3 Final   • Basophils, Absolute 02/12/2020 0.05  0.00 - 0.20 10*3/mm3 Final   • Immature Grans, Absolute 02/12/2020 0.03  0.00 - 0.05 10*3/mm3 Final   • nRBC 02/12/2020 0.0  0.0 - 0.2 /100 WBC Final       Physical Exam   Constitutional: He is oriented to person, place, and time. Vital signs are normal. He appears well-developed and well-nourished. No distress.   Visibly obese.    HENT:   Head: Normocephalic and atraumatic.   Right Ear: Tympanic membrane, external ear and ear canal normal.   Left Ear: Tympanic membrane, external ear and ear canal normal.   Nose: Nose normal.   Mouth/Throat: Oropharynx is clear and moist and mucous membranes are normal.   Eyes: Pupils are equal, round, and reactive to light. Conjunctivae and EOM are normal. Right eye exhibits no discharge. Left eye exhibits no discharge.   Neck: Normal range of motion. Neck supple. No tracheal deviation present. No thyromegaly present.   Cardiovascular: Normal rate, regular rhythm, normal heart sounds and intact distal pulses.   No murmur heard.  Pulmonary/Chest: Effort normal and breath sounds normal. No respiratory distress. He has no wheezes. He has no rales. He exhibits no tenderness.   Abdominal: Soft. Normal appearance and bowel sounds are normal. He exhibits no distension and no mass. There is no tenderness. There is no rebound and no guarding.   Musculoskeletal:        Lumbar back: He exhibits decreased range of motion, tenderness, pain and spasm.   Decreased lumbar curvature, there is pain with forward flexion. DTR's +2. No edema.    Lymphadenopathy:     He has no cervical adenopathy.   Neurological: He is alert  and oriented to person, place, and time. He has normal reflexes.   Skin: Skin is warm and dry. Capillary refill takes less than 2 seconds. No rash noted. He is not diaphoretic. There is erythema (Around skin tags). No pallor.        Multiple skin tags with erythema and dried blood located under both axillary, neck and abdomen.   Psychiatric: He has a normal mood and affect. His speech is normal and behavior is normal. Judgment and thought content normal. His affect is not inappropriate. Cognition and memory are normal.   Nursing note and vitals reviewed.      Assessment/Plan     Problem List Items Addressed This Visit        Cardiovascular and Mediastinum    Essential hypertension    Relevant Medications    carvedilol (COREG) 6.25 MG tablet    chlorthalidone (HYGROTON) 25 MG tablet    Other Relevant Orders    CBC & Differential    Comprehensive Metabolic Panel    TSH    Hemoglobin A1c    Vitamin D 25 Hydroxy    Vitamin B12    Lipid Panel    MicroAlbumin, Urine, Random - Urine, Clean Catch       Respiratory    COPD (chronic obstructive pulmonary disease) (CMS/[facility])    Relevant Medications    albuterol sulfate HFA (ProAir HFA) 108 (90 Base) MCG/ACT inhaler    cetirizine (zyrTEC) 10 MG tablet    Fluticasone Furoate-Vilanterol (Breo Ellipta) 100-25 MCG/INH inhaler       Digestive    Class 3 severe obesity with serious comorbidity in adult (CMS/[facility])    Relevant Medications    omeprazole (priLOSEC) 20 MG capsule    Other Relevant Orders    CBC & Differential    Comprehensive Metabolic Panel    TSH    Hemoglobin A1c    Vitamin D 25 Hydroxy    Vitamin B12    Lipid Panel    MicroAlbumin, Urine, Random - Urine, Clean Catch       Endocrine    Diabetes mellitus with diabetic neuropathy, with long-term current use of insulin (CMS/[facility])    Overview     Abnormal nerve conduction study on file         Current Assessment & Plan     Pt has been educated/instructed on diabetic care and protocols.  Diabetic diet instructions provided.   Medication regimen reviewed.  Discussed possible side effects and interactions of current medications.  Sick day rules reviewed. Continue to monitor blood sugar and report abnormal readings as discussed today. Understanding stated by patient.              Relevant Medications    metFORMIN ER (GLUCOPHAGE-XR) 500 MG 24 hr tablet    metFORMIN (FORTAMET) 500 MG (OSM) 24 hr tablet    aspirin 81 MG EC tablet    glipizide (GLUCOTROL) 5 MG tablet    Insulin Glargine (LANTUS SOLOSTAR) 100 UNIT/ML injection pen    icosapent ethyl (VASCEPA) 1 g capsule capsule    Other Relevant Orders    CBC & Differential    Comprehensive Metabolic Panel    TSH    Hemoglobin A1c    Vitamin D 25 Hydroxy    Vitamin B12    Lipid Panel    MicroAlbumin, Urine, Random - Urine, Clean Catch       Musculoskeletal and Integument    Chronic idiopathic gout of multiple sites    Relevant Medications    febuxostat (ULORIC) 80 MG tablet tablet    Other Relevant Orders    CBC & Differential    Comprehensive Metabolic Panel    TSH    Hemoglobin A1c    Vitamin D 25 Hydroxy    Vitamin B12    Lipid Panel    MicroAlbumin, Urine, Random - Urine, Clean Catch    Skin tags, multiple acquired - Primary    Overview     Bleeding and catching on clothing          Current Assessment & Plan     Tolerated cryotherapy well will repeat in 1 month if needed            Other    Other male erectile dysfunction    Primary insomnia    Relevant Medications    traZODone (DESYREL) 50 MG tablet    Other Relevant Orders    CBC & Differential    Comprehensive Metabolic Panel    TSH    Hemoglobin A1c    Vitamin D 25 Hydroxy    Vitamin B12    Lipid Panel    MicroAlbumin, Urine, Random - Urine, Clean Catch    Anxiety    Current Assessment & Plan     Emotional support provided.  Continue current medications.  Report changes.           Other Visit Diagnoses     Screening for prostate cancer        Vitamin D deficiency        Relevant Orders    Vitamin D 25 Hydroxy          Stop Adipex due to  non-use.  Discussed heart healthy low fat low calorie diet.  Recommend a 2000-calorie a day diet.  Daily exercise is recommended for at least 30 minutes.  Goal is a 5 pound weight loss each month.    John #8 [id number] is reviewed as consistent.  Remain under the care of pain management for chronic pain.  No controlled medications written today.  Adipex will be held at this time as patient has not been taking and shows weight gain.     Patient's Body mass index is 48.55 kg/m². BMI is above normal parameters. Recommendations include: exercise counseling and nutrition counseling.      Reviewed possible side effects of Viagra and other medications.  Discussed possible interactions.  Reviewed reasons to stop medications and seek immediate medical attention.  Patient states understanding.    Coronavirus precautions have been reviewed and discussed.  I have discussed the CDC recommendations  of social distancing, hand washing and disinfecting commonly touched items. Reviewed need to notify PCP and self quarantine with mild symptoms.  Discussed procedure to obtain Covid-19 testing and notification of PCP/health dept/ED/Urgent Center if symptoms begin. Understanding verbalized.         I have discussed diagnosis in detail today allowing time for questions and answers. Patient is aware of reasons to seek urgent or emergent medical care as well as reasons to return to the clinic for evaluation. Possible side effects, interactions and progression of symptoms discussed as well. Patient / family states understanding.   Emotional support and active listening provided.       Follow-up in 1 month, sooner if needed.  Fasting labs 1 week prior.          This document has been electronically signed by:  TYSON Vinson, NP-C

## 2020-06-04 NOTE — ASSESSMENT & PLAN NOTE
Pt has been educated/instructed on diabetic care and protocols.  Diabetic diet instructions provided.  Medication regimen reviewed.  Discussed possible side effects and interactions of current medications.  Sick day rules reviewed. Continue to monitor blood sugar and report abnormal readings as discussed today. Understanding stated by patient.

## 2020-07-14 ENCOUNTER — OFFICE VISIT (OUTPATIENT)
Dept: FAMILY MEDICINE CLINIC | Facility: CLINIC | Age: 45
End: 2020-07-14

## 2020-07-14 DIAGNOSIS — S93.402A SPRAIN OF LEFT ANKLE, UNSPECIFIED LIGAMENT, INITIAL ENCOUNTER: Primary | ICD-10-CM

## 2020-07-14 DIAGNOSIS — S33.5XXA LUMBAR SPRAIN, INITIAL ENCOUNTER: ICD-10-CM

## 2020-07-14 PROCEDURE — 99442 PR PHYS/QHP TELEPHONE EVALUATION 11-20 MIN: CPT | Performed by: PHYSICIAN ASSISTANT

## 2020-07-14 RX ORDER — MELOXICAM 7.5 MG/1
7.5 TABLET ORAL DAILY PRN
Qty: 30 TABLET | Refills: 2 | Status: SHIPPED | OUTPATIENT
Start: 2020-07-14 | End: 2020-09-02 | Stop reason: SDUPTHER

## 2020-07-14 RX ORDER — MELOXICAM 7.5 MG/1
7.5 TABLET ORAL DAILY PRN
COMMUNITY
Start: 2020-07-06 | End: 2020-07-14 | Stop reason: SDUPTHER
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
## 2020-08-10 RX ORDER — LISINOPRIL 20 MG/1
TABLET ORAL
Qty: 60 TABLET | Refills: 5 | Status: SHIPPED | OUTPATIENT
Start: 2020-08-10 | End: 2020-12-01 | Stop reason: SDUPTHER

## 2020-09-02 ENCOUNTER — OFFICE VISIT (OUTPATIENT)
Dept: FAMILY MEDICINE CLINIC | Facility: CLINIC | Age: 45
End: 2020-09-02

## 2020-09-02 VITALS
SYSTOLIC BLOOD PRESSURE: 140 MMHG | HEART RATE: 82 BPM | BODY MASS INDEX: 42.66 KG/M2 | TEMPERATURE: 96.1 F | DIASTOLIC BLOOD PRESSURE: 80 MMHG | OXYGEN SATURATION: 91 % | HEIGHT: 72 IN | WEIGHT: 315 LBS

## 2020-09-02 DIAGNOSIS — M25.552 LEFT HIP PAIN: ICD-10-CM

## 2020-09-02 DIAGNOSIS — G47.33 OBSTRUCTIVE SLEEP APNEA SYNDROME: ICD-10-CM

## 2020-09-02 DIAGNOSIS — E66.01 CLASS 3 SEVERE OBESITY WITH SERIOUS COMORBIDITY AND BODY MASS INDEX (BMI) OF 40.0 TO 44.9 IN ADULT, UNSPECIFIED OBESITY TYPE (HCC): Primary | ICD-10-CM

## 2020-09-02 DIAGNOSIS — L91.8 SKIN TAGS, MULTIPLE ACQUIRED: ICD-10-CM

## 2020-09-02 DIAGNOSIS — E66.01 CLASS 3 SEVERE OBESITY WITH SERIOUS COMORBIDITY AND BODY MASS INDEX (BMI) OF 40.0 TO 44.9 IN ADULT, UNSPECIFIED OBESITY TYPE (HCC): ICD-10-CM

## 2020-09-02 LAB
6-ACETYL MORPHINE: NEGATIVE
AMPHET+METHAMPHET UR QL: NEGATIVE
BARBITURATES UR QL SCN: NEGATIVE
BENZODIAZ UR QL SCN: NEGATIVE
BUPRENORPHINE SERPL-MCNC: NEGATIVE NG/ML
CANNABINOIDS SERPL QL: NEGATIVE
COCAINE UR QL: NEGATIVE
METHADONE UR QL SCN: NEGATIVE
OPIATES UR QL: POSITIVE
OXYCODONE UR QL SCN: NEGATIVE
PCP UR QL SCN: NEGATIVE

## 2020-09-02 PROCEDURE — 80307 DRUG TEST PRSMV CHEM ANLYZR: CPT | Performed by: NURSE PRACTITIONER

## 2020-09-02 PROCEDURE — 99214 OFFICE O/P EST MOD 30 MIN: CPT | Performed by: NURSE PRACTITIONER

## 2020-09-02 PROCEDURE — 11201 RMVL SKIN TAGS EA ADDL 10: CPT | Performed by: NURSE PRACTITIONER

## 2020-09-02 PROCEDURE — 11200 RMVL SKIN TAGS UP TO&INC 15: CPT | Performed by: NURSE PRACTITIONER

## 2020-09-02 RX ORDER — PHENTERMINE HYDROCHLORIDE 37.5 MG/1
37.5 TABLET ORAL
Qty: 30 TABLET | Refills: 0 | Status: SHIPPED | OUTPATIENT
Start: 2020-09-02 | End: 2020-09-30 | Stop reason: SDUPTHER

## 2020-09-02 RX ORDER — BENZONATATE 100 MG/1
100 CAPSULE ORAL 3 TIMES DAILY PRN
Qty: 30 CAPSULE | Refills: 2 | Status: SHIPPED | OUTPATIENT
Start: 2020-09-02 | End: 2020-12-01 | Stop reason: SDUPTHER

## 2020-09-02 RX ORDER — MELOXICAM 7.5 MG/1
7.5 TABLET ORAL DAILY PRN
Qty: 30 TABLET | Refills: 5 | Status: SHIPPED | OUTPATIENT
Start: 2020-09-02 | End: 2020-12-01 | Stop reason: SDUPTHER

## 2020-09-02 NOTE — PROGRESS NOTES
Subjective   Johnathon Mclain is a 45 y.o. male.     No chief complaint on file.    Chief complaint  Bleeding skin tags catching on clothing  Obesity  Arthritis    History of Present Illness:     Class III severe obesity- patient is requesting start back on Adipex as a boost to his weight loss.  His current BMI is 49.8.  Today's weight is 367 pounds.  Patient has obstructive sleep apnea and would greatly benefit from weight loss.  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  He is trying to be more active, has been out working in the garden.    Patient continues to have a little bit of left ankle pain following a recent sprain.  He is now having some pain in his left hip.  Wonders if he can compensate a little much for the sprain.    Multiple skin tags.  Skin tags catch on clothing and bleed.  Become very inflamed and erythematous.  Patient has 100s of these areas on his body.    The following portions of the patient's history and ROS were reviewed and updated as appropriate per provider:  Allergies, current medications, past family history, past medical history, past social history, past surgical history and problem list.    Review of Systems   Constitutional: Positive for activity change. Negative for appetite change, fatigue and fever.   HENT: Negative for congestion, sinus pressure, sinus pain, sneezing and sore throat.    Eyes: Negative for pain, discharge and itching.   Respiratory: Negative for cough, chest tightness, shortness of breath and wheezing.    Cardiovascular: Negative.    Gastrointestinal: Negative for abdominal pain, constipation, diarrhea, nausea and vomiting.   Endocrine: Negative.    Genitourinary: Negative for difficulty urinating, dysuria, flank pain, frequency and hematuria.   Musculoskeletal: Positive for arthralgias and back pain. Negative for neck pain.   Skin: Positive for color change. Negative for pallor, rash and wound.  "  Allergic/Immunologic: Negative for environmental allergies, food allergies and immunocompromised state.   Neurological: Negative for seizures, facial asymmetry and speech difficulty.   Hematological: Negative.    Psychiatric/Behavioral: Negative for confusion, dysphoric mood, self-injury and suicidal ideas.       Objective     /80   Pulse 82   Temp 96.1 °F (35.6 °C) (Temporal)   Ht 182.9 cm (72\")   Wt (!) 166 kg (367 lb)   SpO2 91%   BMI 49.77 kg/m²   Orders Only on 02/12/2020   Component Date Value Ref Range Status   • Glucose 02/12/2020 175* 65 - 99 mg/dL Final   • BUN 02/12/2020 22* 6 - 20 mg/dL Final   • Creatinine 02/12/2020 0.93  0.76 - 1.27 mg/dL Final   • Sodium 02/12/2020 135* 136 - 145 mmol/L Final   • Potassium 02/12/2020 4.3  3.5 - 5.2 mmol/L Final   • Chloride 02/12/2020 99  98 - 107 mmol/L Final   • CO2 02/12/2020 23.2  22.0 - 29.0 mmol/L Final   • Calcium 02/12/2020 10.2  8.6 - 10.5 mg/dL Final   • Total Protein 02/12/2020 6.6  6.0 - 8.5 g/dL Final   • Albumin 02/12/2020 4.50  3.50 - 5.20 g/dL Final   • ALT (SGPT) 02/12/2020 35  1 - 41 U/L Final   • AST (SGOT) 02/12/2020 23  1 - 40 U/L Final   • Alkaline Phosphatase 02/12/2020 59  39 - 117 U/L Final   • Total Bilirubin 02/12/2020 0.7  0.2 - 1.2 mg/dL Final   • eGFR Non African Amer 02/12/2020 87  >60 mL/min/1.73 Final   • Globulin 02/12/2020 2.1  gm/dL Final   • A/G Ratio 02/12/2020 2.1  g/dL Final   • BUN/Creatinine Ratio 02/12/2020 23.7  7.0 - 25.0 Final   • Anion Gap 02/12/2020 12.8  5.0 - 15.0 mmol/L Final   • TSH 02/12/2020 0.777  0.270 - 4.200 uIU/mL Final   • Hemoglobin A1C 02/12/2020 6.83* 4.80 - 5.60 % Final   • 25 Hydroxy, Vitamin D 02/12/2020 29.3* 30.0 - 100.0 ng/ml Final   • Vitamin B-12 02/12/2020 479  211 - 946 pg/mL Final   • Total Cholesterol 02/12/2020 140  0 - 200 mg/dL Final   • Triglycerides 02/12/2020 202* 0 - 150 mg/dL Final   • HDL Cholesterol 02/12/2020 25* 40 - 60 mg/dL Final   • LDL Cholesterol  02/12/2020 75  " 0 - 100 mg/dL Final   • VLDL Cholesterol 02/12/2020 40.4* 5 - 40 mg/dL Final   • LDL/HDL Ratio 02/12/2020 2.98   Final   • WBC 02/12/2020 7.02  3.40 - 10.80 10*3/mm3 Final   • RBC 02/12/2020 5.18  4.14 - 5.80 10*6/mm3 Final   • Hemoglobin 02/12/2020 17.3  13.0 - 17.7 g/dL Final   • Hematocrit 02/12/2020 47.3  37.5 - 51.0 % Final   • MCV 02/12/2020 91.3  79.0 - 97.0 fL Final   • MCH 02/12/2020 33.4* 26.6 - 33.0 pg Final   • MCHC 02/12/2020 36.6* 31.5 - 35.7 g/dL Final   • RDW 02/12/2020 12.2* 12.3 - 15.4 % Final   • RDW-SD 02/12/2020 40.4  37.0 - 54.0 fl Final   • MPV 02/12/2020 10.9  6.0 - 12.0 fL Final   • Platelets 02/12/2020 182  140 - 450 10*3/mm3 Final   • Neutrophil % 02/12/2020 44.7  42.7 - 76.0 % Final   • Lymphocyte % 02/12/2020 29.5  19.6 - 45.3 % Final   • Monocyte % 02/12/2020 12.3* 5.0 - 12.0 % Final   • Eosinophil % 02/12/2020 12.4* 0.3 - 6.2 % Final   • Basophil % 02/12/2020 0.7  0.0 - 1.5 % Final   • Immature Grans % 02/12/2020 0.4  0.0 - 0.5 % Final   • Neutrophils, Absolute 02/12/2020 3.14  1.70 - 7.00 10*3/mm3 Final   • Lymphocytes, Absolute 02/12/2020 2.07  0.70 - 3.10 10*3/mm3 Final   • Monocytes, Absolute 02/12/2020 0.86  0.10 - 0.90 10*3/mm3 Final   • Eosinophils, Absolute 02/12/2020 0.87* 0.00 - 0.40 10*3/mm3 Final   • Basophils, Absolute 02/12/2020 0.05  0.00 - 0.20 10*3/mm3 Final   • Immature Grans, Absolute 02/12/2020 0.03  0.00 - 0.05 10*3/mm3 Final   • nRBC 02/12/2020 0.0  0.0 - 0.2 /100 WBC Final       Physical Exam   Constitutional: He is oriented to person, place, and time. Vital signs are normal. He appears well-developed and well-nourished. He is cooperative. He does not have a sickly appearance. He does not appear ill. No distress.   HENT:   Head: Normocephalic. Hair is normal.   Right Ear: Tympanic membrane, external ear and ear canal normal.   Left Ear: Tympanic membrane, external ear and ear canal normal.   Nose: Nose normal. Right sinus exhibits no maxillary sinus tenderness  and no frontal sinus tenderness. Left sinus exhibits no maxillary sinus tenderness and no frontal sinus tenderness.   Mouth/Throat: Oropharynx is clear and moist. No oropharyngeal exudate.   Brief oral and facial exam then mask returned to proper position   Eyes: Pupils are equal, round, and reactive to light. Conjunctivae, EOM and lids are normal. Right eye exhibits no discharge. Left eye exhibits no discharge.   Neck: Normal range of motion. Neck supple. No tracheal deviation present. No thyromegaly present.   Cardiovascular: Normal rate, regular rhythm and normal heart sounds. Exam reveals no gallop and no friction rub.   No murmur heard.  Pulmonary/Chest: Effort normal and breath sounds normal. No respiratory distress. He has no wheezes. He has no rales. He exhibits no tenderness.   Abdominal: Soft. Normal appearance and bowel sounds are normal. He exhibits no distension and no mass. There is no tenderness. There is no rebound and no guarding.   Musculoskeletal: Normal range of motion.        Left hip: He exhibits tenderness (With range of motion). He exhibits normal range of motion, normal strength and no crepitus.        Left ankle: He exhibits normal range of motion, no swelling and normal pulse.        Lumbar back: He exhibits tenderness.   Lymphadenopathy:        Head (right side): No submental, no submandibular and no preauricular adenopathy present.        Head (left side): No submental, no submandibular and no preauricular adenopathy present.     He has no cervical adenopathy.   Neurological: He is alert and oriented to person, place, and time. He has normal reflexes. He is not disoriented.   CN 2-12 grossly intact    Skin: Skin is warm and dry. Capillary refill takes less than 2 seconds. Lesion noted. No bruising and no rash noted. He is not diaphoretic. No cyanosis or erythema. No pallor. Nails show no clubbing.        Psychiatric: He has a normal mood and affect. His speech is normal and behavior is  normal. Judgment and thought content normal. His affect is not inappropriate. Cognition and memory are normal. He is attentive.   Vitals reviewed.      Assessment/Plan     Problem List Items Addressed This Visit        Respiratory    Obstructive sleep apnea syndrome    Overview     Wears cpap          Relevant Orders    XR Chest 2 View       Musculoskeletal and Integument    Skin tags, multiple acquired    Overview     Bleeding and catching on clothing          Current Assessment & Plan     Tolerated cryotherapy well            Other    Class 3 severe obesity with serious comorbidity in adult (CMS/[facility]) - Primary    Relevant Medications    phentermine (Adipex-P) 37.5 MG tablet    Other Relevant Orders    Urine Drug Screen - Urine, Clean Catch (Completed)      Other Visit Diagnoses     Left hip pain        Relevant Medications    meloxicam (MOBIC) 7.5 MG tablet    Other Relevant Orders    XR Hip With or Without Pelvis 2 - 3 View Left        Adipex 37.5 mg daily.  Goal will be at least 5 pound weight loss this month with no side effects of Adipex.  He will monitor his blood pressure reporting readings greater than 140/90 or heart rate greater than 100.  Discussed reasons to stop this medication with patient stating understanding.    Reviewed treatment options and agreed on a trial of cryotherapy. See procedure note.    Tolerated cryotherapy well.    Coronavirus precautions have been reviewed and discussed.  I have discussed the CDC recommendations  of social distancing, hand washing, wearing mask and disinfecting commonly touched items. Reviewed need to notify PCP and self quarantine with mild symptoms.  Discussed procedure to obtain Covid-19 testing and notification of PCP/health dept/ED/Urgent Center if symptoms begin. Understanding verbalized.    John #[id number] has been reviewed as consistent.  Urine drug screen obtained today.    Will tinea Mobic 7.5 mg daily as this has been helpful for arthritis pain in the  past.  Reviewed possible side effects and interactions.     Patient's Body mass index is 49.77 kg/m². BMI is above normal parameters. Recommendations include: exercise counseling, nutrition counseling and pharmacological intervention.        I have discussed diagnosis in detail today allowing time for questions and answers. Patient is aware of reasons to seek urgent or emergent medical care as well as reasons to return to the clinic for evaluation. Possible side effects, interactions and progression of symptoms discussed as well. Patient / family states understanding.   Emotional support and active listening provided.       Follow-up in 2-4 weeks, sooner if needed.  Will be awaiting x-ray results and have patient come in/referral as indicated.          This document has been electronically signed by:  TYSON Vinson, NP-C

## 2020-09-11 ENCOUNTER — HOSPITAL ENCOUNTER (OUTPATIENT)
Dept: GENERAL RADIOLOGY | Facility: HOSPITAL | Age: 45
Discharge: HOME | End: 2020-09-11

## 2020-09-11 DIAGNOSIS — G47.33 OBSTRUCTIVE SLEEP APNEA SYNDROME: ICD-10-CM

## 2020-09-11 DIAGNOSIS — M25.552 LEFT HIP PAIN: ICD-10-CM

## 2020-09-11 PROCEDURE — 71046 X-RAY EXAM CHEST 2 VIEWS: CPT

## 2020-09-11 PROCEDURE — 73502 X-RAY EXAM HIP UNI 2-3 VIEWS: CPT

## 2020-09-11 PROCEDURE — 71046 X-RAY EXAM CHEST 2 VIEWS: CPT | Performed by: RADIOLOGY

## 2020-09-11 PROCEDURE — 73502 X-RAY EXAM HIP UNI 2-3 VIEWS: CPT | Performed by: RADIOLOGY

## 2020-09-25 ENCOUNTER — LAB (OUTPATIENT)
Dept: FAMILY MEDICINE CLINIC | Facility: CLINIC | Age: 45
End: 2020-09-25

## 2020-09-25 DIAGNOSIS — F51.01 PRIMARY INSOMNIA: ICD-10-CM

## 2020-09-25 DIAGNOSIS — I10 ESSENTIAL HYPERTENSION: ICD-10-CM

## 2020-09-25 DIAGNOSIS — E66.01 CLASS 3 SEVERE OBESITY DUE TO EXCESS CALORIES WITH SERIOUS COMORBIDITY AND BODY MASS INDEX (BMI) OF 45.0 TO 49.9 IN ADULT (HCC): ICD-10-CM

## 2020-09-25 DIAGNOSIS — E55.9 VITAMIN D DEFICIENCY: ICD-10-CM

## 2020-09-25 DIAGNOSIS — M1A.09X0 IDIOPATHIC CHRONIC GOUT OF MULTIPLE SITES WITHOUT TOPHUS: ICD-10-CM

## 2020-09-25 DIAGNOSIS — E11.40 TYPE 2 DIABETES MELLITUS WITH DIABETIC NEUROPATHY, WITH LONG-TERM CURRENT USE OF INSULIN (HCC): ICD-10-CM

## 2020-09-25 DIAGNOSIS — Z79.4 TYPE 2 DIABETES MELLITUS WITH DIABETIC NEUROPATHY, WITH LONG-TERM CURRENT USE OF INSULIN (HCC): ICD-10-CM

## 2020-09-25 LAB
25(OH)D3 SERPL-MCNC: 33.7 NG/ML (ref 30–100)
ALBUMIN SERPL-MCNC: 4.2 G/DL (ref 3.5–5.2)
ALBUMIN UR-MCNC: <1.2 MG/DL
ALBUMIN/GLOB SERPL: 1.7 G/DL
ALP SERPL-CCNC: 54 U/L (ref 39–117)
ALT SERPL W P-5'-P-CCNC: 57 U/L (ref 1–41)
ANION GAP SERPL CALCULATED.3IONS-SCNC: 11.2 MMOL/L (ref 5–15)
AST SERPL-CCNC: 29 U/L (ref 1–40)
BASOPHILS # BLD AUTO: 0.05 10*3/MM3 (ref 0–0.2)
BASOPHILS NFR BLD AUTO: 0.7 % (ref 0–1.5)
BILIRUB SERPL-MCNC: 0.8 MG/DL (ref 0–1.2)
BUN SERPL-MCNC: 30 MG/DL (ref 6–20)
BUN/CREAT SERPL: 21.3 (ref 7–25)
CALCIUM SPEC-SCNC: 9.8 MG/DL (ref 8.6–10.5)
CHLORIDE SERPL-SCNC: 98 MMOL/L (ref 98–107)
CHOLEST SERPL-MCNC: 135 MG/DL (ref 0–200)
CO2 SERPL-SCNC: 26.8 MMOL/L (ref 22–29)
CREAT SERPL-MCNC: 1.41 MG/DL (ref 0.76–1.27)
DEPRECATED RDW RBC AUTO: 41.1 FL (ref 37–54)
EOSINOPHIL # BLD AUTO: 1.01 10*3/MM3 (ref 0–0.4)
EOSINOPHIL NFR BLD AUTO: 14.2 % (ref 0.3–6.2)
ERYTHROCYTE [DISTWIDTH] IN BLOOD BY AUTOMATED COUNT: 12.2 % (ref 12.3–15.4)
GFR SERPL CREATININE-BSD FRML MDRD: 54 ML/MIN/1.73
GLOBULIN UR ELPH-MCNC: 2.5 GM/DL
GLUCOSE SERPL-MCNC: 176 MG/DL (ref 65–99)
HBA1C MFR BLD: 6.4 % (ref 4.8–5.6)
HCT VFR BLD AUTO: 50 % (ref 37.5–51)
HDLC SERPL-MCNC: 19 MG/DL (ref 40–60)
HGB BLD-MCNC: 17.8 G/DL (ref 13–17.7)
IMM GRANULOCYTES # BLD AUTO: 0.01 10*3/MM3 (ref 0–0.05)
IMM GRANULOCYTES NFR BLD AUTO: 0.1 % (ref 0–0.5)
LDLC SERPL CALC-MCNC: 51 MG/DL (ref 0–100)
LDLC/HDLC SERPL: 2.66 {RATIO}
LYMPHOCYTES # BLD AUTO: 2.78 10*3/MM3 (ref 0.7–3.1)
LYMPHOCYTES NFR BLD AUTO: 39 % (ref 19.6–45.3)
MCH RBC QN AUTO: 32.8 PG (ref 26.6–33)
MCHC RBC AUTO-ENTMCNC: 35.6 G/DL (ref 31.5–35.7)
MCV RBC AUTO: 92.3 FL (ref 79–97)
MONOCYTES # BLD AUTO: 0.97 10*3/MM3 (ref 0.1–0.9)
MONOCYTES NFR BLD AUTO: 13.6 % (ref 5–12)
NEUTROPHILS NFR BLD AUTO: 2.31 10*3/MM3 (ref 1.7–7)
NEUTROPHILS NFR BLD AUTO: 32.4 % (ref 42.7–76)
NRBC BLD AUTO-RTO: 0 /100 WBC (ref 0–0.2)
PLATELET # BLD AUTO: 180 10*3/MM3 (ref 140–450)
PMV BLD AUTO: 10.8 FL (ref 6–12)
POTASSIUM SERPL-SCNC: 4.3 MMOL/L (ref 3.5–5.2)
PROT SERPL-MCNC: 6.7 G/DL (ref 6–8.5)
RBC # BLD AUTO: 5.42 10*6/MM3 (ref 4.14–5.8)
SODIUM SERPL-SCNC: 136 MMOL/L (ref 136–145)
TRIGL SERPL-MCNC: 327 MG/DL (ref 0–150)
TSH SERPL DL<=0.05 MIU/L-ACNC: 0.97 UIU/ML (ref 0.27–4.2)
VIT B12 BLD-MCNC: 1064 PG/ML (ref 211–946)
VLDLC SERPL-MCNC: 65.4 MG/DL (ref 5–40)
WBC # BLD AUTO: 7.13 10*3/MM3 (ref 3.4–10.8)

## 2020-09-25 PROCEDURE — 82306 VITAMIN D 25 HYDROXY: CPT | Performed by: NURSE PRACTITIONER

## 2020-09-25 PROCEDURE — 84443 ASSAY THYROID STIM HORMONE: CPT | Performed by: NURSE PRACTITIONER

## 2020-09-25 PROCEDURE — 82607 VITAMIN B-12: CPT | Performed by: NURSE PRACTITIONER

## 2020-09-25 PROCEDURE — 80053 COMPREHEN METABOLIC PANEL: CPT | Performed by: NURSE PRACTITIONER

## 2020-09-25 PROCEDURE — 83036 HEMOGLOBIN GLYCOSYLATED A1C: CPT | Performed by: NURSE PRACTITIONER

## 2020-09-25 PROCEDURE — 80061 LIPID PANEL: CPT | Performed by: NURSE PRACTITIONER

## 2020-09-25 PROCEDURE — 85025 COMPLETE CBC W/AUTO DIFF WBC: CPT | Performed by: NURSE PRACTITIONER

## 2020-09-25 PROCEDURE — 82043 UR ALBUMIN QUANTITATIVE: CPT | Performed by: NURSE PRACTITIONER

## 2020-09-30 ENCOUNTER — PROCEDURE VISIT (OUTPATIENT)
Dept: FAMILY MEDICINE CLINIC | Facility: CLINIC | Age: 45
End: 2020-09-30

## 2020-09-30 VITALS
BODY MASS INDEX: 42.66 KG/M2 | OXYGEN SATURATION: 96 % | DIASTOLIC BLOOD PRESSURE: 80 MMHG | HEART RATE: 102 BPM | TEMPERATURE: 96.1 F | HEIGHT: 72 IN | SYSTOLIC BLOOD PRESSURE: 130 MMHG | WEIGHT: 315 LBS

## 2020-09-30 DIAGNOSIS — L91.8 SKIN TAGS, MULTIPLE ACQUIRED: ICD-10-CM

## 2020-09-30 DIAGNOSIS — R89.9 ABNORMAL LABORATORY TEST RESULT: ICD-10-CM

## 2020-09-30 DIAGNOSIS — E11.40 TYPE 2 DIABETES MELLITUS WITH DIABETIC NEUROPATHY, WITH LONG-TERM CURRENT USE OF INSULIN (HCC): ICD-10-CM

## 2020-09-30 DIAGNOSIS — E78.2 MIXED HYPERLIPIDEMIA: Primary | ICD-10-CM

## 2020-09-30 DIAGNOSIS — D45 POLYCYTHEMIA VERA (HCC): ICD-10-CM

## 2020-09-30 DIAGNOSIS — Z79.4 TYPE 2 DIABETES MELLITUS WITH DIABETIC NEUROPATHY, WITH LONG-TERM CURRENT USE OF INSULIN (HCC): ICD-10-CM

## 2020-09-30 DIAGNOSIS — E66.01 CLASS 3 SEVERE OBESITY WITH SERIOUS COMORBIDITY AND BODY MASS INDEX (BMI) OF 40.0 TO 44.9 IN ADULT, UNSPECIFIED OBESITY TYPE (HCC): ICD-10-CM

## 2020-09-30 PROCEDURE — 11200 RMVL SKIN TAGS UP TO&INC 15: CPT | Performed by: NURSE PRACTITIONER

## 2020-09-30 PROCEDURE — 99214 OFFICE O/P EST MOD 30 MIN: CPT | Performed by: NURSE PRACTITIONER

## 2020-09-30 PROCEDURE — 11201 RMVL SKIN TAGS EA ADDL 10: CPT | Performed by: NURSE PRACTITIONER

## 2020-09-30 RX ORDER — ICOSAPENT ETHYL 1000 MG/1
1 CAPSULE ORAL 2 TIMES DAILY WITH MEALS
Qty: 120 CAPSULE | Refills: 5 | Status: SHIPPED | OUTPATIENT
Start: 2020-09-30 | End: 2020-12-01 | Stop reason: SDUPTHER

## 2020-09-30 RX ORDER — PHENTERMINE HYDROCHLORIDE 37.5 MG/1
37.5 TABLET ORAL
Qty: 30 TABLET | Refills: 0 | Status: SHIPPED | OUTPATIENT
Start: 2020-09-30 | End: 2020-12-01

## 2020-09-30 NOTE — ASSESSMENT & PLAN NOTE
Lipid abnormalities are not at goal. He has not been taking his Vascepa. .  Nutritional counseling was provided. and Pharmacotherapy as ordered.  Lipids will be reassessed in 3 months. Pt will take his medications as prescribed and work on weight loss.

## 2020-09-30 NOTE — PROGRESS NOTES
Subjective   Johnathon Mclain is a 45 y.o. male.     Chief complaint  Go over labs  Obesity  Skin tags catching, close      History of Present Illness:    Morbid obesity-patient has remained the same weight with Adipex this month.  He reports that his scale shows a 10 pound weight loss from the day he started Adipex.  His BMI today is 49.77.  He reports that Adipex is helpful to decrease his appetite, give him energy and help him stay focused on his diet.  He would like to try at least 1 more month taking Adipex and reports he will work more on exercise this month.  Denies any side effects.      Multiple skin tags.  Catching on clothing and bleed.  Areas become very inflamed and irritated which makes them painful.  Requesting removal via cryotherapy.    Type 2 diabetes-fasting glucose was 176.  His A1c is controlled at 6.4.  The following portions of the patient's history and ROS were reviewed and updated as appropriate per provider:  Allergies, current medications, past family history, past medical history, past social history, past surgical history and problem list.    Hyperlipidemia- patient's triglyceride level is 327.  He reports he had not been taking his Vascepa.  He is not sure why he was not taking it but he knows he is not taking any medication twice a day with meals.    Elevated liver enzyme- ALT 57.    Decreased GFR-GFR 54 with creatinine 1.41.    B12 level elevated-patient had had a recent B12 injection prior to labs    Elevated hemoglobin at 17.8.  Has been seen by hematology.  Frequently has blood drawn and wasted.  Generally they wait until his hemoglobin is well over 22 drawl and waist.  Patient denies any headache or symptoms at this time.      The following portions of the patient's history, chief complaint and ROS were reviewed and updated as appropriate per provider:  Allergies, current medications, past family history, past medical history, past social history, past surgical history and problem  "list.    Review of Systems   Constitutional: Negative for appetite change, fatigue and fever.   HENT: Negative for congestion, sinus pressure, sinus pain, sneezing and sore throat.    Eyes: Negative for pain, discharge and itching.   Respiratory: Negative for cough, chest tightness, shortness of breath and wheezing.    Cardiovascular: Negative.    Gastrointestinal: Negative for abdominal pain, constipation, diarrhea and nausea.   Endocrine: Negative.    Genitourinary: Negative for difficulty urinating, dysuria, frequency and hematuria.   Musculoskeletal: Positive for arthralgias and back pain. Negative for neck stiffness.        Under the care of pain management   Skin: Positive for color change. Negative for pallor, rash and wound.   Allergic/Immunologic: Negative for environmental allergies, food allergies and immunocompromised state.   Neurological: Negative for dizziness, seizures, syncope and headaches.   Hematological: Negative.    Psychiatric/Behavioral: Negative for agitation, dysphoric mood, self-injury and suicidal ideas.       Objective     /80   Pulse 102   Temp 96.1 °F (35.6 °C) (Tympanic)   Ht 182.9 cm (72\")   Wt (!) 166 kg (367 lb)   SpO2 96%   BMI 49.77 kg/m²   Lab on 09/25/2020   Component Date Value Ref Range Status   • Glucose 09/25/2020 176* 65 - 99 mg/dL Final   • BUN 09/25/2020 30* 6 - 20 mg/dL Final   • Creatinine 09/25/2020 1.41* 0.76 - 1.27 mg/dL Final   • Sodium 09/25/2020 136  136 - 145 mmol/L Final   • Potassium 09/25/2020 4.3  3.5 - 5.2 mmol/L Final   • Chloride 09/25/2020 98  98 - 107 mmol/L Final   • CO2 09/25/2020 26.8  22.0 - 29.0 mmol/L Final   • Calcium 09/25/2020 9.8  8.6 - 10.5 mg/dL Final   • Total Protein 09/25/2020 6.7  6.0 - 8.5 g/dL Final   • Albumin 09/25/2020 4.20  3.50 - 5.20 g/dL Final   • ALT (SGPT) 09/25/2020 57* 1 - 41 U/L Final   • AST (SGOT) 09/25/2020 29  1 - 40 U/L Final   • Alkaline Phosphatase 09/25/2020 54  39 - 117 U/L Final   • Total Bilirubin " 09/25/2020 0.8  0.0 - 1.2 mg/dL Final   • eGFR Non African Amer 09/25/2020 54* >60 mL/min/1.73 Final   • Globulin 09/25/2020 2.5  gm/dL Final   • A/G Ratio 09/25/2020 1.7  g/dL Final   • BUN/Creatinine Ratio 09/25/2020 21.3  7.0 - 25.0 Final   • Anion Gap 09/25/2020 11.2  5.0 - 15.0 mmol/L Final   • TSH 09/25/2020 0.970  0.270 - 4.200 uIU/mL Final   • Hemoglobin A1C 09/25/2020 6.40* 4.80 - 5.60 % Final   • 25 Hydroxy, Vitamin D 09/25/2020 33.7  30.0 - 100.0 ng/ml Final   • Vitamin B-12 09/25/2020 1,064* 211 - 946 pg/mL Final   • Total Cholesterol 09/25/2020 135  0 - 200 mg/dL Final   • Triglycerides 09/25/2020 327* 0 - 150 mg/dL Final   • HDL Cholesterol 09/25/2020 19* 40 - 60 mg/dL Final   • LDL Cholesterol  09/25/2020 51  0 - 100 mg/dL Final   • VLDL Cholesterol 09/25/2020 65.4* 5 - 40 mg/dL Final   • LDL/HDL Ratio 09/25/2020 2.66   Final   • Microalbumin, Urine 09/25/2020 <1.2  mg/dL Final   • WBC 09/25/2020 7.13  3.40 - 10.80 10*3/mm3 Final   • RBC 09/25/2020 5.42  4.14 - 5.80 10*6/mm3 Final   • Hemoglobin 09/25/2020 17.8* 13.0 - 17.7 g/dL Final   • Hematocrit 09/25/2020 50.0  37.5 - 51.0 % Final   • MCV 09/25/2020 92.3  79.0 - 97.0 fL Final   • MCH 09/25/2020 32.8  26.6 - 33.0 pg Final   • MCHC 09/25/2020 35.6  31.5 - 35.7 g/dL Final   • RDW 09/25/2020 12.2* 12.3 - 15.4 % Final   • RDW-SD 09/25/2020 41.1  37.0 - 54.0 fl Final   • MPV 09/25/2020 10.8  6.0 - 12.0 fL Final   • Platelets 09/25/2020 180  140 - 450 10*3/mm3 Final   • Neutrophil % 09/25/2020 32.4* 42.7 - 76.0 % Final   • Lymphocyte % 09/25/2020 39.0  19.6 - 45.3 % Final   • Monocyte % 09/25/2020 13.6* 5.0 - 12.0 % Final   • Eosinophil % 09/25/2020 14.2* 0.3 - 6.2 % Final   • Basophil % 09/25/2020 0.7  0.0 - 1.5 % Final   • Immature Grans % 09/25/2020 0.1  0.0 - 0.5 % Final   • Neutrophils, Absolute 09/25/2020 2.31  1.70 - 7.00 10*3/mm3 Final   • Lymphocytes, Absolute 09/25/2020 2.78  0.70 - 3.10 10*3/mm3 Final   • Monocytes, Absolute 09/25/2020 0.97*  0.10 - 0.90 10*3/mm3 Final   • Eosinophils, Absolute 09/25/2020 1.01* 0.00 - 0.40 10*3/mm3 Final   • Basophils, Absolute 09/25/2020 0.05  0.00 - 0.20 10*3/mm3 Final   • Immature Grans, Absolute 09/25/2020 0.01  0.00 - 0.05 10*3/mm3 Final   • nRBC 09/25/2020 0.0  0.0 - 0.2 /100 WBC Final       Physical Exam  Vitals signs reviewed.   Constitutional:       General: He is not in acute distress.     Appearance: Normal appearance. He is well-developed. He is morbidly obese. He is not ill-appearing, toxic-appearing or diaphoretic.   HENT:      Head: Normocephalic and atraumatic. Hair is normal.      Comments: mask removed for brief oral exam     Right Ear: Tympanic membrane, ear canal and external ear normal.      Left Ear: Tympanic membrane, ear canal and external ear normal.      Nose: Nose normal.      Right Sinus: No maxillary sinus tenderness or frontal sinus tenderness.      Left Sinus: No maxillary sinus tenderness or frontal sinus tenderness.      Mouth/Throat:      Lips: Pink. No lesions.      Pharynx: No oropharyngeal exudate.   Eyes:      General: Lids are normal. Vision grossly intact. Gaze aligned appropriately.         Right eye: No discharge.         Left eye: No discharge.      Conjunctiva/sclera: Conjunctivae normal.      Pupils: Pupils are equal, round, and reactive to light.   Neck:      Musculoskeletal: Normal range of motion and neck supple. Normal range of motion. No spinous process tenderness or muscular tenderness.      Thyroid: No thyromegaly.      Trachea: No tracheal deviation.   Cardiovascular:      Rate and Rhythm: Normal rate and regular rhythm.      Pulses: Normal pulses.      Heart sounds: Normal heart sounds. No murmur. No friction rub. No gallop.    Pulmonary:      Effort: Pulmonary effort is normal. No accessory muscle usage or respiratory distress.      Breath sounds: Normal breath sounds. No wheezing or rales.   Chest:      Chest wall: No tenderness.   Abdominal:      General: Bowel  sounds are normal. There is no distension.      Palpations: Abdomen is soft. There is no mass.      Tenderness: There is no abdominal tenderness. There is no guarding or rebound.   Musculoskeletal: Normal range of motion.   Lymphadenopathy:      Cervical: No cervical adenopathy.   Skin:     General: Skin is warm and dry.      Capillary Refill: Capillary refill takes less than 2 seconds.      Coloration: Skin is not cyanotic or pale.      Findings: Erythema (100s of skin tags with erythema) present. No bruising or rash.      Nails: There is no clubbing.            Neurological:      General: No focal deficit present.      Mental Status: He is alert and oriented to person, place, and time.      Deep Tendon Reflexes: Reflexes are normal and symmetric.      Comments: CN 2-12 grossly intact    Psychiatric:         Attention and Perception: Attention normal.         Mood and Affect: Mood normal.         Speech: Speech normal.         Behavior: Behavior normal. Behavior is cooperative.         Thought Content: Thought content normal.         Cognition and Memory: Cognition normal.         Judgment: Judgment normal.         Assessment/Plan     Problem List Items Addressed This Visit        Cardiovascular and Mediastinum    Mixed hyperlipidemia - Primary    Overview     mixed         Current Assessment & Plan     Lipid abnormalities are not at goal. He has not been taking his Vascepa. .  Nutritional counseling was provided. and Pharmacotherapy as ordered.  Lipids will be reassessed in 3 months. Pt will take his medications as prescribed and work on weight loss.          Relevant Medications    icosapent ethyl (VASCEPA) 1 g capsule capsule       Digestive    Class 3 severe obesity with serious comorbidity in adult (CMS/HCC)    Current Assessment & Plan     Obesity is unchanged.  Discussed the patient's BMI.  The BMI is above average; BMI management plan is completed.  General weight loss/lifestyle modification strategies  discussed (elicit support from others; identify saboteurs; non-food rewards, etc).  Informal exercise measures discussed, e.g. taking stairs instead of elevator.  Pharmacotherapy as ordered.  Will continue Adipex 37.5 mg daily this month.  Explained to the patient that he should have at least a 5 pound weight loss this month.  Discussed with the patient that if he does not lose weight using Adipex we will discontinue next month.         Relevant Medications    phentermine (Adipex-P) 37.5 MG tablet       Endocrine    Diabetes mellitus with diabetic neuropathy, with long-term current use of insulin (CMS/[facility])    Overview     Abnormal nerve conduction study on file         Relevant Medications    icosapent ethyl (VASCEPA) 1 g capsule capsule       Musculoskeletal and Integument    Skin tags, multiple acquired    Overview     Bleeding and catching on clothing             Hematopoietic and Hemostatic    Polycythemia vera (CMS/[facility])    Overview     Under the care of hematology          Current Assessment & Plan     Remain under the care of hematology.  We will repeat CBC and 2-3 months, sooner if headache or other symptoms occur.           Other Visit Diagnoses     Abnormal laboratory test result        Elevated creatinine, elevated ALT, low GFR, elevated hemoglobin.  Will repeat in 3 months.          Current Outpatient Medications:   •  albuterol sulfate HFA (ProAir HFA) 108 (90 Base) MCG/ACT inhaler, Inhale 2 puffs Every 6 (Six) Hours As Needed for Wheezing., Disp: 1 inhaler, Rfl: 5  •  aspirin 81 MG EC tablet, Take 1 tablet by mouth Daily. Indications: Stable Angina Pectoris, Disp: 90 tablet, Rfl: 1  •  benzonatate (Tessalon Perles) 100 MG capsule, Take 1 capsule by mouth 3 (Three) Times a Day As Needed for Cough., Disp: 30 capsule, Rfl: 2  •  carvedilol (COREG) 6.25 MG tablet, Take 1 tablet by mouth 2 (Two) Times a Day With Meals., Disp: 60 tablet, Rfl: 5  •  cetirizine (zyrTEC) 10 MG tablet, Take 1 tablet by mouth  Daily As Needed for Allergies., Disp: 30 tablet, Rfl: 5  •  chlorthalidone (HYGROTON) 25 MG tablet, Take 1 tablet by mouth Daily., Disp: 30 tablet, Rfl: 5  •  Cholecalciferol (VITAMIN D) 1000 units tablet, Take 1 tablet by mouth Daily., Disp: 30 tablet, Rfl: 5  •  colchicine 0.6 MG capsule capsule, Take 1 capsule by mouth Daily., Disp: 30 capsule, Rfl: 5  •  cyanocobalamin (VITAMIN B-12) 250 MCG tablet, Take 1 tablet by mouth Daily., Disp: 30 tablet, Rfl: 5  •  febuxostat (ULORIC) 80 MG tablet tablet, Take 1 tablet by mouth Daily. Indications: Disorder of Excessive Uric Acid in the Blood, Disp: 30 tablet, Rfl: 5  •  fenofibrate (Tricor) 145 MG tablet, Take 1 tablet by mouth Daily., Disp: 30 tablet, Rfl: 5  •  Fluticasone Furoate-Vilanterol (Breo Ellipta) 100-25 MCG/INH inhaler, 1 puff every morning, Disp: 1 each, Rfl: 5  •  gabapentin (NEURONTIN) 800 MG tablet, Take 800 mg by mouth 3 (Three) Times a Day., Disp: , Rfl:   •  glipizide (GLUCOTROL) 5 MG tablet, Take 1 tablet by mouth Daily., Disp: 30 tablet, Rfl: 5  •  glucose blood test strip, Use as instructed, Disp: 50 each, Rfl: 12  •  HYDROcodone-acetaminophen (NORCO)  MG per tablet, TAKE 1/2 TABLET BY MOUTH TWICE DAILY AS NEEDED, Disp: , Rfl:   •  icosapent ethyl (VASCEPA) 1 g capsule capsule, Take 1 g by mouth 2 (Two) Times a Day With Meals. Indications: High Amount of Triglycerides in the Blood, Disp: 120 capsule, Rfl: 5  •  Insulin Glargine (LANTUS SOLOSTAR) 100 UNIT/ML injection pen, Inject 21 Units under the skin into the appropriate area as directed Daily. Indications: Type 2 Diabetes, Disp: 2 pen, Rfl: 5  •  Insulin Pen Needle (PEN NEEDLES) 32G X 4 MM misc, 1 Device Daily., Disp: 100 each, Rfl: 3  •  ketoconazole (NIZORAL) 2 % shampoo, apply TO THE appropriate AREA TWICE WEEKLY AS directed, Disp: 120 mL, Rfl: 5  •  lisinopril (PRINIVIL,ZESTRIL) 20 MG tablet, TAKE 1 Tablet BY MOUTH TWICE DAILY, Disp: 60 tablet, Rfl: 5  •  meloxicam (MOBIC) 7.5 MG  tablet, Take 1 tablet by mouth Daily As Needed for Mild Pain . for pain, Disp: 30 tablet, Rfl: 5  •  metFORMIN (FORTAMET) 500 MG (OSM) 24 hr tablet, Take 1 tablet by mouth 2 (Two) Times a Day With Meals., Disp: 60 tablet, Rfl: 5  •  metFORMIN ER (GLUCOPHAGE-XR) 500 MG 24 hr tablet, Take 1 tablet by mouth Daily With Breakfast. Indications: Type 2 Diabetes, Disp: 30 tablet, Rfl: 5  •  mupirocin (BACTROBAN) 2 % ointment, Apply  topically to the appropriate area as directed 3 (Three) Times a Day., Disp: 1 each, Rfl: 1  •  omeprazole (priLOSEC) 20 MG capsule, Take 1 capsule by mouth Daily. Indications: Gastroesophageal Reflux Disease, Disp: 30 capsule, Rfl: 5  •  ONE TOUCH LANCETS misc, 1 Device 2 (Two) Times a Day., Disp: 200 each, Rfl: 3  •  oxyCODONE-acetaminophen (PERCOCET) 5-325 MG per tablet, , Disp: , Rfl:   •  phentermine (Adipex-P) 37.5 MG tablet, Take 1 tablet by mouth Every Morning Before Breakfast., Disp: 30 tablet, Rfl: 0  •  sertraline (ZOLOFT) 50 MG tablet, Take 1 tablet by mouth Daily., Disp: 30 tablet, Rfl: 5  •  sildenafil (VIAGRA) 100 MG tablet, Take 1 tablet by mouth Daily As Needed for Erectile Dysfunction., Disp: 10 tablet, Rfl: 5  •  traZODone (DESYREL) 50 MG tablet, Take 0.5-1 tablets by mouth Every Night., Disp: 30 tablet, Rfl: 5    I have reviewed medication list and discussed with patient the possible side effects and interactions.  We have discussed purpose for medication, route, dosage, frequency.  Refill routine medications.    Remain under the care of pain management.     Recent labs reviewed and discussed with patient.        [facility] Patient Controlled Substance Report (from 10/1/2019 to 9/30/2020)    Dispensed  Strength Quantity Days Supply Provider Pharmacy   09/09/2020 Gabapentin 800MG 90 each 30 PEPPER [facility]   09/03/2020 [facility]one[facility]etamin[facility]10MG [facility]   09/02/2020 Phentermine Hcl 37.5MG 30 each 30  [facility], [facility]   08/05/2020 Gabapentin 800MG 90 each 30 [place], [facility]   08/05/2020 [facility]et[facility]   07/06/2020 Gabapentin 800MG 90 each 30 WHEALTON, [facility]   07/06/2020 Hydrocodone/Acetaminophen 325MG[facility]10MG [facility]   06/05/2020 Gabapentin 800MG 90 each 30 JUNIOR, [facility]   06/05/2020 Hydrocodone/Acetaminophen 325MG/10MG 30 each 30 JUNIOR, [facility]   05/08/2020 Gabapentin 800MG 90 each 30 JUNIOR, [facility]   05/08/2020 Hydrocodone/Acetaminophen 325MG/10MG 30 each 30 JUNIOR, [facility]   04/28/2020 [facility]   04/10/2020 Gabapentin 800MG 90 each 30 WHEALTON, [facility]   04/10/2020 [facility]   03/[facility]2020 [facility]   03/11/2020 Gabapentin 800MG 90 each 30 JUNIOR, [facility]   03/11/2020 Hydrocodone/Acetaminophen 325MG/10MG 30 each 30 JUNIOR, [facility]   02/18/2020 Phentermine Hcl 37.5MG 30 each 30 ELY, [facility]   02/10/2020 [facility] 800MG 90 [facility]   02/10/2020 Hydrocodone/Acetaminophen 325MG/10MG 30 [facility]   01/15/2020 Hydrocodone/Acetaminophen 325MG/10MG 30 each 30 JUNIOR, [facility]   01/10/2020 Gabapentin 800MG 90 each 30 [facility], [facility]   12/20/2019 Hydrocodone/Acetaminophen [facility]7.[facility]MG [facility]   12/10/2019 Gabapentin 600MG 90 each 30 ELY, [facility]   11/27/2019 Acetaminophen/Codeine 300MG/60MG 60 each 10 ELY, [facility]   11/12/2019 Gabapentin 600MG 90 each 30 ELY, [facility]   10/28/2019 Acetaminophen/Codeine 300MG/60MG 60 each 10 ELY, [facility]   10/16/2019 Oxycodone/Acetaminophen 325MG/5MG 5 each 1 [facility]   10/15/2019 Gabapentin 600MG 90 each 30 ELY, [facility]      Disclaimer    *The information in this report is based upon Schedule II through V controlled substance records reported by dispensers. Data should appear on [facility] reports within two to three business days after dispensing.   *The records listed in the report are based on the patient identification information entered by the report requestor, and if not sufficiently unique may result in the report including records for multiple patients. Please verify the information in the report by contacting the prescribers and/or dispensers listed.   *If the controlled substance records on this report appear to be in error, the patient or provider should contact the dispenser to determine if the information was reported accurately. If the dispenser certifies the information was reported accurately, the dispenser can contact the Drug Enforcement and Professional Practices Branch at [phone] to investigate the error.   *The information in this report is intended for informational use only by the person authorized to request the report. Intentional disclosure of the report or data to someone not authorized to obtain the data is a Class B Misdemeanor.      Report Restrictions - A practitioner or pharmacist may share the report with the patient or person authorized to act on the patient's behalf and place the report in the patient's medical record, with the report then being deemed a medical record subject to the same disclosure terms and conditions as  an ordinary medical record. ([place]RS [place].202)      External Sources    Source Last Checked for Updates Status   [place] PDMP 9/30/2020  1:09 AM History Response Filed     Pt has been educated/instructed on diabetic care and protocols.  Diabetic diet instructions provided.  Medication regimen reviewed.  Discussed possible side effects and interactions of current medications.  Sick day rules reviewed. Continue to monitor blood sugar and report abnormal readings as discussed today. Understanding stated by patient.          Patient's Body mass index is 49.77 kg/m². BMI is above normal parameters. Recommendations include: exercise counseling, nutrition counseling and pharmacological intervention.    Pt has been instructed today regarding low fat heart smart diet. Weight management and routine exercise has been recommended. Avoid high fat foods, starchy foods and processed foods. Increase lean meats, fresh vegetables and fresh fruits.     Coronavirus precautions have been reviewed and discussed.  I have discussed the CDC recommendations  of social distancing, hand washing and disinfecting commonly touched items. Reviewed need to notify PCP and self quarantine with mild symptoms.  Discussed procedure to obtain Covid-19 testing and notification of PCP/health dept/ED/Urgent Center if symptoms begin. Understanding verbalized.    I have discussed diagnosis in detail today allowing time for questions and answers. Patient is aware of reasons to seek urgent or emergent medical care as well as reasons to return to the clinic for evaluation. Possible side effects, interactions and progression of symptoms discussed as well. Patient / family states understanding.   Emotional support and active listening provided.       Follow-up in 1 month, sooner if needed.        This document has been electronically signed by:  TYSON Vinson, NP-C

## 2020-09-30 NOTE — ASSESSMENT & PLAN NOTE
Remain under the care of hematology.  We will repeat CBC and 2-3 months, sooner if headache or other symptoms occur.

## 2020-09-30 NOTE — ASSESSMENT & PLAN NOTE
Obesity is unchanged.  Discussed the patient's BMI.  The BMI is above average; BMI management plan is completed.  General weight loss/lifestyle modification strategies discussed (elicit support from others; identify saboteurs; non-food rewards, etc).  Informal exercise measures discussed, e.g. taking stairs instead of elevator.  Pharmacotherapy as ordered.  Will continue Adipex 37.5 mg daily this month.  Explained to the patient that he should have at least a 5 pound weight loss this month.  Discussed with the patient that if he does not lose weight using Adipex we will discontinue next month.

## 2020-11-25 DIAGNOSIS — J44.9 CHRONIC OBSTRUCTIVE PULMONARY DISEASE, UNSPECIFIED COPD TYPE (HCC): ICD-10-CM

## 2020-11-25 RX ORDER — CETIRIZINE HYDROCHLORIDE 10 MG/1
TABLET ORAL
Qty: 30 TABLET | Refills: 5 | Status: SHIPPED | OUTPATIENT
Start: 2020-11-25 | End: 2020-12-01 | Stop reason: SDUPTHER

## 2020-11-25 RX ORDER — COLCHICINE 0.6 MG/1
CAPSULE ORAL
Qty: 30 CAPSULE | Refills: 5 | Status: SHIPPED | OUTPATIENT
Start: 2020-11-25 | End: 2020-12-01 | Stop reason: SDUPTHER

## 2020-12-01 ENCOUNTER — PROCEDURE VISIT (OUTPATIENT)
Dept: FAMILY MEDICINE CLINIC | Facility: CLINIC | Age: 45
End: 2020-12-01

## 2020-12-01 VITALS
BODY MASS INDEX: 42.66 KG/M2 | HEART RATE: 89 BPM | DIASTOLIC BLOOD PRESSURE: 80 MMHG | SYSTOLIC BLOOD PRESSURE: 130 MMHG | HEIGHT: 72 IN | WEIGHT: 315 LBS | TEMPERATURE: 97.1 F | OXYGEN SATURATION: 93 %

## 2020-12-01 DIAGNOSIS — M25.552 LEFT HIP PAIN: ICD-10-CM

## 2020-12-01 DIAGNOSIS — J44.9 CHRONIC OBSTRUCTIVE PULMONARY DISEASE, UNSPECIFIED COPD TYPE (HCC): ICD-10-CM

## 2020-12-01 DIAGNOSIS — E11.40 TYPE 2 DIABETES MELLITUS WITH DIABETIC NEUROPATHY, WITH LONG-TERM CURRENT USE OF INSULIN (HCC): ICD-10-CM

## 2020-12-01 DIAGNOSIS — L91.8 SKIN TAGS, MULTIPLE ACQUIRED: Primary | ICD-10-CM

## 2020-12-01 DIAGNOSIS — I10 ESSENTIAL HYPERTENSION: ICD-10-CM

## 2020-12-01 DIAGNOSIS — Z79.4 TYPE 2 DIABETES MELLITUS WITH DIABETIC NEUROPATHY, WITH LONG-TERM CURRENT USE OF INSULIN (HCC): ICD-10-CM

## 2020-12-01 DIAGNOSIS — M1A.09X0 IDIOPATHIC CHRONIC GOUT OF MULTIPLE SITES WITHOUT TOPHUS: ICD-10-CM

## 2020-12-01 DIAGNOSIS — E66.01 CLASS 3 SEVERE OBESITY DUE TO EXCESS CALORIES WITH SERIOUS COMORBIDITY AND BODY MASS INDEX (BMI) OF 45.0 TO 49.9 IN ADULT (HCC): ICD-10-CM

## 2020-12-01 DIAGNOSIS — F51.01 PRIMARY INSOMNIA: ICD-10-CM

## 2020-12-01 PROBLEM — L97.512 DIABETIC ULCER OF TOE OF RIGHT FOOT ASSOCIATED WITH TYPE 2 DIABETES MELLITUS, WITH FAT LAYER EXPOSED (HCC): Status: RESOLVED | Noted: 2020-02-12 | Resolved: 2020-12-01

## 2020-12-01 PROBLEM — E11.621 DIABETIC ULCER OF TOE OF RIGHT FOOT ASSOCIATED WITH TYPE 2 DIABETES MELLITUS, WITH FAT LAYER EXPOSED (HCC): Status: RESOLVED | Noted: 2020-02-12 | Resolved: 2020-12-01

## 2020-12-01 PROCEDURE — 11201 RMVL SKIN TAGS EA ADDL 10: CPT | Performed by: NURSE PRACTITIONER

## 2020-12-01 PROCEDURE — 99214 OFFICE O/P EST MOD 30 MIN: CPT | Performed by: NURSE PRACTITIONER

## 2020-12-01 PROCEDURE — 11200 RMVL SKIN TAGS UP TO&INC 15: CPT | Performed by: NURSE PRACTITIONER

## 2020-12-01 RX ORDER — ALBUTEROL SULFATE 90 UG/1
2 AEROSOL, METERED RESPIRATORY (INHALATION) EVERY 6 HOURS PRN
Qty: 18 G | Refills: 5 | Status: SHIPPED | OUTPATIENT
Start: 2020-12-01

## 2020-12-01 RX ORDER — PEN NEEDLE, DIABETIC 30 GX3/16"
1 NEEDLE, DISPOSABLE MISCELLANEOUS DAILY
Qty: 100 EACH | Refills: 3 | Status: SHIPPED | OUTPATIENT
Start: 2020-12-01

## 2020-12-01 RX ORDER — COLCHICINE 0.6 MG/1
1 CAPSULE ORAL DAILY
Qty: 30 CAPSULE | Refills: 5 | Status: SHIPPED | OUTPATIENT
Start: 2020-12-01

## 2020-12-01 RX ORDER — FEBUXOSTAT 80 MG/1
80 TABLET, FILM COATED ORAL DAILY
Qty: 30 TABLET | Refills: 5 | Status: SHIPPED | OUTPATIENT
Start: 2020-12-01

## 2020-12-01 RX ORDER — OMEPRAZOLE 20 MG/1
20 CAPSULE, DELAYED RELEASE ORAL DAILY
Qty: 30 CAPSULE | Refills: 5 | Status: SHIPPED | OUTPATIENT
Start: 2020-12-01

## 2020-12-01 RX ORDER — BENZONATATE 100 MG/1
100 CAPSULE ORAL 3 TIMES DAILY PRN
Qty: 30 CAPSULE | Refills: 2 | Status: SHIPPED | OUTPATIENT
Start: 2020-12-01

## 2020-12-01 RX ORDER — METFORMIN HYDROCHLORIDE EXTENDED-RELEASE TABLETS 500 MG/1
500 TABLET, FILM COATED, EXTENDED RELEASE ORAL 2 TIMES DAILY WITH MEALS
Qty: 60 TABLET | Refills: 5 | Status: SHIPPED | OUTPATIENT
Start: 2020-12-01 | End: 2021-02-08 | Stop reason: SDUPTHER

## 2020-12-01 RX ORDER — TRAZODONE HYDROCHLORIDE 50 MG/1
25-50 TABLET ORAL NIGHTLY
Qty: 30 TABLET | Refills: 5 | Status: SHIPPED | OUTPATIENT
Start: 2020-12-01

## 2020-12-01 RX ORDER — MELOXICAM 7.5 MG/1
7.5 TABLET ORAL DAILY PRN
Qty: 30 TABLET | Refills: 5 | Status: SHIPPED | OUTPATIENT
Start: 2020-12-01

## 2020-12-01 RX ORDER — CETIRIZINE HYDROCHLORIDE 10 MG/1
10 TABLET ORAL DAILY
Qty: 30 TABLET | Refills: 5 | Status: SHIPPED | OUTPATIENT
Start: 2020-12-01

## 2020-12-01 RX ORDER — FENOFIBRATE 145 MG/1
145 TABLET, COATED ORAL DAILY
Qty: 30 TABLET | Refills: 5 | Status: SHIPPED | OUTPATIENT
Start: 2020-12-01

## 2020-12-01 RX ORDER — ICOSAPENT ETHYL 1000 MG/1
1 CAPSULE ORAL 2 TIMES DAILY WITH MEALS
Qty: 120 CAPSULE | Refills: 5 | Status: SHIPPED | OUTPATIENT
Start: 2020-12-01

## 2020-12-01 RX ORDER — ASPIRIN 81 MG/1
81 TABLET ORAL DAILY
Qty: 90 TABLET | Refills: 1 | Status: SHIPPED | OUTPATIENT
Start: 2020-12-01

## 2020-12-01 RX ORDER — LISINOPRIL 20 MG/1
20 TABLET ORAL 2 TIMES DAILY
Qty: 60 TABLET | Refills: 5 | Status: SHIPPED | OUTPATIENT
Start: 2020-12-01

## 2020-12-01 RX ORDER — CIPROFLOXACIN AND DEXAMETHASONE 3; 1 MG/ML; MG/ML
SUSPENSION/ DROPS AURICULAR (OTIC)
Qty: 7.5 ML | Refills: 0 | Status: SHIPPED | OUTPATIENT
Start: 2020-12-01 | End: 2020-12-08

## 2020-12-01 RX ORDER — CYCLOBENZAPRINE HCL 5 MG
TABLET ORAL
COMMUNITY
Start: 2020-11-30

## 2020-12-01 RX ORDER — GLIPIZIDE 5 MG/1
5 TABLET ORAL DAILY
Qty: 30 TABLET | Refills: 5 | Status: SHIPPED | OUTPATIENT
Start: 2020-12-01

## 2020-12-01 RX ORDER — CARVEDILOL 6.25 MG/1
6.25 TABLET ORAL 2 TIMES DAILY WITH MEALS
Qty: 60 TABLET | Refills: 5 | Status: SHIPPED | OUTPATIENT
Start: 2020-12-01

## 2020-12-01 RX ORDER — CHLORTHALIDONE 25 MG/1
25 TABLET ORAL DAILY
Qty: 30 TABLET | Refills: 5 | Status: SHIPPED | OUTPATIENT
Start: 2020-12-01

## 2020-12-01 RX ORDER — SILDENAFIL 100 MG/1
100 TABLET, FILM COATED ORAL DAILY PRN
Qty: 10 TABLET | Refills: 5 | Status: SHIPPED | OUTPATIENT
Start: 2020-12-01 | End: 2021-03-18

## 2020-12-01 RX ORDER — AMOXICILLIN 875 MG/1
875 TABLET, COATED ORAL EVERY 12 HOURS SCHEDULED
Qty: 20 TABLET | Refills: 0 | Status: SHIPPED | OUTPATIENT
Start: 2020-12-01 | End: 2021-03-18

## 2020-12-01 NOTE — ASSESSMENT & PLAN NOTE
Diabetes is improving.  I would like him to work harder on diet and exercise.  Discussed normal versus abnormal blood sugars in the risk of uncontrolled diabetes.  Discussed sick day rules.  Patient states understanding.

## 2020-12-01 NOTE — PROGRESS NOTES
Subjective   Johnathon Mclain is a 45 y.o. male.       Chief complaint  Skin tags  Diabetes  Obesity    History of Present Illness:    Obesity-patient has not been taking Adipex.  He reports he likes to sit and eat at night.  Patient says that Adipex really does not help him much just helps him not snack as much as night.  He has not been exercising due to onset of cold weather.  Has not really given weight loss surgery much fault but will consider it.    Skin tags-100s of skin tags.  They catch on his clothing and bleed.  Would like to have some on his neck and axillary region removed today.  We have been working on removing them slowly because there are literally 100s if not thousands on his neck, upper extremities and trunk.  Tolerates cryotherapy well.    Diabetes-patient reports he is being more compliant with his medications and denies any hyperglycemic or hypoglycemic episodes.  Checking his glucose at least daily.  Currently receives glipizide 5 mg daily, Lantus 21 units daily, Metformin 500 mg daily extended release.    Essential hypertension-needs refills on routine medications.  Monitors occasionally.    Chronic pain-under the care of pain management    Anxiety with depression-stable with current medication regimen.  Denies any thoughts of hurting self or others.  Currently taking trazodone and Zoloft.  The following portions of the patient's history and ROS were reviewed and updated as appropriate per provider:  Allergies, current medications, past family history, past medical history, past social history, past surgical history and problem list.    Review of Systems   Constitutional: Negative for activity change, appetite change, chills, fatigue and fever.   HENT: Negative for congestion, ear pain, facial swelling, hearing loss, sinus pressure, sore throat, trouble swallowing and voice change.    Eyes: Negative for pain, discharge and visual disturbance.   Respiratory: Negative for apnea, cough, chest  "tightness, shortness of breath and wheezing.    Cardiovascular: Negative for chest pain, palpitations and leg swelling.   Gastrointestinal: Negative for abdominal pain, blood in stool, constipation, diarrhea, nausea and vomiting.   Endocrine: Negative.    Genitourinary: Negative for difficulty urinating, dysuria and flank pain.   Musculoskeletal: Positive for arthralgias and back pain. Negative for neck stiffness.   Skin: Positive for color change. Negative for pallor, rash and wound.   Allergic/Immunologic: Negative.    Neurological: Negative for dizziness, facial asymmetry and headaches.   Hematological: Negative.    Psychiatric/Behavioral: Negative for confusion, dysphoric mood, sleep disturbance and suicidal ideas. The patient is not nervous/anxious.        Objective     /80   Pulse 89   Temp 97.1 °F (36.2 °C) (Temporal)   Ht 182.9 cm (72\")   Wt (!) 170 kg (375 lb)   SpO2 93%   BMI 50.86 kg/m²   Lab on 09/25/2020   Component Date Value Ref Range Status   • Glucose 09/25/2020 176* 65 - 99 mg/dL Final   • BUN 09/25/2020 30* 6 - 20 mg/dL Final   • Creatinine 09/25/2020 1.41* 0.76 - 1.27 mg/dL Final   • Sodium 09/25/2020 136  136 - 145 mmol/L Final   • Potassium 09/25/2020 4.3  3.5 - 5.2 mmol/L Final   • Chloride 09/25/2020 98  98 - 107 mmol/L Final   • CO2 09/25/2020 26.8  22.0 - 29.0 mmol/L Final   • Calcium 09/25/2020 9.8  8.6 - 10.5 mg/dL Final   • Total Protein 09/25/2020 6.7  6.0 - 8.5 g/dL Final   • Albumin 09/25/2020 4.20  3.50 - 5.20 g/dL Final   • ALT (SGPT) 09/25/2020 57* 1 - 41 U/L Final   • AST (SGOT) 09/25/2020 29  1 - 40 U/L Final   • Alkaline Phosphatase 09/25/2020 54  39 - 117 U/L Final   • Total Bilirubin 09/25/2020 0.8  0.0 - 1.2 mg/dL Final   • eGFR Non African Amer 09/25/2020 54* >60 mL/min/1.73 Final   • Globulin 09/25/2020 2.5  gm/dL Final   • A/G Ratio 09/25/2020 1.7  g/dL Final   • BUN/Creatinine Ratio 09/25/2020 21.3  7.0 - 25.0 Final   • Anion Gap 09/25/2020 11.2  5.0 - 15.0 " mmol/L Final   • TSH 09/25/2020 0.970  0.270 - 4.200 uIU/mL Final   • Hemoglobin A1C 09/25/2020 6.40* 4.80 - 5.60 % Final   • 25 Hydroxy, Vitamin D 09/25/2020 33.7  30.0 - 100.0 ng/ml Final   • Vitamin B-12 09/25/2020 1,064* 211 - 946 pg/mL Final   • Total Cholesterol 09/25/2020 135  0 - 200 mg/dL Final   • Triglycerides 09/25/2020 327* 0 - 150 mg/dL Final   • HDL Cholesterol 09/25/2020 19* 40 - 60 mg/dL Final   • LDL Cholesterol  09/25/2020 51  0 - 100 mg/dL Final   • VLDL Cholesterol 09/25/2020 65.4* 5 - 40 mg/dL Final   • LDL/HDL Ratio 09/25/2020 2.66   Final   • Microalbumin, Urine 09/25/2020 <1.2  mg/dL Final   • WBC 09/25/2020 7.13  3.40 - 10.80 10*3/mm3 Final   • RBC 09/25/2020 5.42  4.14 - 5.80 10*6/mm3 Final   • Hemoglobin 09/25/2020 17.8* 13.0 - 17.7 g/dL Final   • Hematocrit 09/25/2020 50.0  37.5 - 51.0 % Final   • MCV 09/25/2020 92.3  79.0 - 97.0 fL Final   • MCH 09/25/2020 32.8  26.6 - 33.0 pg Final   • MCHC 09/25/2020 35.6  31.5 - 35.7 g/dL Final   • RDW 09/25/2020 12.2* 12.3 - 15.4 % Final   • RDW-SD 09/25/2020 41.1  37.0 - 54.0 fl Final   • MPV 09/25/2020 10.8  6.0 - 12.0 fL Final   • Platelets 09/25/2020 180  140 - 450 10*3/mm3 Final   • Neutrophil % 09/25/2020 32.4* 42.7 - 76.0 % Final   • Lymphocyte % 09/25/2020 39.0  19.6 - 45.3 % Final   • Monocyte % 09/25/2020 13.6* 5.0 - 12.0 % Final   • Eosinophil % 09/25/2020 14.2* 0.3 - 6.2 % Final   • Basophil % 09/25/2020 0.7  0.0 - 1.5 % Final   • Immature Grans % 09/25/2020 0.1  0.0 - 0.5 % Final   • Neutrophils, Absolute 09/25/2020 2.31  1.70 - 7.00 10*3/mm3 Final   • Lymphocytes, Absolute 09/25/2020 2.78  0.70 - 3.10 10*3/mm3 Final   • Monocytes, Absolute 09/25/2020 0.97* 0.10 - 0.90 10*3/mm3 Final   • Eosinophils, Absolute 09/25/2020 1.01* 0.00 - 0.40 10*3/mm3 Final   • Basophils, Absolute 09/25/2020 0.05  0.00 - 0.20 10*3/mm3 Final   • Immature Grans, Absolute 09/25/2020 0.01  0.00 - 0.05 10*3/mm3 Final   • nRBC 09/25/2020 0.0  0.0 - 0.2 /100 WBC  Final       Physical Exam  Vitals signs reviewed.   Constitutional:       General: He is not in acute distress.     Appearance: Normal appearance. He is well-developed and well-groomed. He is morbidly obese. He is not ill-appearing, toxic-appearing or diaphoretic.   HENT:      Head: Normocephalic and atraumatic. Hair is normal.      Comments: mask removed for brief oral exam     Right Ear: Tympanic membrane, ear canal and external ear normal.      Left Ear: Tympanic membrane, ear canal and external ear normal.      Nose: Nose normal.      Right Sinus: No maxillary sinus tenderness or frontal sinus tenderness.      Left Sinus: No maxillary sinus tenderness or frontal sinus tenderness.      Mouth/Throat:      Lips: Pink. No lesions.      Pharynx: No oropharyngeal exudate.   Eyes:      General: Lids are normal. Vision grossly intact. Gaze aligned appropriately.         Right eye: No discharge.         Left eye: No discharge.      Conjunctiva/sclera: Conjunctivae normal.      Pupils: Pupils are equal, round, and reactive to light.   Neck:      Musculoskeletal: Normal range of motion and neck supple. Normal range of motion. No spinous process tenderness or muscular tenderness.      Thyroid: No thyromegaly.      Trachea: No tracheal deviation.   Cardiovascular:      Rate and Rhythm: Normal rate and regular rhythm.      Pulses: Normal pulses.      Heart sounds: Normal heart sounds. No murmur. No friction rub. No gallop.    Pulmonary:      Effort: Pulmonary effort is normal. No accessory muscle usage or respiratory distress.      Breath sounds: Normal breath sounds. No wheezing or rales.   Chest:      Chest wall: No tenderness.   Abdominal:      General: Bowel sounds are normal. There is no distension.      Palpations: Abdomen is soft. There is no mass.      Tenderness: There is no abdominal tenderness. There is no guarding or rebound.   Musculoskeletal: Normal range of motion.   Lymphadenopathy:      Cervical: No cervical  adenopathy.   Skin:     General: Skin is warm and dry.      Capillary Refill: Capillary refill takes less than 2 seconds.      Coloration: Skin is not cyanotic or pale.      Findings: Erythema (100s of skin tags with erythema) present. No bruising or rash.      Nails: There is no clubbing.               Comments: Patient has 100s of skin tags on his trunk, axillary and neck region.   Neurological:      General: No focal deficit present.      Mental Status: He is alert and oriented to person, place, and time.      Deep Tendon Reflexes: Reflexes are normal and symmetric.      Comments: CN 2-12 grossly intact    Psychiatric:         Attention and Perception: Attention normal.         Mood and Affect: Mood normal.         Speech: Speech normal.         Behavior: Behavior normal. Behavior is cooperative.         Thought Content: Thought content normal.         Cognition and Memory: Cognition normal.         Judgment: Judgment normal.         Assessment/Plan     Problem List Items Addressed This Visit        Cardiovascular and Mediastinum    Essential hypertension    Current Assessment & Plan     Hypertension is stable, continue medications and work on weight loss         Relevant Medications    carvedilol (COREG) 6.25 MG tablet    chlorthalidone (HYGROTON) 25 MG tablet    lisinopril (PRINIVIL,ZESTRIL) 20 MG tablet       Respiratory    COPD (chronic obstructive pulmonary disease) (CMS/[facility])    Current Assessment & Plan     COPD is stable, recommend at least yearly pulmonology follow-up.  Continue current inhalers         Relevant Medications    albuterol sulfate HFA (ProAir HFA) 108 (90 Base) MCG/ACT inhaler    benzonatate (Tessalon Perles) 100 MG capsule    cetirizine (zyrTEC) 10 MG tablet    Fluticasone Furoate-Vilanterol (Breo Ellipta) 100-25 MCG/INH inhaler       Digestive    Class 3 severe obesity with serious comorbidity in adult (CMS/[facility])    Current Assessment & Plan     Obesity is worsening.  Discussed bariatric  surgery as option.  Patient is going to consider         Relevant Medications    omeprazole (priLOSEC) 20 MG capsule       Endocrine    Diabetes mellitus with diabetic neuropathy, with long-term current use of insulin (CMS/[facility])    Overview     Abnormal nerve conduction study on file         Current Assessment & Plan     Diabetes is improving.  I would like him to work harder on diet and exercise.  Discussed normal versus abnormal blood sugars in the risk of uncontrolled diabetes.  Discussed sick day rules.  Patient states understanding.         Relevant Medications    aspirin (aspirin) 81 MG EC tablet    glipizide (GLUCOTROL) 5 MG tablet    icosapent ethyl (VASCEPA) 1 g capsule capsule    Insulin Glargine (LANTUS SOLOSTAR) 100 UNIT/ML injection pen    metFORMIN (FORTAMET) 500 MG (OSM) 24 hr tablet       Musculoskeletal and Integument    Chronic idiopathic gout of multiple sites    Relevant Medications    febuxostat (ULORIC) 80 MG tablet tablet    Skin tags, multiple acquired - Primary    Overview     Bleeding and catching on clothing          Current Assessment & Plan     Tolerated cryotherapy well            Other    Primary insomnia    Current Assessment & Plan     With anxiety.  Stable with trazodone and Zoloft         Relevant Medications    traZODone (DESYREL) 50 MG tablet      Other Visit Diagnoses     Left hip pain        Relevant Medications    meloxicam (MOBIC) 7.5 MG tablet          Current Outpatient Medications   Medication Instructions   • albuterol sulfate HFA (ProAir HFA) 108 (90 Base) MCG/ACT inhaler 2 puffs, Inhalation, Every 6 Hours PRN   • aspirin 81 mg, Oral, Daily   • benzonatate (TESSALON PERLES) 100 mg, Oral, 3 Times Daily PRN   • carvedilol (COREG) 6.25 mg, Oral, 2 Times Daily With Meals   • cetirizine (ZYRTEC) 10 mg, Oral, Daily   • chlorthalidone (HYGROTON) 25 mg, Oral, Daily   • colchicine 0.6 mg, Oral, Daily   • cyanocobalamin (VITAMIN B-12) 250 mcg, Oral, Daily   • cyclobenzaprine  (FLEXERIL) 5 MG tablet No dose, route, or frequency recorded.   • febuxostat (ULORIC) 80 mg, Oral, Daily   • fenofibrate (TRICOR) 145 mg, Oral, Daily   • Fluticasone Furoate-Vilanterol (Breo Ellipta) 100-25 MCG/INH inhaler 1 puff every morning   • gabapentin (NEURONTIN) 800 mg, Oral, 3 Times Daily   • glipizide (GLUCOTROL) 5 mg, Oral, Daily   • glucose blood test strip Use as instructed   • HYDROcodone-acetaminophen (NORCO)  MG per tablet TAKE 1/2 TABLET BY MOUTH TWICE DAILY AS NEEDED   • icosapent ethyl (VASCEPA) 1 g, Oral, 2 Times Daily With Meals   • Insulin Glargine (LANTUS SOLOSTAR) 21 Units, Subcutaneous, Daily   • Insulin Pen Needle (Pen Needles) 32G X 4 MM misc 1 Device, Does not apply, Daily   • ketoconazole (NIZORAL) 2 % shampoo apply TO THE appropriate AREA TWICE WEEKLY AS directed   • lisinopril (PRINIVIL,ZESTRIL) 20 mg, Oral, 2 Times Daily   • meloxicam (MOBIC) 7.5 mg, Oral, Daily PRN, for pain   • metFORMIN (FORTAMET) 500 mg, Oral, 2 Times Daily With Meals   • mupirocin (BACTROBAN) 2 % ointment Topical, 3 Times Daily   • omeprazole (PRILOSEC) 20 mg, Oral, Daily   • ONE TOUCH LANCETS misc 1 Device, Does not apply, 2 Times Daily   • oxyCODONE-acetaminophen (PERCOCET) 5-325 MG per tablet No dose, route, or frequency recorded.   • sertraline (ZOLOFT) 50 mg, Oral, Daily   • sildenafil (VIAGRA) 100 mg, Oral, Daily PRN   • traZODone (DESYREL) 25-50 mg, Oral, Nightly   • Vitamin D 1,000 Units, Oral, Daily         Current Outpatient Medications:   •  albuterol sulfate HFA (ProAir HFA) 108 (90 Base) MCG/ACT inhaler, Inhale 2 puffs Every 6 (Six) Hours As Needed for Wheezing., Disp: 18 g, Rfl: 5  •  aspirin (aspirin) 81 MG EC tablet, Take 1 tablet by mouth Daily. Indications: Stable Angina Pectoris, Disp: 90 tablet, Rfl: 1  •  benzonatate (Tessalon Perles) 100 MG capsule, Take 1 capsule by mouth 3 (Three) Times a Day As Needed for Cough., Disp: 30 capsule, Rfl: 2  •  carvedilol (COREG) 6.25 MG tablet, Take 1  tablet by mouth 2 (Two) Times a Day With Meals., Disp: 60 tablet, Rfl: 5  •  cetirizine (zyrTEC) 10 MG tablet, Take 1 tablet by mouth Daily., Disp: 30 tablet, Rfl: 5  •  chlorthalidone (HYGROTON) 25 MG tablet, Take 1 tablet by mouth Daily., Disp: 30 tablet, Rfl: 5  •  Cholecalciferol (VITAMIN D) 1000 units tablet, Take 1 tablet by mouth Daily., Disp: 30 tablet, Rfl: 5  •  colchicine 0.6 MG capsule capsule, Take 1 capsule by mouth Daily., Disp: 30 capsule, Rfl: 5  •  cyanocobalamin (VITAMIN B-12) 250 MCG tablet, Take 1 tablet by mouth Daily., Disp: 30 tablet, Rfl: 5  •  cyclobenzaprine (FLEXERIL) 5 MG tablet, , Disp: , Rfl:   •  febuxostat (ULORIC) 80 MG tablet tablet, Take 1 tablet by mouth Daily. Indications: Disorder of Excessive Uric Acid in the Blood, Disp: 30 tablet, Rfl: 5  •  fenofibrate (Tricor) 145 MG tablet, Take 1 tablet by mouth Daily., Disp: 30 tablet, Rfl: 5  •  Fluticasone Furoate-Vilanterol (Breo Ellipta) 100-25 MCG/INH inhaler, 1 puff every morning, Disp: 1 each, Rfl: 5  •  gabapentin (NEURONTIN) 800 MG tablet, Take 800 mg by mouth 3 (Three) Times a Day., Disp: , Rfl:   •  glipizide (GLUCOTROL) 5 MG tablet, Take 1 tablet by mouth Daily., Disp: 30 tablet, Rfl: 5  •  glucose blood test strip, Use as instructed, Disp: 50 each, Rfl: 12  •  HYDROcodone-acetaminophen (NORCO)  MG per tablet, TAKE 1/2 TABLET BY MOUTH TWICE DAILY AS NEEDED, Disp: , Rfl:   •  icosapent ethyl (VASCEPA) 1 g capsule capsule, Take 1 g by mouth 2 (Two) Times a Day With Meals. Indications: High Amount of Triglycerides in the Blood, Disp: 120 capsule, Rfl: 5  •  Insulin Glargine (LANTUS SOLOSTAR) 100 UNIT/ML injection pen, Inject 21 Units under the skin into the appropriate area as directed Daily. Indications: Type 2 Diabetes, Disp: 2 pen, Rfl: 5  •  Insulin Pen Needle (Pen Needles) 32G X 4 MM misc, 1 Device Daily., Disp: 100 each, Rfl: 3  •  ketoconazole (NIZORAL) 2 % shampoo, apply TO THE appropriate AREA TWICE WEEKLY AS  directed, Disp: 120 mL, Rfl: 5  •  lisinopril (PRINIVIL,ZESTRIL) 20 MG tablet, Take 1 tablet by mouth 2 (Two) Times a Day., Disp: 60 tablet, Rfl: 5  •  meloxicam (MOBIC) 7.5 MG tablet, Take 1 tablet by mouth Daily As Needed for Mild Pain . for pain, Disp: 30 tablet, Rfl: 5  •  metFORMIN (FORTAMET) 500 MG (OSM) 24 hr tablet, Take 1 tablet by mouth 2 (Two) Times a Day With Meals., Disp: 60 tablet, Rfl: 5  •  mupirocin (BACTROBAN) 2 % ointment, Apply  topically to the appropriate area as directed 3 (Three) Times a Day., Disp: 1 each, Rfl: 1  •  omeprazole (priLOSEC) 20 MG capsule, Take 1 capsule by mouth Daily. Indications: Gastroesophageal Reflux Disease, Disp: 30 capsule, Rfl: 5  •  ONE TOUCH LANCETS misc, 1 Device 2 (Two) Times a Day., Disp: 200 each, Rfl: 3  •  oxyCODONE-acetaminophen (PERCOCET) 5-325 MG per tablet, , Disp: , Rfl:   •  sertraline (ZOLOFT) 50 MG tablet, Take 1 tablet by mouth Daily., Disp: 30 tablet, Rfl: 5  •  sildenafil (VIAGRA) 100 MG tablet, Take 1 tablet by mouth Daily As Needed for Erectile Dysfunction., Disp: 10 tablet, Rfl: 5  •  traZODone (DESYREL) 50 MG tablet, Take 0.5-1 tablets by mouth Every Night., Disp: 30 tablet, Rfl: 5      I have reviewed medication list and discussed with patient the possible side effects and interactions.  We have discussed purpose for medication, route, dosage, frequency.  Refill routine medications.    Recent labs reviewed and discussed with patient.    Coronavirus precautions have been reviewed and discussed.  I have discussed the CDC recommendations  of social distancing, hand washing and disinfecting commonly touched items. Reviewed need to notify PCP and self quarantine with mild symptoms.  Discussed procedure to obtain Covid-19 testing and notification of PCP/health dept/ED/Urgent Center if symptoms begin. Understanding verbalized.       Patient's Body mass index is 50.86 kg/m². BMI is above normal parameters. Recommendations include: exercise counseling  and nutrition counseling.  Strongly recommend weight loss surgery.  I do not feel Adipex is beneficial to this patient at this time.  Talked about his eating habits and encouraged him to keep healthy finger foods available for his nighttime snacking of such as carrots, sliced apples, jerky, peanut butter and celery etc.       Pt has been educated/instructed on diabetic care and protocols.  Diabetic diet instructions provided.  Medication regimen reviewed.  Discussed possible side effects and interactions of current medications.  Sick day rules reviewed. Continue to monitor blood sugar and report abnormal readings as discussed today. Understanding stated by patient.         I have discussed diagnosis in detail today allowing time for questions and answers. Patient is aware of reasons to seek urgent or emergent medical care as well as reasons to return to the clinic for evaluation. Possible side effects, interactions and progression of symptoms discussed as well. Patient / family states understanding.   Emotional support and active listening provided.       Follow up in 3 months. Routine labs fasting one week prior to next office visit. Return sooner if needed.           This document has been electronically signed by:  TYSON Vinson, NP-C

## 2021-02-08 DIAGNOSIS — E11.40 TYPE 2 DIABETES MELLITUS WITH DIABETIC NEUROPATHY, WITH LONG-TERM CURRENT USE OF INSULIN (HCC): ICD-10-CM

## 2021-02-08 DIAGNOSIS — Z79.4 TYPE 2 DIABETES MELLITUS WITH DIABETIC NEUROPATHY, WITH LONG-TERM CURRENT USE OF INSULIN (HCC): ICD-10-CM

## 2021-02-08 RX ORDER — ERGOCALCIFEROL 1.25 MG/1
50000 CAPSULE ORAL WEEKLY
Qty: 5 CAPSULE | Refills: 11 | Status: SHIPPED | OUTPATIENT
Start: 2021-02-08

## 2021-02-08 RX ORDER — METFORMIN HYDROCHLORIDE EXTENDED-RELEASE TABLETS 500 MG/1
500 TABLET, FILM COATED, EXTENDED RELEASE ORAL 2 TIMES DAILY WITH MEALS
Qty: 60 TABLET | Refills: 5 | Status: SHIPPED | OUTPATIENT
Start: 2021-02-08

## 2021-03-16 ENCOUNTER — BULK ORDERING (OUTPATIENT)
Dept: CASE MANAGEMENT | Facility: OTHER | Age: 46
End: 2021-03-16

## 2021-03-16 DIAGNOSIS — Z23 IMMUNIZATION DUE: ICD-10-CM

## 2021-03-18 ENCOUNTER — PROCEDURE VISIT (OUTPATIENT)
Dept: FAMILY MEDICINE CLINIC | Facility: CLINIC | Age: 46
End: 2021-03-18

## 2021-03-18 VITALS
HEIGHT: 72 IN | OXYGEN SATURATION: 96 % | DIASTOLIC BLOOD PRESSURE: 85 MMHG | TEMPERATURE: 97.1 F | BODY MASS INDEX: 42.66 KG/M2 | SYSTOLIC BLOOD PRESSURE: 130 MMHG | HEART RATE: 84 BPM | WEIGHT: 315 LBS

## 2021-03-18 DIAGNOSIS — E66.01 CLASS 3 SEVERE OBESITY DUE TO EXCESS CALORIES WITH SERIOUS COMORBIDITY IN ADULT, UNSPECIFIED BMI (HCC): Primary | ICD-10-CM

## 2021-03-18 DIAGNOSIS — N52.8 OTHER MALE ERECTILE DYSFUNCTION: ICD-10-CM

## 2021-03-18 DIAGNOSIS — M47.16 SPONDYLOSIS WITH MYELOPATHY, LUMBAR REGION: ICD-10-CM

## 2021-03-18 DIAGNOSIS — L91.8 SKIN TAGS, MULTIPLE ACQUIRED: ICD-10-CM

## 2021-03-18 PROCEDURE — 17111 DESTRUCTION B9 LESIONS 15/>: CPT | Performed by: NURSE PRACTITIONER

## 2021-03-18 PROCEDURE — 99214 OFFICE O/P EST MOD 30 MIN: CPT | Performed by: NURSE PRACTITIONER

## 2021-03-18 RX ORDER — PHENTERMINE HYDROCHLORIDE 37.5 MG/1
37.5 TABLET ORAL
Qty: 30 TABLET | Refills: 0 | Status: SHIPPED | OUTPATIENT
Start: 2021-03-18

## 2021-03-18 RX ORDER — TADALAFIL 10 MG/1
10 TABLET ORAL DAILY PRN
Qty: 30 TABLET | Refills: 5 | Status: SHIPPED | OUTPATIENT
Start: 2021-03-18

## 2021-03-18 NOTE — PROGRESS NOTES
Subjective   Johnathon Mclain is a 45 y.o. male.     No chief complaint on file.    Chief complaint  Obesity  Skin tags      History of Present Illness:     Chronic pain - under care of pain management .     Dm -patient reports that his glucose readings have been within normal.  He is trying to follow his diet though he has showed some weight gain.  Patient does not wish to adjust any of his medications.  He wishes to work on diet.  He is hoping now that warmer weather is here he will be up to get back to walking.    Skin tags -multiple skin tags in the axillary, trunk and neck area.  Skin tags catch on clothing and bleed.  Become inflamed and painful.    Obesity-current weight is 377 pounds with a BMI greater than 51.  Patient had successfully lost weight previously on Adipex.  He would like to discuss Adipex today.  Patient reports Adipex helps decrease his appetite and give him energy.  His blood pressure is well controlled today and his heart rate is within normal.    Sleep apnea-weight loss would be beneficial for sleep apnea.  Wear CPAP.    Erectile dysfunction-patient reports Viagra is not very helpful.  He would like to try Cialis.  Understands possible side effects and risk.      The following portions of the patient's history and ROS were reviewed and updated as appropriate per provider:  Allergies, current medications, past family history, past medical history, past social history, past surgical history and problem list.    Review of Systems   Constitutional: Negative for activity change, appetite change, chills, fatigue and fever.   HENT: Negative for congestion, ear pain, facial swelling, hearing loss, sinus pressure, sore throat, trouble swallowing and voice change.    Eyes: Negative for pain, discharge and visual disturbance.   Respiratory: Negative for apnea, cough, chest tightness, shortness of breath and wheezing.    Cardiovascular: Negative for chest pain, palpitations and leg swelling.  "  Gastrointestinal: Negative for abdominal pain, blood in stool, constipation, diarrhea, nausea and vomiting.   Endocrine: Negative.    Genitourinary: Negative for discharge, dysuria, flank pain, hematuria, penile pain, penile swelling and urgency.   Musculoskeletal: Positive for arthralgias, back pain and gait problem. Negative for neck stiffness.   Skin: Positive for color change. Negative for pallor and wound.   Allergic/Immunologic: Negative.    Neurological: Negative for dizziness, facial asymmetry and headaches.   Hematological: Negative.    Psychiatric/Behavioral: Negative for confusion, dysphoric mood, self-injury and suicidal ideas. The patient is not nervous/anxious.        Objective     /85   Pulse 84   Temp 97.1 °F (36.2 °C) (Temporal)   Ht 182.9 cm (72\")   Wt (!) 171 kg (377 lb)   SpO2 96%   BMI 51.13 kg/m²   Lab on 09/25/2020   Component Date Value Ref Range Status   • Glucose 09/25/2020 176* 65 - 99 mg/dL Final   • BUN 09/25/2020 30* 6 - 20 mg/dL Final   • Creatinine 09/25/2020 1.41* 0.76 - 1.27 mg/dL Final   • Sodium 09/25/2020 136  136 - 145 mmol/L Final   • Potassium 09/25/2020 4.3  3.5 - 5.2 mmol/L Final   • Chloride 09/25/2020 98  98 - 107 mmol/L Final   • CO2 09/25/2020 26.8  22.0 - 29.0 mmol/L Final   • Calcium 09/25/2020 9.8  8.6 - 10.5 mg/dL Final   • Total Protein 09/25/2020 6.7  6.0 - 8.5 g/dL Final   • Albumin 09/25/2020 4.20  3.50 - 5.20 g/dL Final   • ALT (SGPT) 09/25/2020 57* 1 - 41 U/L Final   • AST (SGOT) 09/25/2020 29  1 - 40 U/L Final   • Alkaline Phosphatase 09/25/2020 54  39 - 117 U/L Final   • Total Bilirubin 09/25/2020 0.8  0.0 - 1.2 mg/dL Final   • eGFR Non African Amer 09/25/2020 54* >60 mL/min/1.73 Final   • Globulin 09/25/2020 2.5  gm/dL Final   • A/G Ratio 09/25/2020 1.7  g/dL Final   • BUN/Creatinine Ratio 09/25/2020 21.3  7.0 - 25.0 Final   • Anion Gap 09/25/2020 11.2  5.0 - 15.0 mmol/L Final   • TSH 09/25/2020 0.970  0.270 - 4.200 uIU/mL Final   • Hemoglobin " A1C 09/25/2020 6.40* 4.80 - 5.60 % Final   • 25 Hydroxy, Vitamin D 09/25/2020 33.7  30.0 - 100.0 ng/ml Final   • Vitamin B-12 09/25/2020 1,064* 211 - 946 pg/mL Final   • Total Cholesterol 09/25/2020 135  0 - 200 mg/dL Final   • Triglycerides 09/25/2020 327* 0 - 150 mg/dL Final   • HDL Cholesterol 09/25/2020 19* 40 - 60 mg/dL Final   • LDL Cholesterol  09/25/2020 51  0 - 100 mg/dL Final   • VLDL Cholesterol 09/25/2020 65.4* 5 - 40 mg/dL Final   • LDL/HDL Ratio 09/25/2020 2.66   Final   • Microalbumin, Urine 09/25/2020 <1.2  mg/dL Final   • WBC 09/25/2020 7.13  3.40 - 10.80 10*3/mm3 Final   • RBC 09/25/2020 5.42  4.14 - 5.80 10*6/mm3 Final   • Hemoglobin 09/25/2020 17.8* 13.0 - 17.7 g/dL Final   • Hematocrit 09/25/2020 50.0  37.5 - 51.0 % Final   • MCV 09/25/2020 92.3  79.0 - 97.0 fL Final   • MCH 09/25/2020 32.8  26.6 - 33.0 pg Final   • MCHC 09/25/2020 35.6  31.5 - 35.7 g/dL Final   • RDW 09/25/2020 12.2* 12.3 - 15.4 % Final   • RDW-SD 09/25/2020 41.1  37.0 - 54.0 fl Final   • MPV 09/25/2020 10.8  6.0 - 12.0 fL Final   • Platelets 09/25/2020 180  140 - 450 10*3/mm3 Final   • Neutrophil % 09/25/2020 32.4* 42.7 - 76.0 % Final   • Lymphocyte % 09/25/2020 39.0  19.6 - 45.3 % Final   • Monocyte % 09/25/2020 13.6* 5.0 - 12.0 % Final   • Eosinophil % 09/25/2020 14.2* 0.3 - 6.2 % Final   • Basophil % 09/25/2020 0.7  0.0 - 1.5 % Final   • Immature Grans % 09/25/2020 0.1  0.0 - 0.5 % Final   • Neutrophils, Absolute 09/25/2020 2.31  1.70 - 7.00 10*3/mm3 Final   • Lymphocytes, Absolute 09/25/2020 2.78  0.70 - 3.10 10*3/mm3 Final   • Monocytes, Absolute 09/25/2020 0.97* 0.10 - 0.90 10*3/mm3 Final   • Eosinophils, Absolute 09/25/2020 1.01* 0.00 - 0.40 10*3/mm3 Final   • Basophils, Absolute 09/25/2020 0.05  0.00 - 0.20 10*3/mm3 Final   • Immature Grans, Absolute 09/25/2020 0.01  0.00 - 0.05 10*3/mm3 Final   • nRBC 09/25/2020 0.0  0.0 - 0.2 /100 WBC Final       Physical Exam  Vitals reviewed.   Constitutional:       General: He is  not in acute distress.     Appearance: Normal appearance. He is well-developed. He is obese. He is not ill-appearing, toxic-appearing or diaphoretic.   HENT:      Head: Normocephalic and atraumatic. Hair is normal.      Right Ear: Tympanic membrane, ear canal and external ear normal.      Left Ear: Tympanic membrane, ear canal and external ear normal.      Nose: Nose normal.      Right Sinus: No maxillary sinus tenderness or frontal sinus tenderness.      Left Sinus: No maxillary sinus tenderness or frontal sinus tenderness.      Mouth/Throat:      Lips: Pink. No lesions.      Pharynx: No oropharyngeal exudate.   Eyes:      General: Lids are normal. Vision grossly intact. Gaze aligned appropriately.         Right eye: No discharge.         Left eye: No discharge.      Conjunctiva/sclera: Conjunctivae normal.      Pupils: Pupils are equal, round, and reactive to light.   Neck:      Thyroid: No thyromegaly.      Trachea: No tracheal deviation.   Cardiovascular:      Rate and Rhythm: Normal rate and regular rhythm.      Pulses: Normal pulses.      Heart sounds: Normal heart sounds. No murmur heard.   No friction rub. No gallop.    Pulmonary:      Effort: Pulmonary effort is normal. No accessory muscle usage or respiratory distress.      Breath sounds: Normal breath sounds. No wheezing or rales.   Chest:      Chest wall: No tenderness.   Abdominal:      General: Abdomen is protuberant. Bowel sounds are normal. There is no distension.      Palpations: Abdomen is soft. There is no mass.      Tenderness: There is no abdominal tenderness. There is no guarding or rebound.   Musculoskeletal:         General: Normal range of motion.      Cervical back: Normal range of motion and neck supple. No spinous process tenderness or muscular tenderness. Normal range of motion.   Lymphadenopathy:      Cervical: No cervical adenopathy.   Skin:     General: Skin is warm and dry.      Capillary Refill: Capillary refill takes less than 2  seconds.      Coloration: Skin is not cyanotic or pale.      Findings: Erythema (Inflamed and irritated skin tags) present. No rash.      Nails: There is no clubbing.      Comments: Multiple skin tags around the neck, trunk and axillary regions    Cryotherapy x21 skin tags under the right axillary region   Neurological:      General: No focal deficit present.      Mental Status: He is alert and oriented to person, place, and time.      Deep Tendon Reflexes: Reflexes are normal and symmetric.      Comments: CN 2-12 grossly intact    Psychiatric:         Attention and Perception: Attention normal.         Mood and Affect: Mood normal.         Speech: Speech normal.         Behavior: Behavior normal. Behavior is cooperative.         Thought Content: Thought content normal.         Cognition and Memory: Cognition normal.         Judgment: Judgment normal.         Assessment/Plan     Problem List Items Addressed This Visit        Endocrine and Metabolic    Class 3 severe obesity with serious comorbidity in adult (CMS/[facility]) - Primary (Chronic)    Current Assessment & Plan     Patient's (Body mass index is 51.13 kg/m².) indicates that they are morbidly obese (BMI > 40 or > 35 with obesity - related health condition) with obesity-related health conditions that include obstructive sleep apnea, hypertension, coronary heart disease, diabetes mellitus, GERD and osteoarthritis . Obesity is unchanged. BMI is is above average; BMI management plan is completed. We discussed portion control, increasing exercise and pharmacologic options including Adipex.  1800-calorie a day diet.  Patient declines bariatric consult.          Relevant Medications    phentermine (Adipex-P) 37.5 MG tablet       Genitourinary and Reproductive     Other male erectile dysfunction (Chronic)    Current Assessment & Plan     Stop Viagra.  Will start Cialis on a daily basis.  Discussed possible side effects and risk versus benefits.  Patient states  understanding.         Relevant Medications    tadalafil (Cialis) 10 MG tablet       Neuro    Spondylosis with myelopathy, lumbar region (Chronic)    Overview     Added automatically from request for surgery [id number]         Current Assessment & Plan     Weight loss recommended.  Body mechanics reviewed.  Remain under the care of pain management.            Skin    Skin tags, multiple acquired    Overview     Bleeding and catching on clothing          Current Assessment & Plan     Erythema around skin tags with dried blood.  Catching on clothing.  21 lesions destroyed with cryotherapy.             I have reviewed medication list and discussed with patient the possible side effects and interactions.  We have discussed purpose for medication, route, dosage, frequency.  Refill routine medications.    Recent labs reviewed and discussed with patient.    Pt has been instructed today regarding low fat heart smart diet. Weight management and routine exercise has been recommended. Avoid high fat foods, starchy foods and processed foods. Increase lean meats, fresh vegetables and fresh fruits.     Pt has been educated/instructed on diabetic care and protocols.  Diabetic diet instructions provided.  Medication regimen reviewed.  Discussed possible side effects and interactions of current medications.  Sick day rules reviewed. Continue to monitor blood sugar and report abnormal readings as discussed today. Understanding stated by patient.       Removed 21 skin tags under the right axillary region with cryotherapy today.       Patient's Body mass index is 51.13 kg/m². BMI is above normal parameters. Recommendations include: exercise counseling, nutrition counseling and pharmacological intervention.    Johnathon Chemo  reports that he has never smoked. His smokeless tobacco use includes chew.. I have educated him on the risk of diseases from using tobacco products such as cancer, COPD, heart disease and cataracts.     I advised him  to quit and he is not willing to quit.    I spent 3  minutes counseling the patient.    John/PDMP has been reviewed as consistent.  Will get screening UDS today.  Denny reasons to stop Adipex and to seek immediate medical attention.  Patient states understanding.    I have discussed diagnosis in detail today allowing time for questions and answers. Patient is aware of reasons to seek urgent or emergent medical care as well as reasons to return to the clinic for evaluation. Possible side effects, interactions and progression of symptoms discussed as well. Patient / family states understanding.   Emotional support and active listening provided.       Follow up in one month, sooner if needed. Routine labs every 3-6 months.         This document has been electronically signed by:  TYSON Vinson, NP-C

## 2021-03-18 NOTE — ASSESSMENT & PLAN NOTE
Erythema around skin tags with dried blood.  Catching on clothing.  21 lesions destroyed with cryotherapy.

## 2021-03-18 NOTE — ASSESSMENT & PLAN NOTE
Stop Viagra.  Will start Cialis on a daily basis.  Discussed possible side effects and risk versus benefits.  Patient states understanding.

## 2021-03-18 NOTE — ASSESSMENT & PLAN NOTE
Patient's (Body mass index is 51.13 kg/m².) indicates that they are morbidly obese (BMI > 40 or > 35 with obesity - related health condition) with obesity-related health conditions that include obstructive sleep apnea, hypertension, coronary heart disease, diabetes mellitus, GERD and osteoarthritis . Obesity is unchanged. BMI is is above average; BMI management plan is completed. We discussed portion control, increasing exercise and pharmacologic options including Adipex.  1800-calorie a day diet.  Patient declines bariatric consult.

## 2021-03-25 DIAGNOSIS — E66.01 CLASS 3 SEVERE OBESITY DUE TO EXCESS CALORIES WITH SERIOUS COMORBIDITY IN ADULT, UNSPECIFIED BMI (HCC): Primary | ICD-10-CM

## 2021-04-02 ENCOUNTER — TELEPHONE (OUTPATIENT)
Dept: FAMILY MEDICINE CLINIC | Facility: CLINIC | Age: 46
End: 2021-04-02

## 2021-06-09 NOTE — DISCHARGE INSTRUCTIONS
Pt admitted to [address] per Frankey Handler SON Martins, RN  06/09/21 [id number] You have received an injection of local anesthetic and/or corticosteroids. The local anesthetic effect typically last 2-6 hours. It may take 3-10 days for the corticosteroids to reach optimal effect.    Please pay close attention to the following information/instructions:    You may not drive for 12 hours after your injection.    It is common to experience mild soreness at the injection site(s) for 24-48 hours. Ice is the best remedy. You may apply ice for 20 minutes at a time several times a day as needed.    Avoid heat to the injection area for 72 hours. No hot packs, saunas, or steam rooms during this time. A regular shower is OK.    You may restart your regular medication regimen, including pain medications, anti-inflammatory. Restart your blood thinner as directed by your physician.    Please remove the sterile dressing/band-aid tonight or tomorrow morning. Do not leave it on after you have taken a shower or gotten it wet.    Corticosteroid side effects can occur after this injection, but they usually resolve   after several days. These side effects can include flushing, hot flashes, mild palpitations, insomnia, water retention, feeling anxious/restless, or headaches.    While it is extremely rare to get an infection after a spinal procedure, please call the office if you develop any signs of infection. These signs include fevers/chills, severely increased pain, redness at the injections site, or any drainage from the injection site.    Remember that the corticosteroid benefit (long-term pain relief) from an injection can take as long as 10 days to occur. You may have a period of slightly increased pain after your injection before the cortisone takes effect. In some cases the local anesthetic will provide the majority of your injection. Please note this relief as well.     You may resume all of your normal daily activities 24 hours after your injection.    It is OK to restart your exercise or physical therapy  program as soon as you feel comfortable doing so.    Please call our office if you do not know or have your follow-up appointment date.    Please note your pain throughout your post-injection period.

## 2021-10-06 NOTE — ASSESSMENT & PLAN NOTE
Diabetes is improving with treatment.   Reminded to bring in blood sugar diary at next visit.  Dietary recommendations for ADA diet.  Regular aerobic exercise.  Discussed ways to avoid symptomatic hypoglycemia.  Discussed sick day management.  Discussed foot care.  Reminded to get yearly retinal exam.  Medication changes per orders.  Diabetes will be reassessed in 1 month.   General

## 2022-03-08 NOTE — DISCHARGE INSTR - DIET
Resume normal diet as tolerated.   impairments found/functional limitations in following categories/risk reduction/prevention/rehab potential/therapy frequency/predicted duration of therapy intervention/anticipated equipment needs at discharge/anticipated discharge recommendation

## 2023-11-08 NOTE — ASSESSMENT & PLAN NOTE
Obesity is unchanged.  Discussed the patient's BMI.  The BMI is above average; BMI management plan is completed.  General weight loss/lifestyle modification strategies discussed (elicit support from others; identify saboteurs; non-food rewards, etc).  Informal exercise measures discussed, e.g. taking stairs instead of elevator.  Regular aerobic exercise program discussed.   Patient declines diet changes/nutritional consult/recommendation for bariatric surgery.   SSKI Counseling:  I discussed with the patient the risks of SSKI including but not limited to thyroid abnormalities, metallic taste, GI upset, fever, headache, acne, arthralgias, paraesthesias, lymphadenopathy, easy bleeding, arrhythmias, and allergic reaction.

## (undated) DEVICE — NDL SPINE 25G 31/2IN BLU

## (undated) DEVICE — ANESTHESIA CIRCUIT ADULT-LF: Brand: MEDLINE INDUSTRIES, INC.

## (undated) DEVICE — CURITY AMD ANTIMICROBIAL PACKING STRIPS: Brand: CURITY

## (undated) DEVICE — NDL BLNT 18G 1 1/2IN

## (undated) DEVICE — HOLDER: Brand: DEROYAL

## (undated) DEVICE — NDL HYPO ECLPS SFTY 25G 1 1/2IN

## (undated) DEVICE — SPNG GZ STRL 2S 4X4 12PLY

## (undated) DEVICE — ENCORE® LATEX MICRO SIZE 7.5, STERILE LATEX POWDER-FREE SURGICAL GLOVE: Brand: ENCORE

## (undated) DEVICE — TRY SKINPREP PVP SCRB W PAINT

## (undated) DEVICE — DBD-DRAPE,LAP,CHOLE,W/TROUGHS,STERILE: Brand: MEDLINE

## (undated) DEVICE — SYR LL TP 10ML STRL

## (undated) DEVICE — DRAPE,UTILTY,TAPE,15X26, 4EA/PK: Brand: MEDLINE

## (undated) DEVICE — ANTIBACTERIAL UNDYED BRAIDED (POLYGLACTIN 910), SYNTHETIC ABSORBABLE SUTURE: Brand: COATED VICRYL

## (undated) DEVICE — GLV SURG PREMIERPRO MIC LTX PF SZ7.5 BRN

## (undated) DEVICE — JACKSON-PRATT 100CC BULB RESERVOIR: Brand: CARDINAL HEALTH

## (undated) DEVICE — BNDG ADHS CURAD FLX/FABRC 1X3IN STRL LF

## (undated) DEVICE — PK BASIC 70

## (undated) DEVICE — GLV SURG SENSICARE MICRO PF LF 8 STRL

## (undated) DEVICE — DRN WND HUBLSS FLUT FULL PERF SIL10MM

## (undated) DEVICE — PK HD AND NK 70

## (undated) DEVICE — APPL CHLORAPREP HI/LITE TINTED 3ML ORNG

## (undated) DEVICE — DRSNG WND BORDR/ADHS NONADHR/GZ LF 4X10IN STRL